# Patient Record
Sex: FEMALE | Race: BLACK OR AFRICAN AMERICAN | NOT HISPANIC OR LATINO | Employment: OTHER | URBAN - METROPOLITAN AREA
[De-identification: names, ages, dates, MRNs, and addresses within clinical notes are randomized per-mention and may not be internally consistent; named-entity substitution may affect disease eponyms.]

---

## 2017-05-27 ENCOUNTER — HOSPITAL ENCOUNTER (OUTPATIENT)
Facility: HOSPITAL | Age: 62
Discharge: HOME | DRG: 305 | End: 2017-05-29
Attending: EMERGENCY MEDICINE | Admitting: INTERNAL MEDICINE
Payer: COMMERCIAL

## 2017-05-27 DIAGNOSIS — E78.5 HYPERLIPIDEMIA, UNSPECIFIED HYPERLIPIDEMIA TYPE: Chronic | ICD-10-CM

## 2017-05-27 DIAGNOSIS — I16.9 HYPERTENSIVE CRISIS: Primary | ICD-10-CM

## 2017-05-27 DIAGNOSIS — R51.9 HEADACHE: ICD-10-CM

## 2017-05-27 DIAGNOSIS — R51.9 HEADACHE, UNSPECIFIED HEADACHE TYPE: ICD-10-CM

## 2017-05-27 PROBLEM — R71.8 MICROCYTOSIS: Status: ACTIVE | Noted: 2017-05-27

## 2017-05-27 LAB
ALBUMIN SERPL BCP-MCNC: 4.2 G/DL
ALP SERPL-CCNC: 61 U/L
ALT SERPL W/O P-5'-P-CCNC: 12 U/L
ANION GAP SERPL CALC-SCNC: 11 MMOL/L
APTT BLDCRRT: 25.8 SEC
AST SERPL-CCNC: 17 U/L
BACTERIA #/AREA URNS HPF: NORMAL /HPF
BASOPHILS # BLD AUTO: 0.01 K/UL
BASOPHILS NFR BLD: 0.2 %
BILIRUB SERPL-MCNC: 0.7 MG/DL
BILIRUB UR QL STRIP: NEGATIVE
BUN SERPL-MCNC: 10 MG/DL
CALCIUM SERPL-MCNC: 9.6 MG/DL
CHLORIDE SERPL-SCNC: 105 MMOL/L
CLARITY UR: CLEAR
CO2 SERPL-SCNC: 28 MMOL/L
COLOR UR: YELLOW
CREAT SERPL-MCNC: 0.9 MG/DL
DIFFERENTIAL METHOD: ABNORMAL
EOSINOPHIL # BLD AUTO: 0.1 K/UL
EOSINOPHIL NFR BLD: 1.1 %
ERYTHROCYTE [DISTWIDTH] IN BLOOD BY AUTOMATED COUNT: 14.6 %
EST. GFR  (AFRICAN AMERICAN): >60 ML/MIN/1.73 M^2
EST. GFR  (NON AFRICAN AMERICAN): >60 ML/MIN/1.73 M^2
FERRITIN SERPL-MCNC: 143 NG/ML
GLUCOSE SERPL-MCNC: 127 MG/DL
GLUCOSE UR QL STRIP: NEGATIVE
HCT VFR BLD AUTO: 40 %
HGB BLD-MCNC: 12.9 G/DL
HGB UR QL STRIP: NEGATIVE
HYALINE CASTS #/AREA URNS LPF: 0 /LPF
INR PPP: 1
KETONES UR QL STRIP: NEGATIVE
LEUKOCYTE ESTERASE UR QL STRIP: NEGATIVE
LYMPHOCYTES # BLD AUTO: 1.3 K/UL
LYMPHOCYTES NFR BLD: 22.5 %
MAGNESIUM SERPL-MCNC: 2.2 MG/DL
MCH RBC QN AUTO: 26.2 PG
MCHC RBC AUTO-ENTMCNC: 32.3 %
MCV RBC AUTO: 81 FL
MICROSCOPIC COMMENT: NORMAL
MONOCYTES # BLD AUTO: 0.4 K/UL
MONOCYTES NFR BLD: 6.2 %
NEUTROPHILS # BLD AUTO: 4 K/UL
NEUTROPHILS NFR BLD: 70 %
NITRITE UR QL STRIP: NEGATIVE
PH UR STRIP: 8 [PH] (ref 5–8)
PHOSPHATE SERPL-MCNC: 3.8 MG/DL
PLATELET # BLD AUTO: 222 K/UL
PMV BLD AUTO: 10.1 FL
POCT GLUCOSE: 145 MG/DL (ref 70–110)
POTASSIUM SERPL-SCNC: 3.5 MMOL/L
PROT SERPL-MCNC: 7.3 G/DL
PROT UR QL STRIP: ABNORMAL
PROTHROMBIN TIME: 10.4 SEC
RBC # BLD AUTO: 4.92 M/UL
RBC #/AREA URNS HPF: 1 /HPF (ref 0–4)
SODIUM SERPL-SCNC: 144 MMOL/L
SP GR UR STRIP: 1.01 (ref 1–1.03)
SQUAMOUS #/AREA URNS HPF: 4 /HPF
TROPONIN I SERPL DL<=0.01 NG/ML-MCNC: <0.006 NG/ML
TSH SERPL DL<=0.005 MIU/L-ACNC: 0.94 UIU/ML
URN SPEC COLLECT METH UR: ABNORMAL
UROBILINOGEN UR STRIP-ACNC: 1 EU/DL
WBC # BLD AUTO: 5.64 K/UL
WBC #/AREA URNS HPF: 1 /HPF (ref 0–5)

## 2017-05-27 PROCEDURE — 84443 ASSAY THYROID STIM HORMONE: CPT

## 2017-05-27 PROCEDURE — 82607 VITAMIN B-12: CPT

## 2017-05-27 PROCEDURE — 93005 ELECTROCARDIOGRAM TRACING: CPT

## 2017-05-27 PROCEDURE — 84145 PROCALCITONIN (PCT): CPT

## 2017-05-27 PROCEDURE — 82962 GLUCOSE BLOOD TEST: CPT

## 2017-05-27 PROCEDURE — 85025 COMPLETE CBC W/AUTO DIFF WBC: CPT

## 2017-05-27 PROCEDURE — 84484 ASSAY OF TROPONIN QUANT: CPT

## 2017-05-27 PROCEDURE — 81000 URINALYSIS NONAUTO W/SCOPE: CPT

## 2017-05-27 PROCEDURE — 83540 ASSAY OF IRON: CPT

## 2017-05-27 PROCEDURE — 96375 TX/PRO/DX INJ NEW DRUG ADDON: CPT

## 2017-05-27 PROCEDURE — 80053 COMPREHEN METABOLIC PANEL: CPT

## 2017-05-27 PROCEDURE — 25000003 PHARM REV CODE 250: Performed by: STUDENT IN AN ORGANIZED HEALTH CARE EDUCATION/TRAINING PROGRAM

## 2017-05-27 PROCEDURE — 11000001 HC ACUTE MED/SURG PRIVATE ROOM

## 2017-05-27 PROCEDURE — 99291 CRITICAL CARE FIRST HOUR: CPT | Mod: 25

## 2017-05-27 PROCEDURE — 85730 THROMBOPLASTIN TIME PARTIAL: CPT

## 2017-05-27 PROCEDURE — 82728 ASSAY OF FERRITIN: CPT

## 2017-05-27 PROCEDURE — 83036 HEMOGLOBIN GLYCOSYLATED A1C: CPT

## 2017-05-27 PROCEDURE — 96374 THER/PROPH/DIAG INJ IV PUSH: CPT

## 2017-05-27 PROCEDURE — G0378 HOSPITAL OBSERVATION PER HR: HCPCS

## 2017-05-27 PROCEDURE — 84100 ASSAY OF PHOSPHORUS: CPT

## 2017-05-27 PROCEDURE — 25000003 PHARM REV CODE 250: Performed by: EMERGENCY MEDICINE

## 2017-05-27 PROCEDURE — 85610 PROTHROMBIN TIME: CPT

## 2017-05-27 PROCEDURE — 63600175 PHARM REV CODE 636 W HCPCS: Performed by: EMERGENCY MEDICINE

## 2017-05-27 PROCEDURE — 83735 ASSAY OF MAGNESIUM: CPT

## 2017-05-27 PROCEDURE — 82746 ASSAY OF FOLIC ACID SERUM: CPT

## 2017-05-27 RX ORDER — LABETALOL HYDROCHLORIDE 5 MG/ML
10 INJECTION, SOLUTION INTRAVENOUS EVERY 4 HOURS PRN
Status: DISCONTINUED | OUTPATIENT
Start: 2017-05-27 | End: 2017-05-29 | Stop reason: HOSPADM

## 2017-05-27 RX ORDER — IBUPROFEN 200 MG
24 TABLET ORAL
Status: DISCONTINUED | OUTPATIENT
Start: 2017-05-27 | End: 2017-05-29 | Stop reason: HOSPADM

## 2017-05-27 RX ORDER — ONDANSETRON 4 MG/1
4 TABLET, ORALLY DISINTEGRATING ORAL
Status: COMPLETED | OUTPATIENT
Start: 2017-05-27 | End: 2017-05-27

## 2017-05-27 RX ORDER — TRAZODONE HYDROCHLORIDE 100 MG/1
100 TABLET ORAL NIGHTLY
Status: DISCONTINUED | OUTPATIENT
Start: 2017-05-28 | End: 2017-05-29 | Stop reason: HOSPADM

## 2017-05-27 RX ORDER — FAMOTIDINE 20 MG/1
20 TABLET, FILM COATED ORAL 2 TIMES DAILY
Status: DISCONTINUED | OUTPATIENT
Start: 2017-05-27 | End: 2017-05-29 | Stop reason: HOSPADM

## 2017-05-27 RX ORDER — LISINOPRIL 20 MG/1
20 TABLET ORAL DAILY
Status: DISCONTINUED | OUTPATIENT
Start: 2017-05-27 | End: 2017-05-29

## 2017-05-27 RX ORDER — HEPARIN SODIUM 5000 [USP'U]/ML
5000 INJECTION, SOLUTION INTRAVENOUS; SUBCUTANEOUS EVERY 12 HOURS
Status: DISCONTINUED | OUTPATIENT
Start: 2017-05-28 | End: 2017-05-29 | Stop reason: HOSPADM

## 2017-05-27 RX ORDER — ATORVASTATIN CALCIUM 40 MG/1
40 TABLET, FILM COATED ORAL DAILY
Status: DISCONTINUED | OUTPATIENT
Start: 2017-05-28 | End: 2017-05-29 | Stop reason: HOSPADM

## 2017-05-27 RX ORDER — GLUCAGON 1 MG
1 KIT INJECTION
Status: DISCONTINUED | OUTPATIENT
Start: 2017-05-27 | End: 2017-05-29 | Stop reason: HOSPADM

## 2017-05-27 RX ORDER — MORPHINE SULFATE 2 MG/ML
4 INJECTION, SOLUTION INTRAMUSCULAR; INTRAVENOUS
Status: COMPLETED | OUTPATIENT
Start: 2017-05-27 | End: 2017-05-27

## 2017-05-27 RX ORDER — NAPROXEN SODIUM 220 MG/1
81 TABLET, FILM COATED ORAL DAILY
Status: ON HOLD | COMMUNITY
End: 2017-05-29

## 2017-05-27 RX ORDER — ONDANSETRON 8 MG/1
8 TABLET, ORALLY DISINTEGRATING ORAL EVERY 8 HOURS PRN
Status: DISCONTINUED | OUTPATIENT
Start: 2017-05-27 | End: 2017-05-29 | Stop reason: HOSPADM

## 2017-05-27 RX ORDER — CITALOPRAM 20 MG/1
40 TABLET, FILM COATED ORAL DAILY
Status: DISCONTINUED | OUTPATIENT
Start: 2017-05-28 | End: 2017-05-29 | Stop reason: HOSPADM

## 2017-05-27 RX ORDER — METHOCARBAMOL 500 MG/1
500 TABLET, FILM COATED ORAL 4 TIMES DAILY PRN
Status: DISCONTINUED | OUTPATIENT
Start: 2017-05-27 | End: 2017-05-29 | Stop reason: HOSPADM

## 2017-05-27 RX ORDER — INSULIN ASPART 100 [IU]/ML
0-5 INJECTION, SOLUTION INTRAVENOUS; SUBCUTANEOUS
Status: DISCONTINUED | OUTPATIENT
Start: 2017-05-27 | End: 2017-05-29 | Stop reason: HOSPADM

## 2017-05-27 RX ORDER — IBUPROFEN 200 MG
16 TABLET ORAL
Status: DISCONTINUED | OUTPATIENT
Start: 2017-05-27 | End: 2017-05-29 | Stop reason: HOSPADM

## 2017-05-27 RX ORDER — NAPROXEN SODIUM 220 MG/1
81 TABLET, FILM COATED ORAL DAILY
Status: DISCONTINUED | OUTPATIENT
Start: 2017-05-28 | End: 2017-05-29 | Stop reason: HOSPADM

## 2017-05-27 RX ORDER — HYDRALAZINE HYDROCHLORIDE 20 MG/ML
10 INJECTION INTRAMUSCULAR; INTRAVENOUS
Status: COMPLETED | OUTPATIENT
Start: 2017-05-27 | End: 2017-05-27

## 2017-05-27 RX ORDER — HYDROCODONE BITARTRATE AND ACETAMINOPHEN 10; 325 MG/1; MG/1
1 TABLET ORAL EVERY 6 HOURS PRN
Status: DISCONTINUED | OUTPATIENT
Start: 2017-05-27 | End: 2017-05-29 | Stop reason: HOSPADM

## 2017-05-27 RX ORDER — CARVEDILOL 6.25 MG/1
6.25 TABLET ORAL 2 TIMES DAILY WITH MEALS
Status: DISCONTINUED | OUTPATIENT
Start: 2017-05-28 | End: 2017-05-29 | Stop reason: HOSPADM

## 2017-05-27 RX ORDER — HYDROCHLOROTHIAZIDE 25 MG/1
25 TABLET ORAL DAILY
Status: DISCONTINUED | OUTPATIENT
Start: 2017-05-28 | End: 2017-05-29

## 2017-05-27 RX ORDER — ACETAMINOPHEN 325 MG/1
650 TABLET ORAL EVERY 6 HOURS PRN
Status: DISCONTINUED | OUTPATIENT
Start: 2017-05-27 | End: 2017-05-29 | Stop reason: HOSPADM

## 2017-05-27 RX ADMIN — HYDRALAZINE HYDROCHLORIDE 10 MG: 20 INJECTION INTRAMUSCULAR; INTRAVENOUS at 08:05

## 2017-05-27 RX ADMIN — FAMOTIDINE 20 MG: 20 TABLET, FILM COATED ORAL at 11:05

## 2017-05-27 RX ADMIN — MORPHINE SULFATE 4 MG: 2 INJECTION, SOLUTION INTRAMUSCULAR; INTRAVENOUS at 08:05

## 2017-05-27 RX ADMIN — ACETAMINOPHEN 650 MG: 325 TABLET ORAL at 10:05

## 2017-05-27 RX ADMIN — ONDANSETRON 4 MG: 4 TABLET, ORALLY DISINTEGRATING ORAL at 08:05

## 2017-05-27 RX ADMIN — LISINOPRIL 20 MG: 20 TABLET ORAL at 11:05

## 2017-05-27 RX ADMIN — LABETALOL HYDROCHLORIDE 10 MG: 5 INJECTION, SOLUTION INTRAVENOUS at 11:05

## 2017-05-28 LAB
ALBUMIN SERPL BCP-MCNC: 3.6 G/DL
ALP SERPL-CCNC: 51 U/L
ALT SERPL W/O P-5'-P-CCNC: 9 U/L
ANION GAP SERPL CALC-SCNC: 10 MMOL/L
AST SERPL-CCNC: 13 U/L
BASOPHILS # BLD AUTO: 0.01 K/UL
BASOPHILS NFR BLD: 0.2 %
BILIRUB SERPL-MCNC: 0.8 MG/DL
BUN SERPL-MCNC: 10 MG/DL
CALCIUM SERPL-MCNC: 8.8 MG/DL
CHLORIDE SERPL-SCNC: 103 MMOL/L
CHOLEST/HDLC SERPL: 2.8 {RATIO}
CO2 SERPL-SCNC: 26 MMOL/L
CREAT SERPL-MCNC: 0.8 MG/DL
CRP SERPL-MCNC: 17.7 MG/L
DIFFERENTIAL METHOD: ABNORMAL
EOSINOPHIL # BLD AUTO: 0 K/UL
EOSINOPHIL NFR BLD: 0.2 %
ERYTHROCYTE [DISTWIDTH] IN BLOOD BY AUTOMATED COUNT: 14.7 %
ERYTHROCYTE [SEDIMENTATION RATE] IN BLOOD BY WESTERGREN METHOD: 18 MM/HR
EST. GFR  (AFRICAN AMERICAN): >60 ML/MIN/1.73 M^2
EST. GFR  (NON AFRICAN AMERICAN): >60 ML/MIN/1.73 M^2
ESTIMATED AVG GLUCOSE: 180 MG/DL
FOLATE SERPL-MCNC: 12.6 NG/ML
GLUCOSE SERPL-MCNC: 140 MG/DL
HBA1C MFR BLD HPLC: 7.9 %
HCT VFR BLD AUTO: 35.3 %
HDL/CHOLESTEROL RATIO: 36.2 %
HDLC SERPL-MCNC: 127 MG/DL
HDLC SERPL-MCNC: 46 MG/DL
HGB BLD-MCNC: 11.4 G/DL
IRON SERPL-MCNC: 37 UG/DL
LDLC SERPL CALC-MCNC: 63 MG/DL
LYMPHOCYTES # BLD AUTO: 1 K/UL
LYMPHOCYTES NFR BLD: 17.3 %
MCH RBC QN AUTO: 26 PG
MCHC RBC AUTO-ENTMCNC: 32.3 %
MCV RBC AUTO: 80 FL
MONOCYTES # BLD AUTO: 0.3 K/UL
MONOCYTES NFR BLD: 4.2 %
NEUTROPHILS # BLD AUTO: 4.6 K/UL
NEUTROPHILS NFR BLD: 77.8 %
NONHDLC SERPL-MCNC: 81 MG/DL
PLATELET # BLD AUTO: 193 K/UL
PMV BLD AUTO: 10.2 FL
POCT GLUCOSE: 232 MG/DL (ref 70–110)
POCT GLUCOSE: 276 MG/DL (ref 70–110)
POCT GLUCOSE: 290 MG/DL (ref 70–110)
POTASSIUM SERPL-SCNC: 3.1 MMOL/L
PROCALCITONIN SERPL IA-MCNC: <0.09 NG/ML
PROT SERPL-MCNC: 6.1 G/DL
RBC # BLD AUTO: 4.39 M/UL
SATURATED IRON: 10 %
SODIUM SERPL-SCNC: 139 MMOL/L
TOTAL IRON BINDING CAPACITY: 358 UG/DL
TRANSFERRIN SERPL-MCNC: 242 MG/DL
TRIGL SERPL-MCNC: 90 MG/DL
VIT B12 SERPL-MCNC: 1941 PG/ML
WBC # BLD AUTO: 5.89 K/UL

## 2017-05-28 PROCEDURE — 94761 N-INVAS EAR/PLS OXIMETRY MLT: CPT

## 2017-05-28 PROCEDURE — 63600175 PHARM REV CODE 636 W HCPCS: Performed by: STUDENT IN AN ORGANIZED HEALTH CARE EDUCATION/TRAINING PROGRAM

## 2017-05-28 PROCEDURE — 86140 C-REACTIVE PROTEIN: CPT

## 2017-05-28 PROCEDURE — 36415 COLL VENOUS BLD VENIPUNCTURE: CPT

## 2017-05-28 PROCEDURE — 85652 RBC SED RATE AUTOMATED: CPT

## 2017-05-28 PROCEDURE — 85025 COMPLETE CBC W/AUTO DIFF WBC: CPT

## 2017-05-28 PROCEDURE — 25000003 PHARM REV CODE 250: Performed by: STUDENT IN AN ORGANIZED HEALTH CARE EDUCATION/TRAINING PROGRAM

## 2017-05-28 PROCEDURE — 80053 COMPREHEN METABOLIC PANEL: CPT

## 2017-05-28 PROCEDURE — 80061 LIPID PANEL: CPT

## 2017-05-28 PROCEDURE — G0378 HOSPITAL OBSERVATION PER HR: HCPCS

## 2017-05-28 PROCEDURE — 11000001 HC ACUTE MED/SURG PRIVATE ROOM

## 2017-05-28 RX ORDER — POTASSIUM CHLORIDE 20 MEQ/1
40 TABLET, EXTENDED RELEASE ORAL
Status: COMPLETED | OUTPATIENT
Start: 2017-05-28 | End: 2017-05-28

## 2017-05-28 RX ORDER — FLUTICASONE PROPIONATE 50 MCG
2 SPRAY, SUSPENSION (ML) NASAL DAILY
Status: DISCONTINUED | OUTPATIENT
Start: 2017-05-28 | End: 2017-05-29 | Stop reason: HOSPADM

## 2017-05-28 RX ORDER — PREDNISONE 20 MG/1
60 TABLET ORAL DAILY
Status: DISCONTINUED | OUTPATIENT
Start: 2017-05-28 | End: 2017-05-29

## 2017-05-28 RX ADMIN — HYDROCODONE BITARTRATE AND ACETAMINOPHEN 1 TABLET: 10; 325 TABLET ORAL at 12:05

## 2017-05-28 RX ADMIN — LISINOPRIL 20 MG: 20 TABLET ORAL at 08:05

## 2017-05-28 RX ADMIN — CITALOPRAM HYDROBROMIDE 40 MG: 20 TABLET ORAL at 08:05

## 2017-05-28 RX ADMIN — HYDROCODONE BITARTRATE AND ACETAMINOPHEN 1 TABLET: 10; 325 TABLET ORAL at 06:05

## 2017-05-28 RX ADMIN — ATORVASTATIN CALCIUM 40 MG: 40 TABLET, FILM COATED ORAL at 09:05

## 2017-05-28 RX ADMIN — HEPARIN SODIUM 5000 UNITS: 5000 INJECTION, SOLUTION INTRAVENOUS; SUBCUTANEOUS at 09:05

## 2017-05-28 RX ADMIN — POTASSIUM CHLORIDE 40 MEQ: 20 TABLET, EXTENDED RELEASE ORAL at 06:05

## 2017-05-28 RX ADMIN — FAMOTIDINE 20 MG: 20 TABLET, FILM COATED ORAL at 09:05

## 2017-05-28 RX ADMIN — CARVEDILOL 6.25 MG: 6.25 TABLET, FILM COATED ORAL at 04:05

## 2017-05-28 RX ADMIN — HEPARIN SODIUM 5000 UNITS: 5000 INJECTION, SOLUTION INTRAVENOUS; SUBCUTANEOUS at 12:05

## 2017-05-28 RX ADMIN — HEPARIN SODIUM 5000 UNITS: 5000 INJECTION, SOLUTION INTRAVENOUS; SUBCUTANEOUS at 08:05

## 2017-05-28 RX ADMIN — TRAZODONE HYDROCHLORIDE 100 MG: 100 TABLET ORAL at 09:05

## 2017-05-28 RX ADMIN — HYDROCHLOROTHIAZIDE 25 MG: 25 TABLET ORAL at 08:05

## 2017-05-28 RX ADMIN — INSULIN ASPART 2 UNITS: 100 INJECTION, SOLUTION INTRAVENOUS; SUBCUTANEOUS at 12:05

## 2017-05-28 RX ADMIN — ACETAMINOPHEN 650 MG: 325 TABLET ORAL at 02:05

## 2017-05-28 RX ADMIN — PREDNISONE 60 MG: 20 TABLET ORAL at 09:05

## 2017-05-28 RX ADMIN — FAMOTIDINE 20 MG: 20 TABLET, FILM COATED ORAL at 08:05

## 2017-05-28 RX ADMIN — POTASSIUM CHLORIDE 40 MEQ: 20 TABLET, EXTENDED RELEASE ORAL at 08:05

## 2017-05-28 RX ADMIN — FLUTICASONE PROPIONATE 2 SPRAY: 50 SPRAY, METERED NASAL at 12:05

## 2017-05-28 RX ADMIN — ASPIRIN 81 MG CHEWABLE TABLET 81 MG: 81 TABLET CHEWABLE at 08:05

## 2017-05-28 RX ADMIN — INSULIN ASPART 3 UNITS: 100 INJECTION, SOLUTION INTRAVENOUS; SUBCUTANEOUS at 04:05

## 2017-05-28 RX ADMIN — CARVEDILOL 6.25 MG: 6.25 TABLET, FILM COATED ORAL at 08:05

## 2017-05-28 NOTE — ED NOTES
Pt reports that she has been having cough, nasal congestion, post nasal drip, and R ear pain for 2 days. Started taking Nyquil yesterday with no relief in symptoms. Very HTN in triage, c/o headache to R temple with fatigue/generalized weakness. Denies N/V, dizziness, vision changes, CP, and SOB. Hx CVA last year. Pt appears uncomfortable. AAO. Dr. Buchanan at bedside.    APPEARANCE: Alert, oriented.  CARDIAC: Normal rate and rhythm.   PERIPHERAL VASCULAR: peripheral pulses present. Normal cap refill. No edema. Warm to touch.    RESPIRATORY:Normal rate and effort, breath sounds clear bilaterally throughout chest. Respirations are equal and unlabored no obvious signs of distress. +cough, nasal congestion  GASTRO: soft, no tenderness, no abdominal distention.  MUSC: Full ROM. No bony tenderness or soft tissue tenderness. No obvious deformity. Fatigue/generalized weakness  SKIN: Skin is warm and dry, normal skin turgor.  NEURO: 5/5 strength major flexors/extensors bilaterally. Joe coma scale: eyes open spontaneously-4, oriented & converses-5, obeys commands-6. +temporal headache  MENTAL STATUS: awake, alert and aware of environment.

## 2017-05-28 NOTE — PROGRESS NOTES
"[facility] Resident HO-I Progress Note    Subjective:      Autumn Smart is a 61 y.o.  female who is being followed by the [facility] service at [facility] for HTN urgency.     Pt with continued right temporal headache, although less severe than prior. Denies blurry vision or weakness.      Objective:   Last 24 Hour Vital Signs:  BP  Min: 137/65  Max: 252/116  Temp  Av.8 °F (38.2 °C)  Min: 98.9 °F (37.2 °C)  Max: 102.4 °F (39.1 °C)  Pulse  Av.2  Min: 80  Max: 97  Resp  Av.7  Min: 18  Max: 25  SpO2  Av.5 %  Min: 94 %  Max: 100 %  Height  Av' 7" (170.2 cm)  Min: 5' 7" (170.2 cm)  Max: 5' 7" (170.2 cm)  Weight  Av.1 kg (203 lb 0.4 oz)  Min: 92.1 kg (203 lb)  Max: 92.1 kg (203 lb 0.7 oz)  I/O last 3 completed shifts:  In: -   Out: 400 [Urine:400]    Physical Examination:  General appearance: alert, appears stated age and cooperative  Head: Normocephalic, without obvious abnormality, atraumatic  Eyes: EOMI, PERRL, watery eyes  Throat: MMM, poor dentition   Lungs: clear to auscultation bilaterally  Heart: regular rate and rhythm, S1, S2 normal, no murmur, click, rub or gallop  Abdomen: soft, non-tender; bowel sounds normal; no masses,  no organomegaly  Extremities: extremities normal, atraumatic, no cyanosis or edema  Pulses: 2+ and symmetric  Neurologic: Alert and oriented X 3, normal strength and tone. Normal symmetric reflexes. Normal coordination and gait        Laboratory:  Laboratory Data Reviewed: yes  Pertinent Findings:  CBC:   Lab Results   Component Value Date    WBC 5.89 2017    HGB 11.4 (L) 2017    HCT 35.3 (L) 2017     2017    MCV 80 (L) 2017    RDW 14.7 (H) 2017     BMP:   Lab Results   Component Value Date     2017    K 3.1 (L) 2017     2017    CO2 26 2017    BUN 10 2017     (H) 2017    CALCIUM 8.8 2017    MG 2.2 2017    PHOS 3.8 2017     LFTs:   Lab " Results   Component Value Date    PROT 6.1 05/28/2017    ALBUMIN 3.6 05/28/2017    BILITOT 0.8 05/28/2017    AST 13 05/28/2017    ALKPHOS 51 (L) 05/28/2017    ALT 9 (L) 05/28/2017     Coags:   Lab Results   Component Value Date    INR 1.0 05/27/2017     Lipids:   Lab Results   Component Value Date    CHOL 127 05/28/2017    HDL 46 05/28/2017    LDLCALC 63.0 05/28/2017    TRIG 90 05/28/2017     DM:   Lab Results   Component Value Date    HGBA1C 6.8 (H) 05/19/2016    LDLCALC 63.0 05/28/2017    CREATININE 0.8 05/28/2017     Thyroid:   Lab Results   Component Value Date    TSH 0.943 05/27/2017     Anemia:   Lab Results   Component Value Date    FERRITIN 143 05/27/2017    [id number] 1916 (H) 05/20/2016       Trending Labs:    Recent Labs  Lab 05/27/17 2022 05/27/17 2027 05/28/17  0455   WBC 5.64  --  5.89   HGB 12.9  --  11.4*   HCT 40.0  --  35.3*     --  193     --  139   K 3.5  --  3.1*     --  103   CO2 28  --  26   BUN 10  --  10   PROT 7.3  --  6.1   ALBUMIN 4.2  --  3.6   BILITOT 0.7  --  0.8   AST 17  --  13   ALKPHOS 61  --  51*   ALT 12  --  9*   INR  --  1.0  --          Microbiology Data Reviewed: yes  Pertinent Findings:  none    Other Results:  EKG (my interpretation): T wave inversions in inferolateral leads, unchanged from previous.    Radiology Data Reviewed: yes  Pertinent Findings:  Imaging Results          X-Ray Chest 1 View (Final result)  Result time 05/27/17 22:56:30    Final result by Eleazar Verdin MD (05/27/17 22:56:30)                 Impression:        No acute findings in the chest. No significant change from prior.        Electronically signed by: ELEAZAR VERDIN MD  Date:     05/27/17  Time:    22:56              Narrative:    Chest AP portable    Indication:fever.    Comparison:August 1, 2016.    Findings:         Heart and lungs unchanged when allowing for differences in technique and positioning.                             CT Head Without Contrast (Final result)   Result time 05/27/17 20:54:34    Final result by Jaspreet Mendez MD (05/27/17 20:54:34)                 Impression:         No intracranial hemorrhage or mass effect.      No interval detrimental change from 5/21/16.        Electronically signed by: Dr. Jaspreet Mendez MD  Date:     05/27/17  Time:    20:54              Narrative:    CT OF THE HEAD WITHOUT CONTRAST:     Technique: 5 mm axial images were obtained from the vertex to the skullbase, without administration of contrast.    Comparison: 5/21/16    Findings:    No intracranial hemorrhage, extra-axial fluid collection, midline shift, or mass effect.  No evidence of an acute cortical infarct or of an infarct in a major vascular territory.  The ventricles are normal in size and configuration, and the basilar cisterns are patent. Findings consistent with chronic infarction LEFT occipital lobe. If there is additional clinical concern for acute ischemia, MRI with diffusion-weighted imaging could be obtained.      The calvarium is unremarkable. Visualized portions of the paranasal sinuses are clear. Visualized mastoid air cells are clear. Visualized orbits are unremarkable.                                Current Medications:     Infusions:        Scheduled:   aspirin  81 mg Oral Daily    atorvastatin  40 mg Oral Daily    carvedilol  6.25 mg Oral BID WM    citalopram  40 mg Oral Daily    famotidine  20 mg Oral BID    heparin (porcine)  5,000 Units Subcutaneous Q12H    hydrochlorothiazide  25 mg Oral Daily    lisinopril  20 mg Oral Daily    potassium chloride  40 mEq Oral Q2H    trazodone  100 mg Oral QHS        PRN:  acetaminophen, dextrose 50%, dextrose 50%, glucagon (human recombinant), glucose, glucose, hydrocodone-acetaminophen 10-325mg, insulin aspart, labetalol, methocarbamol, ondansetron    Antibiotics and Day Number of Therapy:  -    Lines and Day Number of Therapy:  -    Assessment:     Autumn Smart is a 61 y.o.female with  Patient Active  Problem List    Diagnosis Date Noted    Hypertensive crisis 05/27/2017    Microcytosis 05/27/2017    HLD (hyperlipidemia) 05/21/2016    Hypovolemia 05/20/2016    Right sided weakness 05/19/2016    Arterial ischemic stroke, PCA, left, acute 05/19/2016    Visual disturbance 05/19/2016    Cytotoxic brain edema 05/19/2016        Plan:     Hypertensive urgency, resolved  - presented to ED with BP of 252/116   - complaining of 2 day history of HA, otherwise asymptomatic with normal lab values   - home med list with amlodipine-norvasc 5-320 mg, coreg 6.25 mg BID, lisinopril 20-12.5 mg but patient unsure which medications she takes at home   - troponin negative, EKG unchanged from previous   - restarted lisinopril 20 mg ,HCTZ 25 mg, coreg 6.25 BID  - /65 this AM      Fever unknown etiology, resolved  - Pt febrile to 102.9 on admission  WBC normal, bland UA  - procal negative procal  CXR unremarkable   - Normothermic on exam this AM   - continue to monitor      DM2  - most recent A1C of 6.8  repeat pending   - SSI as needed for glucose control      HLD  - continue home statin      H/O CVA  - continue Asa, statin, and blood pressure control      Normocytic Anemia   - Hgb 12.9 -> 11.4  - has not had colonoscopy   - iron studies pending      HCM  - pt not up to date on vaccinations or cancer screening  order prior to discharge      F -   E - replete as needed  N - diabetic      PPX - SQH     Kun Escobedo  [facility]-I  [facility] IM Service Team B    [facility] Hospitalist Pager numbers:   [facility] Hospitalist Medicine Team A (Lucio/Jaja): [phone]  [facility] Hospitalist Medicine Team B (Chad/Angelo):  [phone]

## 2017-05-28 NOTE — PLAN OF CARE
Problem: Hypertensive Disease/Crisis (Arterial) (Adult)  Goal: Signs and Symptoms of Listed Potential Problems Will be Absent, Minimized or Managed (Hypertensive Disease/Crisis)  Signs and symptoms of listed potential problems will be absent, minimized or managed by discharge/transition of care (reference Hypertensive Disease/Crisis (Arterial) (Adult) CPG).   Outcome: Ongoing (interventions implemented as appropriate)  Pt stable. NO distress noted. POC reviewed with pt. Pt verbalized understanding. Pt remains SR on the monitor. NO true red alarms noted. Pt c/o right temple headache. Po pain meds administered. Pt received PO tylenol for fever and PO potassium for hypokalemia. Education given before administration. Blood pressure monitored throughout the night. Antihypertensives given in ED to maintained BP WDL. Fall precautions maintained. Bed in lowest position, call light in reach and bed alarm on.

## 2017-05-28 NOTE — PLAN OF CARE
Problem: Patient Care Overview  Goal: Plan of Care Review  Outcome: Ongoing (interventions implemented as appropriate)  Plan of care reviewed with the patient, verbalized understanding, c/o headache beginning of the shift, moderate relief with PRN pain medication, has remained afebrile throughout shift, no true red alarms or ectopy noted, SR on tele, will continue to monitor.

## 2017-05-28 NOTE — ED PROVIDER NOTES
Encounter Date: 5/27/2017       History     Chief Complaint   Patient presents with    Fatigue     reports weakness and fatigue with subjective fever, headache, chills cough and nasal congetion for 2 days    Fever    Headache    Chills    Cough    Nasal Congestion     Review of patient's allergies indicates:  No Known Allergies  This is a 61-year-old lady with a history of hypertension and a recent CVA.   Her chief complaint to me is a headache.  The headache is in the left frontal area and is moderate in intensity.  She also says her sinuses feel full and she has the sensation of a post-nasal drip with intermittent coughing.   Her HA is not exacerbated or relieved by anything and was of gradual onset today.    Her BP is markedly elevated at presentation.           Past Medical History:   Diagnosis Date    Back pain     Diabetes mellitus     Hypertension      Past Surgical History:   Procedure Laterality Date    BACK SURGERY      FRACTURE SURGERY       History reviewed. No pertinent family history.  Social History   Substance Use Topics    Smoking status: Never Smoker    Smokeless tobacco: Not on file    Alcohol use 0.6 oz/week     1 Cans of beer per week     Review of Systems   Constitutional: Negative for chills and fever.   HENT: Positive for postnasal drip, sinus pressure and tinnitus. Negative for nosebleeds, sore throat and voice change.    Eyes: Negative for photophobia, pain and visual disturbance.   Respiratory: Positive for cough. Negative for chest tightness, shortness of breath, wheezing and stridor.    Cardiovascular: Negative for chest pain, palpitations and leg swelling.        Denies palpitations   Gastrointestinal: Positive for nausea. Negative for abdominal distention and vomiting.   Neurological: Positive for dizziness, light-headedness and headaches. Negative for tremors, seizures, syncope, facial asymmetry, speech difficulty, weakness and numbness.   All other systems reviewed and  are negative.      Physical Exam     Initial Vitals [05/27/17 1958]   BP Pulse Resp Temp SpO2   (!) 252/116 91 (!) 21 99.8 °F (37.7 °C) 97 %     Physical Exam    Nursing note and vitals reviewed.  Constitutional: She appears well-developed and well-nourished. She appears distressed.   HENT:   Head: Normocephalic and atraumatic.   Right Ear: External ear normal.   Mouth/Throat: Oropharynx is clear and moist.   Eyes: Conjunctivae and EOM are normal. Pupils are equal, round, and reactive to light.   Neck: Normal range of motion. Neck supple. No JVD present.   Cardiovascular: Normal rate, regular rhythm, normal heart sounds and intact distal pulses. Exam reveals no gallop and no friction rub.    No murmur heard.  Pulmonary/Chest: Breath sounds normal. No respiratory distress. She has no wheezes. She has no rhonchi. She exhibits no tenderness.   Abdominal: Soft. She exhibits no distension, no pulsatile midline mass and no mass. There is no tenderness. There is no rebound and no guarding.   Musculoskeletal: Normal range of motion. She exhibits no edema.   Lymphadenopathy:     She has no cervical adenopathy.   Neurological: She is alert and oriented to person, place, and time. She has normal strength. No cranial nerve deficit or sensory deficit. Coordination and gait normal.   Skin: Skin is warm and dry. Capillary refill takes less than 2 seconds. No rash noted.   Psychiatric: She has a normal mood and affect. Her behavior is normal. Judgment and thought content normal.         ED Course   Critical Care  Date/Time: 5/27/2017 9:22 PM  Performed by: GILBERT DE JESUS.  Authorized by: GILBERT DE JESUS.   Direct patient critical care time: 20 minutes  Additional history critical care time: 5 minutes  Ordering / reviewing critical care time: 5 minutes  Documentation critical care time: 15 minutes  Consulting other physicians critical care time: 5 minutes  Total critical care time (exclusive of procedural time) : 50  minutes  Critical care was necessary to treat or prevent imminent or life-threatening deterioration of the following conditions: CNS failure or compromise and cardiac failure.  Critical care was time spent personally by me on the following activities: discussions with consultants, evaluation of patient's response to treatment, examination of patient, obtaining history from patient or surrogate, ordering and performing treatments and interventions, ordering and review of laboratory studies, ordering and review of radiographic studies, pulse oximetry, re-evaluation of patient's condition, review of old charts and development of treatment plan with patient or surrogate.        Labs Reviewed   POCT GLUCOSE - Abnormal; Notable for the following:        Result Value    POCT Glucose 145 (*)     All other components within normal limits   CBC W/ AUTO DIFFERENTIAL   COMPREHENSIVE METABOLIC PANEL   TROPONIN I   URINALYSIS     EKG Readings: (Independently Interpreted)   ST Segments: Normal ST Segments. T Waves Flipped: II, III, AVF, V1, V2 and V3.   NSR, T wave inversions in the inferior leads and lateral leads, rate 91 .  Inferolateral T-wave findings mentioned in EKG reports from last year.          Medical Decision Making:   Initial Assessment:   Although the lady presented with what seemed to be infectious symptoms (post-nasal drip, fever, fatigue according to the triage note), her exam and my history do not suggest this.  I believe her symptoms to be the sequela of her markedly elevated blood pressure.   These symptoms have improved with blood pressure control.   Differential Diagnosis:   Considered hemorrhagic CVA; onset was gradual and less than 12 hours ago.  CT was negative for acute findings.  No focal deficits to suggest ischemic CVA.  The lady was evaluated for this a year ago here.      BP improved at admission.   Clinical Tests:   Lab Tests: Ordered and Reviewed  The following lab test(s) were unremarkable: CBC,  CMP and Troponin  Radiological Study: Ordered and Reviewed  I discussed the patient with the [facility] resident who will see the patient in the ER for admission.                    ED Course     Clinical Impression:   The primary encounter diagnosis was Hypertensive crisis. A diagnosis of Headache was also pertinent to this visit.          Mario Buchanan MD  05/27/17 [id number]

## 2017-05-28 NOTE — H&P
"\[facility] [place] [facility]\"" Internal Medicine History and Physical - Resident Note    Admitting Team: U Team B  Attending Physician: Chad  Resident: Canelo   Interns: Guccione and Green    Date of Admit: 5/27/2017    Chief Complaint     Headache x 2 days     Subjective:      History of Present Illness:  Pt is a 61 year old female with past medical history of CVA in 2016, hypertension and DM2 presenting to the ED with headache x 2 days.     The patient was in their usual state of health until a day prior to admission when she developed a splitting headache in the right temple. Patient took multiple doses of Nyquil, but did not attain relief. Pt presented to the ED concerned after her headache did not go away over the past two days. Also endorsing nasal congestion, watery eyes, and generalized weakness, but denies blurry vision, nausea, vomiting, chest pain, shortness of breath, abdominal pain, constipation or diarrhea. Patient states that she is compliant with her medications but can't recall what she takes because "there are too many". Pt denies difficulty speaking or loss of sensation.     Past Medical History:  Past Medical History:   Diagnosis Date    Back pain     Diabetes mellitus     Hypertension        Past Surgical History:  Past Surgical History:   Procedure Laterality Date    BACK SURGERY      FRACTURE SURGERY         Allergies:  Review of patient's allergies indicates:  No Known Allergies    Home Medications:  Prior to Admission medications    Medication Sig Start Date End Date Taking? Authorizing Provider   amlodipine-valsartan (EXFORGE) 5-320 mg per tablet Take 1 tablet by mouth once daily.   Yes Historical Provider, MD   aspirin 81 MG Chew Take 81 mg by mouth once daily.   Yes Historical Provider, MD   carvedilol (COREG) 6.25 MG tablet Take 6.25 mg by mouth 2 (two) times daily with meals.   Yes Historical Provider, MD   citalopram (CELEXA) 40 MG tablet Take 40 mg by mouth once daily.   Yes Historical Provider, MD "   clonazepam (KLONOPIN) 1 MG tablet Take 1 mg by mouth 2 (two) times daily as needed.   Yes Historical Provider, MD   hydrocodone-acetaminophen  (LORTAB)  mg per tablet Take 1 tablet by mouth every 6 (six) hours as needed.   Yes Historical Provider, MD   hydrOXYzine (VISTARIL) 25 MG Cap Take 25 mg by mouth 4 (four) times daily.   Yes Historical Provider, MD   isosorbide dinitrate (ISORDIL) 20 MG tablet Take 20 mg by mouth 3 (three) times daily.   Yes Historical Provider, MD   lisinopril-hydrochlorothiazide (PRINZIDE,ZESTORETIC) 20-12.5 mg per tablet Take 1 tablet by mouth once daily.   Yes Historical Provider, MD   metformin (GLUCOPHAGE) 1000 MG tablet Take 1,000 mg by mouth 2 (two) times daily with meals.   Yes Historical Provider, MD   methocarbamol (ROBAXIN) 500 MG Tab Take 500 mg by mouth 4 (four) times daily.   Yes Historical Provider, MD   naproxen (NAPROSYN) 500 MG tablet Take 500 mg by mouth 2 (two) times daily.   Yes Historical Provider, MD   trazodone (DESYREL) 100 MG tablet Take 100 mg by mouth every evening.   Yes Historical Provider, MD   atorvastatin (LIPITOR) 40 MG tablet Take 1 tablet (40 mg total) by mouth once daily. 5/21/16 5/21/17  Tahira Jackson NP   diclofenac (VOLTAREN) 75 MG EC tablet Take 1 tablet (75 mg total) by mouth 2 (two) times daily. 6/10/13   Cheo Montoya NP   famotidine (PEPCID) 20 MG tablet Take 1 tablet (20 mg total) by mouth 2 (two) times daily. 8/1/16 8/6/16  Emily Beth MD   nitroGLYCERIN (NITROSTAT) 0.4 MG SL tablet Place 0.4 mg under the tongue every 5 (five) minutes as needed.    Historical Provider, MD       Family History:  History reviewed. No pertinent family history.    Social History:  Social History   Substance Use Topics    Smoking status: Never Smoker    Smokeless tobacco: Not on file    Alcohol use 0.6 oz/week     1 Cans of beer per week       Review of Systems:  Pertinent items are noted in HPI. All other systems are reviewed and are  "negative.    Health Maintaince :   Primary Care Physician: Lisbeth  Immunizations:   TDap is not up to date.  Influenza is not up to date.  Pneumovax is not up to date.  Cancer Screening:  PAP: is not up to date.  Mammogram: is not up to date.   Colonoscopy: is not up to date.      Objective:   Last 24 Hour Vital Signs:  BP  Min: 174/100  Max: 252/116  Temp  Av.1 °F (38.4 °C)  Min: 99.8 °F (37.7 °C)  Max: 102.4 °F (39.1 °C)  Pulse  Av.4  Min: 88  Max: 97  Resp  Av.8  Min: 19  Max: 25  SpO2  Av.4 %  Min: 94 %  Max: 100 %  Height  Av' 7" (170.2 cm)  Min: 5' 7" (170.2 cm)  Max: 5' 7" (170.2 cm)  Weight  Av.1 kg (203 lb)  Min: 92.1 kg (203 lb)  Max: 92.1 kg (203 lb)  Body mass index is 31.79 kg/m².  No intake/output data recorded.    Physical Examination:  BP (!) 174/100   Pulse 91   Temp (!) 102.4 °F (39.1 °C) (Oral)   Resp 19   Ht 5' 7" (1.702 m)   Wt 92.1 kg (203 lb)   SpO2 (!) 94%   BMI 31.79 kg/m²   General appearance: alert, appears stated age and cooperative  Head: Normocephalic, without obvious abnormality, atraumatic  Eyes: EOMI, PERRL, watery eyes  Throat: MMM, poor dentition   Lungs: clear to auscultation bilaterally  Heart: regular rate and rhythm, S1, S2 normal, no murmur, click, rub or gallop  Abdomen: soft, non-tender; bowel sounds normal; no masses,  no organomegaly  Extremities: extremities normal, atraumatic, no cyanosis or edema  Pulses: 2+ and symmetric  Skin: Skin color, texture, turgor normal. No rashes or lesions  Neurologic: Alert and oriented X 3, normal strength and tone. Normal symmetric reflexes. Normal coordination and gait      Laboratory:  Most Recent Data:  CBC: Lab Results   Component Value Date    WBC 5.64 2017    HGB 12.9 2017    HCT 40.0 2017     2017    MCV 81 (L) 2017    RDW 14.6 (H) 2017     BMP: Lab Results   Component Value Date     2017    K 3.5 2017     2017    CO2 28 " 05/27/2017    BUN 10 05/27/2017    CREATININE 0.9 05/27/2017     (H) 05/27/2017    CALCIUM 9.6 05/27/2017    MG 2.0 05/21/2016    PHOS 2.2 (L) 05/21/2016     LFTs: Lab Results   Component Value Date    PROT 7.3 05/27/2017    ALBUMIN 4.2 05/27/2017    BILITOT 0.7 05/27/2017    AST 17 05/27/2017    ALKPHOS 61 05/27/2017    ALT 12 05/27/2017     Coags:   Lab Results   Component Value Date    INR 1.0 08/01/2016     FLP: Lab Results   Component Value Date    CHOL 221 (H) 05/19/2016    HDL 68 05/19/2016    LDLCALC 135.6 05/19/2016    TRIG 87 05/19/2016    CHOLHDL 30.8 05/19/2016     DM: Lab Results   Component Value Date    HGBA1C 6.8 (H) 05/19/2016    LDLCALC 135.6 05/19/2016    CREATININE 0.9 05/27/2017     Thyroid: Lab Results   Component Value Date    TSH 1.489 05/19/2016     Anemia: Lab Results   Component Value Date    KRTYJVAU28 1916 (H) 05/20/2016     Cardiac: Lab Results   Component Value Date    TROPONINI <0.006 05/27/2017    BNP 48 08/01/2016     Urinalysis: Lab Results   Component Value Date    COLORU Yellow 05/19/2016    SPECGRAV 1.005 05/19/2016    NITRITE Negative 05/19/2016    KETONESU 1+ (A) 05/19/2016    UROBILINOGEN Negative 05/19/2016       Trended Lab Data:    Recent Labs  Lab 05/27/17 2022   WBC 5.64   HGB 12.9   HCT 40.0      MCV 81*   RDW 14.6*      K 3.5      CO2 28   BUN 10   CREATININE 0.9   *   PROT 7.3   ALBUMIN 4.2   BILITOT 0.7   AST 17   ALKPHOS 61   ALT 12       Trended Cardiac Data:    Recent Labs  Lab 05/27/17 2022   TROPONINI <0.006       Microbiology Data:  -    Other Laboratory Data:  -    Other Results:  EKG (my interpretation): t wave inversions in inferolateral leads, unchanged from previous.    Radiology:  Imaging Results          CT Head Without Contrast (Final result)  Result time 05/27/17 20:54:34    Final result by Jaspreet Mendez MD (05/27/17 20:54:34)                 Impression:         No intracranial hemorrhage or mass effect.      No  interval detrimental change from 5/21/16.        Electronically signed by: Dr. Jaspreet Mendez MD  Date:     05/27/17  Time:    20:54              Narrative:    CT OF THE HEAD WITHOUT CONTRAST:     Technique: 5 mm axial images were obtained from the vertex to the skullbase, without administration of contrast.    Comparison: 5/21/16    Findings:    No intracranial hemorrhage, extra-axial fluid collection, midline shift, or mass effect.  No evidence of an acute cortical infarct or of an infarct in a major vascular territory.  The ventricles are normal in size and configuration, and the basilar cisterns are patent. Findings consistent with chronic infarction LEFT occipital lobe. If there is additional clinical concern for acute ischemia, MRI with diffusion-weighted imaging could be obtained.      The calvarium is unremarkable. Visualized portions of the paranasal sinuses are clear. Visualized mastoid air cells are clear. Visualized orbits are unremarkable.                                   Assessment:     Autumn Smart is a 61 y.o. female with:  Patient Active Problem List    Diagnosis Date Noted    Hypertensive crisis 05/27/2017    HLD (hyperlipidemia) 05/21/2016    Hypovolemia 05/20/2016    Right sided weakness 05/19/2016    Hypertensive urgency 05/19/2016    Arterial ischemic stroke, PCA, left, acute 05/19/2016    Visual disturbance 05/19/2016    Cytotoxic brain edema 05/19/2016        Plan:     Hypertensive urgency   - presented to ED with BP of 252/116   - complaining of 2 day history of HA, otherwise asymptomatic with normal lab values   - home med list with amlodipine-norvasc 5-320 mg, coreg 6.25 mg BID, lisinopril 20-12.5 mg but patient unsure which medications she takes at home   - troponin negative, EKG unchanged from previous   - will attempt to bring down slowly overnight to MAP of 120  - restart ACE and HCTZ, labetalol PRN    Fever unknown etiology   - Pt febrile to 102.9  WBC normal, bland UA  -  will get procal and chest xray   - continue to monitor     DM2  - most recent A1C of 6.8  will reorder   - SSI as needed for glucose control     HLD  - continue home statin     H/O CVA  - continue Asa, statin, and blood pressure control     Normocytic Anemia   - Hgb 12.9  MCV 81   - has not had colonoscopy   - will get iron studies     HCM  - pt not up to date on vaccinations or cancer screening  order prior to discharge     F -   E - replete as needed  N - diabetic     PPX - SQH     Dispo - admission for htn urgency       Code Status:     Full     Knu Escobedo  U Internal Medicine HO-I   [facility] IM Service    [facility] Hospitalist Pager numbers:   [facility] Hospitalist Medicine Team A (Lucio/Jaja): [phone]  [facility] Hospitalist Medicine Team B (Chad/Angelo):  [phone]

## 2017-05-28 NOTE — CONSULTS
[facility]  Rheumatology  Consult Note    Patient Name: Autumn Smart  MRN: [id number]  Admission Date: 5/27/2017  Hospital Length of Stay: 0 days  Attending Physician: Dr Van  Primary Care Provider: Maria Esther Bob MD             Consults      Subjective:     Principal Problem:Hypertensive crisis    Chief Complaint:   Chief Complaint   Patient presents with    Fatigue     reports weakness and fatigue with subjective fever, headache, chills cough and nasal congetion for 2 days    Fever    Headache    Chills    Cough    Nasal Congestion         HPI:   Pt is 60yo AAF who presents c/o headache x 2 days. She also had subjective fever, watery eyes, sinus congestion x 2 days. She went to the gas station to get some sinus headache medicine, and subsequently came to ED. She is unsure whether she was taking her BP medications, she presented to ED with hypertensive emergency. No complaints of jaw claudication, blurry vision, arm numbness/tingling, or muscle weakness.    14pt ros negative except as otherwise states above    Pmhx:    Back pain s/p spinal surgery  DM  HTN    Psurghx:  Back surgery       Past Medical History:   Diagnosis Date    Back pain     Diabetes mellitus     Hypertension        Past Surgical History:   Procedure Laterality Date    BACK SURGERY      FRACTURE SURGERY         Review of patient's allergies indicates:  No Known Allergies    No current facility-administered medications on file prior to encounter.      Current Outpatient Prescriptions on File Prior to Encounter   Medication Sig    amlodipine-valsartan (EXFORGE) 5-320 mg per tablet Take 1 tablet by mouth once daily.    carvedilol (COREG) 6.25 MG tablet Take 6.25 mg by mouth 2 (two) times daily with meals.    citalopram (CELEXA) 40 MG tablet Take 40 mg by mouth once daily.    clonazepam (KLONOPIN) 1 MG tablet Take 1 mg by mouth 2 (two) times daily as needed.    hydrocodone-acetaminophen  (LORTAB)  mg  per tablet Take 1 tablet by mouth every 6 (six) hours as needed.    hydrOXYzine (VISTARIL) 25 MG Cap Take 25 mg by mouth 4 (four) times daily.    isosorbide dinitrate (ISORDIL) 20 MG tablet Take 20 mg by mouth 3 (three) times daily.    lisinopril-hydrochlorothiazide (PRINZIDE,ZESTORETIC) 20-12.5 mg per tablet Take 1 tablet by mouth once daily.    metformin (GLUCOPHAGE) 1000 MG tablet Take 1,000 mg by mouth 2 (two) times daily with meals.    methocarbamol (ROBAXIN) 500 MG Tab Take 500 mg by mouth 4 (four) times daily.    naproxen (NAPROSYN) 500 MG tablet Take 500 mg by mouth 2 (two) times daily.    trazodone (DESYREL) 100 MG tablet Take 100 mg by mouth every evening.    atorvastatin (LIPITOR) 40 MG tablet Take 1 tablet (40 mg total) by mouth once daily.    diclofenac (VOLTAREN) 75 MG EC tablet Take 1 tablet (75 mg total) by mouth 2 (two) times daily.    famotidine (PEPCID) 20 MG tablet Take 1 tablet (20 mg total) by mouth 2 (two) times daily.    nitroGLYCERIN (NITROSTAT) 0.4 MG SL tablet Place 0.4 mg under the tongue every 5 (five) minutes as needed.     Family History     None        Social History Main Topics    Smoking status: Never Smoker    Smokeless tobacco: Not on file    Alcohol use 0.6 oz/week     1 Cans of beer per week    Drug use: No    Sexual activity: Not on file     Review of Systems  Objective:     Vital Signs (Most Recent):  Temp: 98.3 °F (36.8 °C) (05/28/17 1300)  Pulse: 75 (05/28/17 1300)  Resp: 18 (05/28/17 1300)  BP: (!) 161/76 (05/28/17 1300)  SpO2: 95 % (05/28/17 1203) Vital Signs (24h Range):  Temp:  [98.3 °F (36.8 °C)-102.4 °F (39.1 °C)] 98.3 °F (36.8 °C)  Pulse:  [75-97] 75  Resp:  [18-25] 18  SpO2:  [94 %-100 %] 95 %  BP: (117-252)/() 161/76     Weight: 92.1 kg (203 lb 0.7 oz)  Body mass index is 31.8 kg/m².    Physical Exam     General: laying in bed, nad  HEENT: nubia, no tenderness along temporal artery  Chest: symmetrical expansion w respiration  CVS: RRR no  murmurs  Resp: CTABL, no crackles  Abd: nontender, +BSx4, no guarding no rebound  Msk: 4/5 prox and distal, upper and lower   Skin: no lesions, intact  Lymph: no lymphadenopathy      Significant Labs:   ESR 18  CRP 17  H/H 11.4/35  WBC 5.8  Plts 193k  K 3.1  Cr .8      Significant Imaging    CT head 5/17  No intracranial hemorrhage, extra-axial fluid collection, midline shift, or mass effect.  No evidence of an acute cortical infarct or of an infarct in a major vascular territory.  The ventricles are normal in size and configuration, and the basilar cisterns are patent. Findings consistent with chronic infarction LEFT occipital lobe. If there is additional clinical concern for acute ischemia, MRI with diffusion-weighted imaging could be obtained.  Assessment/Plan:     60yo AAF presents for acute onset headache, admitted for hypertensive emergency, rheumatology consulted for evaluation giant cell arteritis    1. Headache  -no tenderness along temporal artery, no elevation ESR, CT head imaging unremarkable for acute event  -low suspicion GCA , no other symptoms suggestive of GCA, suspect more likely 2.2 uncontrolled HTN  -Received 1 dose prednisone 60mg, no need to continue at this time  -Continue BP control    Pt seen and discussed with Dr Rehan Peter MD  [facility] Rheumatology  [facility]

## 2017-05-29 ENCOUNTER — HOSPITAL ENCOUNTER (OUTPATIENT)
Facility: HOSPITAL | Age: 62
Discharge: HOME | End: 2017-05-30
Attending: EMERGENCY MEDICINE | Admitting: INTERNAL MEDICINE
Payer: COMMERCIAL

## 2017-05-29 VITALS
SYSTOLIC BLOOD PRESSURE: 145 MMHG | HEIGHT: 67 IN | DIASTOLIC BLOOD PRESSURE: 72 MMHG | HEART RATE: 67 BPM | TEMPERATURE: 98 F | OXYGEN SATURATION: 96 % | WEIGHT: 205.94 LBS | RESPIRATION RATE: 18 BRPM | BODY MASS INDEX: 32.32 KG/M2

## 2017-05-29 DIAGNOSIS — G45.0 VERTEBROBASILAR ARTERY SYNDROME: ICD-10-CM

## 2017-05-29 DIAGNOSIS — R29.898 LEFT ARM WEAKNESS: ICD-10-CM

## 2017-05-29 DIAGNOSIS — I63.532: ICD-10-CM

## 2017-05-29 DIAGNOSIS — H53.2 DIPLOPIA: Primary | ICD-10-CM

## 2017-05-29 LAB
ALBUMIN SERPL BCP-MCNC: 3.4 G/DL
ALBUMIN SERPL BCP-MCNC: 3.4 G/DL
ALP SERPL-CCNC: 45 U/L
ALP SERPL-CCNC: 46 U/L
ALT SERPL W/O P-5'-P-CCNC: 10 U/L
ALT SERPL W/O P-5'-P-CCNC: 9 U/L
ANION GAP SERPL CALC-SCNC: 10 MMOL/L
ANION GAP SERPL CALC-SCNC: 10 MMOL/L
AST SERPL-CCNC: 11 U/L
AST SERPL-CCNC: 12 U/L
BASOPHILS # BLD AUTO: 0 K/UL
BASOPHILS # BLD AUTO: 0.01 K/UL
BASOPHILS NFR BLD: 0 %
BASOPHILS NFR BLD: 0.3 %
BILIRUB SERPL-MCNC: 0.6 MG/DL
BILIRUB SERPL-MCNC: 0.7 MG/DL
BUN SERPL-MCNC: 14 MG/DL
BUN SERPL-MCNC: 16 MG/DL
CALCIUM SERPL-MCNC: 9 MG/DL
CALCIUM SERPL-MCNC: 9.2 MG/DL
CHLORIDE SERPL-SCNC: 102 MMOL/L
CHLORIDE SERPL-SCNC: 102 MMOL/L
CHOLEST/HDLC SERPL: 2.6 {RATIO}
CO2 SERPL-SCNC: 26 MMOL/L
CO2 SERPL-SCNC: 27 MMOL/L
CREAT SERPL-MCNC: 0.8 MG/DL
CREAT SERPL-MCNC: 1.1 MG/DL
DIFFERENTIAL METHOD: ABNORMAL
DIFFERENTIAL METHOD: ABNORMAL
EOSINOPHIL # BLD AUTO: 0 K/UL
EOSINOPHIL # BLD AUTO: 0 K/UL
EOSINOPHIL NFR BLD: 0 %
EOSINOPHIL NFR BLD: 0.3 %
ERYTHROCYTE [DISTWIDTH] IN BLOOD BY AUTOMATED COUNT: 14.3 %
ERYTHROCYTE [DISTWIDTH] IN BLOOD BY AUTOMATED COUNT: 14.3 %
EST. GFR  (AFRICAN AMERICAN): >60 ML/MIN/1.73 M^2
EST. GFR  (AFRICAN AMERICAN): >60 ML/MIN/1.73 M^2
EST. GFR  (NON AFRICAN AMERICAN): 55 ML/MIN/1.73 M^2
EST. GFR  (NON AFRICAN AMERICAN): >60 ML/MIN/1.73 M^2
GLUCOSE SERPL-MCNC: 136 MG/DL
GLUCOSE SERPL-MCNC: 172 MG/DL
HCT VFR BLD AUTO: 33.9 %
HCT VFR BLD AUTO: 35.5 %
HDL/CHOLESTEROL RATIO: 38.8 %
HDLC SERPL-MCNC: 134 MG/DL
HDLC SERPL-MCNC: 52 MG/DL
HGB BLD-MCNC: 10.9 G/DL
HGB BLD-MCNC: 11.3 G/DL
INR PPP: 1
LDLC SERPL CALC-MCNC: 63 MG/DL
LYMPHOCYTES # BLD AUTO: 1.4 K/UL
LYMPHOCYTES # BLD AUTO: 1.5 K/UL
LYMPHOCYTES NFR BLD: 33.6 %
LYMPHOCYTES NFR BLD: 45.8 %
MCH RBC QN AUTO: 25.6 PG
MCH RBC QN AUTO: 25.7 PG
MCHC RBC AUTO-ENTMCNC: 31.8 %
MCHC RBC AUTO-ENTMCNC: 32.2 %
MCV RBC AUTO: 80 FL
MCV RBC AUTO: 81 FL
MONOCYTES # BLD AUTO: 0.2 K/UL
MONOCYTES # BLD AUTO: 0.4 K/UL
MONOCYTES NFR BLD: 7 %
MONOCYTES NFR BLD: 9.3 %
NEUTROPHILS # BLD AUTO: 1.5 K/UL
NEUTROPHILS # BLD AUTO: 2.3 K/UL
NEUTROPHILS NFR BLD: 46.6 %
NEUTROPHILS NFR BLD: 56.9 %
NONHDLC SERPL-MCNC: 82 MG/DL
PLATELET # BLD AUTO: 189 K/UL
PLATELET # BLD AUTO: 205 K/UL
PMV BLD AUTO: 10.3 FL
PMV BLD AUTO: 9.9 FL
POCT GLUCOSE: 124 MG/DL (ref 70–110)
POCT GLUCOSE: 138 MG/DL (ref 70–110)
POCT GLUCOSE: 165 MG/DL (ref 70–110)
POCT GLUCOSE: 222 MG/DL (ref 70–110)
POTASSIUM SERPL-SCNC: 3.2 MMOL/L
POTASSIUM SERPL-SCNC: 3.3 MMOL/L
PROT SERPL-MCNC: 6.6 G/DL
PROT SERPL-MCNC: 6.6 G/DL
PROTHROMBIN TIME: 11 SEC
RBC # BLD AUTO: 4.24 M/UL
RBC # BLD AUTO: 4.41 M/UL
SODIUM SERPL-SCNC: 138 MMOL/L
SODIUM SERPL-SCNC: 139 MMOL/L
TRIGL SERPL-MCNC: 95 MG/DL
TSH SERPL DL<=0.005 MIU/L-ACNC: 0.92 UIU/ML
WBC # BLD AUTO: 3.3 K/UL
WBC # BLD AUTO: 4.08 K/UL

## 2017-05-29 PROCEDURE — 80053 COMPREHEN METABOLIC PANEL: CPT | Mod: 91

## 2017-05-29 PROCEDURE — 92610 EVALUATE SWALLOWING FUNCTION: CPT

## 2017-05-29 PROCEDURE — G0378 HOSPITAL OBSERVATION PER HR: HCPCS

## 2017-05-29 PROCEDURE — 85025 COMPLETE CBC W/AUTO DIFF WBC: CPT | Mod: 91

## 2017-05-29 PROCEDURE — 93005 ELECTROCARDIOGRAM TRACING: CPT

## 2017-05-29 PROCEDURE — 82962 GLUCOSE BLOOD TEST: CPT

## 2017-05-29 PROCEDURE — G8997 SWALLOW GOAL STATUS: HCPCS | Mod: CH

## 2017-05-29 PROCEDURE — 36415 COLL VENOUS BLD VENIPUNCTURE: CPT

## 2017-05-29 PROCEDURE — A9585 GADOBUTROL INJECTION: HCPCS | Performed by: INTERNAL MEDICINE

## 2017-05-29 PROCEDURE — 94761 N-INVAS EAR/PLS OXIMETRY MLT: CPT

## 2017-05-29 PROCEDURE — 25000003 PHARM REV CODE 250: Performed by: HOSPITALIST

## 2017-05-29 PROCEDURE — 25000003 PHARM REV CODE 250: Performed by: STUDENT IN AN ORGANIZED HEALTH CARE EDUCATION/TRAINING PROGRAM

## 2017-05-29 PROCEDURE — 84443 ASSAY THYROID STIM HORMONE: CPT

## 2017-05-29 PROCEDURE — 99291 CRITICAL CARE FIRST HOUR: CPT | Mod: 25

## 2017-05-29 PROCEDURE — 25000003 PHARM REV CODE 250: Performed by: INTERNAL MEDICINE

## 2017-05-29 PROCEDURE — 80061 LIPID PANEL: CPT

## 2017-05-29 PROCEDURE — 25500020 PHARM REV CODE 255: Performed by: INTERNAL MEDICINE

## 2017-05-29 PROCEDURE — 85610 PROTHROMBIN TIME: CPT

## 2017-05-29 PROCEDURE — G0508 CRIT CARE TELEHEA CONSULT 60: HCPCS | Mod: GT,,, | Performed by: PSYCHIATRY & NEUROLOGY

## 2017-05-29 PROCEDURE — 80053 COMPREHEN METABOLIC PANEL: CPT

## 2017-05-29 PROCEDURE — G8998 SWALLOW D/C STATUS: HCPCS | Mod: CH

## 2017-05-29 PROCEDURE — 85025 COMPLETE CBC W/AUTO DIFF WBC: CPT

## 2017-05-29 PROCEDURE — G8996 SWALLOW CURRENT STATUS: HCPCS | Mod: CH

## 2017-05-29 RX ORDER — CARVEDILOL 6.25 MG/1
6.25 TABLET ORAL 2 TIMES DAILY WITH MEALS
Qty: 60 TABLET | Refills: 3 | Status: ON HOLD | OUTPATIENT
Start: 2017-05-29 | End: 2017-05-30

## 2017-05-29 RX ORDER — FLUTICASONE PROPIONATE 50 MCG
1 SPRAY, SUSPENSION (ML) NASAL DAILY
Refills: 0 | COMMUNITY
Start: 2017-05-29 | End: 2018-01-23

## 2017-05-29 RX ORDER — FAMOTIDINE 20 MG/1
20 TABLET, FILM COATED ORAL 2 TIMES DAILY
Status: DISCONTINUED | OUTPATIENT
Start: 2017-05-30 | End: 2017-05-30 | Stop reason: HOSPADM

## 2017-05-29 RX ORDER — GADOBUTROL 604.72 MG/ML
10 INJECTION INTRAVENOUS
Status: COMPLETED | OUTPATIENT
Start: 2017-05-29 | End: 2017-05-29

## 2017-05-29 RX ORDER — CITALOPRAM 20 MG/1
40 TABLET, FILM COATED ORAL DAILY
Status: DISCONTINUED | OUTPATIENT
Start: 2017-05-30 | End: 2017-05-30 | Stop reason: HOSPADM

## 2017-05-29 RX ORDER — QUETIAPINE FUMARATE 100 MG/1
200 TABLET, FILM COATED ORAL NIGHTLY
Status: DISCONTINUED | OUTPATIENT
Start: 2017-05-29 | End: 2017-05-30 | Stop reason: HOSPADM

## 2017-05-29 RX ORDER — ATORVASTATIN CALCIUM 40 MG/1
40 TABLET, FILM COATED ORAL DAILY
Status: DISCONTINUED | OUTPATIENT
Start: 2017-05-30 | End: 2017-05-30 | Stop reason: HOSPADM

## 2017-05-29 RX ORDER — NAPROXEN SODIUM 220 MG/1
324 TABLET, FILM COATED ORAL DAILY
Status: DISCONTINUED | OUTPATIENT
Start: 2017-05-29 | End: 2017-05-29

## 2017-05-29 RX ORDER — MOXIFLOXACIN HYDROCHLORIDE 400 MG/1
400 TABLET ORAL DAILY
Qty: 4 TABLET | Refills: 0 | Status: SHIPPED | OUTPATIENT
Start: 2017-05-29 | End: 2017-05-31 | Stop reason: CLARIF

## 2017-05-29 RX ORDER — LABETALOL HYDROCHLORIDE 5 MG/ML
10 INJECTION, SOLUTION INTRAVENOUS EVERY 4 HOURS PRN
Status: DISCONTINUED | OUTPATIENT
Start: 2017-05-29 | End: 2017-05-30 | Stop reason: HOSPADM

## 2017-05-29 RX ORDER — LISINOPRIL 40 MG/1
40 TABLET ORAL DAILY
Qty: 90 TABLET | Refills: 3 | Status: SHIPPED | OUTPATIENT
Start: 2017-05-29 | End: 2017-05-31 | Stop reason: SDUPTHER

## 2017-05-29 RX ORDER — CITALOPRAM 20 MG/1
40 TABLET, FILM COATED ORAL DAILY
Status: DISCONTINUED | OUTPATIENT
Start: 2017-05-29 | End: 2017-05-29

## 2017-05-29 RX ORDER — NAPROXEN SODIUM 220 MG/1
324 TABLET, FILM COATED ORAL ONCE
Status: DISCONTINUED | OUTPATIENT
Start: 2017-05-29 | End: 2017-05-29

## 2017-05-29 RX ORDER — TRAZODONE HYDROCHLORIDE 100 MG/1
100 TABLET ORAL NIGHTLY
Status: DISCONTINUED | OUTPATIENT
Start: 2017-05-29 | End: 2017-05-30 | Stop reason: HOSPADM

## 2017-05-29 RX ORDER — MOXIFLOXACIN HYDROCHLORIDE 400 MG/1
400 TABLET ORAL DAILY
Status: DISCONTINUED | OUTPATIENT
Start: 2017-05-29 | End: 2017-05-29 | Stop reason: HOSPADM

## 2017-05-29 RX ORDER — SODIUM CHLORIDE 0.9 % (FLUSH) 0.9 %
3 SYRINGE (ML) INJECTION EVERY 8 HOURS
Status: DISCONTINUED | OUTPATIENT
Start: 2017-05-29 | End: 2017-05-30 | Stop reason: HOSPADM

## 2017-05-29 RX ORDER — LISINOPRIL 20 MG/1
40 TABLET ORAL DAILY
Status: DISCONTINUED | OUTPATIENT
Start: 2017-05-29 | End: 2017-05-29 | Stop reason: HOSPADM

## 2017-05-29 RX ORDER — NAPROXEN SODIUM 220 MG/1
81 TABLET, FILM COATED ORAL DAILY
Refills: 0 | COMMUNITY
Start: 2017-05-29 | End: 2017-05-31 | Stop reason: SDUPTHER

## 2017-05-29 RX ORDER — POTASSIUM CHLORIDE 20 MEQ/1
40 TABLET, EXTENDED RELEASE ORAL ONCE
Status: DISCONTINUED | OUTPATIENT
Start: 2017-05-29 | End: 2017-05-29 | Stop reason: HOSPADM

## 2017-05-29 RX ORDER — QUETIAPINE FUMARATE 100 MG/1
100 TABLET, FILM COATED ORAL NIGHTLY
Status: DISCONTINUED | OUTPATIENT
Start: 2017-05-29 | End: 2017-05-29

## 2017-05-29 RX ORDER — ATORVASTATIN CALCIUM 40 MG/1
40 TABLET, FILM COATED ORAL DAILY
Status: DISCONTINUED | OUTPATIENT
Start: 2017-05-29 | End: 2017-05-29

## 2017-05-29 RX ORDER — FAMOTIDINE 20 MG/1
20 TABLET, FILM COATED ORAL 2 TIMES DAILY
Status: DISCONTINUED | OUTPATIENT
Start: 2017-05-29 | End: 2017-05-29

## 2017-05-29 RX ORDER — HYDROXYZINE PAMOATE 25 MG/1
25 CAPSULE ORAL NIGHTLY PRN
Status: DISCONTINUED | OUTPATIENT
Start: 2017-05-29 | End: 2017-05-30 | Stop reason: HOSPADM

## 2017-05-29 RX ORDER — ATORVASTATIN CALCIUM 40 MG/1
40 TABLET, FILM COATED ORAL DAILY
Qty: 30 TABLET | Refills: 1 | Status: SHIPPED | OUTPATIENT
Start: 2017-05-29 | End: 2017-05-31 | Stop reason: SDUPTHER

## 2017-05-29 RX ORDER — DIPHENHYDRAMINE HCL 25 MG
25 CAPSULE ORAL ONCE AS NEEDED
Status: COMPLETED | OUTPATIENT
Start: 2017-05-29 | End: 2017-05-29

## 2017-05-29 RX ORDER — NAPROXEN SODIUM 220 MG/1
81 TABLET, FILM COATED ORAL DAILY
Status: DISCONTINUED | OUTPATIENT
Start: 2017-05-29 | End: 2017-05-29

## 2017-05-29 RX ADMIN — ATORVASTATIN CALCIUM 40 MG: 40 TABLET, FILM COATED ORAL at 09:05

## 2017-05-29 RX ADMIN — SODIUM CHLORIDE, PRESERVATIVE FREE 3 ML: 5 INJECTION INTRAVENOUS at 09:05

## 2017-05-29 RX ADMIN — HEPARIN SODIUM 5000 UNITS: 5000 INJECTION, SOLUTION INTRAVENOUS; SUBCUTANEOUS at 09:05

## 2017-05-29 RX ADMIN — MOXIFLOXACIN HYDROCHLORIDE 400 MG: 400 TABLET, FILM COATED ORAL at 09:05

## 2017-05-29 RX ADMIN — CARVEDILOL 6.25 MG: 6.25 TABLET, FILM COATED ORAL at 09:05

## 2017-05-29 RX ADMIN — LISINOPRIL 40 MG: 20 TABLET ORAL at 09:05

## 2017-05-29 RX ADMIN — DIPHENHYDRAMINE HYDROCHLORIDE 25 MG: 25 CAPSULE ORAL at 01:05

## 2017-05-29 RX ADMIN — FLUTICASONE PROPIONATE 2 SPRAY: 50 SPRAY, METERED NASAL at 09:05

## 2017-05-29 RX ADMIN — CITALOPRAM HYDROBROMIDE 40 MG: 20 TABLET ORAL at 09:05

## 2017-05-29 RX ADMIN — GADOBUTROL 10 ML: 604.72 INJECTION INTRAVENOUS at 05:05

## 2017-05-29 RX ADMIN — TRAZODONE HYDROCHLORIDE 100 MG: 100 TABLET ORAL at 09:05

## 2017-05-29 RX ADMIN — ASPIRIN 81 MG CHEWABLE TABLET 81 MG: 81 TABLET CHEWABLE at 09:05

## 2017-05-29 RX ADMIN — FAMOTIDINE 20 MG: 20 TABLET, FILM COATED ORAL at 09:05

## 2017-05-29 RX ADMIN — QUETIAPINE FUMARATE 200 MG: 100 TABLET, FILM COATED ORAL at 09:05

## 2017-05-29 NOTE — PT/OT/SLP EVAL
"Speech Language Pathology  Swallow Evaluation    Autumn Smart   MRN: [id number]   Admitting Diagnosis: <principal problem not specified>    Diet recommendations: Solid Diet Level: Regular  Liquid Diet Level: Thin   Universal swallow precautions, upright for meals, whole meds    SLP Treatment Date: 05/29/17  Speech Start Time: 1600     Speech Stop Time: 1610     Speech Total (min): 10 min       TREATMENT BILLABLE MINUTES:  Eval Swallow and Oral Function 10    Diagnosis: <principal problem not specified>  Pt is a 61 year old female with past medical history of CVA in 2016, hypertension and DM2 presenting to the ED with headache x 2 days.      The patient was in their usual state of health until a day prior to admission when she developed a splitting headache in the right temple. Patient took multiple doses of Nyquil, but did not attain relief. Pt presented to the ED concerned after her headache did not go away over the past two days. Also endorsing nasal congestion, watery eyes, and generalized weakness, but denies blurry vision, nausea, vomiting, chest pain, shortness of breath, abdominal pain, constipation or diarrhea. Patient states that she is compliant with her medications but can't recall what she takes because "there are too many". Pt denies difficulty speaking or loss of sensation.        Past Medical History:   Diagnosis Date    Back pain     Diabetes mellitus     Hypertension      Past Surgical History:   Procedure Laterality Date    BACK SURGERY      FRACTURE SURGERY         Has the patient been evaluated by SLP for swallowing? : Yes  Keep patient NPO?: No   General Precautions: Standard, fall    Current Respiratory Status:  (room air)     Social Hx: Patient lives at home, independent with ADLS    Prior diet: regular diet and thin liquids    Occupational/hobbies/homemaking: works full time    Subjective:  Consult received for swallow study. SLP did communicate with RN re: swallow eval  Patient goals: "I " "can eat fine, I just got dizzy."    Pain/Comfort  Pain Rating 1: 0/10    Objective:   Patient found with: telemetry  Pt seen at bedside for swallow study, alert and awake. Pt oriented x4.   Pt reported no difficulty with eating and swallowing "pork chop" yesterday. Pt was being discharged this afternoon when she was re-admitted.     Oral Musculature Evaluation  Oral Musculature: WFL  Dentition: edentulous  Mucosal Quality: good, adequate  Mandibular Strength and Mobility: WFL  Oral Labial Strength and Mobility: WFL  Lingual Strength and Mobility: WFL  Buccal Strength and Mobility: WFL  Volitional Cough: elicited  Volitional Swallow: timely  Voice Prior to PO Intake: clear voice  Oral Musculature Comments: no facial droop present     Bedside Swallow Eval:  Consistencies Assessed: Thin liquids water by straw, Puree pudding by tsp and Solids cracker self fed  Oral Phase: WFL  Pharyngeal Phase: no overt clinical  signs/symptoms of aspiration and no overt clinical signs/symptoms of pharyngeal dysphagia    Additional Treatment:      FIM:  Social Interaction: 7 Complete [place]--The patient interacts appropriately with staff, other patients, and family members (e.g., controls temper, accepts criticism, is aware that words and actions have an impact on others), and does not require medication for         Comprehension: 7 Complete [place]--The patient understand complex or abstract directions and conversation, and understand either spoken or written language (not necessarily English).                Assessment:  Autumn Smart is a 61 y.o. female with a medical diagnosis of <principal problem not specified> and presents with no clinical s/s of oral or pharyngeal dysphagia.    Do you have any cultural, spiritual, Scientology conflicts, given your current situation?: none     Discharge recommendations: Discharge Facility/Level Of Care Needs:  (pending neuro w/u)     Goals:    SLP Goals        Problem: SLP Goal    Goal " Priority Disciplines Outcome   SLP Goal     SLP Ongoing (interventions implemented as appropriate)   Description:  Short Term Goals:  1. Pt will participate in ongoing swallow assessment to determine least restrictive diet.   2. Patient will successfully participate in [id number] evaluation to further assess for any communication impairments s/p stroke                       Plan:   Patient to be seen Therapy Frequency: 2 x/week   Plan of Care expires: 06/18/17  Plan of Care reviewed with: patient (RN, MD)  SLP Follow-up?: Yes  SLP - Next Visit Date: 05/30/17           FABRICIO Fowler, CCC-SLP  05/29/2017

## 2017-05-29 NOTE — PLAN OF CARE
Pt  presenting to the ED with headache x 2 days. Hx of CVA in 2016, hypertension and DM2       05/28/17 1816   Discharge Assessment   Assessment Type Discharge Planning Assessment   Confirmed/corrected address and phone number on facesheet? Yes   Assessment information obtained from? Patient   Expected Length of Stay (days) 2   Communicated expected length of stay with patient/caregiver yes   Prior to hospitilization cognitive status: Alert/Oriented   Prior to hospitalization functional status: Independent   Current cognitive status: Alert/Oriented   Current Functional Status: Independent   Arrived From home or self-care   Lives With alone   Able to Return to Prior Arrangements yes   Is patient able to care for self after discharge? Yes   How many people do you have in your home that can help with your care after discharge? 0   Who are your caregiver(s) and their phone number(s)? Friend:  Nahomy Audra  [phone]   Patient's perception of discharge disposition home or selfcare   Readmission Within The Last 30 Days no previous admission in last 30 days   Patient currently being followed by outpatient case management? No   Patient currently receives home health services? No   Does the patient currently use HME? Yes   Patient currently receives private duty nursing? No   Equipment Currently Used at Home glucometer   Do you have any problems affording any of your prescribed medications? No   Is the patient taking medications as prescribed? yes   Do you have any financial concerns preventing you from receiving the healthcare you need? No   Does the patient have transportation to healthcare appointments? Yes   Transportation Available family or friend will provide   On Dialysis? No   Does the patient receive services at the Coumadin Clinic? No   Are there any open cases? No   Discharge Plan A Home   Discharge Plan B Home with family   Patient/Family In Agreement With Plan yes     Liz Hernández RN Transitional  Navigator  [phone]

## 2017-05-29 NOTE — PLAN OF CARE
Patient discharged to home, no needs noted upon discharge.  PCP office closed, patient to call for follow-up.    [place] faxed discharge information to PCP, left message to call patient with follow.    Follow-up With  Details  Why  Contact Info   Maria Esther Bob MD  In 2 weeks  office closed today, please call for follow-up.  [address] [facility]  [place] [address]  [phone]   Valeria Lowry MD  On 6/8/2017  9:30 am  [address]  [address]  [facility] [address]  [phone]           05/29/17 1140   Final Note   Assessment Type Final Discharge Note   Discharge Disposition Home   Discharge planning education complete? Yes   What phone number can be called within the next 1-3 days to see how you are doing after discharge? [phone]   Hospital Follow Up  Appt(s) scheduled? Yes   Discharge plans and expectations educations in teach back method with documentation complete? Yes   Offered [facility] Bedside Delivery? Yes   Discharge/Hospital Encounter Summary to (non[facility]) PCP Yes   Referral to Outpatient Case Management complete? n/a   Referral to / orders for Home Health Complete? n/a   30 day supply of medicines given at discharge, if documented non-compliance / non-adherence? n/a   Any social issues identified prior to discharge? n/a   Did you assess the readiness or willingness of the family or caregiver to support self management of care? Yes   Right Care Referral Info   Post Acute Recommendation No Care     Radha Vickers RN  Transition Navigator  [phone]

## 2017-05-29 NOTE — CONSULTS
[facility]  Tele-Consult    Consultation started: 5/29/2017 at 2:06 PM   Consulting Provider:  Dr. Atwood  The patient location is [facility]  Spoke hospital nurse at bedside with patient assisting consultant.     Subjective:   Subjective   History of Present Illness:  Autumn Smart is a 61 y.o. female who presents with double vision and L sided weakness.  She was discharged earlier today for an admission for hypertensive crisis.  Shortly after discharge, she developed sudden neurologic complaints.  At 1:15p she developed diplopia and L sided weakness.  Diplopia resolved first and then L sided weakness. No abnl motor activity.      Wake up Stroke?: no  Last known normal:    Recent bleeding noted: no  Does the patient take any Blood Thinners? yes ASA           H&P was obtained from patient and Dr. Atwood    Past Medical History:   Diagnosis Date    Back pain     Diabetes mellitus     Hypertension        Medications: No current facility-administered medications for this encounter.     Current Outpatient Prescriptions:     aspirin 81 MG Chew, Take 1 tablet (81 mg total) by mouth once daily., Disp: , Rfl: 0    atorvastatin (LIPITOR) 40 MG tablet, Take 1 tablet (40 mg total) by mouth once daily., Disp: 30 tablet, Rfl: 1    carvedilol (COREG) 6.25 MG tablet, Take 1 tablet (6.25 mg total) by mouth 2 (two) times daily with meals., Disp: 60 tablet, Rfl: 3    citalopram (CELEXA) 40 MG tablet, Take 40 mg by mouth once daily., Disp: , Rfl:     diclofenac (VOLTAREN) 75 MG EC tablet, Take 1 tablet (75 mg total) by mouth 2 (two) times daily., Disp: 20 tablet, Rfl: 0    famotidine (PEPCID) 20 MG tablet, Take 1 tablet (20 mg total) by mouth 2 (two) times daily., Disp: 10 tablet, Rfl: 0    fluticasone (FLONASE) 50 mcg/actuation nasal spray, 1 spray by Each Nare route once daily., Disp: , Rfl: 0    hydrOXYzine (VISTARIL) 25 MG Cap, Take 25 mg by mouth 4 (four) times daily., Disp: ,  Rfl:     lisinopril (PRINIVIL,ZESTRIL) 40 MG tablet, Take 1 tablet (40 mg total) by mouth once daily., Disp: 90 tablet, Rfl: 3    metformin (GLUCOPHAGE) 1000 MG tablet, Take 1,000 mg by mouth 2 (two) times daily with meals., Disp: , Rfl:     methocarbamol (ROBAXIN) 500 MG Tab, Take 500 mg by mouth 4 (four) times daily., Disp: , Rfl:     moxifloxacin (AVELOX) 400 mg tablet, Take 1 tablet (400 mg total) by mouth once daily., Disp: 4 tablet, Rfl: 0    nitroGLYCERIN (NITROSTAT) 0.4 MG SL tablet, Place 0.4 mg under the tongue every 5 (five) minutes as needed., Disp: , Rfl:     trazodone (DESYREL) 100 MG tablet, Take 100 mg by mouth every evening., Disp: , Rfl:     Allergies: Review of patient's allergies indicates:  No Known Allergies    Objective:     Vitals: There were no vitals filed for this visit.  /90    Objective   Physical Exam:  General: well developed, well nourished   HENT: Head:normocephalic and atraumatic   HENT: Ears: right ear normal and left ear normal  Nose: normal nares and no discharge  Eyes:conjunctivae/corneas clear. PERRL.  Neck: normal, no obvious masses and trachea to midline  Lungs: normal respiratory effort  Cardiovascular: Heart: regular rate and rhythm   Cardiovascular: Extremities: no cyanosis, no edema and no clubbing  Abdomen: appears normal and not distended  Skin:  skin color and texture normal, no rash and no bruises  Musculoskeletal: normal range of motion in all extremities  Psych/Behavioral: appropriate affect       Imaging Notes: Abnormal CT L pontine, L BG, L occipital infarcts (subacute to chronic). Impression performed at: 2:05p    NIH Stroke Scale:  Interval: baseline (upon arrival/admit)  Level of Consciousness: 0 - alert  LOC Questions: 0 - answers both correctly  LOC Commands: 0 - performs both correctly  Best Gaze: 0 - normal  Visual: 0 - no visual loss  Facial Palsy: 0 - normal  Motor Left Arm: 0 - no drift  Motor Right Arm: 0 - no drift  Motor Left Le - no  drift  Motor Right Le - no drift  Limb Ataxia: 0 - absent  Sensory: 0 - normal  Best Language: 0 - no aphasia  Dysarthria: 0 - normal articulation  Extinction and Inattention: 0 - no neglect  NIH Stroke Scale Total: 0      Lab Results   Component Value Date    LDLCALC 63.0 2017       Assessment - Diagnosis - Goals:   Posterior circulation TIA    Plan     Diagnosis/Impression: Vertebrobasilar artery syndrome TIA (G45.0)    Altaplase Recommendation: Altaplase not recommended due to TIA    Recommendation: NPO until after pass dysphagia screen. Diagnostic studies: MRA head to assess vasculature, MRA neck/arch to assess vasculature, MRI head without contrast to assess brain parenchyma, Trans-thoracic cardiac echo to assess cardiac function/status  Consults: Other: n/a  Antithrombotics: Aspirin: 325 mg oral daily  Statins: Atorvastatin- 40 mg oral daily  ASA 325mg po if passes swallow screen    Disposition Recommendation:  Obs       Possible Interventional Revascularization Candidate? No; No significant neurological deficit    Recommended the emergency room physician to have a brief discussion with the patient and/or family if available regarding the risks and benefits of treatment, and to briefly document the occurrence of that discussion in his clinical encounter note.     The attending portion of this evaluation, treatment, and documentation was performed per Jaspreet Teixeira via audiovisual.      Billing code:  (time dependent stroke, complex case, unstable patient, hemorrhages, any intervention, some mimics)    · This patient has a very critical neurological condition/illness, with very high morbidity and mortality.  · There is a very high probability for acute neurological change leading to clinical and possibly life-threatening deterioration requiring highest level of physician preparedness for urgent intervention.  · There is possibility that this condition will require treatment with high risk  medications as quickly as possible.  · There is also a possibility that the patient may benefit from further, more advance complex therapies (e.g. endovascular therapy) that will require prompt diagnosis and care.  · Care was coordinated with other physicians involved in the patient's care.  · Radiologic studies and laboratory data were reviewed and interpreted, and plan of care was re-assessed based on the results.  · Diagnosis, treatment options and prognosis may have been discussed with the patient and/or family members or caregiver.  Further advanced medical management and further evaluation is warranted for his care.      Consultation ended: 5/29/2017 at 2:28p    Jaspreet Teixeira MD

## 2017-05-29 NOTE — ED NOTES
Patient states she take psych drugs that was not given in the hospital. States her medicine is in her car

## 2017-05-29 NOTE — H&P
[facility] History and Physical - Resident Note    Admitting Team: [facility] Team B  Attending Physician: Chad  Resident: Canelo   Interns: Guccione and Green    Date of Admit: 5/29/2017    Chief Complaint     Headache x 2 days     Subjective:      History of Present Illness:  Pt is a 61 year old female with past medical history of CVA in 2016, hypertension and DM2 presenting with diplopia x 5 minutes.    This is a patient who was recently admitted for hypertensive urgency and discharged on 5/29.   The patient was on her way out to the car when she began to develop diplopia.  She denies difficulty speaking, shortness of breath, chest pain, weakness, or any other symptom.  The event was witnessed by a patient care technician who took her back to the ED.    At time of interview the patient denies any remaining deficits.    Past Medical History:  Past Medical History:   Diagnosis Date    Back pain     Diabetes mellitus     Hypertension        Past Surgical History:  Past Surgical History:   Procedure Laterality Date    BACK SURGERY      FRACTURE SURGERY         Allergies:  Review of patient's allergies indicates:  No Known Allergies    Home Medications:  Prior to Admission medications    Medication Sig Start Date End Date Taking? Authorizing Provider   aspirin 81 MG Chew Take 1 tablet (81 mg total) by mouth once daily. 5/29/17   Mynor Sauceda MD   atorvastatin (LIPITOR) 40 MG tablet Take 1 tablet (40 mg total) by mouth once daily. 5/29/17   Mynor Sauceda MD   carvedilol (COREG) 6.25 MG tablet Take 1 tablet (6.25 mg total) by mouth 2 (two) times daily with meals. 5/29/17   Mynor Sauceda MD   citalopram (CELEXA) 40 MG tablet Take 40 mg by mouth once daily.    Historical Provider, MD   diclofenac (VOLTAREN) 75 MG EC tablet Take 1 tablet (75 mg total) by mouth 2 (two) times daily. 6/10/13   Cheo Montoya, NP   famotidine (PEPCID) 20 MG tablet Take 1 tablet (20 mg total)  by mouth 2 (two) times daily. 8/1/16 8/6/16  Emily Beth MD   fluticasone (FLONASE) 50 mcg/actuation nasal spray 1 spray by Each Nare route once daily. 5/29/17   Mynor Sauceda MD   hydrOXYzine (VISTARIL) 25 MG Cap Take 25 mg by mouth 4 (four) times daily.    Historical Provider, MD   lisinopril (PRINIVIL,ZESTRIL) 40 MG tablet Take 1 tablet (40 mg total) by mouth once daily. 5/29/17 5/29/18  Mynor Sauceda MD   metformin (GLUCOPHAGE) 1000 MG tablet Take 1,000 mg by mouth 2 (two) times daily with meals.    Historical Provider, MD   methocarbamol (ROBAXIN) 500 MG Tab Take 500 mg by mouth 4 (four) times daily.    Historical Provider, MD   moxifloxacin (AVELOX) 400 mg tablet Take 1 tablet (400 mg total) by mouth once daily. 5/29/17 6/2/17  Mynor Sauceda MD   nitroGLYCERIN (NITROSTAT) 0.4 MG SL tablet Place 0.4 mg under the tongue every 5 (five) minutes as needed.    Historical Provider, MD   trazodone (DESYREL) 100 MG tablet Take 100 mg by mouth every evening.    Historical Provider, MD   amlodipine-valsartan (EXFORGE) 5-320 mg per tablet Take 1 tablet by mouth once daily.  5/29/17  Historical Provider, MD   aspirin 325 MG tablet Take 1 tablet (325 mg total) by mouth once daily. 5/21/16 5/29/17  Tahira Jackson NP   aspirin 81 MG Chew Take 81 mg by mouth once daily.  5/29/17  Historical Provider, MD   atorvastatin (LIPITOR) 40 MG tablet Take 1 tablet (40 mg total) by mouth once daily. 5/21/16 5/29/17  Tahira Jackson NP   carvedilol (COREG) 6.25 MG tablet Take 6.25 mg by mouth 2 (two) times daily with meals.  5/29/17  Historical Provider, MD   clonazepam (KLONOPIN) 1 MG tablet Take 1 mg by mouth 2 (two) times daily as needed.  5/29/17  Historical Provider, MD   hydrocodone-acetaminophen  (LORTAB)  mg per tablet Take 1 tablet by mouth every 6 (six) hours as needed.  5/29/17  Historical Provider, MD   isosorbide dinitrate (ISORDIL) 20 MG tablet Take 20 mg by mouth 3  "(three) times daily.  17  Historical Provider, MD   lisinopril-hydrochlorothiazide (PRINZIDE,ZESTORETIC) 20-12.5 mg per tablet Take 1 tablet by mouth once daily.  17  Historical Provider, MD   naproxen (NAPROSYN) 500 MG tablet Take 500 mg by mouth 2 (two) times daily.  17  Historical Provider, MD       Family History:  History reviewed. No pertinent family history.    Social History:  Social History   Substance Use Topics    Smoking status: Never Smoker    Smokeless tobacco: Not on file    Alcohol use 0.6 oz/week     1 Cans of beer per week       Review of Systems:  Pertinent items are noted in HPI. All other systems are reviewed and are negative.    Health Maintaince :   Primary Care Physician: Lisbeth  Immunizations:   TDap is not up to date.  Influenza is not up to date.  Pneumovax is not up to date.  Cancer Screening:  PAP: is not up to date.  Mammogram: is not up to date.   Colonoscopy: is not up to date.      Objective:   Last 24 Hour Vital Signs:  BP  Min: 132/71  Max: 186/81  Temp  Av.3 °F (36.8 °C)  Min: 98 °F (36.7 °C)  Max: 98.9 °F (37.2 °C)  Pulse  Av.6  Min: 56  Max: 74  Resp  Av.3  Min: 18  Max: 20  SpO2  Av.6 %  Min: 94 %  Max: 99 %  Height  Av' 7" (170.2 cm)  Min: 5' 7" (170.2 cm)  Max: 5' 7" (170.2 cm)  Weight  Av.2 kg (205 lb 7.3 oz)  Min: 93 kg (205 lb)  Max: 93.4 kg (205 lb 14.6 oz)  Body mass index is 32.11 kg/m².  No intake/output data recorded.    Physical Examination:  BP (!) 151/78 (BP Location: Left arm, Patient Position: Lying, BP Method: Automatic)   Pulse (!) 56   Temp 98.2 °F (36.8 °C)   Resp 20   Ht 5' 7" (1.702 m)   Wt 93 kg (205 lb)   SpO2 97%   Breastfeeding? No   BMI 32.11 kg/m²   Gen: NAD, laying in bed comfortably  Head: atraumatic, normocephalic  Eyes: extraocular movements intact, no nystagmus, pupils equally round and reactive to light ~4mm.  No scleral icterus  Mouth: tongue protrusion midline. Poor dentition.  Neck: no " lymphadenopathy, JVP 6cm  CV: RRR, no murmurs  Pulm: CTAB, no wheezes, occasional cough  Abd: normoactive BS, nontender, nondistended  Extremities: no c/c/e, 2+ pulses in all extremities  Skin: no rash or lesion  Neuro:  Bilateral lower leg weakness, chronic. 5/5 strength upper extremities, no other focal finding  Psych: AAOx4, calm, cooperative, conversational      Laboratory:  Most Recent Data:  CBC:   Lab Results   Component Value Date    WBC 3.30 (L) 05/29/2017    HGB 11.3 (L) 05/29/2017    HCT 35.5 (L) 05/29/2017     05/29/2017    MCV 81 (L) 05/29/2017    RDW 14.3 05/29/2017     BMP:   Lab Results   Component Value Date     05/29/2017    K 3.2 (L) 05/29/2017     05/29/2017    CO2 27 05/29/2017    BUN 16 05/29/2017    CREATININE 1.1 05/29/2017     (H) 05/29/2017    CALCIUM 9.2 05/29/2017    MG 2.2 05/27/2017    PHOS 3.8 05/27/2017     LFTs:   Lab Results   Component Value Date    PROT 6.6 05/29/2017    ALBUMIN 3.4 (L) 05/29/2017    BILITOT 0.7 05/29/2017    AST 12 05/29/2017    ALKPHOS 45 (L) 05/29/2017    ALT 9 (L) 05/29/2017     Coags:   Lab Results   Component Value Date    INR 1.0 05/29/2017     FLP:   Lab Results   Component Value Date    CHOL 134 05/29/2017    HDL 52 05/29/2017    LDLCALC 63.0 05/29/2017    TRIG 95 05/29/2017    CHOLHDL 38.8 05/29/2017     DM:   Lab Results   Component Value Date    HGBA1C 7.9 (H) 05/27/2017    HGBA1C 6.8 (H) 05/19/2016    LDLCALC 63.0 05/29/2017    CREATININE 1.1 05/29/2017     Thyroid:   Lab Results   Component Value Date    TSH 0.923 05/29/2017     Anemia:   Lab Results   Component Value Date    IRON 37 05/27/2017    TIBC 358 05/27/2017    FERRITIN 143 05/27/2017    [id number] 1941 (H) 05/27/2017    FOLATE 12.6 05/27/2017     Cardiac:   Lab Results   Component Value Date    TROPONINI <0.006 05/27/2017    BNP 48 08/01/2016     Urinalysis:   Lab Results   Component Value Date    COLORU Yellow 05/27/2017    SPECGRAV 1.015 05/27/2017    NITRITE  Negative 05/27/2017    KETONESU Negative 05/27/2017    UROBILINOGEN 1.0 05/27/2017    WBCUA 1 05/27/2017       Trended Lab Data:    Recent Labs  Lab 05/28/17  0455 05/29/17  0515 05/29/17  1359   WBC 5.89 4.08 3.30*   HGB 11.4* 10.9* 11.3*   HCT 35.3* 33.9* 35.5*    205 189   MCV 80* 80* 81*   RDW 14.7* 14.3 14.3    138 139   K 3.1* 3.3* 3.2*    102 102   CO2 26 26 27   BUN 10 14 16   CREATININE 0.8 0.8 1.1   * 136* 172*   PROT 6.1 6.6 6.6   ALBUMIN 3.6 3.4* 3.4*   BILITOT 0.8 0.6 0.7   AST 13 11 12   ALKPHOS 51* 46* 45*   ALT 9* 10 9*       Trended Cardiac Data:    Recent Labs  Lab 05/27/17 2022   TROPONINI <0.006       Microbiology Data:  -    Other Laboratory Data:  -    Other Results:  EKG (my interpretation): t wave inversions in inferolateral leads, unchanged from previous.    Radiology:  Imaging Results          X-Ray Chest AP Portable (Final result)  Result time 05/29/17 14:31:01    Final result by Silvio Chan MD (05/29/17 14:31:01)                 Impression:        No acute radiographic findings in the chest.      Electronically signed by: SILVIO CHAN MD  Date:     05/29/17  Time:    14:31              Narrative:    PORTABLE AP CHEST:      Comparison: 5/27/17    Findings:     The lungs are clear.  The cardiac silhouette and the pulmonary vasculature are normal in size. There is no pleural effusion or pneumothorax. The hilar and mediastinal contours are unremarkable. There are no acute bony abnormalities.                             CT Head Without Contrast (Final result)  Result time 05/29/17 14:28:09    Final result by Silvio Chan MD (05/29/17 14:28:09)                 Impression:        Stable infarcts involving the left shantanu and left posterior cerebral artery territory.  No evidence of acute intracranial hemorrhage or new major vascular territory infarct.    Paranasal sinus disease involving the maxillary and ethmoid sinuses.      Electronically signed by: SILVIO  ERIC SCOTT  Date:     05/29/17  Time:    14:28              Narrative:    Comparison: 5/27/17    Technique: 5 mm axial images of the head obtained without contrast with sagittal and coronal reformats obtained from the data.    Findings: Patchy areas of decreased attenuation within the left shantanu and left PCA territory consistent with small areas of infarction which appear stable when compared to prior examinations.  No evidence of new major vascular territory infarct.  Hypodensity in the left basal ganglia is stable and may represent a lacunar infarct or prominent perivascular space.  No evidence of acute intracranial hemorrhage.  No abnormal extra-axial fluid collections.  There is atherosclerosis of the vertebral and carotid arteries.  There are fluid levels within the maxillary sinuses which could represent acute sinusitis.  There is mucosal membrane thickening in the ethmoid sinuses.  The mastoid air cells are clear.  The calvarium is intact.                                   Assessment:     Autumn Smart is a 61 y.o. female with:  Patient Active Problem List    Diagnosis Date Noted    Diplopia 05/29/2017    Vertebrobasilar artery syndrome     Hypertensive crisis 05/27/2017    Microcytosis 05/27/2017    HLD (hyperlipidemia) 05/21/2016    Hypovolemia 05/20/2016    Right sided weakness 05/19/2016    Arterial ischemic stroke, PCA, left, acute 05/19/2016    Visual disturbance 05/19/2016    Cytotoxic brain edema 05/19/2016        Plan:     Diplopia  - admit to obs for TIA workup  - CT head negative  - MRI/MRA head pending  - continue aspirin, statin, ace, BB  - cleared by speech for regular diet  - pending PT/OT recs  - possibly orthostatic vs low BP related    HTN  - recently admitted for HTN urgency  - currently 151/78  - allowing for permissive hypertension while ruling out stroke.  - will slowly restart home lisinopril, coreg    DM2  - most recent A1C of 7.9  - SSI as needed for glucose control   - will  restart home metformin on discharge    HLD  - continue home statin     H/O CVA  - continue Asa, statin, and blood pressure control     Normocytic Anemia   - Hgb 12.9  MCV 81   - has not had colonoscopy   - iron studies showing mixed picture, defer to outpatient    HCM  - pt not up to date on vaccinations or cancer screening  order prior to discharge     Diet: dental soft cardiac  Prophylaxis: lovenox  Code: Full    Dispo - admission for TIA rule-out and workup    Bradford Kirk MD  [facility] Team B    [facility] Medicine Hospitalist Pager numbers:   [facility] Hospitalist Medicine Team A (Lucio/Jaja): [phone]  [facility] Hospital[facility] Medicine Team B (Chad/Angelo):  [phone]

## 2017-05-29 NOTE — PLAN OF CARE
Problem: Patient Care Overview  Goal: Plan of Care Review  Outcome: Ongoing (interventions implemented as appropriate)  Pt on RA with sats of 94% .  Will continue to monitor.

## 2017-05-29 NOTE — PROGRESS NOTES
[facility] IM Resident CHIQUITA Progress Note    Subjective:     Patient feeling much better today and states her headache has resolved. She is still having lots of nasal congestion and cough w/ sore throat. Her BPs have been well controlled and she doesn't have any other complaints.     Objective:   Last 24 Hour Vital Signs:  BP  Min: 117/64  Max: 186/81  Temp  Av.4 °F (36.9 °C)  Min: 98 °F (36.7 °C)  Max: 98.9 °F (37.2 °C)  Pulse  Av.6  Min: 63  Max: 75  Resp  Av  Min: 18  Max: 18  SpO2  Av.3 %  Min: 94 %  Max: 99 %  Weight  Av.4 kg (205 lb 14.6 oz)  Min: 93.4 kg (205 lb 14.6 oz)  Max: 93.4 kg (205 lb 14.6 oz)  I/O last 3 completed shifts:  In: 800 [P.O.:800]  Out: 1420 [Urine:1420]    Physical Examination:  General appearance: alert, appears stated age and cooperative  Head: Normocephalic, without obvious abnormality, atraumatic  Eyes: EOMI, PERRL, watery eyes  Throat: MMM, poor dentition   Lungs: clear to auscultation bilaterally  Heart: regular rate and rhythm, S1, S2 normal, no murmur, click, rub or gallop  Abdomen: soft, non-tender; bowel sounds normal; no masses,  no organomegaly  Extremities: extremities normal, atraumatic, no cyanosis or edema  Pulses: 2+ and symmetric  Neurologic: Alert and oriented X 3, normal strength and tone. Normal symmetric reflexes. Normal coordination and gait        Laboratory:  Laboratory Data Reviewed: yes  Pertinent Findings:  CBC:   Lab Results   Component Value Date    WBC 4.08 2017    HGB 10.9 (L) 2017    HCT 33.9 (L) 2017     2017    MCV 80 (L) 2017    RDW 14.3 2017     BMP:   Lab Results   Component Value Date     2017    K 3.3 (L) 2017     2017    CO2 26 2017    BUN 14 2017     (H) 2017    CALCIUM 9.0 2017    MG 2.2 2017    PHOS 3.8 2017     LFTs:   Lab Results   Component Value Date    PROT 6.6 2017    ALBUMIN 3.4 (L) 2017     BILITOT 0.6 05/29/2017    AST 11 05/29/2017    ALKPHOS 46 (L) 05/29/2017    ALT 10 05/29/2017     Coags:   Lab Results   Component Value Date    INR 1.0 05/27/2017     Lipids:   Lab Results   Component Value Date    CHOL 127 05/28/2017    HDL 46 05/28/2017    LDLCALC 63.0 05/28/2017    TRIG 90 05/28/2017     DM:   Lab Results   Component Value Date    HGBA1C 7.9 (H) 05/27/2017    HGBA1C 6.8 (H) 05/19/2016    LDLCALC 63.0 05/28/2017    CREATININE 0.8 05/29/2017     Thyroid:   Lab Results   Component Value Date    TSH 0.943 05/27/2017     Anemia:   Lab Results   Component Value Date    IRON 37 05/27/2017    TIBC 358 05/27/2017    FERRITIN 143 05/27/2017    XBTFCNQO46 1941 (H) 05/27/2017    FOLATE 12.6 05/27/2017       Microbiology Data Reviewed: yes  Pertinent Findings:  none    Other Results:  EKG (my interpretation): T wave inversions in inferolateral leads, unchanged from previous.    Radiology Data Reviewed: yes  Pertinent Findings:  Imaging Results          X-Ray Chest 1 View (Final result)  Result time 05/27/17 22:56:30    Final result by Eleazar Verdin MD (05/27/17 22:56:30)                 Impression:        No acute findings in the chest. No significant change from prior.        Electronically signed by: ELEAZAR VERDIN MD  Date:     05/27/17  Time:    22:56              Narrative:    Chest AP portable    Indication:fever.    Comparison:August 1, 2016.    Findings:         Heart and lungs unchanged when allowing for differences in technique and positioning.                             CT Head Without Contrast (Final result)  Result time 05/27/17 20:54:34    Final result by Jaspreet Mendez MD (05/27/17 20:54:34)                 Impression:         No intracranial hemorrhage or mass effect.      No interval detrimental change from 5/21/16.        Electronically signed by: Dr. Jaspreet Mendez MD  Date:     05/27/17  Time:    20:54              Narrative:    CT OF THE HEAD WITHOUT CONTRAST:     Technique: 5  mm axial images were obtained from the vertex to the skullbase, without administration of contrast.    Comparison: 5/21/16    Findings:    No intracranial hemorrhage, extra-axial fluid collection, midline shift, or mass effect.  No evidence of an acute cortical infarct or of an infarct in a major vascular territory.  The ventricles are normal in size and configuration, and the basilar cisterns are patent. Findings consistent with chronic infarction LEFT occipital lobe. If there is additional clinical concern for acute ischemia, MRI with diffusion-weighted imaging could be obtained.      The calvarium is unremarkable. Visualized portions of the paranasal sinuses are clear. Visualized mastoid air cells are clear. Visualized orbits are unremarkable.                                Current Medications:     Infusions:        Scheduled:   aspirin  81 mg Oral Daily    atorvastatin  40 mg Oral Daily    carvedilol  6.25 mg Oral BID WM    citalopram  40 mg Oral Daily    famotidine  20 mg Oral BID    fluticasone  2 spray Each Nare Daily    heparin (porcine)  5,000 Units Subcutaneous Q12H    lisinopril  40 mg Oral Daily    moxifloxacin  400 mg Oral Daily    trazodone  100 mg Oral QHS        PRN:  acetaminophen, dextrose 50%, dextrose 50%, glucagon (human recombinant), glucose, glucose, hydrocodone-acetaminophen 10-325mg, insulin aspart, labetalol, methocarbamol, ondansetron      Assessment:     Autumn Smart is a 61 y.o.female with  Patient Active Problem List    Diagnosis Date Noted    Hypertensive crisis 05/27/2017    Microcytosis 05/27/2017    HLD (hyperlipidemia) 05/21/2016    Hypovolemia 05/20/2016    Right sided weakness 05/19/2016    Arterial ischemic stroke, PCA, left, acute 05/19/2016    Visual disturbance 05/19/2016    Cytotoxic brain edema 05/19/2016        Plan:     Hypertensive urgency, resolved  - presented to ED with BP of 252/116   - complaining of 2 day history of HA, otherwise asymptomatic  with normal lab values   - home med list with amlodipine-norvasc 5-320 mg, coreg 6.25 mg BID, lisinopril 20-12.5 mg but patient unsure which medications she takes at home   - troponin negative, EKG unchanged from previous   - restarted lisinopril 20 mg ,HCTZ 25 mg, coreg 6.25 BID  - /68 this AM  - Will increase lisinopril to 40 mg and stop HCTZ    Headache  - Severe bi-temporal headache during admission  - CT head wnl on admit  - CRP elevated; ESR wnl  - Concern for GCA; s/p prednisone  - Rheum consulted; felt no concern for GCA  - Treat symptomatically     Fever unknown etiology, resolved  - Pt febrile to 102.9 on admission  WBC normal, bland UA  - procal negative procal  CXR unremarkable   - Normothermic on exam this AM   - continue to monitor      DM2  - most recent A1C of 6.8  repeat 7.9  - SSI as needed for glucose control  - Likely not compliant with metformin at home; will encourage compliance before adding additional medications/insulin     HLD  - continue home statin      H/O CVA  - continue Asa, statin, and blood pressure control      Normocytic Anemia   - Hgb 12.9 -> 11.4  - has not had colonoscopy; will need done outpatient  - iron studies likely MELY but will need repeat outpatient      HCM  - pt not up to date on vaccinations or cancer screening  order prior to discharge      F -   E - replete as needed  N - diabetic      PPX - SQH     Dispo: Home today    Mynor Sauceda  [facility] Internal Medicine HO-I  U IM Service Team B    [facility] Medicine Hospitalist Pager numbers:   [facility] Hospitalist Medicine Team A (Lucio/Jaja): [phone]  [facility] Hospitalist Medicine Team B (Chad/Angelo):  [phone]

## 2017-05-29 NOTE — PLAN OF CARE
Patient d/c home d/c instructions given to the patient, verbalized understanding. Education given, refer to clinical reference for education. IV removed tip intact. Telemetry d/c. Waiting for transport

## 2017-05-29 NOTE — PROGRESS NOTES
.Pharmacy New Medication Education    Patient accepted medication education.    Pharmacy educated patient on the following medications, using the teach-back method.   Apap  Asa  Lipitor  Coreg  Celexa  D50%  Flonase  Glucagon  Glucose  Heparin  Norco  Novolog  Labetalol  Lisinopril  Methocarbamol  Avelox  Zofran  Trazodone    Learners of pharmacy medication education included:  patient    Patient +/- learner response:  verbalize understanding

## 2017-05-29 NOTE — DISCHARGE SUMMARY
[facility] IM Discharge Summary    Primary Team: [facility] IM Team B  Attending Physician: Dr. Bonilla  Resident: Dr. Lemos  Intern: Dr. Sauceda    Date of Admit: 5/27/2017  Date of Discharge: 5/29/2017    Discharge to: Home  Condition: Stable at discharge    Discharge Diagnoses     Patient Active Problem List   Diagnosis    Right sided weakness    Arterial ischemic stroke, PCA, left, acute    Visual disturbance    Cytotoxic brain edema    Hypovolemia    HLD (hyperlipidemia)    Hypertensive crisis    Microcytosis    Diplopia    Vertebrobasilar artery syndrome       Consultants and Procedures     Consultants:  Rheumatology - [facility]    Procedures:   CT Head 5/27/17:  No intracranial hemorrhage, extra-axial fluid collection, midline shift, or mass effect.  No evidence of an acute cortical infarct or of an infarct in a major vascular territory.  The ventricles are normal in size and configuration, and the basilar cisterns are patent. Findings consistent with chronic infarction LEFT occipital lobe. If there is additional clinical concern for acute ischemia, MRI with diffusion-weighted imaging could be obtained.      The calvarium is unremarkable. Visualized portions of the paranasal sinuses are clear. Visualized mastoid air cells are clear. Visualized orbits are unremarkable.    Brief History of Present Illness      Autumn Smart is a 61 y.o. AA female who  has a past medical history of Back pain; Diabetes mellitus; and Hypertension.  The patient presented to [facility] on 5/27/2017 with a primary complaint of Fatigue and headache.  .     The patient was in their usual state of health until a day prior to admission when she developed a splitting headache in the right temple. Patient took multiple doses of Nyquil, but did not attain relief. Pt presented to the ED concerned after her headache did not go away over the past two days PTA. Also endorsed nasal congestion, watery eyes, and generalized weakness,  "but denied blurry vision, nausea, vomiting, chest pain, shortness of breath, abdominal pain, constipation or diarrhea. Patient stated that she is compliant with her medications but couldn't recall what she takes because "there are too many". Pt denied difficulty speaking or loss of sensation.     For the full HPI please refer to the History & Physical from this admission.    Hospital Course By Problem with Pertinent Findings     Hypertensive urgency, resolved  - presented to ED with BP of 252/116   - complaining of 2 day history of HA, otherwise asymptomatic with normal lab values   - home med list with amlodipine-norvasc 5-320 mg, coreg 6.25 mg BID, lisinopril 20-12.5 mg but patient unsure which medications she takes at home   - troponin negative, EKG unchanged from previous   - restarted lisinopril 20 mg ,HCTZ 25 mg, coreg 6.25 BID  - /68 this AM  - Increased lisinopril to 40 mg and stopped HCTZ  - Patient only on Lisinopril 40mg and Coreg 6.25 on day of discharge and BP stable at 145/72 at discharge; will avoid stricter BP goals for the short term to avoid dropping patient too low  - Patient was counseled on strict compliance with home medication     Headache  - Severe bi-temporal headache during admission  - CT head wnl on admit  - CRP elevated; ESR wnl  - Concern for GCA; s/p prednisone  - Rheum consulted; felt no concern for GCA  - Treated symptomatically; resolved prior to discharge     Fever unknown etiology, resolved  - Pt febrile to 102.9 on admission  WBC normal, bland UA  - procal negative procal  CXR unremarkable   - Normothermic on exam day of discharge  - Treat w/ moxifloxacin for possible respiratory source     DM2  - most recent A1C of 6.8  repeat 7.9  - SSI as needed for glucose control  - Likely not compliant with metformin at home; encourage compliance before adding additional medications/insulin     HLD  - continued home statin      H/O CVA  - continued Asa, statin, and blood pressure " control      Normocytic Anemia   - Hgb 12.9 -> 11.4  - has not had colonoscopy; will need done outpatient  - iron studies likely MELY but will need repeat outpatient    Discharge Medications        Medication List      START taking these medications    fluticasone 50 mcg/actuation nasal spray  Commonly known as:  FLONASE  1 spray by Each Nare route once daily.     lisinopril 40 MG tablet  Commonly known as:  PRINIVIL,ZESTRIL  Take 1 tablet (40 mg total) by mouth once daily.     moxifloxacin 400 mg tablet  Commonly known as:  AVELOX  Take 1 tablet (400 mg total) by mouth once daily.        CHANGE how you take these medications    aspirin 81 MG Chew  Take 1 tablet (81 mg total) by mouth once daily.  What changed:  Another medication with the same name was removed. Continue taking this medication, and follow the directions you see here.        CONTINUE taking these medications    atorvastatin 40 MG tablet  Commonly known as:  LIPITOR  Take 1 tablet (40 mg total) by mouth once daily.     carvedilol 6.25 MG tablet  Commonly known as:  COREG  Take 1 tablet (6.25 mg total) by mouth 2 (two) times daily with meals.     citalopram 40 MG tablet  Commonly known as:  CELEXA     diclofenac 75 MG EC tablet  Commonly known as:  VOLTAREN  Take 1 tablet (75 mg total) by mouth 2 (two) times daily.     famotidine 20 MG tablet  Commonly known as:  PEPCID  Take 1 tablet (20 mg total) by mouth 2 (two) times daily.     hydrOXYzine pamoate 25 MG Cap  Commonly known as:  VISTARIL     metformin 1000 MG tablet  Commonly known as:  GLUCOPHAGE     methocarbamol 500 MG Tab  Commonly known as:  ROBAXIN     nitroGLYCERIN 0.4 MG SL tablet  Commonly known as:  NITROSTAT     trazodone 100 MG tablet  Commonly known as:  DESYREL        STOP taking these medications    amlodipine-valsartan 5-320 mg per tablet  Commonly known as:  EXFORGE     clonazePAM 1 MG tablet  Commonly known as:  KLONOPIN     hydrocodone-acetaminophen   mg per  tablet  Commonly known as:  LORTAB     isosorbide dinitrate 20 MG tablet  Commonly known as:  ISORDIL     lisinopril-hydrochlorothiazide 20-12.5 mg per tablet  Commonly known as:  PRINZIDE,ZESTORETIC     naproxen 500 MG tablet  Commonly known as:  NAPROSYN           Where to Get Your Medications      These medications were sent to [facility]pharm[facility] #[id number] - [place], [place] - [address] [place]  [address] [place] [address]    Phone:  [phone]    atorvastatin 40 MG tablet     You can get these medications from any pharmacy    Bring a paper prescription for each of these medications   carvedilol 6.25 MG tablet   lisinopril 40 MG tablet   moxifloxacin 400 mg tablet  You don't need a prescription for these medications   aspirin 81 MG Chew   fluticasone 50 mcg/actuation nasal spray         Discharge Information:   Diet:  Cardiac, low sodium    Physical Activity:  As tolerated    Instructions:  1. Take all medications as prescribed  2. Keep all follow-up appointments  3. Return to the hospital or call your primary care physicians if any worsening symptoms such as headache, vision changes occur.      Follow-Up Appointments:  Follow-up Information     Maria Esther Bob MD In 2 weeks.    Specialty:  Internal Medicine  Why:  office closed today, please call for follow-up.  Contact information:  [address] [place] [address] [phone]             Valeria Pulido MD On 6/8/2017.    Specialty:  Allergy and Immunology  Why:  9:30 am  Contact information:  [address]  [address]  [place] [address] [phone]                     Mynor Sauceda  [facility] Internal Medicine, -1

## 2017-05-29 NOTE — ED NOTES
Patient was brought back inside from being discharged for previous stroke. Patient got to car when she started seeing double vision in both eyes. Double vision is now resolved. Patient denies pain. Bilateral leg weakness noticed when getting a CT scan. Was unable to stand up by herself.

## 2017-05-29 NOTE — ED NOTES
Pt states she sees a mental health provider at [facility]( [facility] office_ phone number [phone]

## 2017-05-29 NOTE — PLAN OF CARE
Patient returned to her room from MRI.  Patient denies any pain.  Patient does not have any new weakness, drifting, facial droop or slurred speech.  Patient is ready to go home.  I will continue to monitor the patient.

## 2017-05-29 NOTE — PLAN OF CARE
"At the time of D/C patient got up and dressed herself.  I went over the D/C plan with the patient and patient verbalized understanding.  Patient denied any complaints at the time.  Patient rolled down per PCT.  Received call from charge nurse at 1330 stating patient c/o double vision and feeling dizzy. Immediatly to access the patient.  Patient stating " I see two of you". I told the patient her nose was running. She stated right into my mouth" She did not wipe her nose or do anything about it. Patient's behavior was not at her baseline.  Patient brought to the ED via tech and nurse .  The admission  in the ER asked for the patient's name and date of birth. Patient told her " I see two of you" instead of giving her her information.      "

## 2017-05-29 NOTE — PLAN OF CARE
Problem: SLP Goal  Goal: SLP Goal  Short Term Goals:  1. Pt will participate in ongoing swallow assessment to determine least restrictive diet.   2. Patient will successfully participate in [id number] evaluation to further assess for any communication impairments s/p stroke    Outcome: Ongoing (interventions implemented as appropriate)  5/29/17: Swallow eval completed, no clinical s/s of oral or pharyngeal dysphagia present. Will f/u with speech eval next session as indicated pending neuro w/u.   FABRICIO Fowler, [facility]SLP  Speech Pathologist 5/29/2017

## 2017-05-29 NOTE — PLAN OF CARE
Problem: Patient Care Overview  Goal: Plan of Care Review  Outcome: Ongoing (interventions implemented as appropriate)  Reviewed plan of care with pt. Pt unable to sleep overnight. Trazodone and benadryl was administered. Pt denies any pain. Pt sinus magalie on tele. No true red alarms noted. Safety measures are in place, bed low and in lock position, call light in reach, and bed alarm is on. Pt verbalizes full understanding of their plan of care.

## 2017-05-29 NOTE — PLAN OF CARE
The radiologist called. Patient had a small infarct on the right side of her brain.  Dr Aj advised.

## 2017-05-29 NOTE — ED PROVIDER NOTES
"Encounter Date: 5/29/2017       History     Chief Complaint   Patient presents with    Eye Problem     c/o blurred vision, pt had stroke, just discharged few minutes ago  from hospital,. went outside to leave and developed blurred vision.     Review of patient's allergies indicates:  No Known Allergies  HPI   Autumn Smart is a 61 y.o. female who has a past medical history of Back pain; Diabetes mellitus; and Hypertension. who presents to the Emergency Department with double vision. Symptoms began just prior to arrival as patient was leaving the hospital 2/2 discharge. She was recently admitted for hypertensive emergency, headache, and states "I had a stroke in the back of my brain". Symptoms are associated with nothing, and are not associated with headache, eye pain, numbness or weakness. Pt has a past surgical history that includes Fracture surgery and Back surgery.      Past Medical History:   Diagnosis Date    Back pain     Diabetes mellitus     Hypertension      Past Surgical History:   Procedure Laterality Date    BACK SURGERY      FRACTURE SURGERY       History reviewed. No pertinent family history.  Social History   Substance Use Topics    Smoking status: Never Smoker    Smokeless tobacco: Not on file    Alcohol use 0.6 oz/week     1 Cans of beer per week     Review of Systems   Constitutional: Positive for activity change. Negative for fatigue and fever.   HENT: Negative for sore throat.    Eyes: Positive for visual disturbance. Negative for photophobia, pain and redness.   Respiratory: Negative for shortness of breath.    Cardiovascular: Negative for chest pain.   Gastrointestinal: Negative for nausea.   Genitourinary: Negative for dysuria.   Musculoskeletal: Positive for arthralgias (right hip). Negative for back pain.   Skin: Negative for rash.   Neurological: Negative for dizziness, seizures, facial asymmetry, speech difficulty, weakness, light-headedness, numbness and headaches. "   Hematological: Does not bruise/bleed easily.   Psychiatric/Behavioral: The patient is not nervous/anxious.        Physical Exam     Initial Vitals   BP Pulse Resp Temp SpO2   05/29/17 1520 05/29/17 1520 05/29/17 1520 05/29/17 1520 05/29/17 1558   (!) 151/78 (!) 56 20 98.2 °F (36.8 °C) 97 %     Physical Exam    Nursing note and vitals reviewed.  Constitutional: She appears well-developed and well-nourished. She appears distressed.   HENT:   Head: Normocephalic and atraumatic.   Right Ear: External ear normal.   Left Ear: External ear normal.   Mouth/Throat: Oropharynx is clear and moist.   Eyes: Conjunctivae and EOM are normal. Pupils are equal, round, and reactive to light.   No gaze preference   Neck: Normal range of motion. Neck supple.   Cardiovascular: Regular rhythm and intact distal pulses.   No murmur heard.  Bradycardia   Pulmonary/Chest: Breath sounds normal. No respiratory distress. She has no wheezes.   Abdominal: Soft. Bowel sounds are normal. She exhibits no distension. There is no tenderness. There is no guarding.   Musculoskeletal: Normal range of motion. She exhibits no edema or tenderness.   Lymphadenopathy:     She has no cervical adenopathy.   Neurological: She is alert and oriented to person, place, and time. She has normal reflexes. No cranial nerve deficit or sensory deficit.   Left upper extremity weakness: 4/5  Right upper extremity 5/5  Left lower extremity 5/5  Right lower extremity 3/5 (secondary to pain)  Finger-nose negative bilaterally  Heel to Shin negative bilaterally  No dysarthria or aphasia  No visual field deficits     Skin: Skin is warm and dry.   Psychiatric: She has a normal mood and affect. Her behavior is normal. Judgment and thought content normal.         ED Course   Critical Care  Date/Time: 5/29/2017 4:44 PM  Performed by: DOT FULTON  Authorized by: BRENDAN FARRIS   Direct patient critical care time: 20 minutes  Ordering / reviewing critical care time: 5  minutes  Documentation critical care time: 5 minutes  Consulting other physicians critical care time: 5 minutes  Total critical care time (exclusive of procedural time) : 35 minutes  Comments: Critical Care time: 35 minutes inclusive of direct patient care, review of previous records, interpretation of labs, imaging and ekg, as well as discussion of my impression and plan of care with the patient, family and other clinicians/consultants. This time is exclusive of any separate billable procedures and of treating other patients. Critical care was required for this patient who presented with acute neurological deficit, possible TIA vs CVA requiring my emergent evaluation and management in the emergency department.          Labs Reviewed   CBC W/ AUTO DIFFERENTIAL - Abnormal; Notable for the following:        Result Value    WBC 3.30 (*)     Hemoglobin 11.3 (*)     Hematocrit 35.5 (*)     MCV 81 (*)     MCH 25.6 (*)     MCHC 31.8 (*)     Gran # 1.5 (*)     Mono # 0.2 (*)     All other components within normal limits   COMPREHENSIVE METABOLIC PANEL - Abnormal; Notable for the following:     Potassium 3.2 (*)     Glucose 172 (*)     Albumin 3.4 (*)     Alkaline Phosphatase 45 (*)     ALT 9 (*)     eGFR if non  55 (*)     All other components within normal limits   POCT GLUCOSE - Abnormal; Notable for the following:     POCT Glucose 165 (*)     All other components within normal limits   PROTIME-INR   TSH   LIPID PANEL   POCT GLUCOSE     EKG Readings: (Independently Interpreted)   EKG: Sinus bradycardia at 53 bpm with occasional PVC, nl axis, nl intervals, no hypertrophy, nonspecific ST-T changes as read by me.  Heart rate has decreased significantly and ST depressions with T-wave inversions are less obvious on this EKG when compared to 5/27/17          Medical Decision Making:   Initial Assessment:   This is an emergent evaluation of a 61-year-old female presents with acute onset of diplopia.  Patient had  "recent history of hypertensive emergency and was just discharged from the hospital.  Stroke code was activated by me.  Patient was taken emergently to CT scan.  Head CT was read as negative for acute bleed per radiology.  I discussed the case with Dr. Rosado, vascular neurologist, who evaluated the patient via teleneurology. At that time, symptoms resolved.  Our overall impression is TIA.  Patient not a candidate for TPA given recent possible "stroke" and resolution of symptoms.  Per vascular neurology, patient should be admitted for further evaluation including MRI.                   ED Course     Clinical Impression:   The primary encounter diagnosis was Diplopia. Diagnoses of Left arm weakness and Vertebrobasilar artery syndrome were also pertinent to this visit.    Disposition:   Disposition: Placed in Observation  Condition: Cyril Atwood MD  05/29/17 1740    "

## 2017-05-30 VITALS
RESPIRATION RATE: 18 BRPM | HEART RATE: 64 BPM | HEIGHT: 67 IN | SYSTOLIC BLOOD PRESSURE: 174 MMHG | TEMPERATURE: 98 F | BODY MASS INDEX: 32.18 KG/M2 | DIASTOLIC BLOOD PRESSURE: 91 MMHG | OXYGEN SATURATION: 93 % | WEIGHT: 205 LBS

## 2017-05-30 PROBLEM — I63.9 CVA (CEREBRAL VASCULAR ACCIDENT): Status: ACTIVE | Noted: 2017-05-29

## 2017-05-30 LAB
ALBUMIN SERPL BCP-MCNC: 3.3 G/DL
ALP SERPL-CCNC: 44 U/L
ALT SERPL W/O P-5'-P-CCNC: 9 U/L
ANION GAP SERPL CALC-SCNC: 9 MMOL/L
AST SERPL-CCNC: 13 U/L
BASOPHILS # BLD AUTO: 0.01 K/UL
BASOPHILS NFR BLD: 0.3 %
BILIRUB SERPL-MCNC: 0.5 MG/DL
BUN SERPL-MCNC: 14 MG/DL
CALCIUM SERPL-MCNC: 8.9 MG/DL
CHLORIDE SERPL-SCNC: 104 MMOL/L
CO2 SERPL-SCNC: 27 MMOL/L
CREAT SERPL-MCNC: 0.8 MG/DL
DIASTOLIC DYSFUNCTION: YES
DIFFERENTIAL METHOD: ABNORMAL
EOSINOPHIL # BLD AUTO: 0 K/UL
EOSINOPHIL NFR BLD: 0.3 %
ERYTHROCYTE [DISTWIDTH] IN BLOOD BY AUTOMATED COUNT: 14.5 %
EST. GFR  (AFRICAN AMERICAN): >60 ML/MIN/1.73 M^2
EST. GFR  (NON AFRICAN AMERICAN): >60 ML/MIN/1.73 M^2
ESTIMATED PA SYSTOLIC PRESSURE: 13.37
GLUCOSE SERPL-MCNC: 136 MG/DL
HCT VFR BLD AUTO: 34.9 %
HGB BLD-MCNC: 11.1 G/DL
LYMPHOCYTES # BLD AUTO: 1.2 K/UL
LYMPHOCYTES NFR BLD: 31.3 %
MCH RBC QN AUTO: 25.8 PG
MCHC RBC AUTO-ENTMCNC: 31.8 %
MCV RBC AUTO: 81 FL
MITRAL VALVE MOBILITY: NORMAL
MONOCYTES # BLD AUTO: 0.3 K/UL
MONOCYTES NFR BLD: 8.4 %
NEUTROPHILS # BLD AUTO: 2.3 K/UL
NEUTROPHILS NFR BLD: 59.4 %
PLATELET # BLD AUTO: 199 K/UL
PMV BLD AUTO: 10.6 FL
POCT GLUCOSE: 117 MG/DL (ref 70–110)
POCT GLUCOSE: 151 MG/DL (ref 70–110)
POTASSIUM SERPL-SCNC: 3.4 MMOL/L
PROT SERPL-MCNC: 6.4 G/DL
RBC # BLD AUTO: 4.31 M/UL
RETIRED EF AND QEF - SEE NOTES: 65 (ref 55–65)
SODIUM SERPL-SCNC: 140 MMOL/L
TRICUSPID VALVE REGURGITATION: ABNORMAL
WBC # BLD AUTO: 3.93 K/UL

## 2017-05-30 PROCEDURE — 25000003 PHARM REV CODE 250: Performed by: STUDENT IN AN ORGANIZED HEALTH CARE EDUCATION/TRAINING PROGRAM

## 2017-05-30 PROCEDURE — G0378 HOSPITAL OBSERVATION PER HR: HCPCS

## 2017-05-30 PROCEDURE — 97161 PT EVAL LOW COMPLEX 20 MIN: CPT

## 2017-05-30 PROCEDURE — 85025 COMPLETE CBC W/AUTO DIFF WBC: CPT

## 2017-05-30 PROCEDURE — 93306 TTE W/DOPPLER COMPLETE: CPT | Mod: 26,,, | Performed by: INTERNAL MEDICINE

## 2017-05-30 PROCEDURE — 97802 MEDICAL NUTRITION INDIV IN: CPT

## 2017-05-30 PROCEDURE — 97165 OT EVAL LOW COMPLEX 30 MIN: CPT

## 2017-05-30 PROCEDURE — 97530 THERAPEUTIC ACTIVITIES: CPT

## 2017-05-30 PROCEDURE — 36415 COLL VENOUS BLD VENIPUNCTURE: CPT

## 2017-05-30 PROCEDURE — 94761 N-INVAS EAR/PLS OXIMETRY MLT: CPT

## 2017-05-30 PROCEDURE — 96374 THER/PROPH/DIAG INJ IV PUSH: CPT

## 2017-05-30 PROCEDURE — 80053 COMPREHEN METABOLIC PANEL: CPT

## 2017-05-30 PROCEDURE — 25000003 PHARM REV CODE 250: Performed by: HOSPITALIST

## 2017-05-30 RX ORDER — CARVEDILOL 3.12 MG/1
3.12 TABLET ORAL 2 TIMES DAILY WITH MEALS
Qty: 60 TABLET | Refills: 4 | Status: SHIPPED | OUTPATIENT
Start: 2017-05-30 | End: 2017-05-31 | Stop reason: SDUPTHER

## 2017-05-30 RX ORDER — POTASSIUM CHLORIDE 20 MEQ/1
40 TABLET, EXTENDED RELEASE ORAL ONCE
Status: COMPLETED | OUTPATIENT
Start: 2017-05-30 | End: 2017-05-30

## 2017-05-30 RX ORDER — ASPIRIN 81 MG/1
81 TABLET ORAL DAILY
Status: DISCONTINUED | OUTPATIENT
Start: 2017-05-30 | End: 2017-05-30

## 2017-05-30 RX ORDER — ASPIRIN 325 MG
325 TABLET, DELAYED RELEASE (ENTERIC COATED) ORAL DAILY
Status: DISCONTINUED | OUTPATIENT
Start: 2017-05-30 | End: 2017-05-30 | Stop reason: HOSPADM

## 2017-05-30 RX ORDER — MOXIFLOXACIN HYDROCHLORIDE 400 MG/1
400 TABLET ORAL DAILY
Status: DISCONTINUED | OUTPATIENT
Start: 2017-05-30 | End: 2017-05-30 | Stop reason: HOSPADM

## 2017-05-30 RX ADMIN — ASPIRIN 325 MG: 325 TABLET, DELAYED RELEASE ORAL at 11:05

## 2017-05-30 RX ADMIN — MOXIFLOXACIN HYDROCHLORIDE 400 MG: 400 TABLET, FILM COATED ORAL at 10:05

## 2017-05-30 RX ADMIN — POTASSIUM CHLORIDE 40 MEQ: 20 TABLET, EXTENDED RELEASE ORAL at 10:05

## 2017-05-30 RX ADMIN — FAMOTIDINE 20 MG: 20 TABLET, FILM COATED ORAL at 10:05

## 2017-05-30 RX ADMIN — SODIUM CHLORIDE, PRESERVATIVE FREE 3 ML: 5 INJECTION INTRAVENOUS at 06:05

## 2017-05-30 RX ADMIN — ATORVASTATIN CALCIUM 40 MG: 40 TABLET, FILM COATED ORAL at 10:05

## 2017-05-30 RX ADMIN — CITALOPRAM HYDROBROMIDE 40 MG: 20 TABLET ORAL at 10:05

## 2017-05-30 NOTE — PT/OT/SLP EVAL
Occupational Therapy  Evaluation/Treatment/Dc Summary     Autumn Smart   MRN: [id number]   Admitting Diagnosis: <principal problem not specified>    OT Date of Treatment: 05/30/17   OT Start Time: 1000  OT Stop Time: 1047  OT Total Time (min): 47 min    Billable Minutes:  Evaluation 10  Therapeutic Activity 37 co treatment with PT     Diagnosis: <principal problem not specified>   The primary encounter diagnosis was Diplopia. Diagnoses of Left arm weakness, Vertebrobasilar artery syndrome, and Arterial ischemic stroke, PCA, left, acute were also pertinent to this visit.      Past Medical History:   Diagnosis Date    Back pain     Diabetes mellitus     Hypertension       Past Surgical History:   Procedure Laterality Date    BACK SURGERY      FRACTURE SURGERY           General Precautions: Standard, fall  Orthopedic Precautions: N/A  Braces:            Patient History:  Living Environment  Lives With: alone  Living Arrangements: house  Home Accessibility: stairs to enter home  Number of Stairs to Enter Home: 3  Stair Railings at Home: outside, present at both sides  Living Environment Comment: Pt reports living alone in [facility], 3 steps to enter, B rails, tub/shower combo. Pt reports (I) as PLOF, drives, does not work, does not own DME, performs IADLs  Equipment Currently Used at Home: none    Prior level of function:   Bed Mobility/Transfers: independent  Grooming: independent  Bathing: independent  Upper Body Dressing: independent  Lower Body Dressing: independent  Toileting: independent  Home Management Skills: independent  Driving License: Yes  Mode of Transportation: Car     Dominant hand: right    Subjective:  Communicated with nsg prior to session.  Pt reporting she has concerns over her hospital stay- states MDs d/c her too quickly and that is why she had to be re-admitted so quickly. Frustrated over situation and medication management   Chief Complaint: none stated   Patient/Family stated goals: return home;  figure out medications     Pain/Comfort  Pain Rating 1: 0/10    Objective:       Cognitive Exam:  Oriented to: Person, Place, Time and Situation  Follows Commands/attention: Follows multistep  commands  Communication: clear/fluent  Memory:  No Deficits noted  Safety awareness/insight to disability: impaired  Coping skills/emotional control: Labile    Visual/perceptual:  Intact    Physical Exam:  Postural examination/scapula alignment: No postural abnormalities identified  Skin integrity: Visible skin intact  Edema: mild BLEs     Sensation:   Intact    Upper Extremity Range of Motion:  Right Upper Extremity: WFL  Left Upper Extremity: WFL    Upper Extremity Strength:  Right Upper Extremity: WFL  Left Upper Extremity: WFL   Strength: good     Fine motor coordination:   Intact    Gross motor coordination: WFL    Functional Mobility:  Bed Mobility:  Supine to Sit: Modified Independent  Sit to Supine: Modified Independent    Transfers:  Sit <> Stand Assistance: Modified Independent  Sit <> Stand Assistive Device: No Assistive Device  Toilet Transfer Technique: Stand Pivot  Toilet Transfer Assistance: Modified Independent    Functional Ambulation: mod I; no AD; safety awareness     Activities of Daily Living:    UE Dressing Level of Assistance: Modified independent    LE Dressing Level of Assistance: Modified independent      Balance:   Static Sit: NORMAL: No deviations seen in posture held statically  Dynamic Sit: NORMAL: No deviations seen in posture held dynamically  Static Stand: GOOD+: Takes MAXIMAL challenges from all directions  Dynamic stand: GOOD+: Independent gait (with or without assistive device)    Therapeutic Activities and Exercises:  Pt educated on safety throughout session; education on stroke precautions as well as signs symptoms  Functional mobility in room and hallway while participating in balance challenges     AM-PAC 6 CLICK ADL  How much help from another person does this patient currently  "need?  1 = Unable, Total/Dependent Assistance  2 = A lot, Maximum/Moderate Assistance  3 = A little, Minimum/Contact Guard/Supervision  4 = None, Modified [place]/Independent    Putting on and taking off regular lower body clothing? : 4  Bathing (including washing, rinsing, drying)?: 3  Toileting, which includes using toilet, bedpan, or urinal? : 4  Putting on and taking off regular upper body clothing?: 4  Taking care of personal grooming such as brushing teeth?: 4  Eating meals?: 4  Total Score: 23    AM-PAC Raw Score CMS "G-Code Modifier Level of Impairment Assistance   6 % Total / Unable   7 - 9 CM 80 - 100% Maximal Assist   10-14 CL 60 - 80% Moderate Assist   15 - 19 CK 40 - 60% Moderate Assist   20 - 22 CJ 20 - 40% Minimal Assist   23 CI 1-20% SBA / CGA   24 CH 0% Independent/ Mod I       Patient left supine with all lines intact, call button in reach and nsg notified    Assessment:  Autumn Smart is a 61 y.o. female with a medical diagnosis of <principal problem not specified>. Pt performing Mod I ADLs and functional mobility at this time. Pt with no further OT and/or DME needs at this time. Educated on home safety. D/c OT     Rehab identified problem list/impairments: Rehab identified problem list/impairments: decreased safety awareness    Rehab potential is good.    Activity tolerance: Good    Discharge recommendations: Discharge Facility/Level Of Care Needs:  (home when medically stable )     Barriers to discharge: Barriers to Discharge: None    Equipment recommendations: none     GOALS:    Occupational Therapy Goals     Not on file          Multidisciplinary Problems (Resolved)        Problem: Occupational Therapy Goal    Goal Priority Disciplines Outcome Interventions   Occupational Therapy Goal   (Resolved)     OT, PT/OT Outcome(s) achieved                    PLAN:  Patient to be seen  (D/c OT ) to address the above listed problems via    Plan of Care expires: 05/30/17  Plan of Care " reviewed with: patient         Kaylen NOVAK Marah, OT  05/30/2017

## 2017-05-30 NOTE — PLAN OF CARE
Problem: Occupational Therapy Goal  Goal: Occupational Therapy Goal  Outcome: Outcome(s) achieved Date Met: 05/30/17  Pt performing Mod I ADLs and functional mobility at this time. Pt with no further OT and/or DME needs at this time. Educated on home safety. D/c OT

## 2017-05-30 NOTE — PT/OT/SLP EVAL
Physical Therapy  Evaluation    Autumn Smart   MRN: [id number]   Admitting Diagnosis: CVA (cerebral vascular accident)    PT Received On: 05/30/17  PT Start Time: 1000     PT Stop Time: 1047    PT Total Time (min): 47 min with OT      Billable Minutes:  Evaluation 47 minutes    Diagnosis: CVA (cerebral vascular accident)  The primary encounter diagnosis was Diplopia. Diagnoses of Left arm weakness, Vertebrobasilar artery syndrome, and Arterial ischemic stroke, PCA, left, acute were also pertinent to this visit.      Past Medical History:   Diagnosis Date    Back pain     Diabetes mellitus     Hypertension       Past Surgical History:   Procedure Laterality Date    BACK SURGERY      FRACTURE SURGERY       General Precautions: Standard, fall  Orthopedic Precautions: N/A   Braces: N/A       Do you have any cultural, spiritual, Faith conflicts, given your current situation?: none    Patient History:  Lives With: alone  Living Arrangements: house  Home Accessibility: stairs to enter home  Number of Stairs to Enter Home: 3  Stair Railings at Home: outside, present at both sides  Transportation Available: family or friend will provide  Living Environment Comment: Pt reports living alone in [facility], 3 [address], B rails, tub/shower combo; pt reports (I) as PLOF, drives, does not work, does not own DME, performs IADLs  Equipment Currently Used at Home: none      Previous Level of Function:  Ambulation Skills: independent  Transfer Skills: independent  ADL Skills: independent  Work/Leisure Activity: independent    Subjective:  Communicated with nurse prior to session.  Pt reporting she has concerns over her hospital stay- states MDs d/c her too quickly and that is why she had to be re-admitted so quickly. Frustrated over situation and medication management; reported concerns to charge nurse who will consult the pt advocate.  Chief Complaint: none stated  Patient goals: to return home; figure out  medications    Pain/Comfort  Pain Rating 1: 0/10  Pain Rating Post-Intervention 1: 0/10      Objective:   Patient found with: telemetry     Cognitive Exam:  Oriented to: Person, Place, Time and Situation    Follows Commands/attention: Follows multistep  commands  Communication: clear/fluent  Safety awareness/insight to disability: impaired    Physical Exam:  Postural examination/scapula alignment: No postural abnormalities identified    Skin integrity: Visible skin intact  Edema: None noted     Sensation:   Intact    BUEs~ see OT notes  Lower Extremity Range of Motion:  Right Lower Extremity: WFL  Left Lower Extremity: WFL    Lower Extremity Strength:  Right Lower Extremity: WFL  Left Lower Extremity: WFL     Fine motor coordination:  Intact    Gross motor coordination: WFL    Functional Mobility:  Bed Mobility:  Supine to Sit: Modified Independent  Sit to Supine: Modified Independent    Transfers:  Sit <> Stand Assistance: Modified Independent  Sit <> Stand Assistive Device: No Assistive Device    Gait:   Gait Distance: 250ft  Assistance 1: Modified Independent  Gait Assistive Device: No device  Gait Pattern: reciprocal  Gait Deviation(s): decreased nicolas, decreased velocity of limb motion (2/2 RLE surgery with placement of dionna and screws)    Stairs:  Pt ascended/descend 10 stair(s) with No Assistive Device with right with Modified Independent.     Balance:   Static Sit: NORMAL: No deviations seen in posture held statically  Dynamic Sit: NORMAL: No deviations seen in posture held dynamically  Static Stand: GOOD+: Takes MAXIMAL challenges from all directions  Dynamic stand: GOOD+: Independent gait (with or without assistive device)     Therapeutic activities and Exercises:  Patient educated on safety with change in positions - waiting a few moments to allow for accomodation in BP from sup to sit and sit to stand; and with self awareness following taking BP medications- sitting up to 15-20 minutes after taking  medication to ensure her blood is accommodating.    AM-PAC 6 CLICK MOBILITY  How much help from another person does this patient currently need?   1 = Unable, Total/Dependent Assistance  2 = A lot, Maximum/Moderate Assistance  3 = A little, Minimum/Contact Guard/Supervision  4 = None, Modified [place]/Independent    Turning over in bed (including adjusting bedclothes, sheets and blankets)?: 4  Sitting down on and standing up from a chair with arms (e.g., wheelchair, bedside commode, etc.): 4  Moving from lying on back to sitting on the side of the bed?: 4  Moving to and from a bed to a chair (including a wheelchair)?: 4  Need to walk in hospital room?: 4  Climbing 3-5 steps with a railing?: 4  Total Score: 24     AM-PAC Raw Score CMS G-Code Modifier Level of Impairment Assistance   6 % Total / Unable   7 - 9 CM 80 - 100% Maximal Assist   10 - 14 CL 60 - 80% Moderate Assist   15 - 19 CK 40 - 60% Moderate Assist   20 - 22 CJ 20 - 40% Minimal Assist   23 CI 1-20% SBA / CGA   24 CH 0% Independent/ Mod I     Patient left supine with all lines intact, call button in reach and nurse notified.    Assessment:   Autumn mSart is a 61 y.o. female with a medical diagnosis of CVA (cerebral vascular accident). Patient seen for evaluation of functional mob/amb status; pt is [place] with all transfers and amb without AD; educated on safety at home with self awareness when changing position and after taking BP; /71 HR 92 O2 sats 94% during amb; no further need for skilled PT services; no recs for equipment or therapy; will d/c PT.    Rehab identified problem list/impairments: Rehab identified problem list/impairments: decreased safety awareness (pt educated on safety at home with changes in position and self awareness following taking BP medications)    Rehab potential is good.    Activity tolerance: Good    Discharge recommendations: Discharge Facility/Level Of Care Needs:  (home when medically stable)     Barriers  to discharge: Barriers to Discharge: None    Equipment recommendations: Equipment Needed After Discharge: none     GOALS:    Physical Therapy Goals     Not on file          Multidisciplinary Problems (Resolved)        Problem: Physical Therapy Goal    Goal Priority Disciplines Outcome Goal Variances Interventions   Physical Therapy Goal   (Resolved)     PT/OT, PT Outcome(s) achieved                     PLAN:      (d/c acute PT services)     Plan of Care reviewed with: patient          Lily CORONA Adelia, PT  05/30/2017

## 2017-05-30 NOTE — PLAN OF CARE
Patient d/c home d/c instructions given to the patient ,patient  verbalized understanding. Stroke education hand out and stroke booklet given to the patient, refer to clinical reference for education. IV removed tip intact. Telemetry d/c. The patient denies any pain, light headness, chest pain, visual disturbance, slurred speech and no arm or leg weakness.  The patient stated she feels fine and is ready to go. Charge nurse was also in the room and evaluated the patient with me.  The patient will be driving herself.  For the patient's safety the charge nurse asked the patient if she had anyone who could come and pick her up. Mr Corea, a friend of the patient  will come and pick the her  up.  When Mr Corea arrives he will call the patient and the nurse will wheel chair her down to the front entrance to meet him.

## 2017-05-30 NOTE — PLAN OF CARE
05/30/17 1152   Discharge Assessment   Assessment Type Discharge Planning Assessment   Confirmed/corrected address and phone number on facesheet? Yes   Assessment information obtained from? Patient   Communicated expected length of stay with patient/caregiver yes   Prior to hospitilization cognitive status: Alert/Oriented   Prior to hospitalization functional status: Independent   Current cognitive status: Alert/Oriented   Current Functional Status: Independent   Arrived From home or self-care   Lives With alone   Able to Return to Prior Arrangements yes   Is patient able to care for self after discharge? Yes   Patient's perception of discharge disposition home or selfcare   Patient currently receives home health services? No   Does the patient currently use HME? No   Patient currently receives private duty nursing? No   Patient currently receives any other outside agency services? No   Equipment Currently Used at Home none   Do you have any problems affording any of your prescribed medications? Yes   Is the patient taking medications as prescribed? yes   Do you have any financial concerns preventing you from receiving the healthcare you need? No   Does the patient have transportation to healthcare appointments? Yes   Transportation Available family or friend will provide   On Dialysis? No   Does the patient receive services at the [facility]mad[facility] Clinic? No   Are there any open cases? No   Discharge Plan A Home;Home with family   Discharge Plan B Home;Home with family   Patient/Family In Agreement With Plan yes     Valeria Alegre, RN, CCM, CMSRN  RN Transition Navigator  [phone]

## 2017-05-30 NOTE — PLAN OF CARE
Problem: Patient Care Overview  Goal: Plan of Care Review  Outcome: Ongoing (interventions implemented as appropriate)  Pt on RA with sats of 93%.  Will continue to monitor.

## 2017-05-30 NOTE — CONSULTS
[facility]  Adult Nutrition  Consult Note    SUMMARY     Recommendations    Recommendation/Intervention:   1. Rec add 1800 ADA diet restriction   2. Provided handouts on low sodium diet- patient not receptive   3. RD to monitor    Goals: Meet 85% EEN  Nutrition Goal Status: new  Communication of RD Recs: reviewed with RN    Continuum of Care Plan    Referral to Outpatient Services:  (D/C planning: Cardiac/ADA)    Reason for Assessment    Reason for Assessment: physician consult  Diagnosis:  (TIA)  Relevent Medical History: DM   Interdisciplinary Rounds: did not attend     General Information Comments: Provided patient with stroke nutrition therapy handout. Patient states she has been educated before on this topic as well as DM. [place] she was not in need for further information. Reinforced rec for good BG control and low sodium diet. Patient denies issues with n/v/appetite/weight loss.    Nutrition Prescription Ordered    Current Diet Order: Cardiac Dental Soft      Evaluation of Received Nutrients/Fluid Intake        % Intake of Estimated Needs: %   % Intake of Meals: %    I/O: not recorded at this time    Nutrition Risk Screen     Nutrition Risk Screen: no indicators present    Nutrition/Diet History     Food Preferences: Denies cultural, Jew, ethnic food preferences.        Labs/Tests/Procedures/Meds     Pertinent Labs Reviewed: reviewed   Pertinent Medications Reviewed: reviewed     Physical Findings    Overall Physical Appearance: overweight   Oral/Mouth Cavity: WDL  Skin: intact    Anthropometrics     Height (inches): 66.93 in  Weight Method: Stated        Ideal Body Weight (IBW), Female: 134.65 lb     % Ideal Body Weight, Female (lb): 152.27 lb     BMI Grade: 30 - 34.9- obesity - grade I     Estimated/Assessed Needs    Weight Used For Calorie Calculations: 93 kg (205 lb 0.4 oz)         Energy Need Method: [place]-St Jeor (5651-7232 kcal)     RMR ([facility]  Equation): 1536.5      Weight Used For Protein Calculations: 93 kg (205 lb 0.4 oz)  Protein Requirements: 75 g    Fluid Need Method: RDA Method       CHO Requirement: 225 g     Assessment and Plan    Nutrition Diagnosis    No nutrition related issues at this time.    Monitor and Evaluation    Food and Nutrient Intake: energy intake, food and beverage intake  Food and Nutrient Adminstration: diet order   Anthropometric Measurements: weight, weight change  Biochemical Data, Medical Tests and Procedures: electrolyte and renal panel, glucose/endocrine profile  Nutrition-Focused Physical Findings: overall appearance    Nutrition Risk    Level of Risk:  (1 x week)    Nutrition Follow-Up    RD Follow-up?: Yes

## 2017-05-30 NOTE — PLAN OF CARE
Problem: Patient Care Overview  Goal: Plan of Care Review  Outcome: Ongoing (interventions implemented as appropriate)  Reviewed plan of care with pt. Pt concerned about medications prescribed prior to being discharged. Pt able to sleep well tonight. Pt denies any pain. Safety measures are in place, bed low and in lock position, call light in reach, and  bed alarm is on. Pt verbalizes full understanding of their plan of care.

## 2017-05-30 NOTE — PLAN OF CARE
Problem: Patient Care Overview  Goal: Plan of Care Review  Room air SpO2  95 %. Pt with no apparent distress noted. Will continue to monitor.

## 2017-05-30 NOTE — CONSULTS
Case discussed over the phone with attending physician.    Small area of infarction noted on the right frontal lobe in patient with apparent right SHIV stenosis may be related to hypoperfusion when correcting for hypertensive emergency as an unavoidable situation.    That location should not present clinical changes.    Plan to do single antiplatelet agent with risk factor modification for stroke prevention.    Follow up in 4 weeks with vascular neurology in clinic.    Jean Parks MD  Vascular and Interventional Neurology Staff  Director of [facility]  [facility]  [phone]

## 2017-05-30 NOTE — PT/OT/SLP PROGRESS
Speech Language Pathology      Autumn Smart  MRN: [id number]    Patient not seen this pm, pt working therapy. Will re-attempt speech and lang eval next session.     FABRICIO Fowler, CCC-SLP  Speech Pathologist  5/30/2017

## 2017-05-30 NOTE — PLAN OF CARE
Problem: Physical Therapy Goal  Goal: Physical Therapy Goal  Outcome: Outcome(s) achieved Date Met: 05/30/17  Patient seen for evaluation of functional mob/amb status; pt is [place] with all transfers and amb without AD; educated on safety at home with self awareness when changing position and after taking BP; /71 HR 92 O2 sats 94% during amb; no further need for skilled PT services; no recs for equipment or therapy; will d/c PT.

## 2017-05-30 NOTE — PROGRESS NOTES
"[facility] IM Resident HO-1 Progress Note    Subjective:      Patient states she is having no more vision changes and no weakness. She slept well after she got a dose of her seroquel. Afebrile overnight.     Objective:   Last 24 Hour Vital Signs:  BP  Min: 132/71  Max: 169/87  Temp  Av.6 °F (37 °C)  Min: 98.1 °F (36.7 °C)  Max: 99.1 °F (37.3 °C)  Pulse  Av.1  Min: 56  Max: 74  Resp  Av.3  Min: 18  Max: 20  SpO2  Av.2 %  Min: 94 %  Max: 98 %  Height  Av' 7" (170.2 cm)  Min: 5' 7" (170.2 cm)  Max: 5' 7" (170.2 cm)  Weight  Av kg (205 lb)  Min: 93 kg (205 lb)  Max: 93 kg (205 lb)  No intake/output data recorded.    Physical Examination:  Gen: NAD, laying in bed comfortably  Head: atraumatic, normocephalic  Eyes: extraocular movements intact, no nystagmus, pupils equally round and reactive to light ~4mm.  No scleral icterus   Mouth: tongue protrusion midline. Poor dentition.  Neck: no lymphadenopathy, JVP 6cm  CV: RRR, no murmurs  Pulm: CTAB, no wheezes, occasional cough  Abd: normoactive BS, nontender, nondistended  Extremities: no c/c/e, 2+ pulses in all extremities  Skin: no rash or lesion  Neuro:  Bilateral lower leg weakness, chronic. 5/5 strength upper extremities, no other focal finding  Psych: AAOx4, calm, cooperative, conversational     Laboratory:  Laboratory Data Reviewed: yes  Pertinent Findings:  K 3.4    Microbiology Data Reviewed: yes  Pertinent Findings:  None    Other Results:  EKG (my interpretation): NSR, Rate 90    Radiology Data Reviewed: yes  Pertinent Findings:  MRI Brain 17:     1. Punctate acute infarct in the right frontal lobe adjacent to the genu of the corpus callosum.    2.  Old infarcts in the left aspect of the shantanu and in the left occipital lobe.    3.  Punctate foci of microhemorrhage in the left occipital lobe corresponding to an area of prior infarction.    MRA Head/Neck 17:  No evidence of hemodynamically significant stenosis of the neck " vessels.    ---    1. No evidence of a large vessel occlusion involving the intracranial vessels.    2.  Stable appearance of the mid caliber of the left distal PCA and the right anterior cerebral arteries.    Current Medications:     Infusions:   sodium chloride 0.9%          Scheduled:   atorvastatin  40 mg Oral Daily    citalopram  40 mg Oral Daily    famotidine  20 mg Oral BID    moxifloxacin  400 mg Oral Daily    potassium chloride  40 mEq Oral Once    quetiapine  200 mg Oral QHS    sodium chloride 0.9%  3 mL Intravenous Q8H    trazodone  100 mg Oral QHS        PRN:  hydrOXYzine pamoate, labetalol, sodium chloride 0.9%    Antibiotics and Day Number of Therapy:  Moxifloxacin End date 6/2      Assessment:     Autumn Smart is a 61 y.o.female with  Patient Active Problem List    Diagnosis Date Noted    Diplopia 05/29/2017    Vertebrobasilar artery syndrome     Hypertensive crisis 05/27/2017    Microcytosis 05/27/2017    HLD (hyperlipidemia) 05/21/2016    Hypovolemia 05/20/2016    Right sided weakness 05/19/2016    Arterial ischemic stroke, PCA, left, acute 05/19/2016    Visual disturbance 05/19/2016    Cytotoxic brain edema 05/19/2016        Plan:     Diplopia Weakness likely 2/2 Acute CVA  - NIHSS 0 on admit  - ABCD2 3  - s/p full-dose ASA in ER  - CXR wnl; CT Head Non-Con Stable infarcts of L shantanu and L p. Cerebral a.; Troponin in ER wnl   - Tele Neuro Vasc consulted by ED  - Holding HTN medications to allow for permissive HTN to 220/120 for first 24 hours  - PT/OT/SLP Consulted  - MRI/MRA Head and Neck with new acute infarct in right frontal lobe in corpus collosum  - 2D Echo ordered     HTN  - recently admitted for HTN urgency  - currently 140/76  - allowing for permissive hypertension while ruling out stroke for first 24 hours  - will slowly restart home lisinopril, coreg     DM2  - most recent A1C of 7.9  - SSI as needed for glucose control   - will restart home metformin on  discharge     HLD  - continue home statin      H/O CVA  - continue Asa, statin, and blood pressure control      Normocytic Anemia   - Hgb 12.9  MCV 81   - has not had colonoscopy   - iron studies showing mixed picture, defer to outpatient    Fever unknown etiology, resolved  - Pt febrile to 102.9 on previous admit  WBC normal  - procal negative  CXR unremarkable  - Continue to treat w/ moxifloxacin for possible respiratory source     HCM  - pt not up to date on vaccinations or cancer screening  order prior to discharge      Diet: dental soft cardiac  Prophylaxis: lovenox  Code: Full     Dispo - Likely home today after workup complete    Mynor Sauceda  [facility] Internal Medicine HO-1  U IM Service Team B    [facility] Medicine Hospitalist Pager numbers:   U Hospitalist Medicine Team A (Lucio/Jaja): [phone]  [facility] Hospitalist Medicine Team B (Chad/Angelo):  [phone]

## 2017-05-30 NOTE — PLAN OF CARE
Future Appointments  Date Time Provider Department Center   6/29/2017 2:20 PM Elia Martinez MD [facility] NEURO Blaise Troncoso     No dme or home health or outpatient needs.       05/30/17 1516   Final Note   Assessment Type Final Discharge Note   Discharge Disposition Home   Discharge planning education complete? Yes   Hospital Follow Up  Appt(s) scheduled? Yes   Discharge plans and expectations educations in teach back method with documentation complete? Yes   Offered [facility] -- Bedside Delivery? Yes  (patient declined)   Referral to / orders for Home Health Complete? No   30 day supply of medicines given at discharge, if documented non-compliance / non-adherence? No   Any social issues identified prior to discharge? No   Did you assess the readiness or willingness of the family or caregiver to support self management of care? No   Right Care Referral Info   Post Acute Recommendation No Care     Valeria Alegre, RN, CCM, CMSRN  RN Transition Navigator  [phone]

## 2017-05-30 NOTE — PLAN OF CARE
Recommendations     Recommendation/Intervention:   1. Rec add 1800 ADA diet restriction   2. Provided handouts on low sodium diet- patient not receptive   3. RD to monitor     Goals: Meet 85% EEN  Nutrition Goal Status: new  Communication of RD Recs: reviewed with RN     Continuum of Care Plan     Referral to Outpatient Services:  (D/C planning: Cardiac/ADA)

## 2017-05-31 DIAGNOSIS — Z91.199 NON-COMPLIANCE: Primary | ICD-10-CM

## 2017-05-31 RX ORDER — LISINOPRIL 20 MG/1
20 TABLET ORAL DAILY
Qty: 90 TABLET | Refills: 3 | Status: SHIPPED | OUTPATIENT
Start: 2017-05-31 | End: 2017-07-31 | Stop reason: SDUPTHER

## 2017-05-31 RX ORDER — ATORVASTATIN CALCIUM 40 MG/1
40 TABLET, FILM COATED ORAL DAILY
Qty: 30 TABLET | Refills: 1 | Status: SHIPPED | OUTPATIENT
Start: 2017-05-31 | End: 2019-06-15 | Stop reason: SDUPTHER

## 2017-05-31 RX ORDER — CARVEDILOL 3.12 MG/1
3.12 TABLET ORAL 2 TIMES DAILY WITH MEALS
Qty: 60 TABLET | Refills: 4 | Status: ON HOLD | OUTPATIENT
Start: 2017-05-31 | End: 2017-11-24

## 2017-05-31 RX ORDER — METFORMIN HYDROCHLORIDE 1000 MG/1
1000 TABLET ORAL 2 TIMES DAILY WITH MEALS
Qty: 60 TABLET | Refills: 3 | Status: SHIPPED | OUTPATIENT
Start: 2017-05-31 | End: 2022-09-14

## 2017-05-31 RX ORDER — CITALOPRAM 40 MG/1
40 TABLET, FILM COATED ORAL DAILY
Qty: 30 TABLET | Refills: 2 | Status: SHIPPED | OUTPATIENT
Start: 2017-05-31 | End: 2018-01-23

## 2017-05-31 RX ORDER — CARVEDILOL 3.12 MG/1
3.12 TABLET ORAL 2 TIMES DAILY WITH MEALS
Qty: 60 TABLET | Refills: 4 | Status: SHIPPED | OUTPATIENT
Start: 2017-05-31 | End: 2017-05-31 | Stop reason: SDUPTHER

## 2017-05-31 RX ORDER — NAPROXEN SODIUM 220 MG/1
81 TABLET, FILM COATED ORAL DAILY
Refills: 0 | COMMUNITY
Start: 2017-05-31 | End: 2018-01-23

## 2017-05-31 NOTE — DISCHARGE SUMMARY
[facility] IM Discharge Summary    Primary Team: [facility] IM Team B  Attending Physician: Dr. Calhoun  Resident: Dr. Lemos  Intern: Dr. Sauceda    Date of Admit: 5/29/2017  Date of Discharge: 5/30/2017    Discharge to: Home  Condition: Good    Discharge Diagnoses     Patient Active Problem List   Diagnosis    Right sided weakness    Arterial ischemic stroke, PCA, left, acute    Visual disturbance    Cytotoxic brain edema    Hypovolemia    HLD (hyperlipidemia)    Hypertensive crisis    Microcytosis    CVA (cerebral vascular accident)    Vertebrobasilar artery syndrome    Left arm weakness       Consultants and Procedures     Consultants:  Neurovascular  PT/OT/SLP    Procedures:   MRI/MRA Head and Neck 5/29/17:  1. Punctate acute infarct in the right frontal lobe adjacent to the genu of the corpus callosum.    2.  Old infarcts in the left aspect of the shantanu and in the left occipital lobe.    3.  Punctate foci of microhemorrhage in the left occipital lobe corresponding to an area of prior infarction.    No evidence of hemodynamically significant stenosis of the neck vessels.     1. No evidence of a large vessel occlusion involving the intracranial vessels.    2.  Stable appearance of the mid caliber of the left distal PCA and the right anterior cerebral arteries.    2D Echo/TTE 5/30/17:      1 - Normal left ventricular systolic function (EF 60-65%).     2 - Impaired LV relaxation, normal LAP (grade 1 diastolic dysfunction).     3 - Normal right ventricular systolic function .     4 - The estimated PA systolic pressure is 13 mmHg.   Negative Bubble study    Brief History of Present Illness      Autumn Smart is a 61 y.o. AA female who  has a past medical history of Back pain; Diabetes mellitus; and Hypertension.  The patient presented to [facility] on 5/29/2017 with a primary complaint of Eye Problem (c/o blurred vision, pt had stroke, just discharged few minutes ago  from hospital,. went outside  to leave and developed blurred vision.)  .     This is a patient who was recently admitted for hypertensive urgency and discharged on 5/29. The patient was on her way out to the car when she began to develop diplopia. She denied difficulty speaking, shortness of breath, chest pain, weakness, or any other symptom. The event was witnessed by a patient care technician who took her back to the ED.     At time of interview the patient denied any remaining deficits.    For the full HPI please refer to the History & Physical from this admission.    Hospital Course By Problem with Pertinent Findings     Diplopia Weakness likely 2/2 Acute CVA  - NIHSS 0 on admit  - ABCD2 3  - s/p full-dose ASA in ER  - CXR wnl; CT Head Non-Con Stable infarcts of L shantanu and L p. Cerebral a.; Troponin in ER wnl   - Tele Neuro Vasc consulted by ED  - Held HTN medications to allow for permissive HTN to 220/120 for first 24 hours  - PT/OT/SLP rec home w/o needs  - MRI/MRA Head and Neck with new acute infarct in right frontal lobe in corpus collosum  - 2D Echo With EF 60-65%, DD     HTN  - recently admitted for HTN urgency  - currently 140/76  - allowed for permissive hypertension while ruling out stroke for first 24 hours  - Discharge with lisinopril 20mg and Coreg 3.125 BID; PCP to titrate up as needed     DM2  - most recent A1C of 7.9  - SSI as needed for glucose control   - restart home metformin on discharge; patient counseled on compliance     HLD  - continued home statin     H/O CVA  - continued Asa, statin, and blood pressure control      Normocytic Anemia   - Hgb 12.9  MCV 81   - has not had colonoscopy   - iron studies showing mixed picture, defer to outpatient     Fever unknown etiology, resolved  - Pt febrile to 102.9 on previous admit  WBC normal  - procal negative  CXR unremarkable  - Continue to treat w/ moxifloxacin for possible respiratory source    Discharge Medications     No current facility-administered medications for this  encounter.      Current Outpatient Prescriptions   Medication Sig    aspirin 81 MG Chew Take 1 tablet (81 mg total) by mouth once daily.    atorvastatin (LIPITOR) 40 MG tablet Take 1 tablet (40 mg total) by mouth once daily.    carvedilol (COREG) 3.125 MG tablet Take 1 tablet (3.125 mg total) by mouth 2 (two) times daily with meals.    citalopram (CELEXA) 40 MG tablet Take 1 tablet (40 mg total) by mouth once daily.    fluticasone (FLONASE) 50 mcg/actuation nasal spray 1 spray by Each Nare route once daily.    lisinopril (PRINIVIL,ZESTRIL) 20 MG tablet Take 1 tablet (20 mg total) by mouth once daily.    metformin (GLUCOPHAGE) 1000 MG tablet Take 1 tablet (1,000 mg total) by mouth 2 (two) times daily with meals.         Discharge Information:   Diet:  Diabetic, Cardiac    Physical Activity:  As tolerated    Instructions:  1. Take all medications as prescribed  2. Keep all follow-up appointments  3. Return to the hospital or call your primary care physicians if any worsening symptoms such as weakness, confusion occur.    Follow-Up Appointments:  Follow-up Information     Schedule an appointment as soon as possible for a visit with Maria Esther Bob MD.    Specialty:  Internal Medicine  Contact information:  [address] [address]  [phone]             Elia Martinez MD On 6/29/2017.    Specialty:  Neurology  Why:  2:20pm- 7th floor clinic tower  Contact information:  [address]  [facility] [address]  [phone]                     Mynor Sauceda  [facility] Internal Medicine, -

## 2017-06-02 PROBLEM — R29.898 LEFT ARM WEAKNESS: Status: ACTIVE | Noted: 2017-06-02

## 2017-07-31 ENCOUNTER — OFFICE VISIT (OUTPATIENT)
Dept: URGENT CARE | Facility: CLINIC | Age: 62
End: 2017-07-31
Payer: COMMERCIAL

## 2017-07-31 VITALS
WEIGHT: 197 LBS | TEMPERATURE: 98 F | OXYGEN SATURATION: 98 % | DIASTOLIC BLOOD PRESSURE: 120 MMHG | HEART RATE: 71 BPM | SYSTOLIC BLOOD PRESSURE: 180 MMHG | BODY MASS INDEX: 30.92 KG/M2 | RESPIRATION RATE: 18 BRPM | HEIGHT: 67 IN

## 2017-07-31 DIAGNOSIS — N76.0 ACUTE VAGINITIS: Primary | ICD-10-CM

## 2017-07-31 DIAGNOSIS — I10 ESSENTIAL HYPERTENSION: ICD-10-CM

## 2017-07-31 PROCEDURE — 99203 OFFICE O/P NEW LOW 30 MIN: CPT | Mod: S$GLB,,, | Performed by: FAMILY MEDICINE

## 2017-07-31 RX ORDER — FLUCONAZOLE 150 MG/1
150 TABLET ORAL DAILY
Qty: 1 TABLET | Refills: 0 | Status: SHIPPED | OUTPATIENT
Start: 2017-07-31 | End: 2017-08-01

## 2017-07-31 RX ORDER — LISINOPRIL 20 MG/1
20 TABLET ORAL DAILY
Qty: 30 TABLET | Refills: 0 | Status: SHIPPED | OUTPATIENT
Start: 2017-07-31 | End: 2018-07-02

## 2017-07-31 RX ORDER — CLOTRIMAZOLE AND BETAMETHASONE DIPROPIONATE 10; .64 MG/G; MG/G
CREAM TOPICAL 2 TIMES DAILY
Qty: 45 G | Refills: 0 | Status: SHIPPED | OUTPATIENT
Start: 2017-07-31 | End: 2018-01-23

## 2017-07-31 NOTE — PATIENT INSTRUCTIONS
"  Contact Dermatitis  Contact dermatitis is a skin rash caused by something that touches the skin and makes it irritated and inflamed. Your skin may be red, swollen, dry, and may be cracked. Blisters may form and ooze. The rash will itch.  Contact dermatitis can form on the face and neck, backs of hands, forearms, genitals, and lower legs.  People can get contact dermatitis from lots of sources. These include:  · Plants such as poison ivy, oak, or sumac  · Chemicals in hair dyes and rinses, soaps, solvents, waxes, fingernail polish, and deodorants   · Jewelry or watchbands made of nickel  Contact dermatitis is not passed from person to person.  Talk with your healthcare provider about what may have caused the rash. A type of allergy testing called "patch testing" may be used to discover what you are allergic to. You will need to avoid the source of your rash in the future to prevent it from coming back.  Treatment is done to relieve itching and prevent the rash from coming back. The rash should go away in a few days to a few weeks.  Home care  Your healthcare provider may prescribe medicine to relieve swelling and itching. Follow all instructions when using these medicines.  General care:  · Avoid anything that heats up your skin, such as hot showers or baths, or direct sunlight. This can make itching worse.  · Apply cold compresses to soothe your sores to help relieve your symptoms. Do this for 30 minutes 3 to 4 times a day. You can make a cold compress by soaking a cloth in cold water. Squeeze out excess water. You can add colloidal oatmeal to the water to help reduce itching. For severe itching in a small area, apply an ice pack wrapped in a thin towel. Do this for 20 minutes 3 to 4 times a day.  · You can also try wet dressings. One way to do this is to wear a wet piece of clothing under a dry one. Wear a damp shirt under a dry shirt if your upper body is affected. This can relieve itching and prevent you from " scratching the affected area.  · You can also help relieve large areas of itching by taking a lukewarm bath with colloidal oatmeal added to the water.  · Use hydrocortisone cream for redness and irritation, unless another medicine was prescribed. You can also use benzocaine anesthetic cream or spray. Calamine lotion can also relieve mild symptoms.  · Use oral diphenhydramine to help reduce itching. You can buy this antihistamine at drug and grocery stores. It can make you sleepy, so use lower doses during the daytime. Or you can use loratadine. This is an antihistamine that will not make you sleepy. Do not use diphenhydramine if you have glaucoma or have trouble urinating due to an enlarged prostate.  · If a plant causes your rash, make sure to wash your skin and the clothes you were wearing when you came into contact with the plant. This is to wash away the plant oils that gave you the rash and prevent more or worse symptoms.  · Stay away from the substance or object that causes your symptoms. If you cant avoid it, wear gloves or some other type of protection.  Follow-up care  Follow up with your healthcare provider, or as advised.  When to seek medical advice  Call your healthcare provider right away if any of these occur:  · Spreading of the rash to other parts of your body  · Severe swelling of your face, eyelids, mouth, throat or tongue  · Trouble urinating due to swelling in the genital area  · Fever of 100.4°F (38°C) or higher  · Redness or swelling that gets worse  · Pain that gets worse  · Foul-smelling fluid leaking from the skin  · Yellow-brown crusts on the open blisters  Date Last Reviewed: 9/1/2016  © 0394-4451 [employer]. [address] [place] [address], [place] [address]. All rights reserved. This information is not intended as a substitute for professional medical care. Always follow your healthcare professional's instructions.        Discharge Instructions for High Blood Pressure  "(Hypertension)  You have been diagnosed with high blood pressure (also called hypertension). This means the force of blood against your artery walls is too strong. It also means your heart is working hard to move blood. High blood pressure usually has no symptoms, but over time, it can damage your heart, blood vessels, eyes, kidneys, and other organs. With help from your doctor, you can manage your blood pressure and protect your health.  Taking medicine  · Learn to take your own blood pressure. Keep a record of your results. Ask your doctor which readings mean that you need medical attention.  · Take your blood pressure medicine exactly as directed. Dont skip doses. Missing doses can cause your blood pressure to get out of control.  · If you do miss a dose (or doses) check with your healthcare provider about what to do.  · Avoid medicine that contain heart stimulants, including over-the-counter drugs. Check for warnings about high blood pressure on the label. Ask the pharmacist before purchasing something you haven't used before  · Check with your doctor or pharmacist before taking a decongestant. Some decongestants can worsen high blood pressure.  Lifestyle changes  · Maintain a healthy weight. Get help to lose any extra pounds.  · Cut back on salt.  ¨ Limit canned, dried, packaged, and fast foods.  ¨ Dont add salt to your food at the table.  ¨ Season foods with herbs instead of salt when you cook.  ¨ Request no added salt when you go to a restaurant.  ¨ The American Heart Associations (AHA) "ideal" sodium intake recommendation is 1,500 milligrams per day.  However, since American's eat so much salt, the AHA says a positive change can occur by cutting back to even 2,400 milligrams of sodium a day.   · Follow the DASH (Dietary Approaches to Stop Hypertension) eating plan. This plan recommends vegetables, fruits, whole gains, and other heart healthy foods.  · Begin an exercise program. Ask your doctor how to get " started. The American Heart Association recommends aerobic exercise 3 to 4 times a week for an average of 40 minutes at a time, with your doctor's approval. Simple activities like walking or gardening can help.  · Break the smoking habit. Enroll in a stop-smoking program to improve your chances of success. Ask your healthcare provider about programs and medicines to help you stop smoking.  · Limit drinks that contain caffeine (coffee, black or green tea, cola) to 2 per day.  · Never take stimulants such as amphetamines or cocaine; these drugs can be deadly for someone with high blood pressure.  · Control your stress. Learn stress-management techniques.  · Limit alcohol to no more than 1 drink a day for women and 2 drinks a day for men.  Follow-up care  Make a follow-up appointment as directed by our staff.     When to seek medical care  Call your doctor immediately or seek emergency care if you have any of the following:  · Chest pain or shortness of breath (call 911)  · Moderate to severe headache  · Weakness in the muscles of your face, arms, or legs  · Trouble speaking  · Extreme drowsiness  · Confusion  · Fainting or dizziness  · Pulsating or rushing sound in your ears  · Unexplained nosebleed  · Weakness, tingling, or numbness of your face, arms, or legs  · Change in vision  · Blood pressure measured at home that is greater than 180/110   Date Last Reviewed: 4/27/2016  © 2751-0087 [employer]. [address] [place] [address]. All rights reserved. This information is not intended as a substitute for professional medical care. Always follow your healthcare professional's instructions.      Autumn was seen today for vaginal itching and vaginal pain.    Diagnoses and all orders for this visit:    Acute vaginitis  -     C. trachomatis/N. gonorrhoeae by AMP DNA Urine  -     clotrimazole-betamethasone 1-0.05% (LOTRISONE) cream; Apply topically 2 (two) times daily.  -     fluconazole (DIFLUCAN)  150 MG Tab; Take 1 tablet (150 mg total) by mouth once daily.    Essential hypertension  -     lisinopril (PRINIVIL,ZESTRIL) 20 MG tablet; Take 1 tablet (20 mg total) by mouth once daily.            Follow Up Comments   Make sure that you follow up with your primary care doctor in the next 2-5 days if needed .  Return to the Urgent Care if signs or symptoms change and certainly if you have worsening symptoms go to the nearest emergency department for further evaluation.     Juju Crabtree MD

## 2017-07-31 NOTE — PROGRESS NOTES
"Subjective:       Patient ID: Autumn Smart is a 61 y.o. female.    Vitals:  height is 5' 7" (1.702 m) and weight is 89.4 kg (197 lb). Her temperature is 98 °F (36.7 °C). Her blood pressure is 180/120 (abnormal) and her pulse is 71. Her respiration is 18 and oxygen saturation is 98%.     Chief Complaint: Vaginal Itching and Vaginal Pain    Vaginal Itching   The patient's primary symptoms include genital itching. The patient's pertinent negatives include no missed menses. This is a new problem. The current episode started in the past 7 days. The problem occurs intermittently. The problem has been gradually worsening. The pain is moderate. The problem affects both sides. She is not pregnant. Pertinent negatives include no abdominal pain, back pain, chills, dysuria, fever, hematuria, nausea, urgency or vomiting. Nothing aggravates the symptoms. She has tried nothing for the symptoms. She is sexually active. No, her partner does not have an STD. She uses nothing for contraception.   Vaginal Pain   The patient's primary symptoms include genital itching. The patient's pertinent negatives include no missed menses. Pertinent negatives include no abdominal pain, back pain, chills, dysuria, fever, hematuria, nausea, urgency or vomiting.     Review of Systems   Constitution: Negative for chills and fever.   Musculoskeletal: Negative for back pain.   Gastrointestinal: Negative for abdominal pain, nausea and vomiting.   Genitourinary: Positive for vaginal pain. Negative for dysuria, genital sores, hematuria, missed menses, non-menstrual bleeding and urgency.       Objective:      Physical Exam   Constitutional: She is oriented to person, place, and time. She appears well-developed and well-nourished.   HENT:   Head: Normocephalic and atraumatic.   Nose: Nose normal. No nasal deformity. No epistaxis.   Mouth/Throat: Mucous membranes are normal.   Eyes: Conjunctivae and lids are normal.   Neck: Trachea normal, normal range of " motion and phonation normal. Neck supple.   Cardiovascular: Normal rate, regular rhythm, normal heart sounds and normal pulses.    Pulmonary/Chest: Effort normal and breath sounds normal.   Abdominal: Soft. Normal appearance and bowel sounds are normal. She exhibits no distension and no mass. There is no tenderness. There is no CVA tenderness.   Genitourinary: There is rash and tenderness on the right labia. There is no lesion or injury on the right labia. There is rash and tenderness on the left labia. There is no lesion or injury on the left labia.   Genitourinary Comments: External labia and perineal area with erythema,  Some areas of friability with some oozing of blood.      Internal vaginal area is normal in appearance.  No real discharge   Neurological: She is alert and oriented to person, place, and time.   Skin: Skin is warm, dry and intact.   Psychiatric: She has a normal mood and affect. Her speech is normal and behavior is normal. Cognition and memory are normal.   Nursing note and vitals reviewed.      Assessment:       1. Acute vaginitis    2. Essential hypertension        Plan:         Acute vaginitis  -     C. trachomatis/N. gonorrhoeae by AMP DNA Urine  -     clotrimazole-betamethasone 1-0.05% (LOTRISONE) cream; Apply topically 2 (two) times daily.  Dispense: 45 g; Refill: 0  -     fluconazole (DIFLUCAN) 150 MG Tab; Take 1 tablet (150 mg total) by mouth once daily.  Dispense: 1 tablet; Refill: 0    Essential hypertension  -     lisinopril (PRINIVIL,ZESTRIL) 20 MG tablet; Take 1 tablet (20 mg total) by mouth once daily.  Dispense: 30 tablet; Refill: 0      Autumn was seen today for vaginal itching and vaginal pain.    Diagnoses and all orders for this visit:    Acute vaginitis  -     C. trachomatis/N. gonorrhoeae by AMP DNA Urine  -     clotrimazole-betamethasone 1-0.05% (LOTRISONE) cream; Apply topically 2 (two) times daily.  -     fluconazole (DIFLUCAN) 150 MG Tab; Take 1 tablet (150 mg total) by  mouth once daily.    Essential hypertension  -     lisinopril (PRINIVIL,ZESTRIL) 20 MG tablet; Take 1 tablet (20 mg total) by mouth once daily.            Follow Up Comments   Make sure that you follow up with your primary care doctor in the next 2-5 days if needed .  Return to the Urgent Care if signs or symptoms change and certainly if you have worsening symptoms go to the nearest emergency department for further evaluation.     Juju Crabtree MD

## 2017-08-01 LAB
C TRACH RRNA SPEC QL NAA+PROBE: NEGATIVE
N GONORRHOEA RRNA SPEC QL NAA+PROBE: NEGATIVE

## 2017-08-03 ENCOUNTER — TELEPHONE (OUTPATIENT)
Dept: URGENT CARE | Facility: CLINIC | Age: 62
End: 2017-08-03

## 2017-08-28 DIAGNOSIS — I10 ESSENTIAL HYPERTENSION: ICD-10-CM

## 2017-08-30 RX ORDER — LISINOPRIL 20 MG/1
20 TABLET ORAL DAILY
Qty: 30 TABLET | Refills: 0 | OUTPATIENT
Start: 2017-08-30 | End: 2017-09-29

## 2017-11-23 ENCOUNTER — HOSPITAL ENCOUNTER (INPATIENT)
Facility: HOSPITAL | Age: 62
LOS: 1 days | Discharge: HOME | DRG: 040 | End: 2017-11-24
Attending: EMERGENCY MEDICINE | Admitting: PSYCHIATRY & NEUROLOGY
Payer: COMMERCIAL

## 2017-11-23 DIAGNOSIS — I63.412 EMBOLIC STROKE INVOLVING LEFT MIDDLE CEREBRAL ARTERY: ICD-10-CM

## 2017-11-23 DIAGNOSIS — R29.90 EPISODE OF TRANSIENT NEUROLOGIC SYMPTOMS: ICD-10-CM

## 2017-11-23 DIAGNOSIS — I63.532 ACUTE ISCHEMIC LEFT PCA STROKE: ICD-10-CM

## 2017-11-23 DIAGNOSIS — I63.9 STROKE: ICD-10-CM

## 2017-11-23 DIAGNOSIS — R53.1 WEAKNESS: ICD-10-CM

## 2017-11-23 DIAGNOSIS — E11.9 TYPE 2 DIABETES MELLITUS WITHOUT COMPLICATION, WITHOUT LONG-TERM CURRENT USE OF INSULIN: Primary | ICD-10-CM

## 2017-11-23 PROBLEM — I10 ESSENTIAL HYPERTENSION: Status: ACTIVE | Noted: 2017-11-23

## 2017-11-23 PROBLEM — G93.6 CYTOTOXIC CEREBRAL EDEMA: Status: ACTIVE | Noted: 2017-11-23

## 2017-11-23 LAB
ALBUMIN SERPL BCP-MCNC: 3.5 G/DL
ALP SERPL-CCNC: 62 U/L
ALT SERPL W/O P-5'-P-CCNC: 11 U/L
ANION GAP SERPL CALC-SCNC: 8 MMOL/L
AST SERPL-CCNC: 18 U/L
BASOPHILS # BLD AUTO: 0.02 K/UL
BASOPHILS NFR BLD: 0.3 %
BILIRUB SERPL-MCNC: 0.3 MG/DL
BUN SERPL-MCNC: 9 MG/DL
CALCIUM SERPL-MCNC: 9.6 MG/DL
CHLORIDE SERPL-SCNC: 105 MMOL/L
CHOLEST SERPL-MCNC: 132 MG/DL
CHOLEST/HDLC SERPL: 3 {RATIO}
CO2 SERPL-SCNC: 27 MMOL/L
CREAT SERPL-MCNC: 0.6 MG/DL (ref 0.5–1.4)
CREAT SERPL-MCNC: 0.8 MG/DL
DIFFERENTIAL METHOD: ABNORMAL
EOSINOPHIL # BLD AUTO: 0 K/UL
EOSINOPHIL NFR BLD: 0.1 %
ERYTHROCYTE [DISTWIDTH] IN BLOOD BY AUTOMATED COUNT: 13.8 %
EST. GFR  (AFRICAN AMERICAN): >60 ML/MIN/1.73 M^2
EST. GFR  (NON AFRICAN AMERICAN): >60 ML/MIN/1.73 M^2
GLUCOSE SERPL-MCNC: 248 MG/DL
HCT VFR BLD AUTO: 35.4 %
HDLC SERPL-MCNC: 44 MG/DL
HDLC SERPL: 33.3 %
HGB BLD-MCNC: 11.3 G/DL
IMM GRANULOCYTES # BLD AUTO: 0.02 K/UL
IMM GRANULOCYTES NFR BLD AUTO: 0.3 %
INR PPP: 1
LDLC SERPL CALC-MCNC: 65.2 MG/DL
LYMPHOCYTES # BLD AUTO: 1.5 K/UL
LYMPHOCYTES NFR BLD: 20.1 %
MCH RBC QN AUTO: 26 PG
MCHC RBC AUTO-ENTMCNC: 31.9 G/DL
MCV RBC AUTO: 82 FL
MONOCYTES # BLD AUTO: 0.2 K/UL
MONOCYTES NFR BLD: 2.7 %
NEUTROPHILS # BLD AUTO: 5.7 K/UL
NEUTROPHILS NFR BLD: 76.5 %
NONHDLC SERPL-MCNC: 88 MG/DL
NRBC BLD-RTO: 0 /100 WBC
PLATELET # BLD AUTO: 247 K/UL
PMV BLD AUTO: 10.2 FL
POC PTINR: 1 (ref 0.9–1.2)
POC PTWBT: 11.8 SEC (ref 9.7–14.3)
POCT GLUCOSE: 252 MG/DL (ref 70–110)
POTASSIUM SERPL-SCNC: 3.5 MMOL/L
PROT SERPL-MCNC: 7 G/DL
PROTHROMBIN TIME: 11.1 SEC
RBC # BLD AUTO: 4.34 M/UL
SAMPLE: NORMAL
SAMPLE: NORMAL
SODIUM SERPL-SCNC: 140 MMOL/L
T4 FREE SERPL-MCNC: 0.91 NG/DL
TRIGL SERPL-MCNC: 114 MG/DL
TSH SERPL DL<=0.005 MIU/L-ACNC: 0.33 UIU/ML
WBC # BLD AUTO: 7.5 K/UL

## 2017-11-23 PROCEDURE — 82803 BLOOD GASES ANY COMBINATION: CPT

## 2017-11-23 PROCEDURE — 99900035 HC TECH TIME PER 15 MIN (STAT)

## 2017-11-23 PROCEDURE — 85610 PROTHROMBIN TIME: CPT

## 2017-11-23 PROCEDURE — 99291 CRITICAL CARE FIRST HOUR: CPT | Mod: ,,, | Performed by: EMERGENCY MEDICINE

## 2017-11-23 PROCEDURE — 99223 1ST HOSP IP/OBS HIGH 75: CPT | Mod: AI,,, | Performed by: PSYCHIATRY & NEUROLOGY

## 2017-11-23 PROCEDURE — 93010 ELECTROCARDIOGRAM REPORT: CPT | Mod: ,,, | Performed by: INTERNAL MEDICINE

## 2017-11-23 PROCEDURE — 80053 COMPREHEN METABOLIC PANEL: CPT

## 2017-11-23 PROCEDURE — 20600001 HC STEP DOWN PRIVATE ROOM

## 2017-11-23 PROCEDURE — G0378 HOSPITAL OBSERVATION PER HR: HCPCS

## 2017-11-23 PROCEDURE — 82962 GLUCOSE BLOOD TEST: CPT

## 2017-11-23 PROCEDURE — A4216 STERILE WATER/SALINE, 10 ML: HCPCS | Performed by: NURSE PRACTITIONER

## 2017-11-23 PROCEDURE — 99285 EMERGENCY DEPT VISIT HI MDM: CPT | Mod: 25

## 2017-11-23 PROCEDURE — 80061 LIPID PANEL: CPT

## 2017-11-23 PROCEDURE — 82565 ASSAY OF CREATININE: CPT

## 2017-11-23 PROCEDURE — 12000002 HC ACUTE/MED SURGE SEMI-PRIVATE ROOM

## 2017-11-23 PROCEDURE — 84439 ASSAY OF FREE THYROXINE: CPT

## 2017-11-23 PROCEDURE — 25500020 PHARM REV CODE 255: Performed by: EMERGENCY MEDICINE

## 2017-11-23 PROCEDURE — 85025 COMPLETE CBC W/AUTO DIFF WBC: CPT

## 2017-11-23 PROCEDURE — 84443 ASSAY THYROID STIM HORMONE: CPT

## 2017-11-23 PROCEDURE — 25000003 PHARM REV CODE 250: Performed by: NURSE PRACTITIONER

## 2017-11-23 PROCEDURE — 93005 ELECTROCARDIOGRAM TRACING: CPT

## 2017-11-23 RX ORDER — GLUCAGON 1 MG
1 KIT INJECTION
Status: DISCONTINUED | OUTPATIENT
Start: 2017-11-23 | End: 2017-11-24 | Stop reason: HOSPADM

## 2017-11-23 RX ORDER — LABETALOL HYDROCHLORIDE 5 MG/ML
10 INJECTION, SOLUTION INTRAVENOUS EVERY 6 HOURS PRN
Status: DISCONTINUED | OUTPATIENT
Start: 2017-11-23 | End: 2017-11-24 | Stop reason: HOSPADM

## 2017-11-23 RX ORDER — MIDAZOLAM HYDROCHLORIDE 1 MG/ML
1 INJECTION INTRAMUSCULAR; INTRAVENOUS ONCE AS NEEDED
Status: ACTIVE | OUTPATIENT
Start: 2017-11-23 | End: 2017-11-23

## 2017-11-23 RX ORDER — ONDANSETRON 2 MG/ML
4 INJECTION INTRAMUSCULAR; INTRAVENOUS EVERY 12 HOURS PRN
Status: DISCONTINUED | OUTPATIENT
Start: 2017-11-23 | End: 2017-11-24 | Stop reason: HOSPADM

## 2017-11-23 RX ORDER — IBUPROFEN 200 MG
24 TABLET ORAL
Status: DISCONTINUED | OUTPATIENT
Start: 2017-11-23 | End: 2017-11-24 | Stop reason: HOSPADM

## 2017-11-23 RX ORDER — SODIUM CHLORIDE 0.9 % (FLUSH) 0.9 %
3 SYRINGE (ML) INJECTION EVERY 8 HOURS
Status: DISCONTINUED | OUTPATIENT
Start: 2017-11-23 | End: 2017-11-24 | Stop reason: HOSPADM

## 2017-11-23 RX ORDER — ATORVASTATIN CALCIUM 20 MG/1
40 TABLET, FILM COATED ORAL DAILY
Status: DISCONTINUED | OUTPATIENT
Start: 2017-11-24 | End: 2017-11-24 | Stop reason: HOSPADM

## 2017-11-23 RX ORDER — NAPROXEN SODIUM 220 MG/1
81 TABLET, FILM COATED ORAL DAILY
Status: DISCONTINUED | OUTPATIENT
Start: 2017-11-24 | End: 2017-11-24 | Stop reason: HOSPADM

## 2017-11-23 RX ORDER — CITALOPRAM 10 MG/1
40 TABLET ORAL DAILY
Status: DISCONTINUED | OUTPATIENT
Start: 2017-11-24 | End: 2017-11-24 | Stop reason: HOSPADM

## 2017-11-23 RX ORDER — ONDANSETRON 8 MG/1
8 TABLET, ORALLY DISINTEGRATING ORAL EVERY 8 HOURS PRN
Status: DISCONTINUED | OUTPATIENT
Start: 2017-11-23 | End: 2017-11-24 | Stop reason: HOSPADM

## 2017-11-23 RX ORDER — IBUPROFEN 200 MG
16 TABLET ORAL
Status: DISCONTINUED | OUTPATIENT
Start: 2017-11-23 | End: 2017-11-24 | Stop reason: HOSPADM

## 2017-11-23 RX ORDER — HEPARIN SODIUM 5000 [USP'U]/ML
5000 INJECTION, SOLUTION INTRAVENOUS; SUBCUTANEOUS EVERY 8 HOURS
Status: DISCONTINUED | OUTPATIENT
Start: 2017-11-23 | End: 2017-11-24

## 2017-11-23 RX ORDER — INSULIN ASPART 100 [IU]/ML
1-10 INJECTION, SOLUTION INTRAVENOUS; SUBCUTANEOUS
Status: DISCONTINUED | OUTPATIENT
Start: 2017-11-23 | End: 2017-11-24 | Stop reason: HOSPADM

## 2017-11-23 RX ADMIN — IOHEXOL 100 ML: 350 INJECTION, SOLUTION INTRAVENOUS at 08:11

## 2017-11-23 RX ADMIN — Medication 3 ML: at 10:11

## 2017-11-24 ENCOUNTER — DOCUMENTATION ONLY (OUTPATIENT)
Dept: CARDIOLOGY | Facility: CLINIC | Age: 62
End: 2017-11-24

## 2017-11-24 VITALS
BODY MASS INDEX: 30.01 KG/M2 | SYSTOLIC BLOOD PRESSURE: 157 MMHG | HEIGHT: 68 IN | TEMPERATURE: 99 F | RESPIRATION RATE: 16 BRPM | HEART RATE: 68 BPM | OXYGEN SATURATION: 98 % | WEIGHT: 198 LBS | DIASTOLIC BLOOD PRESSURE: 88 MMHG

## 2017-11-24 PROBLEM — I63.412 EMBOLIC STROKE INVOLVING LEFT MIDDLE CEREBRAL ARTERY: Status: ACTIVE | Noted: 2017-11-23

## 2017-11-24 LAB
ALBUMIN SERPL BCP-MCNC: 3.2 G/DL
ALP SERPL-CCNC: 54 U/L
ALT SERPL W/O P-5'-P-CCNC: 11 U/L
ANION GAP SERPL CALC-SCNC: 8 MMOL/L
APTT BLDCRRT: 22.1 SEC
AST SERPL-CCNC: 14 U/L
BASOPHILS # BLD AUTO: 0.03 K/UL
BASOPHILS NFR BLD: 0.6 %
BILIRUB SERPL-MCNC: 0.3 MG/DL
BUN SERPL-MCNC: 8 MG/DL
CALCIUM SERPL-MCNC: 9.2 MG/DL
CHLORIDE SERPL-SCNC: 107 MMOL/L
CK MB SERPL-MCNC: 0.8 NG/ML
CK MB SERPL-RTO: 0.9 %
CK SERPL-CCNC: 94 U/L
CO2 SERPL-SCNC: 28 MMOL/L
CREAT SERPL-MCNC: 0.8 MG/DL
DIASTOLIC DYSFUNCTION: YES
DIFFERENTIAL METHOD: ABNORMAL
EOSINOPHIL # BLD AUTO: 0.1 K/UL
EOSINOPHIL NFR BLD: 1.2 %
ERYTHROCYTE [DISTWIDTH] IN BLOOD BY AUTOMATED COUNT: 13.8 %
EST. GFR  (AFRICAN AMERICAN): >60 ML/MIN/1.73 M^2
EST. GFR  (NON AFRICAN AMERICAN): >60 ML/MIN/1.73 M^2
ESTIMATED AVG GLUCOSE: 177 MG/DL
ESTIMATED PA SYSTOLIC PRESSURE: 35.72
GLUCOSE SERPL-MCNC: 145 MG/DL
HBA1C MFR BLD HPLC: 7.8 %
HCT VFR BLD AUTO: 32.3 %
HGB BLD-MCNC: 10.3 G/DL
IMM GRANULOCYTES # BLD AUTO: 0.01 K/UL
IMM GRANULOCYTES NFR BLD AUTO: 0.2 %
INR PPP: 1
LYMPHOCYTES # BLD AUTO: 2.4 K/UL
LYMPHOCYTES NFR BLD: 46.1 %
MAGNESIUM SERPL-MCNC: 2.1 MG/DL
MCH RBC QN AUTO: 26 PG
MCHC RBC AUTO-ENTMCNC: 31.9 G/DL
MCV RBC AUTO: 82 FL
MITRAL VALVE MOBILITY: NORMAL
MONOCYTES # BLD AUTO: 0.3 K/UL
MONOCYTES NFR BLD: 5.2 %
NEUTROPHILS # BLD AUTO: 2.4 K/UL
NEUTROPHILS NFR BLD: 46.7 %
NRBC BLD-RTO: 0 /100 WBC
PHOSPHATE SERPL-MCNC: 3 MG/DL
PLATELET # BLD AUTO: 242 K/UL
PMV BLD AUTO: 10.7 FL
POCT GLUCOSE: 122 MG/DL (ref 70–110)
POTASSIUM SERPL-SCNC: 3.2 MMOL/L
PROT SERPL-MCNC: 6.3 G/DL
PROTHROMBIN TIME: 10.7 SEC
RBC # BLD AUTO: 3.96 M/UL
RETIRED EF AND QEF - SEE NOTES: 60 (ref 55–65)
SODIUM SERPL-SCNC: 143 MMOL/L
TRICUSPID VALVE REGURGITATION: ABNORMAL
TROPONIN I SERPL DL<=0.01 NG/ML-MCNC: <0.006 NG/ML
WBC # BLD AUTO: 5.21 K/UL

## 2017-11-24 PROCEDURE — 97161 PT EVAL LOW COMPLEX 20 MIN: CPT

## 2017-11-24 PROCEDURE — 85730 THROMBOPLASTIN TIME PARTIAL: CPT

## 2017-11-24 PROCEDURE — G8996 SWALLOW CURRENT STATUS: HCPCS | Mod: CH

## 2017-11-24 PROCEDURE — 99233 SBSQ HOSP IP/OBS HIGH 50: CPT | Mod: ,,, | Performed by: PSYCHIATRY & NEUROLOGY

## 2017-11-24 PROCEDURE — 80053 COMPREHEN METABOLIC PANEL: CPT

## 2017-11-24 PROCEDURE — A4216 STERILE WATER/SALINE, 10 ML: HCPCS | Performed by: NURSE PRACTITIONER

## 2017-11-24 PROCEDURE — G8987 SELF CARE CURRENT STATUS: HCPCS | Mod: CH

## 2017-11-24 PROCEDURE — 92610 EVALUATE SWALLOWING FUNCTION: CPT

## 2017-11-24 PROCEDURE — 97165 OT EVAL LOW COMPLEX 30 MIN: CPT

## 2017-11-24 PROCEDURE — 96374 THER/PROPH/DIAG INJ IV PUSH: CPT

## 2017-11-24 PROCEDURE — 92523 SPEECH SOUND LANG COMPREHEN: CPT

## 2017-11-24 PROCEDURE — G8989 SELF CARE D/C STATUS: HCPCS | Mod: CH

## 2017-11-24 PROCEDURE — 0JH632Z INSERTION OF MONITORING DEVICE INTO CHEST SUBCUTANEOUS TISSUE AND FASCIA, PERCUTANEOUS APPROACH: ICD-10-PCS | Performed by: INTERNAL MEDICINE

## 2017-11-24 PROCEDURE — 82553 CREATINE MB FRACTION: CPT

## 2017-11-24 PROCEDURE — 83735 ASSAY OF MAGNESIUM: CPT

## 2017-11-24 PROCEDURE — 63600175 PHARM REV CODE 636 W HCPCS

## 2017-11-24 PROCEDURE — 85610 PROTHROMBIN TIME: CPT

## 2017-11-24 PROCEDURE — 33282 EP LAB PROCEDURE: CPT | Mod: ,,, | Performed by: INTERNAL MEDICINE

## 2017-11-24 PROCEDURE — 63600175 PHARM REV CODE 636 W HCPCS: Performed by: NURSE PRACTITIONER

## 2017-11-24 PROCEDURE — 25000003 PHARM REV CODE 250: Performed by: PHYSICIAN ASSISTANT

## 2017-11-24 PROCEDURE — 83036 HEMOGLOBIN GLYCOSYLATED A1C: CPT

## 2017-11-24 PROCEDURE — G8988 SELF CARE GOAL STATUS: HCPCS | Mod: CH

## 2017-11-24 PROCEDURE — 97802 MEDICAL NUTRITION INDIV IN: CPT

## 2017-11-24 PROCEDURE — C1764 EVENT RECORDER, CARDIAC: HCPCS

## 2017-11-24 PROCEDURE — 84484 ASSAY OF TROPONIN QUANT: CPT

## 2017-11-24 PROCEDURE — 85025 COMPLETE CBC W/AUTO DIFF WBC: CPT

## 2017-11-24 PROCEDURE — 25000003 PHARM REV CODE 250: Performed by: NURSE PRACTITIONER

## 2017-11-24 PROCEDURE — G8997 SWALLOW GOAL STATUS: HCPCS | Mod: CH

## 2017-11-24 PROCEDURE — 99252 IP/OBS CONSLTJ NEW/EST SF 35: CPT | Mod: ,,, | Performed by: NURSE PRACTITIONER

## 2017-11-24 PROCEDURE — 93306 TTE W/DOPPLER COMPLETE: CPT | Mod: 26,,, | Performed by: INTERNAL MEDICINE

## 2017-11-24 PROCEDURE — 36415 COLL VENOUS BLD VENIPUNCTURE: CPT

## 2017-11-24 PROCEDURE — 84100 ASSAY OF PHOSPHORUS: CPT

## 2017-11-24 RX ORDER — CLOPIDOGREL BISULFATE 75 MG/1
75 TABLET ORAL DAILY
Qty: 30 TABLET | Refills: 1 | Status: SHIPPED | OUTPATIENT
Start: 2017-11-25 | End: 2019-12-03

## 2017-11-24 RX ORDER — HYDROCHLOROTHIAZIDE 25 MG/1
25 TABLET ORAL DAILY
COMMUNITY
End: 2018-07-02

## 2017-11-24 RX ORDER — QUETIAPINE 400 MG/1
TABLET, FILM COATED, EXTENDED RELEASE ORAL NIGHTLY
COMMUNITY
End: 2020-03-03 | Stop reason: SDUPTHER

## 2017-11-24 RX ORDER — OXYCODONE HYDROCHLORIDE 30 MG/1
5 TABLET ORAL EVERY 8 HOURS
COMMUNITY
End: 2018-07-02

## 2017-11-24 RX ORDER — CLOPIDOGREL BISULFATE 75 MG/1
75 TABLET ORAL DAILY
Status: DISCONTINUED | OUTPATIENT
Start: 2017-11-24 | End: 2017-11-24 | Stop reason: HOSPADM

## 2017-11-24 RX ORDER — CEFAZOLIN SODIUM 2 G/50ML
2 SOLUTION INTRAVENOUS
Status: DISCONTINUED | OUTPATIENT
Start: 2017-11-24 | End: 2017-11-24 | Stop reason: HOSPADM

## 2017-11-24 RX ADMIN — ATORVASTATIN CALCIUM 40 MG: 20 TABLET, FILM COATED ORAL at 09:11

## 2017-11-24 RX ADMIN — HEPARIN SODIUM 5000 UNITS: 5000 INJECTION, SOLUTION INTRAVENOUS; SUBCUTANEOUS at 05:11

## 2017-11-24 RX ADMIN — CLOPIDOGREL 75 MG: 75 TABLET, FILM COATED ORAL at 09:11

## 2017-11-24 RX ADMIN — HEPARIN SODIUM 5000 UNITS: 5000 INJECTION, SOLUTION INTRAVENOUS; SUBCUTANEOUS at 12:11

## 2017-11-24 RX ADMIN — CITALOPRAM HYDROBROMIDE 40 MG: 10 TABLET ORAL at 09:11

## 2017-11-24 RX ADMIN — Medication 3 ML: at 05:11

## 2017-11-24 RX ADMIN — ASPIRIN 81 MG CHEWABLE TABLET 81 MG: 81 TABLET CHEWABLE at 09:11

## 2017-11-24 NOTE — SUBJECTIVE & OBJECTIVE
Past Medical History:   Diagnosis Date    Back pain     CVA (cerebral vascular accident)     Diabetes mellitus     Embolic stroke involving left middle cerebral artery 11/23/2017    Hypertension        Past Surgical History:   Procedure Laterality Date    BACK SURGERY      FRACTURE SURGERY         Review of patient's allergies indicates:  No Known Allergies    No current facility-administered medications on file prior to encounter.      Current Outpatient Prescriptions on File Prior to Encounter   Medication Sig    aspirin 81 MG Chew Take 1 tablet (81 mg total) by mouth once daily.    atorvastatin (LIPITOR) 40 MG tablet Take 1 tablet (40 mg total) by mouth once daily.    carvedilol (COREG) 3.125 MG tablet Take 1 tablet (3.125 mg total) by mouth 2 (two) times daily with meals.    citalopram (CELEXA) 40 MG tablet Take 1 tablet (40 mg total) by mouth once daily.    metformin (GLUCOPHAGE) 1000 MG tablet Take 1 tablet (1,000 mg total) by mouth 2 (two) times daily with meals.    clotrimazole-betamethasone 1-0.05% (LOTRISONE) cream Apply topically 2 (two) times daily.    fluticasone (FLONASE) 50 mcg/actuation nasal spray 1 spray by Each Nare route once daily.    lisinopril (PRINIVIL,ZESTRIL) 20 MG tablet Take 1 tablet (20 mg total) by mouth once daily.     Family History     Family history is unknown by patient.        Social History Main Topics    Smoking status: Never Smoker    Smokeless tobacco: Never Used    Alcohol use 0.6 oz/week     1 Cans of beer per week    Drug use:      Types: Marijuana    Sexual activity: Yes     Review of Systems   Neurological: Negative for brief paralysis, difficulty with concentration, focal weakness, headaches, light-headedness and loss of balance.   All other systems reviewed and are negative.    Objective:     Vital Signs (Most Recent):  Temp: 98.4 °F (36.9 °C) (11/24/17 0745)  Pulse: 69 (11/24/17 0745)  Resp: 18 (11/24/17 0745)  BP: (!) 148/93 (11/24/17 0745)  SpO2:  99 % (11/24/17 0745) Vital Signs (24h Range):  Temp:  [97.8 °F (36.6 °C)-98.8 °F (37.1 °C)] 98.4 °F (36.9 °C)  Pulse:  [64-80] 69  Resp:  [18] 18  SpO2:  [98 %-100 %] 99 %  BP: (122-180)/(67-96) 148/93     Weight: 89.8 kg (198 lb)  Body mass index is 30.11 kg/m².    SpO2: 99 %  O2 Device (Oxygen Therapy): room air    Physical Exam   Constitutional: She is oriented to person, place, and time. She appears well-developed and well-nourished.   HENT:   Head: Normocephalic and atraumatic.   Neck: No JVD present.   Cardiovascular: Normal rate and regular rhythm.    Murmur heard.  Pulmonary/Chest: Effort normal and breath sounds normal. No respiratory distress. She has no wheezes. She has no rales.   Neurological: She is alert and oriented to person, place, and time. No cranial nerve deficit. Coordination normal.   All Extremities anti-gravity   Skin: Skin is warm. She is not diaphoretic.       Significant Labs: All pertinent lab results from the last 24 hours have been reviewed.    Significant Imaging: EKG: Normal sinus rhythm

## 2017-11-24 NOTE — PLAN OF CARE
Problem: SLP Goal  Goal: SLP Goal  Outcome: Ongoing (interventions implemented as appropriate)  Regular diet with thin liquids recommended.    Mary Gallardo MA/DOMI-SLP  Speech Language Pathologist  Pager [phone]  11/24/2017

## 2017-11-24 NOTE — PT/OT/SLP EVAL
"Physical Therapy  Evaluation and Discharge    Autumn Smart   MRN: [id number]   Admitting Diagnosis: Embolic stroke involving left middle cerebral artery    PT Received On: 11/24/17  PT Start Time: 0857     PT Stop Time: 0915    PT Total Time (min): 18 min       Billable Minutes:  Evaluation 18    Diagnosis: Embolic stroke involving left middle cerebral artery    Comorbidities and personal factors that affect the PT plan of care or the patient's ability to participate or progress with therapy:  1. Hx of stroke  2. R knee arthritis s/p surgical repair 2017  3. Back pain     Clinical Presentation: stable and/or uncomplicated  Level of Complexity:   Low Complexity  · No personal factors or comorbidities that impact the plan of care  · Examination addressing 1-2 body structures and functions, activity limitations, and/or participation restrictions  · Clinical presentation with stable or uncomplicated characteristics    Past Medical History:   Diagnosis Date    Back pain     CVA (cerebral vascular accident)     Diabetes mellitus     Embolic stroke involving left middle cerebral artery 11/23/2017    Hypertension       Past Surgical History:   Procedure Laterality Date    BACK SURGERY      FRACTURE SURGERY         Referring physician: Radha Galaviz NP  Date referred to PT: 11/23/2017    General Precautions: Standard, aspiration, fall    Patient History:  Patient lives alone in [place], [place] in al 1 story home with 3 steps and bilateral rails to enter.  She was completely independent prior to admit and did not use DME.     Subjective:  Communicated with RN prior to session.  "I just can't believe this happened to me again.  Why am I having strokes?  I want to know what is causing this.  Do I need to lay off the [employer]ips?"  Chief Complaint: concern about diagnosis  Patient goals: return home    Pain/Comfort  Pain Rating 1: 0/10  Pain Rating Post-Intervention 1: 0/10      Objective:   Patient found with: " telemetry  Patient found supine in bed with covers over head.  She was agreeable and pleasant during therapy eval.      EXAMINATION    Cognitive Function:  Oriented to: Person, Place, Time and Situation  Level of Alertness: awake and alert  Follows Commands/attention: Follows multistep  commands  Communication: clear/fluent  Safety awareness/insight to disability: intact    Musculoskeletal System  Lower Extremities:  Range of Motion:  Right Lower Extremity: WFL  Left Lower Extremity: WFL    Strength:  Right Lower Extremity: grossly 5/5 in all major muscle groups   Left Lower Extremity: grossly 5/5 in all major muscle groups    Integumentary System:  Skin integrity: Visible skin intact    Cardiopulmonary System:  Edema: None noted     Neuromuscular System:  Sensation:   Light Touch (LT): intact   Coordination: intact    Finger to thumb opposition intact bilaterally    Finger to nose intact bilaterally     Balance:   Static Sit: NORMAL: No deviations seen in posture held statically;   Dynamic Sit: NORMAL: No deviations seen in posture held dynamically;   Static Stand: NORMAL: No deviations seen in posture held statically;   Dynamic stand: NORMAL: No deviations seen in posture held dynamically;     Posture and gross symmetry: No postural abnormalities identified     FUNCTIONAL MOBILITY ASSESSMENT:  Bed Mobility:  Rolling/Turning R: independent   Rolling/Turning L: independent  Supine to sit: independent  Sit to supine: independent   Scooting EOB: independent     Transfers:  Sit to stand transfer: independent   Bed to chair transfer: independent    Gait:   Gait 200 feet independent with no assistive device, no significant gait deviations, no LOB, and no safety concerns.      Stairs:  Pt ascended/descended 6 steps with L hand rail and modified independence using step over step gait pattern.      THERAPEUTIC ACTIVITIES AND EXERCISES:  Therapist educated patient on the following:  · Role of PT, POC  · No therapy needs at  this time.      AM-PAC 6 CLICK MOBILITY  How much help from another person does this patient currently need?   1 = Unable, Total/Dependent Assistance  2 = A lot, Maximum/Moderate Assistance  3 = A little, Minimum/Contact Guard/Supervision  4 = None, Modified [place]/Independent    Turning over in bed (including adjusting bedclothes, sheets and blankets)?: 4  Sitting down on and standing up from a chair with arms (e.g., wheelchair, bedside commode, etc.): 4  Moving from lying on back to sitting on the side of the bed?: 4  Moving to and from a bed to a chair (including a wheelchair)?: 4  Need to walk in hospital room?: 4  Climbing 3-5 steps with a railing?: 4  Total Score: 24     AM-PAC Raw Score CMS G-Code Modifier Level of Impairment Assistance   6 % Total / Unable   7 - 9 CM 80 - 100% Maximal Assist   10 - 14 CL 60 - 80% Moderate Assist   15 - 19 CK 40 - 60% Moderate Assist   20 - 22 CJ 20 - 40% Minimal Assist   23 CI 1-20% SBA / CGA   24 CH 0% Independent/ Mod I     Patient left up in chair with all lines intact and call button in reach.    Assessment:   Autumn Smart is a 62 y.o. female with a medical diagnosis of Embolic stroke involving left middle cerebral artery and presents with no significant functional mobility deficits at this time. She performed bed mobility, transfers, gait and stair climbing independently with no difficulty.  She is not in need of skilled PT services at this time.  Pt is safe to d/c home when medically appropriate.     Rehab identified problem list/impairments:      Rehab potential is excellent.    Activity tolerance: Excellent    Discharge recommendations: Discharge Facility/Level Of Care Needs: home     Barriers to discharge: Barriers to Discharge: None    Equipment recommendations:   none    PLAN:    D/c from skilled PT services.   Plan of Care reviewed with: patient          Ivet ZAIRA Yandel, PT  11/24/2017

## 2017-11-24 NOTE — ED NOTES
Pt ambulatory to bathroom. Gave pt urine cup to get sample. Pt returned and states that she was unaware that she had to obtain sample. She is still c/o feeling off.

## 2017-11-24 NOTE — CONSULTS
[facility]  Physical Medicine & Rehab  Consult Note    Patient Name: Autumn Smart  MRN: [id number]  Admission Date: 11/23/2017  Hospital Length of Stay: 1 days  Attending Physician: Jean Parks MD     Inpatient consult to Physical Medicine & Rehabilitation  Consult performed by: Mellisa Ma NP  Consult requested by:  Jean Parks MD    Reason for Consult:  assess rehabilitation needs    Consults  Subjective:     Principal Problem: Embolic stroke involving left middle cerebral artery    HPI: Autumn Smart is a 62-year-old female with PMHx of HTN, DM, CVA, and back pain.  Patient presented to [facility] on 11/23 with R arm weakness and numbness that resolved prior to arrival .  CTH revealed no acute pathology, but did show stable, remote infarcts of the left shantanu and left occipital lobe.  CTA revealed suspect new occlusion or high grade stenosis involving the A2 segment of the right anterior cerebral artery with stable occlusion or high-grade stenosis involving the P2 segment of the left posterior cerebral artery.  MRI brain revealed acute infarct in the left posterior MCA and superior PCA territory. VN following.     Functional History: Patient lives in [place] alone in a single story home with 3 steps to enter.  Prior to admission,  (I) with ADLs and mobility and driving.  DME: [facility] but does not use it.      Hospital Course: 11/24/17: Evaluated by therapy.  Bed mobility (I).  Sit to stand (I) and transfers (I).  UBD and LBD (I). Ambulated 200 ft (I) and ascend/descend 6 steps (I).     Past Medical History:   Diagnosis Date    Back pain     CVA (cerebral vascular accident)     Diabetes mellitus     Embolic stroke involving left middle cerebral artery 11/23/2017    Hypertension      Past Surgical History:   Procedure Laterality Date    BACK SURGERY      FRACTURE SURGERY       Review of patient's allergies indicates:  No Known Allergies    Scheduled Medications:    aspirin  81 mg Oral  Daily    atorvastatin  40 mg Oral Daily    citalopram  40 mg Oral Daily    clopidogrel  75 mg Oral Daily    heparin (porcine)  5,000 Units Subcutaneous Q8H    sodium chloride 0.9%  3 mL Intravenous Q8H       PRN Medications: dextrose 50%, dextrose 50%, glucagon (human recombinant), glucose, glucose, insulin aspart, labetalol, ondansetron, ondansetron, sodium chloride 0.9%    Family History     Family history is unknown by patient.        Social History Main Topics    Smoking status: Never Smoker    Smokeless tobacco: Never Used    Alcohol use 0.6 oz/week     1 Cans of beer per week    Drug use:      Types: Marijuana    Sexual activity: Yes     Review of Systems   Constitutional: Negative for chills, fatigue and fever.   HENT: Negative for trouble swallowing and voice change.    Eyes: Negative for photophobia and visual disturbance.   Respiratory: Negative for cough, shortness of breath and wheezing.    Cardiovascular: Negative for chest pain and palpitations.   Gastrointestinal: Negative for abdominal distention, nausea and vomiting.   Genitourinary: Negative for difficulty urinating and flank pain.   Musculoskeletal: Negative for arthralgias and gait problem.   Skin: Negative for color change and rash.   Neurological: Negative for facial asymmetry, speech difficulty, weakness, numbness and headaches.   Psychiatric/Behavioral: Negative for agitation and confusion.     Objective:     Vital Signs (Most Recent):  Temp: 98.4 °F (36.9 °C) (11/24/17 0745)  Pulse: 69 (11/24/17 0745)  Resp: 18 (11/24/17 0745)  BP: (!) 148/93 (11/24/17 0745)  SpO2: 99 % (11/24/17 0745)    Vital Signs (24h Range):  Temp:  [97.8 °F (36.6 °C)-98.8 °F (37.1 °C)] 98.4 °F (36.9 °C)  Pulse:  [64-80] 69  Resp:  [18] 18  SpO2:  [98 %-100 %] 99 %  BP: (122-180)/(67-96) 148/93     Body mass index is 30.11 kg/m².    Physical Exam   Constitutional: She appears well-developed and well-nourished.   HENT:   Head: Normocephalic and atraumatic.    Eyes: EOM are normal. Pupils are equal, round, and reactive to light.   Neck: Normal range of motion.   Cardiovascular: Normal rate and regular rhythm.    Pulmonary/Chest: No respiratory distress.   Abdominal: Soft. There is no tenderness.   Musculoskeletal: Normal range of motion. She exhibits no deformity.   Neurological:   -  Mental Status:  AAOx3.  Follows commands.  Answers correct age and .  Recent and remote memory intact.  No neglect.    -  Speech and language:  - aphasia - dysarthria.    -  Vision:  - hemianopsia.  - ptosis.    -  Facial movement (CN VII): symmetric  -  Motor:  RUE: 5/5, 5/5 .  LUE: 5/5, 5/5 .  RLE: 5/5, DF 5/5, PF 5/5.  LLE: 5/5, DF 5/5, PF 5/5.   -  Tone:  normal  -  Sensory:  Intact to light touch and pin prick.  Psychiatric: She has a normal mood and affect. Her behavior is normal. Cognition and memory are not impaired.   Vitals reviewed.          Diagnostic Results:   Labs: Reviewed  ECG: Reviewed  X-Ray: Reviewed  CT: Reviewed  MRI: Reviewed    Assessment/Plan:     * Embolic stroke involving left middle cerebral artery    Functional status: pending  Cognitive/Speech/Language status:  pending  Nutrition/Swallow Status:  Pending bedside swallow evaluation.     Recommendations  -  Encourage mobility, OOB in chair at least 3 hours per day, and early ambulation as appropriate   -  PT/OT evaluate and treat  -  SLP speech and cognitive evaluate and treat  -  Monitor sleep disturbances and establish consistent sleep-wake cycle  -  Monitor for bowel and bladder dysfunction  -  Monitor for shoulder pain and subluxation  -  Monitor for spasticity  -  Monitor for and prevent skin breakdown and pressure ulcers  · Early mobility, repositioning/weight shifting every 20-30 minutes when sitting, turn patient every 2 hours, proper mattress/overlay and chair cushioning, pressure relief/heel protector boots  -  DVT prophylaxis:  [facility]  -  Reviewed discharge options (IP rehab, SNF, HH therapy,  and OP therapy)          Patient is high level with therapy.  She is (I) with bed mobility, transfers, ambulated 200 ft (I), ascend/descend 6 steps, and UBD/LBD.  SLP has deemed Speech, language, and cognitive skills WFL.  All therapies have signed off.   Agree with therapy recommendations for home.  Will sign off.  Please call with questions/concerns or re-consult if situation changes.      Thank you for your consult.     Mellisa Ma NP  Department of Physical Medicine & Rehab  [facility]

## 2017-11-24 NOTE — PLAN OF CARE
Problem: Occupational Therapy Goal  Goal: Occupational Therapy Goal  OT evaluation completed.  NIKOS Avelar  11/24/2017

## 2017-11-24 NOTE — PLAN OF CARE
11/24/2017      Autumn Smart  [address] [place] [address] [place] [address]          Vascular Neurology Dept.  [facility] [address] [place] [address]  [phone] after hours   Diagnosis:  Embolic stroke involving left middle cerebral artery                              Please evaluate and treat patient for her physical therapy, occupational therapy, and speech therapy needs.            __________________________  MIAH Dorsey  11/24/2017

## 2017-11-24 NOTE — NURSING
Patient received discharge orders. Discharge instructions reviewed with patient. Patient demonstrated understanding of discharge instructions. IV and tele removed. Waiting for transport to be discharged home. Will continue to monitor.

## 2017-11-24 NOTE — SUBJECTIVE & OBJECTIVE
Past Medical History:   Diagnosis Date    Back pain     CVA (cerebral vascular accident)     Diabetes mellitus     Embolic stroke involving left middle cerebral artery 11/23/2017    Hypertension      Past Surgical History:   Procedure Laterality Date    BACK SURGERY      FRACTURE SURGERY       Review of patient's allergies indicates:  No Known Allergies    Scheduled Medications:    aspirin  81 mg Oral Daily    atorvastatin  40 mg Oral Daily    citalopram  40 mg Oral Daily    clopidogrel  75 mg Oral Daily    heparin (porcine)  5,000 Units Subcutaneous Q8H    sodium chloride 0.9%  3 mL Intravenous Q8H       PRN Medications: dextrose 50%, dextrose 50%, glucagon (human recombinant), glucose, glucose, insulin aspart, labetalol, ondansetron, ondansetron, sodium chloride 0.9%    Family History     Family history is unknown by patient.        Social History Main Topics    Smoking status: Never Smoker    Smokeless tobacco: Never Used    Alcohol use 0.6 oz/week     1 Cans of beer per week    Drug use:      Types: Marijuana    Sexual activity: Yes     Review of Systems   Constitutional: Negative for chills, fatigue and fever.   HENT: Negative for trouble swallowing and voice change.    Eyes: Negative for photophobia and visual disturbance.   Respiratory: Negative for cough, shortness of breath and wheezing.    Cardiovascular: Negative for chest pain and palpitations.   Gastrointestinal: Negative for abdominal distention, nausea and vomiting.   Genitourinary: Negative for difficulty urinating and flank pain.   Musculoskeletal: Negative for arthralgias and gait problem.   Skin: Negative for color change and rash.   Neurological: Negative for facial asymmetry, speech difficulty, weakness, numbness and headaches.   Psychiatric/Behavioral: Negative for agitation and confusion.     Objective:     Vital Signs (Most Recent):  Temp: 98.4 °F (36.9 °C) (11/24/17 0745)  Pulse: 69 (11/24/17 0745)  Resp: 18 (11/24/17  0745)  BP: (!) 148/93 (17 0745)  SpO2: 99 % (17 0745)    Vital Signs (24h Range):  Temp:  [97.8 °F (36.6 °C)-98.8 °F (37.1 °C)] 98.4 °F (36.9 °C)  Pulse:  [64-80] 69  Resp:  [18] 18  SpO2:  [98 %-100 %] 99 %  BP: (122-180)/(67-96) 148/93     Body mass index is 30.11 kg/m².    Physical Exam   Constitutional: She appears well-developed and well-nourished.   HENT:   Head: Normocephalic and atraumatic.   Eyes: EOM are normal. Pupils are equal, round, and reactive to light.   Neck: Normal range of motion.   Cardiovascular: Normal rate and regular rhythm.    Pulmonary/Chest: No respiratory distress.   Abdominal: Soft. There is no tenderness.   Musculoskeletal: Normal range of motion. She exhibits no deformity.   Neurological:   -  Mental Status:  AAOx3.  Follows commands.  Answers correct age and .  Recent and remote memory intact.  No neglect.    -  Speech and language:  - aphasia - dysarthria.    -  Vision:  - hemianopsia.  - ptosis.    -  Facial movement (CN VII): symmetric  -  Motor:  RUE: 5/5, 5/5 .  LUE: 5/5, 5/5 .  RLE: 5/5, DF 5/5, PF 5/5.  LLE: 5/5, DF 5/5, PF 5/5.   -  Tone:  normal  -  Sensory:  Intact to light touch and pin prick.       Psychiatric: She has a normal mood and affect. Her behavior is normal. Cognition and memory are not impaired.   Vitals reviewed.    NEUROLOGICAL EXAMINATION:     CRANIAL NERVES     CN III, IV, VI   Pupils are equal, round, and reactive to light.  Extraocular motions are normal.       Diagnostic Results:   Labs: Reviewed  ECG: Reviewed  X-Ray: Reviewed  CT: Reviewed  MRI: Reviewed

## 2017-11-24 NOTE — HPI
Autumn Smart is a 62-year-old female with PMHx of HTN, DM, CVA, and back pain.  [place] [facility] [place] with R arm weakness and numbness that resolved prior to arrival .  CTH revealed no acute pathology, but did show stable, remote infarcts of the left shantanu and left occipital lobe.  CTA revealed suspect new occlusion or high grade stenosis involving the A2 segment of the right anterior cerebral artery with stable occlusion or high-grade stenosis involving the P2 segment of the left posterior cerebral artery.  MRI brain revealed acute infarct in the left posterior MCA and superior PCA territory. VN following.     Functional History: Patient lives in [place] alone in a single story home with 3 steps to enter.  Prior to admission,  (I) with ADLs and mobility and driving.  DME: [facility] but does not use it.

## 2017-11-24 NOTE — ASSESSMENT & PLAN NOTE
Functional status: pending  Cognitive/Speech/Language status:  pending  Nutrition/Swallow Status:  Pending bedside swallow evaluation.     Recommendations  -  Encourage mobility, OOB in chair at least 3 hours per day, and early ambulation as appropriate   -  PT/OT evaluate and treat  -  SLP speech and cognitive evaluate and treat  -  Monitor sleep disturbances and establish consistent sleep-wake cycle  -  Monitor for bowel and bladder dysfunction  -  Monitor for shoulder pain and subluxation  -  Monitor for spasticity  -  Monitor for and prevent skin breakdown and pressure ulcers  · Early mobility, repositioning/weight shifting every 20-30 minutes when sitting, turn patient every 2 hours, proper mattress/overlay and chair cushioning, pressure relief/heel protector boots  -  DVT prophylaxis:  [facility]  -  Reviewed discharge options (IP rehab, SNF, HH therapy, and OP therapy)

## 2017-11-24 NOTE — PLAN OF CARE
Problem: Patient Care Overview  Goal: Plan of Care Review  Outcome: Ongoing (interventions implemented as appropriate)  Pt arrived to floor at 2335. POC reviewed with pt, verbalized understanding. Pt verbalizes her disagreement with being admited but states she will stay until the morning. RAVEN passed. VSS. No complaints or needs. Pt rested well through the night.

## 2017-11-24 NOTE — ASSESSMENT & PLAN NOTE
Autumn Smart is a 62 y.o. female who presents with episode of R arm weakness and numbness that resolved prior to arrival. MRI confirmed L MCA infarct, etiology likely ESUS. Echo with LAE and diastolic dysfunction. Etiology likely ESUS.    Antithrombotics: DAPT  Statins: continue home atorvastatin 40, LDL <70  Risk factor modifications: HTN, DM, HLD  Therapy: PT/OT, SLP, and PM&R  Diagnostics: none  VTE prophylaxis: heparin sq

## 2017-11-24 NOTE — CONSULTS
[facility]  Vascular Neurology  Comprehensive Stroke Center  Consult Note    Inpatient consult to Vascular (Stroke) Neurology  Consult performed by: BLAISE PATE  Consult ordered by: GUERDA QUINTANILLA        Assessment/Plan:     Patient is a 62 y.o. year old female with:    Episode of transient neurologic symptoms    Autumn Smart is a 62 y.o. female who presents with episode of R arm weakness and numbness that resolved prior to arrival.    Antithrombotics: DAPT  Statins: continue home atorvastatin 40, LDL <70  Risk factor modifications: HTN, DM, HLD  Therapy: PT/OT, SLP, and PM&R  Diagnostics: MRI, echo, A1C  VTE prophylaxis: heparin sq          Essential hypertension    Stroke risk factor  SBP <220 until MRI completed  Labetalol PRN        Type 2 diabetes mellitus without complication    Stroke risk factor  A1C pending          HLD (hyperlipidemia)    Stroke risk factor  LDL <70  Continue atorvastatin 40            STROKE DOCUMENTATION          NIH Scale:  1a. Level Of Consciousness: 0-->Alert: keenly responsive  1b. LOC Questions: 1-->Answers one question correctly  1c. LOC Commands: 0-->Performs both tasks correctly  2. Best Gaze: 0-->Normal  3. Visual: 0-->No visual loss  4. Facial Palsy: 0-->Normal symmetrical movements  5a. Motor Arm, Left: 0-->No drift: limb holds 90 (or 45) degrees for full 10 secs  5b. Motor Arm, Right: 0-->No drift: limb holds 90 (or 45) degrees for full 10 secs  6a. Motor Leg, Left: 0-->No drift: leg holds 30 degree position for full 5 secs  6b. Motor Leg, Right: 0-->No drift: leg holds 30 degree position for full 5 secs  7. Limb Ataxia: 0-->Absent  8. Sensory: 0-->Normal: no sensory loss  9. Best Language: 0-->No aphasia: normal  10. Dysarthria: 0-->Normal  11. Extinction and Inattention (formerly Neglect): 0-->No abnormality  Total (NIH Stroke Scale): 1    Modified [place] Score: 0    Thrombolysis Candidate? No, presenting symptoms resolved      Interventional  "Revascularization Candidate?   Is the patient eligible for mechanical endovascular reperfusion (RAPHAEL)?  No; No large vessel occlusion      Hemorrhagic change of an Ischemic Stroke: Does this patient have an ischemic stroke with hemorrhagic changes? No     Subjective:     History of Present Illness:  Autumn Smart is a 62 y.o. female with PMHx of HTN, HLD, prior stroke (no deficits) and DM who presented to ED with complaint of "not feeling right." Patient states since 3:30 pm she was having BL UE weakness. Her RUE felt more weak than her LUE. Patient called her friend and told him that she couldn't feel or move her R arm. She drove to him with 1 arm. Patient denies leg weakness.   Upon arrival, patient's symptoms had resolved. Patient and her friend state that she came to ED because she thought she may be having another stroke. She denies numbness, vision changes, and speech difficulty.       Of note, according to friend, patient smokes marijuana and drinks beer occasionally. Friend states patient is under a lot of stress. Patient has lost a child in the past and her other child is paralyzed.        Past Medical History:   Diagnosis Date    Back pain     CVA (cerebral vascular accident)     Diabetes mellitus     Hypertension      Past Surgical History:   Procedure Laterality Date    BACK SURGERY      FRACTURE SURGERY       No family history on file.  Social History   Substance Use Topics    Smoking status: Never Smoker    Smokeless tobacco: Never Used    Alcohol use 0.6 oz/week     1 Cans of beer per week     Review of patient's allergies indicates:  No Known Allergies    Medications: I have reviewed the current medication administration record.      (Not in a hospital admission)    Review of Systems   Constitutional: Negative for chills and fever.   Eyes: Negative for visual disturbance.   Respiratory: Negative for cough.    Cardiovascular: Negative for chest pain.   Gastrointestinal: Negative for nausea " and vomiting.   Skin: Negative for rash.   Neurological: Positive for weakness and numbness. Negative for facial asymmetry, speech difficulty and headaches.   Psychiatric/Behavioral: Negative for agitation. The patient is nervous/anxious.      Objective:     Vital Signs (Most Recent):  Temp: 97.8 °F (36.6 °C) (11/23/17 2000)  Pulse: 72 (11/23/17 2030)  Resp: 18 (11/23/17 2000)  BP: (!) 144/90 (11/23/17 2000)  SpO2: 100 % (11/23/17 2030)    Vital Signs Range (Last 24H):  Temp:  [97.8 °F (36.6 °C)]   Pulse:  [72-80]   Resp:  [18]   BP: (144)/(90)   SpO2:  [98 %-100 %]     Physical Exam   Constitutional: She appears well-developed and well-nourished. No distress.   HENT:   Head: Normocephalic and atraumatic.   Eyes: EOM are normal. Pupils are equal, round, and reactive to light.   Pulmonary/Chest: Effort normal.   Musculoskeletal: Normal range of motion.   Neurological: She is alert.   Skin: Skin is warm and dry.   Psychiatric: Her mood appears anxious. Her speech is rapid and/or pressured.   Vitals reviewed.      Neurological Exam:   LOC: alert  Attention Span: Good   Language: No aphasia  Articulation: No dysarthria  Orientation: Person, Place, Time   Visual Fields: Full  EOM (CN III, IV, VI): Full/intact  Pupils (CN II, III): PERRL  Facial Sensation (CN V): Normal  Facial Movement (CN VII): Symmetric facial expression    Motor: Arm left Normal (5/5)  Leg left Normal (5/5)  Arm right Normal (5/5)  Leg right Normal (5/5)  Cebellar: No evidence of appendicular or axial ataxia  Sensation: Intact to light touch, temperature and vibration  Tone: Normal tone throughout      Laboratory:  CMP: No results for input(s): GLUCOSE, CALCIUM, ALBUMIN, PROT, NA, K, CO2, CL, BUN, CREATININE, ALKPHOS, ALT, AST, BILITOT in the last 24 hours.  CBC:   Recent Labs  Lab 11/23/17 2025   WBC 7.50   RBC 4.34   HGB 11.3*   HCT 35.4*      MCV 82   MCH 26.0*   MCHC 31.9*     Lipid Panel: No results for input(s): CHOL, LDLCALC, HDL, TRIG  in the last 168 hours.  Coagulation:   Recent Labs  Lab 11/23/17 2025   INR 1.0     Hgb A1C: No results for input(s): HGBA1C in the last 168 hours.  TSH: No results for input(s): TSH in the last 168 hours.    Diagnostic Results:      Brain imaging:  CT Head. Date: 11/23/17  No evidence of acute major vascular distribution infarct or intracranial hemorrhage.     Stable, remote infarcts of the left shantanu and left occipital lobe.    Vessel Imaging:  CTA Multiphase. Date: 11/23/17  No large vessel occlusion    Cardiac Evaluation:   NSR at 72 BPM            Parisa Villela PA-C  Comprehensive Stroke Center  Department of Vascular Neurology   [facility]

## 2017-11-24 NOTE — PROGRESS NOTES
Called to the ED due to patient stating she didn't want the MRI and wanted to leave when she was being prepared to go to MRI.  On my arrival the patient was oriented x 3 and states she agrees to the MRI and admission.  Will order Versed if needed for anxiety related to MRI.  Of note, the patient has expressed a desire to leave on multiple occasions with the ED and stroke providers and has reversed her decision in as many times.    Radha Galaviz NP  Vascular Neurology

## 2017-11-24 NOTE — SUBJECTIVE & OBJECTIVE
Neurologic Chief Complaint: L MCA    Subjective:     Interval History: Patient is seen for follow-up neurological assessment and treatment recommendations: NAEON, patient intermittently wanting to leave [place]    HPI, Past Medical, Family, and Social History remains the same as documented in the initial encounter.     Review of Systems   Constitutional: Negative for chills and fever.   Respiratory: Negative for cough.    Cardiovascular: Negative for chest pain.   Gastrointestinal: Negative for vomiting.   Neurological: Negative for speech difficulty and weakness.   Psychiatric/Behavioral: The patient is nervous/anxious.      Scheduled Meds:   aspirin  81 mg Oral Daily    atorvastatin  40 mg Oral Daily    citalopram  40 mg Oral Daily    clopidogrel  75 mg Oral Daily    heparin (porcine)  5,000 Units Subcutaneous Q8H    sodium chloride 0.9%  3 mL Intravenous Q8H     Continuous Infusions:   sodium chloride 0.9%       PRN Meds:dextrose 50%, dextrose 50%, glucagon (human recombinant), glucose, glucose, insulin aspart, labetalol, ondansetron, ondansetron, sodium chloride 0.9%    Objective:     Vital Signs (Most Recent):  Temp: 98.4 °F (36.9 °C) (11/24/17 0745)  Pulse: 69 (11/24/17 0745)  Resp: 18 (11/24/17 0745)  BP: (!) 148/93 (11/24/17 0745)  SpO2: 99 % (11/24/17 0745)  BP Location: Left arm    Vital Signs Range (Last 24H):  Temp:  [97.8 °F (36.6 °C)-98.8 °F (37.1 °C)]   Pulse:  [64-80]   Resp:  [18]   BP: (122-180)/(67-96)   SpO2:  [98 %-100 %]   BP Location: Left arm    Physical Exam   Constitutional: She appears well-developed and well-nourished. No distress.   HENT:   Head: Normocephalic and atraumatic.   Eyes: EOM are normal. Pupils are equal, round, and reactive to light.   Cardiovascular: Normal rate.    Pulmonary/Chest: Effort normal. No respiratory distress.   Neurological: She is alert.   Skin: Skin is warm and dry.       Neurological Exam:   LOC: alert  Attention Span: Good   Language: No  aphasia  Articulation: No dysarthria  Orientation: Person, Place, Time   Visual Fields: Superior quadrantanopsia: right  EOM (CN III, IV, VI): Full/intact  Pupils (CN II, III): PERRL  Facial Sensation (CN V): Normal  Facial Movement (CN VII): Symmetric facial expression    Motor: Arm left Normal (5/5)  Leg left Normal (5/5)  Arm right Paretic (1-4/5)  Leg right Normal (5/5)  Cebellar: No evidence of appendicular or axial ataxia  Sensation: Intact to light touch, temperature and vibration  Tone: Normal tone throughout    Laboratory:  CMP:   Recent Labs  Lab 11/24/17 0440   CALCIUM 9.2   ALBUMIN 3.2*   PROT 6.3      K 3.2*   CO2 28      BUN 8   CREATININE 0.8   ALKPHOS 54*   ALT 11   AST 14   BILITOT 0.3     CBC:   Recent Labs  Lab 11/24/17 0440   WBC 5.21   RBC 3.96*   HGB 10.3*   HCT 32.3*      MCV 82   MCH 26.0*   MCHC 31.9*     Lipid Panel:   Recent Labs  Lab 11/23/17 2025   CHOL 132   LDLCALC 65.2   HDL 44   TRIG 114     Coagulation:   Recent Labs  Lab 11/24/17 0440   INR 1.0   APTT 22.1     Platelet Aggregation Study: No results for input(s): PLTAGG, PLTAGINTERP, PLTAGREGLACO, ADPPLTAGGREG in the last 168 hours.  Hgb A1C: No results for input(s): HGBA1C in the last 168 hours.  TSH:   Recent Labs  Lab 11/23/17 2025   TSH 0.333*       Diagnostic Results     Brain Imaging   CT Head. Date: 11/23/12  No evidence of acute major vascular distribution infarct or intracranial hemorrhage.     Stable, remote infarcts of the left shantanu and left occipital lobe.      MRI. Date: 11/23/17  Acute infarct in the left posterior MCA and superior PCA territory. No evidence of hemorrhagic conversion.    Remote infarcts in the left shantanu and left occipital lobe.    Vessel Imaging   CTA Multiphase. Date: 11/23/17  Suspect new occlusion or high grade stenosis involving the A2 segment of the right anterior cerebral artery.    Focal narrowing of the M2 segment of the left MCA, more conspicuous but grossly unchanged  when compared with the prior CTA in 2016.    Stable occlusion or high-grade stenosis involving the P2 segment of the left posterior cerebral artery.    No evidence of aneurysm or vascular malformation.    Cardiac Imaging   NSR at 72 BPM    Echo pending

## 2017-11-24 NOTE — ASSESSMENT & PLAN NOTE
Autumn Smart is a 62 y.o. female who presents with episode of R arm weakness and numbness that resolved prior to arrival. MRI confirmed L MCA infarct, etiology likely ESUS. Plans for discharge home today after ILR implantation.    Antithrombotics: DAPT  Statins: continue home atorvastatin 40, LDL <70  Risk factor modifications: HTN, DM, HLD  Therapy: PT/OT, SLP, and PM&R  Diagnostics: echo, A1C  VTE prophylaxis: heparin sq

## 2017-11-24 NOTE — ED NOTES
Pt is having problems following directions for neuro assessments; she states she is not thinking clearly.

## 2017-11-24 NOTE — PLAN OF CARE
Problem: Physical Therapy Goal  Goal: Physical Therapy Goal  Outcome: Outcome(s) achieved Date Met: 11/24/17  Initial eval completed. Patient independent with mobility at this time and not in need of skilled PT services.     She is safe to d/c home with no additional PT needs.     Ivet Humphries, PT  11/24/2017  [phone] (pager)

## 2017-11-24 NOTE — ASSESSMENT & PLAN NOTE
Autumn Smart is a 62 y.o. female who presents with episode of R arm weakness and numbness that resolved prior to arrival.    Antithrombotics: DAPT  Statins: continue home atorvastatin 40, LDL <70  Risk factor modifications: HTN, DM, HLD  Therapy: PT/OT, SLP, and PM&R  Diagnostics: MRI, echo, A1C  VTE prophylaxis: heparin sq

## 2017-11-24 NOTE — ED PROVIDER NOTES
"Encounter Date: 11/23/2017    SCRIBE #1 NOTE: I, Joel Malone, am scribing for, and in the presence of,  Dr. Kemp . I have scribed the following portions of the note - the Resident attestation and the EKG reading. Other sections scribed: CThead.       History     Chief Complaint   Patient presents with    Extremity Weakness     right arm numbness and weakness began at 4pm today - hx of cva with no residual deficits - charge rn notifed.      68 year old F with sig Pmhx of HTN, DM and several prior CVAs 5/17 - Punctate Right frontal lobe with foci in left occipital and 5/16 Left occipital and temporal infarcts with limited residual deficits presents with feeling of right arm weakness and loss of control that began at approximately 330pm or 4pm per the patient. At that time she report "just feeling bad". Denied CP, SOB. Since that time she reports mild improvement in symptoms. She denies any other weakness, vision loss or sensory changes.            Review of patient's allergies indicates:  No Known Allergies  Past Medical History:   Diagnosis Date    Back pain     CVA (cerebral vascular accident)     Diabetes mellitus     Hypertension      Past Surgical History:   Procedure Laterality Date    BACK SURGERY      FRACTURE SURGERY       Family History   Problem Relation Age of Onset    Family history unknown: Yes     Social History   Substance Use Topics    Smoking status: Never Smoker    Smokeless tobacco: Never Used    Alcohol use 0.6 oz/week     1 Cans of beer per week     Review of Systems   Constitutional: Positive for activity change. Negative for appetite change, chills and fever.   HENT: Negative for congestion and rhinorrhea.    Eyes: Negative for discharge, itching and visual disturbance.   Respiratory: Negative for cough, chest tightness and shortness of breath.    Cardiovascular: Negative for chest pain and palpitations.   Gastrointestinal: Negative for abdominal pain, nausea and vomiting. "   Endocrine: Negative.    Genitourinary: Negative.    Musculoskeletal: Negative.    Neurological: Positive for weakness and numbness. Negative for dizziness, seizures, facial asymmetry, light-headedness and headaches.   Psychiatric/Behavioral: Negative for agitation and behavioral problems. The patient is nervous/anxious.        Physical Exam     Initial Vitals [11/23/17 2000]   BP Pulse Resp Temp SpO2   (!) 144/90 75 18 97.8 °F (36.6 °C) 98 %      MAP       108         Physical Exam    Nursing note and vitals reviewed.  Constitutional: She appears well-developed and well-nourished. She is not diaphoretic. No distress.   HENT:   Head: Normocephalic and atraumatic.   Mouth/Throat: No oropharyngeal exudate.   Eyes: EOM are normal. Pupils are equal, round, and reactive to light. No scleral icterus.   Neck: Normal range of motion. Neck supple. No JVD present.   Cardiovascular: Normal rate and regular rhythm. Exam reveals no gallop.    No murmur heard.  Pulmonary/Chest: Breath sounds normal. No respiratory distress. She has no wheezes. She has no rales.   Abdominal: Soft. Bowel sounds are normal. She exhibits no distension. There is no tenderness.   Musculoskeletal: Normal range of motion. She exhibits no edema or tenderness.   Lymphadenopathy:     She has no cervical adenopathy.   Neurological: She is alert and oriented to person, place, and time. She has normal strength. No cranial nerve deficit or sensory deficit.   MS - no confusion, mild inattention to commands  Motor 5/5 in all extremities. Intact arm and leg raise b/l  Significant dysmetria Left Finger to nose  CN II-XII intact on general exam  No gross sensorry deficits   Skin: Skin is warm and dry. Capillary refill takes less than 2 seconds.   Psychiatric:   Patient is anxious         ED Course   Procedures  Labs Reviewed   CBC W/ AUTO DIFFERENTIAL - Abnormal; Notable for the following:        Result Value    Hemoglobin 11.3 (*)     Hematocrit 35.4 (*)     MCH  26.0 (*)     MCHC 31.9 (*)     Mono # 0.2 (*)     Gran% 76.5 (*)     Mono% 2.7 (*)     All other components within normal limits   COMPREHENSIVE METABOLIC PANEL - Abnormal; Notable for the following:     Glucose 248 (*)     All other components within normal limits   TSH - Abnormal; Notable for the following:     TSH 0.333 (*)     All other components within normal limits   POCT GLUCOSE - Abnormal; Notable for the following:     POCT Glucose 252 (*)     All other components within normal limits   PROTIME-INR   LIPID PANEL   T4, FREE   HEMOGLOBIN A1C   HEMOGLOBIN A1C   TOXICOLOGY SCREEN, URINE, RANDOM (COMPLIANCE)   URINALYSIS   POCT GLUCOSE   ISTAT PROCEDURE   ISTAT CREATININE   POCT GLUCOSE MONITORING CONTINUOUS     EKG Readings: (Independently Interpreted)   NSR at rate of 72 bpm. No ischemic changes.        X-Rays:   Independently Interpreted Readings:   Head CT: No acute stroke.      Medical Decision Making:   History:   Old Medical Records: I decided to obtain old medical records.  Independently Interpreted Test(s):   I have ordered and independently interpreted X-rays - see prior notes.  I have ordered and independently interpreted EKG Reading(s) - see prior notes  Clinical Tests:   Lab Tests: Ordered and Reviewed  Radiological Study: Ordered and Reviewed  Medical Tests: Ordered and Reviewed  Other:   I have discussed this case with another health care provider.       APC / Resident Notes:   HOIV MDM:  62 year old F with sig Pmhx of CVA as documented above, HTN, DM who presents with new Left arm weakness and significant dysmetria concerning for CVA. Stroke activated from triage.  Concern: CVA/TIA, ACS equivalent, arrhythmia, peripheral neuropathy, central lesion, Hemorrhagic stroke, HTN urgency vs emergency.    Plan: Stroke activation at this time. Stat CT of the head. We have discussed the case with stroke coordinator and will initiate CT-A multiphase. Initiated stroke labs as well.   Paxton Zamora MD  U  "EM PGY-IV  8:29 PM       Scribe Attestation:   Scribe #1: I performed the above scribed service and the documentation accurately describes the services I performed. I attest to the accuracy of the note.    Attending Attestation:   Physician Attestation Statement for Resident:  As the supervising MD   Physician Attestation Statement: I have personally seen and examined this patient.   I agree with the above history. -: Pt presents with 4.5 hours of her right arm being "in limbo."   As the supervising MD I agree with the above PE.   -: On exam, pt has good strength but has difficulty with coordination of her right arm. Her mental status is clear.    As the supervising MD I agree with the above treatment, course, plan, and disposition.   -: Head CT shows no acute stroke. Emergent consult to Neurology was made.     8:44 PM  Discussed with Neurology.   I have reviewed and agree with the residents interpretation of the following: lab data, x-rays, CT scans and EKG.  I have reviewed the following: old records at this facility.        Attending Critical Care:   Critical Care Times:   Direct Patient Care (initial evaluation, reassessments, and time considering the case)................................................................18 minutes.   Additional History from reviewing old medical records or taking additional history from the family, EMS, PCP, etc.......................4 minutes.   Ordering, Reviewing, and Interpreting Diagnostic Studies...............................................................................................................4 minutes.   Documentation..................................................................................................................................................................................4 minutes.   Consultation with other Physicians. " .................................................................................................................................................5 minutes.   ==============================================================  · Total Critical Care Time - exclusive of procedural time: 35 minutes.  ==============================================================              ED Course      Clinical Impression:   Diagnoses of Episode of transient neurologic symptoms and Acute ischemic left PCA stroke were pertinent to this visit.            I, Dr. Matthew Kemp, personally performed the services described in this documentation. All medical record entries made by the scribe were at my direction and in my presence.  I have reviewed the chart and agree that the record reflects my personal performance and is accurate and complete. Matthew Kemp MD.  9:04 AM 11/24/2017               Paxton Zamora MD  Resident  11/24/17 0655       Matthew Kemp MD  11/24/17 0904

## 2017-11-24 NOTE — PROGRESS NOTES
[facility]  Vascular Neurology  Comprehensive Stroke Center  Progress Note    Assessment/Plan:     * Embolic stroke involving left middle cerebral artery    Autumn Smart is a 62 y.o. female who presents with episode of R arm weakness and numbness that resolved prior to arrival. MRI confirmed L MCA infarct, etiology likely ESUS. Plans for discharge home today after ILR implantation.    Antithrombotics: DAPT  Statins: continue home atorvastatin 40, LDL <70  Risk factor modifications: HTN, DM, HLD  Therapy: PT/OT, SLP, and PM&R  Diagnostics: echo, A1C  VTE prophylaxis: heparin sq          Cytotoxic cerebral edema    Evident on imaging        Essential hypertension    Stroke risk factor  SBP <180  Labetalol PRN        Type 2 diabetes mellitus without complication    Stroke risk factor  A1C pending          HLD (hyperlipidemia)    Stroke risk factor  LDL <70  Continue atorvastatin 40             11/24 MRI confirmed L MCA infarct, echo pending, likely discharge today with no needs, plans for ILR    STROKE DOCUMENTATION        NIH Scale:  1a. Level Of Consciousness: 0-->Alert: keenly responsive  1b. LOC Questions: 1-->Answers one question correctly  1c. LOC Commands: 0-->Performs both tasks correctly  2. Best Gaze: 0-->Normal  3. Visual: 1-->Partial hemianopia  4. Facial Palsy: 0-->Normal symmetrical movements  5a. Motor Arm, Left: 0-->No drift: limb holds 90 (or 45) degrees for full 10 secs  5b. Motor Arm, Right: 1-->Drift: limb holds 90 (or 45) degrees, but drifts down before full 10 secs: does not hit bed or other support  6a. Motor Leg, Left: 0-->No drift: leg holds 30 degree position for full 5 secs  6b. Motor Leg, Right: 0-->No drift: leg holds 30 degree position for full 5 secs  7. Limb Ataxia: 0-->Absent  8. Sensory: 0-->Normal: no sensory loss  9. Best Language: 0-->No aphasia: normal  10. Dysarthria: 0-->Normal  11. Extinction and Inattention (formerly Neglect): 0-->No abnormality  Total  (NIH Stroke Scale): 3       Hemorrhagic change of an Ischemic Stroke: Does this patient have an ischemic stroke with hemorrhagic changes? No     Neurologic Chief Complaint: L MCA    Subjective:     Interval History: Patient is seen for follow-up neurological assessment and treatment recommendations: LEON, patient intermittently wanting to leave [place]    HPI, Past Medical, Family, and Social History remains the same as documented in the initial encounter.     Review of Systems   Constitutional: Negative for chills and fever.   Respiratory: Negative for cough.    Cardiovascular: Negative for chest pain.   Gastrointestinal: Negative for vomiting.   Neurological: Negative for speech difficulty and weakness.   Psychiatric/Behavioral: The patient is nervous/anxious.      Scheduled Meds:   aspirin  81 mg Oral Daily    atorvastatin  40 mg Oral Daily    citalopram  40 mg Oral Daily    clopidogrel  75 mg Oral Daily    heparin (porcine)  5,000 Units Subcutaneous Q8H    sodium chloride 0.9%  3 mL Intravenous Q8H     Continuous Infusions:   sodium chloride 0.9%       PRN Meds:dextrose 50%, dextrose 50%, glucagon (human recombinant), glucose, glucose, insulin aspart, labetalol, ondansetron, ondansetron, sodium chloride 0.9%    Objective:     Vital Signs (Most Recent):  Temp: 98.4 °F (36.9 °C) (11/24/17 0745)  Pulse: 69 (11/24/17 0745)  Resp: 18 (11/24/17 0745)  BP: (!) 148/93 (11/24/17 0745)  SpO2: 99 % (11/24/17 0745)  BP Location: Left arm    Vital Signs Range (Last 24H):  Temp:  [97.8 °F (36.6 °C)-98.8 °F (37.1 °C)]   Pulse:  [64-80]   Resp:  [18]   BP: (122-180)/(67-96)   SpO2:  [98 %-100 %]   BP Location: Left arm    Physical Exam   Constitutional: She appears well-developed and well-nourished. No distress.   HENT:   Head: Normocephalic and atraumatic.   Eyes: EOM are normal. Pupils are equal, round, and reactive to light.   Cardiovascular: Normal rate.    Pulmonary/Chest: Effort normal. No respiratory distress.    Neurological: She is alert.   Skin: Skin is warm and dry.       Neurological Exam:   LOC: alert  Attention Span: Good   Language: No aphasia  Articulation: No dysarthria  Orientation: Person, Place, Time   Visual Fields: Superior quadrantanopsia: right  EOM (CN III, IV, VI): Full/intact  Pupils (CN II, III): PERRL  Facial Sensation (CN V): Normal  Facial Movement (CN VII): Symmetric facial expression    Motor: Arm left Normal (5/5)  Leg left Normal (5/5)  Arm right Paretic (1-4/5)  Leg right Normal (5/5)  Cebellar: No evidence of appendicular or axial ataxia  Sensation: Intact to light touch, temperature and vibration  Tone: Normal tone throughout    Laboratory:  CMP:   Recent Labs  Lab 11/24/17 0440   CALCIUM 9.2   ALBUMIN 3.2*   PROT 6.3      K 3.2*   CO2 28      BUN 8   CREATININE 0.8   ALKPHOS 54*   ALT 11   AST 14   BILITOT 0.3     CBC:   Recent Labs  Lab 11/24/17 0440   WBC 5.21   RBC 3.96*   HGB 10.3*   HCT 32.3*      MCV 82   MCH 26.0*   MCHC 31.9*     Lipid Panel:   Recent Labs  Lab 11/23/17 2025   CHOL 132   LDLCALC 65.2   HDL 44   TRIG 114     Coagulation:   Recent Labs  Lab 11/24/17 0440   INR 1.0   APTT 22.1     Platelet Aggregation Study: No results for input(s): PLTAGG, PLTAGINTERP, PLTAGREGLACO, ADPPLTAGGREG in the last 168 hours.  Hgb A1C: No results for input(s): HGBA1C in the last 168 hours.  TSH:   Recent Labs  Lab 11/23/17 2025   TSH 0.333*       Diagnostic Results     Brain Imaging   CT Head. Date: 11/23/12  No evidence of acute major vascular distribution infarct or intracranial hemorrhage.     Stable, remote infarcts of the left shantanu and left occipital lobe.      MRI. Date: 11/23/17  Acute infarct in the left posterior MCA and superior PCA territory. No evidence of hemorrhagic conversion.    Remote infarcts in the left shantanu and left occipital lobe.    Vessel Imaging   CTA Multiphase. Date: 11/23/17  Suspect new occlusion or high grade stenosis involving the A2  segment of the right anterior cerebral artery.    Focal narrowing of the M2 segment of the left MCA, more conspicuous but grossly unchanged when compared with the prior CTA in 2016.    Stable occlusion or high-grade stenosis involving the P2 segment of the left posterior cerebral artery.    No evidence of aneurysm or vascular malformation.    Cardiac Imaging   NSR at 72 BPM    Echo pending            Parisa Villela PA-C  [facility]  Department of Vascular Neurology   [facility]

## 2017-11-24 NOTE — ED TRIAGE NOTES
"Autumn Smart, a 62 y.o. female presents to the ED bed 3      Chief Complaint   Patient presents with    Extremity Weakness     right arm numbness and weakness began at 4pm today - hx of cva with no residual deficits - charge rn notifed.      Pt states she was sitting in her car and started feeling "bad" c/o lightheadedness, right arm weakness, and numbness. Pt states she is feeling better than before and numbness is getting better and she "can feel it" now.    Review of patient's allergies indicates:  No Known Allergies  Past Medical History:   Diagnosis Date    Back pain     CVA (cerebral vascular accident)     Diabetes mellitus     Hypertension      "

## 2017-11-24 NOTE — HPI
"Autumn Smart is a 62 y.o. female with PMHx of HTN, HLD, prior stroke (no deficits) and DM who presented to ED with complaint of "not feeling right." Patient states since 3:30 pm she was having BL UE weakness. Her RUE felt more weak than her LUE. Patient called her friend and told him that she couldn't feel or move her R arm. She drove to him with 1 arm. Patient denies leg weakness.   Upon arrival, patient's symptoms had resolved. Patient and her friend state that she came to ED because she thought she may be having another stroke. She denies numbness, vision changes, and speech difficulty.       Of note, according to friend, patient smokes marijuana and drinks beer occasionally. Friend states patient is under a lot of stress. Patient has lost a child in the past and her other child is paralyzed.  "

## 2017-11-24 NOTE — CONSULTS
Inpatient consult to Physical Medicine Rehab  Consult performed by: ABDIRIZAK GORDON  Consult ordered by: OSCAR JAMESON  Reason for consult: assess rehab needs        Reviewed patient history and current admission.  Rehab team following.  Full consult to follow.    CARLOS Guajardo, FNP-C  Physical Medicine & Rehabilitation   11/24/2017  Spectralink: [id number]

## 2017-11-24 NOTE — PLAN OF CARE
Maria Esther Bob MD     Extended Emergency Contact Information  Primary Emergency Contact: [place] [address] [place]  Home Phone: [phone]  Mobile Phone: [phone]  Relation: Friend     Payor: BLUE CROSS BLUE SHIELD / Plan: BCBS ALL OUT OF STATE / Product Type: PPO /         11/24/17 1139   Discharge Assessment   Assessment Type Discharge Planning Assessment   Confirmed/corrected address and phone number on facesheet? Yes   Assessment information obtained from? Patient   Expected Length of Stay (days) 3   Communicated expected length of stay with patient/caregiver yes   Prior to hospitilization cognitive status: Alert/Oriented   Prior to hospitalization functional status: Independent   Current cognitive status: Alert/Oriented   Current Functional Status: Independent   Lives With alone   Able to Return to Prior Arrangements yes   Is patient able to care for self after discharge? Yes   Patient's perception of discharge disposition home or selfcare   Readmission Within The Last 30 Days no previous admission in last 30 days   Patient currently being followed by outpatient case management? No   Patient currently receives any other outside agency services? No   Equipment Currently Used at Home none   Do you have any problems affording any of your prescribed medications? No   Is the patient taking medications as prescribed? yes   Does the patient have transportation home? Yes   Transportation Available family or friend will provide   Does the patient receive services at the [facility]? No   Discharge Plan A Home   Discharge Plan B Home;Home Health   Patient/Family In Agreement With Plan yes   Readmission Questionnaire   Have you felt down, depressed, or hopeless? 0   Have you felt little interest or pleasure in doing things? 0

## 2017-11-24 NOTE — SUBJECTIVE & OBJECTIVE
Past Medical History:   Diagnosis Date    Back pain     CVA (cerebral vascular accident)     Diabetes mellitus     Hypertension      Past Surgical History:   Procedure Laterality Date    BACK SURGERY      FRACTURE SURGERY       No family history on file.  Social History   Substance Use Topics    Smoking status: Never Smoker    Smokeless tobacco: Never Used    Alcohol use 0.6 oz/week     1 Cans of beer per week     Review of patient's allergies indicates:  No Known Allergies    Medications: I have reviewed the current medication administration record.      (Not in a hospital admission)    Review of Systems   Constitutional: Negative for chills and fever.   Eyes: Negative for visual disturbance.   Respiratory: Negative for cough.    Cardiovascular: Negative for chest pain.   Gastrointestinal: Negative for nausea and vomiting.   Skin: Negative for rash.   Neurological: Positive for weakness and numbness. Negative for facial asymmetry, speech difficulty and headaches.   Psychiatric/Behavioral: Negative for agitation. The patient is nervous/anxious.      Objective:     Vital Signs (Most Recent):  Temp: 97.8 °F (36.6 °C) (11/23/17 2000)  Pulse: 72 (11/23/17 2030)  Resp: 18 (11/23/17 2000)  BP: (!) 144/90 (11/23/17 2000)  SpO2: 100 % (11/23/17 2030)    Vital Signs Range (Last 24H):  Temp:  [97.8 °F (36.6 °C)]   Pulse:  [72-80]   Resp:  [18]   BP: (144)/(90)   SpO2:  [98 %-100 %]     Physical Exam   Constitutional: She appears well-developed and well-nourished. No distress.   HENT:   Head: Normocephalic and atraumatic.   Eyes: EOM are normal. Pupils are equal, round, and reactive to light.   Pulmonary/Chest: Effort normal.   Musculoskeletal: Normal range of motion.   Neurological: She is alert.   Skin: Skin is warm and dry.   Psychiatric: Her mood appears anxious. Her speech is rapid and/or pressured.   Vitals reviewed.      Neurological Exam:   LOC: alert  Attention Span: Good   Language: No  aphasia  Articulation: No dysarthria  Orientation: Person, Place, Time   Visual Fields: Full  EOM (CN III, IV, VI): Full/intact  Pupils (CN II, III): PERRL  Facial Sensation (CN V): Normal  Facial Movement (CN VII): Symmetric facial expression    Motor: Arm left Normal (5/5)  Leg left Normal (5/5)  Arm right Normal (5/5)  Leg right Normal (5/5)  Cebellar: No evidence of appendicular or axial ataxia  Sensation: Intact to light touch, temperature and vibration  Tone: Normal tone throughout      Laboratory:  CMP: No results for input(s): GLUCOSE, CALCIUM, ALBUMIN, PROT, NA, K, CO2, CL, BUN, CREATININE, ALKPHOS, ALT, AST, BILITOT in the last 24 hours.  CBC:   Recent Labs  Lab 11/23/17 2025   WBC 7.50   RBC 4.34   HGB 11.3*   HCT 35.4*      MCV 82   MCH 26.0*   MCHC 31.9*     Lipid Panel: No results for input(s): CHOL, LDLCALC, HDL, TRIG in the last 168 hours.  Coagulation:   Recent Labs  Lab 11/23/17 2025   INR 1.0     Hgb A1C: No results for input(s): HGBA1C in the last 168 hours.  TSH: No results for input(s): TSH in the last 168 hours.    Diagnostic Results:      Brain imaging:  CT Head. Date: 11/23/17  No evidence of acute major vascular distribution infarct or intracranial hemorrhage.     Stable, remote infarcts of the left shantanu and left occipital lobe.    Vessel Imaging:  CTA Multiphase. Date: 11/23/17    Cardiac Evaluation:   NSR at 72 BPM

## 2017-11-24 NOTE — HPI
"   62 y.o. female with PMHx of HTN, HLD, prior stroke (no deficits) and DM who presented to ED with complaint of "not feeling right" while at the [place]. Patient states she had RUE weakness. Her RUE felt more weak than her LUE.     MRI Brain 11/23/2017  Acute infarct in the left posterior MCA and superior PCA territory. No evidence of hemorrhagic conversion.    Remote infarcts in the left shantanu and left occipital lobe.    CTA 11/23/2017  Suspect new occlusion or high grade stenosis involving the A2 segment of the right anterior cerebral artery.    Focal narrowing of the M2 segment of the left MCA, more conspicuous but grossly unchanged when compared with the prior CTA in 2016.    Stable occlusion or high-grade stenosis involving the P2 segment of the left posterior cerebral artery.    No evidence of aneurysm or vascular malformation.    05/2017 TTE:   1 - Normal left ventricular systolic function (EF 60-65%).     2 - Impaired LV relaxation, normal LAP (grade 1 diastolic dysfunction).     3 - Normal right ventricular systolic function .     4 - The estimated PA systolic pressure is 13 mmHg.   Negative Bubble study    EKG: NSR   "

## 2017-11-24 NOTE — HOSPITAL COURSE
"Autumn Smart is a 62 y.o. female with PMHx of HTN, HLD, prior stroke (no deficits) and DM who presented to ED with complaint of "not feeling right." and unable to move or feel her RUE. Her symptoms resolved prior to arrival. She was admitted to stroke service for TIA workup. CTA revealed new occlusion or high grade stenosis of R A2, L M2 narrowing, and occlusion vs high grade stenosis of L P2. There was not evidence of large vessel occluison. MRI revealed L MCA distribution stroke. Echo revealed EF of 60-65% with diastolic dysfunction and mild LA enlargement. Etiology currently ESUS. Patient had loop recorder place prior to discharge.     At discharge, patient had RUE drift and R superior quadranopsia.  She was evaluated by PT/OT and speech who recommended discharge home with no therapy needs. For secondary stroke prevention, patient will be discharged on DAPT and atorvastatin 40. She will continue all other home medications. Her Hemoglobin A1C was 7.8 on metformin 1000 mg daily. Patient was instructed to follow up with her PCP for further management of her diabetes. Patient admits to smoking marijuana and was counseled on cessation. She noticed that she was having difficulty writing with her R hand. She was discharged home with outpatient therapy orders for difficulty writing with R hand. She will follow up in stroke clinic in 4-6 week and with her PCP in 1 week.        "

## 2017-11-24 NOTE — HOSPITAL COURSE
11/24/17: Evaluated by therapy.  Bed mobility (I).  Sit to stand (I) and transfers (I).  UBD and LBD (I). Ambulated 200 ft (I) and ascend/descend 6 steps (I).

## 2017-11-24 NOTE — PROGRESS NOTES
Patient identified by 2 identifiers.   Allergies reviewed.  18 g IV in place to Lt AC.  Bubble study explained to patient, patient verbalized understanding.  Bubble performed X 2, with & without Valsalva.  Pt tolerated procedure well.  IV flushed post bubble..

## 2017-11-24 NOTE — ASSESSMENT & PLAN NOTE
Stroke risk factor  A1C 7.8  Recommend follow up with PCP for further management  Continue home metformin

## 2017-11-24 NOTE — PROCEDURES
Procedures     S/p ILR without complications. Follow up in device clinic in 2 weeks, EP clinic in 3 months.

## 2017-11-24 NOTE — CONSULTS
Food & Nutrition  Education    Consult: Stroke Pathway  Diet Education: Low Sodium   Time Spent: 15 min   Learners: Patient       Nutrition Education provided with handouts: Yes      Comments: Educated patient on low sodium diet including, reading food labels and being mindful when eating away from home. Receptive to information and accepted handout. All questions and concerns answered. Dietitian's contact information provided.       Follow-Up: Please re-consult as needed.         Thanks!

## 2017-11-24 NOTE — PT/OT/SLP EVAL
Speech Language Pathology Evaluation/discharge    Autumn Samrt   MRN: [id number]   Admitting Diagnosis: Embolic stroke involving left middle cerebral artery    Diet recommendations: Solid Diet Level: Regular  Liquid Diet Level: Thin     SLP Treatment Date: 11/24/17  Speech Start Time: 0810     Speech Stop Time: 0827     Speech Total (min): 17 min       TREATMENT BILLABLE MINUTES:  Eval 9  and Eval Swallow and Oral Function 8    Diagnosis: Embolic stroke involving left middle cerebral artery      Past Medical History:   Diagnosis Date    Back pain     CVA (cerebral vascular accident)     Diabetes mellitus     Embolic stroke involving left middle cerebral artery 11/23/2017    Hypertension      Past Surgical History:   Procedure Laterality Date    BACK SURGERY      FRACTURE SURGERY         Has the patient been evaluated by SLP for swallowing? : Yes  Keep patient NPO?: No   General Precautions: Standard,            Social Hx: Patient lives with self    Prior diet: regular    Subjective:  Patient goals: go home    Pain/Comfort  Pain Rating 1: 0/10  Pain Rating Post-Intervention 1: 0/10    Objective:        Oral Musculature Evaluation  Oral Musculature: WNL  Dentition: upper dentures  Mucosal Quality: good  Mandibular Strength and Mobility: WFL  Oral Labial Strength and Mobility: WFL  Lingual Strength and Mobility: WFL  Velar Elevation: WFL  Buccal Strength and Mobility: WFL  Volitional Cough: strong  Volitional Swallow: no delay  Voice Prior to PO Intake: wfl     Cognitive Status:  Behavioral Observations: alert and appropriate-  Memory and Orientation: Pt. was oriented x3 with good recall of recent and remote temporal and general information.  Immediate/delayed verbal recall was wfl with pt. Repeating 7 digits and 5 words without difficulty.      Attention: wfl  Problem Solving: Responses to hypothetical verbal problem solving tasks were accurate and complete.  Pt. Compared and contrasted objects and generated  multiple solutions to problems.  Thought organization and categorization skills were wfl with pt. Naming 15 animals given one minute when 15-20 are wnl.    Pragmatics: wfl  Executive Function: wfl        Auditory Comprehension: Pt. Responded to complex yes/no questions and multi step commands with 100% accuracy and no delays in responding.        Verbal Expression: Verbal language skills were wfl with no evidence of aphasia.  Pt. Expressed their thoughts coherently in conversation with no evidence of confusion or word finding deficits      Motor Speech: wnl    Voice: wnl    Reading: wfl    Writing: wfl    Bedside Swallow Eval:  Consistencies Assessed: one cup of water and one cracker self feed  Oral Phase: WNL  Pharyngeal Phase: no overt clinical  signs/symptoms of aspiration and no overt clinical signs/symptoms of pharyngeal dysphagia        Assessment:  Autumn Smart is a 62 y.o. female with speech language and cognitive skills wfl    Do you have any cultural, spiritual, Shinto conflicts, given your current situation?: no    Discharge recommendations: Discharge Facility/Level Of Care Needs: home     Goals:    SLP Goals        Problem: SLP Goal    Goal Priority Disciplines Outcome   SLP Goal     SLP Ongoing (interventions implemented as appropriate)                    Plan:   Patient to be seen     Plan of Care expires:    Plan of Care reviewed with: patient  SLP Follow-up?: No              Mary Gallardo MA, CCC-SLP  11/24/2017

## 2017-11-24 NOTE — DISCHARGE SUMMARY
"[facility]  Vascular Neurology  Comprehensive Stroke Center  Discharge Summary     Summary:     Admit Date: 11/23/2017  8:03 PM    Discharge Date and Time:  11/24/2017 5:55 PM    Attending Physician: Jean Parks MD     Discharge Provider: Parisa Villela PA-C    History of Present Illness: Autumn Smart is a 62 y.o. female with PMHx of HTN, HLD, prior stroke (no deficits) and DM who presented to ED with complaint of "not feeling right." Patient states since 3:30 pm she was having BL UE weakness. Her RUE felt more weak than her LUE. Patient called her friend and told him that she couldn't feel or move her R arm. She drove to him with 1 arm. Patient denies leg weakness.   Upon arrival, patient's symptoms had resolved. Patient and her friend state that she came to ED because she thought she may be having another stroke. She denies numbness, vision changes, and speech difficulty.       Of note, according to friend, patient smokes marijuana and drinks beer occasionally. Friend states patient is under a lot of stress. Patient has lost a child in the past and her other child is paralyzed.    Hospital Course (synopsis of major diagnoses, care, treatment, and services provided during the course of the hospital stay): Autumn Smart is a 62 y.o. female with PMHx of HTN, HLD, prior stroke (no deficits) and DM who presented to ED with complaint of "not feeling right." and unable to move or feel her RUE. Her symptoms resolved prior to arrival. She was admitted to stroke service for TIA workup. CTA revealed new occlusion or high grade stenosis of R A2, L M2 narrowing, and occlusion vs high grade stenosis of L P2. There was not evidence of large vessel occluison. MRI revealed L MCA distribution stroke. Echo revealed EF of 60-65% with diastolic dysfunction and mild LA enlargement. Etiology currently ESUS. Patient had loop recorder place prior to discharge.     At discharge, patient had RUE drift and R " superior quadranopsia.  She was evaluated by PT/OT and speech who recommended discharge home with no therapy needs. For secondary stroke prevention, patient will be discharged on DAPT and atorvastatin 40. She will continue all other home medications. Her Hemoglobin A1C was 7.8 on metformin 1000 mg daily. Patient was instructed to follow up with her PCP for further management of her diabetes. Patient admits to smoking marijuana and was counseled on cessation. She noticed that she was having difficulty writing with her R hand. She was discharged home with outpatient therapy orders for difficulty writing with R hand. She will follow up in stroke clinic in 4-6 week and with her PCP in 1 week.          STROKE DOCUMENTATION         NIH Scale:  Interval: 7 days or at discharge (whichever comes first)  1a. Level Of Consciousness: 0-->Alert: keenly responsive  1b. LOC Questions: 0-->Answers both questions correctly  1c. LOC Commands: 0-->Performs both tasks correctly  2. Best Gaze: 0-->Normal  3. Visual: 1-->Partial hemianopia  4. Facial Palsy: 0-->Normal symmetrical movements  5a. Motor Arm, Left: 0-->No drift: limb holds 90 (or 45) degrees for full 10 secs  5b. Motor Arm, Right: 1-->Drift: limb holds 90 (or 45) degrees, but drifts down before full 10 secs: does not hit bed or other support  6a. Motor Leg, Left: 0-->No drift: leg holds 30 degree position for full 5 secs  6b. Motor Leg, Right: 0-->No drift: leg holds 30 degree position for full 5 secs  7. Limb Ataxia: 0-->Absent  8. Sensory: 0-->Normal: no sensory loss  9. Best Language: 0-->No aphasia: normal  10. Dysarthria: 0-->Normal  11. Extinction and Inattention (formerly Neglect): 0-->No abnormality  Total (NIH Stroke Scale): 2        Modified Tom Green Score: 1  Joe Coma Scale:    ABCD2 Score:    [id number] Score:   HAS -BLED Score:   ICH Score:   Hunt & Resendiz Classification:       Assessment/Plan:     Diagnostic Results:    Brain Imaging   CT Head. Date:  11/23/12  No evidence of acute major vascular distribution infarct or intracranial hemorrhage.     Stable, remote infarcts of the left shantanu and left occipital lobe.        MRI. Date: 11/23/17  Acute infarct in the left posterior MCA and superior PCA territory. No evidence of hemorrhagic conversion.    Remote infarcts in the left shantanu and left occipital lobe.     Vessel Imaging   CTA Multiphase. Date: 11/23/17  Suspect new occlusion or high grade stenosis involving the A2 segment of the right anterior cerebral artery.    Focal narrowing of the M2 segment of the left MCA, more conspicuous but grossly unchanged when compared with the prior CTA in 2016.    Stable occlusion or high-grade stenosis involving the P2 segment of the left posterior cerebral artery.    No evidence of aneurysm or vascular malformation.     Cardiac Imaging   NSR at 72 BPM     Echo with bubble. Date: 11/24/17    1 - Normal left ventricular systolic function (EF 60-65%).     2 - No wall motion abnormalities.     3 - Mild left atrial enlargement.     4 - Impaired LV relaxation, elevated LAP (grade 2 diastolic dysfunction).     5 - Normal right ventricular systolic function .     6 - The estimated PA systolic pressure is 36 mmHg.     7 - Mild tricuspid regurgitation.       Interventions: None    Complications: None    Disposition: Home or Self Care    Final Active Diagnoses:    Diagnosis Date Noted POA    PRINCIPAL PROBLEM:  Embolic stroke involving left middle cerebral artery [I63.412] 11/23/2017 Yes    Type 2 diabetes mellitus without complication [E11.9] 11/23/2017 Unknown    Essential hypertension [I10] 11/23/2017 Unknown    Cytotoxic cerebral edema [G93.6] 11/23/2017 Yes    HLD (hyperlipidemia) [E78.5] 05/21/2016 Yes     Chronic      Problems Resolved During this Admission:    Diagnosis Date Noted Date Resolved POA     * Embolic stroke involving left middle cerebral artery    Autumn Smart is a 62 y.o. female who presents with episode of R  arm weakness and numbness that resolved prior to arrival. MRI confirmed L MCA infarct, etiology likely ESUS. Echo with LAE and diastolic dysfunction. Etiology likely ESUS.    Antithrombotics: DAPT  Statins: continue home atorvastatin 40, LDL <70  Risk factor modifications: HTN, DM, HLD  Therapy: PT/OT, SLP, and PM&R  Diagnostics: none  VTE prophylaxis: heparin sq          Type 2 diabetes mellitus without complication    Stroke risk factor  A1C 7.8  Recommend follow up with PCP for further management  Continue home metformin            Recommendations:     Post-discharge complication risks: None    Stroke Education given to: patient    Follow-up in Stroke Clinic in 4-6 weeks.     Discharge Plan:  Antithrombotics: Aspirin 81mg, Clopidogrel 75mg  Statin: Atorvastatin 40mg  Aggresive risk factor modification:  Hypertension  Diabetes  High Cholesterol  Drug abuse (marijuana)    Follow Up:  Follow-up Information     [facility] VASCULAR NEUROLOGY In 4 weeks.    Specialty:  Vascular Neurology  Why:  stroke follow up appointment, someone will contact you to schedule your appointment  Contact information:  [address]  [facility] [id number]  [phone]                 Patient Instructions:     Diet Diabetic 1800 Calories     Call 911 for any of the following:   Order Comments: Call 911  right away if any of the following warning signs come on suddenly, even if the symptoms only last for a few minutes. With stroke, timing is very important.   - Warning Signs of Stroke:  - Weakness: You may feel a sudden weakness, tingling or loss of feeling on one side of your face or body.  - Vision Problems: You may have sudden double vision or trouble seeing in one or both eyes.  - Speech Problems: You may have sudden trouble talking, slured speech, or problems understanding others.  - Headache: You may have sudden, severe headache.  - Movement Problems: You may experience dizziness, a feeling of spinning, a loss of balance, a  feeling of falling or blackouts.     Activity as tolerated         Medications:  Reconciled Home Medications:   Current Discharge Medication List      START taking these medications    Details   clopidogrel (PLAVIX) 75 mg tablet Take 1 tablet (75 mg total) by mouth once daily.  Qty: 30 tablet, Refills: 1         CONTINUE these medications which have NOT CHANGED    Details   aspirin 81 MG Chew Take 1 tablet (81 mg total) by mouth once daily.  Refills: 0      atorvastatin (LIPITOR) 40 MG tablet Take 1 tablet (40 mg total) by mouth once daily.  Qty: 30 tablet, Refills: 1      citalopram (CELEXA) 40 MG tablet Take 1 tablet (40 mg total) by mouth once daily.  Qty: 30 tablet, Refills: 2      hydroCHLOROthiazide (HYDRODIURIL) 25 MG tablet Take 25 mg by mouth once daily.      metformin (GLUCOPHAGE) 1000 MG tablet Take 1 tablet (1,000 mg total) by mouth 2 (two) times daily with meals.  Qty: 60 tablet, Refills: 3      oxyCODONE (ROXICODONE) 30 MG Tab Take 5 mg by mouth every 8 (eight) hours.      QUEtiapine (SEROQUEL XR) 400 MG Tb24 Take by mouth every evening.      clotrimazole-betamethasone 1-0.05% (LOTRISONE) cream Apply topically 2 (two) times daily.  Qty: 45 g, Refills: 0    Associated Diagnoses: Acute vaginitis      fluticasone (FLONASE) 50 mcg/actuation nasal spray 1 spray by Each Nare route once daily.  Refills: 0      lisinopril (PRINIVIL,ZESTRIL) 20 MG tablet Take 1 tablet (20 mg total) by mouth once daily.  Qty: 30 tablet, Refills: 0    Associated Diagnoses: Essential hypertension                     Parisa Villela PA-C  Comprehensive Stroke Center  Department of Vascular Neurology   [facility]

## 2017-11-24 NOTE — PT/OT/SLP EVAL
"Occupational Therapy  Evaluation/Discharge Summary    Autumn Smart   MRN: [id number]   Admitting Diagnosis: Acute ischemic left PCA stroke    OT Date of Treatment: 11/24/17   OT Start Time: 0548  OT Stop Time: 0608  OT Total Time (min): 20 min    Billable Minutes:  Evaluation 20    Diagnosis: Acute ischemic left PCA stroke     Past Medical History:   Diagnosis Date    Back pain     CVA (cerebral vascular accident)     Diabetes mellitus     Hypertension       Past Surgical History:   Procedure Laterality Date    BACK SURGERY      FRACTURE SURGERY         Referring physician: Anastacia  Date referred to OT: 11/23  General Precautions: Standard, aspiration, fall  Orthopedic Precautions: N/A  Braces: N/A    Do you have any cultural, spiritual, Nondenominational conflicts, given your current situation?: Rastafari     Patient History:  Prior level of function:   Per patient:  Patient resides alone in [place] in one story home with 3 steps to enter, bilateral rail.  PTA patient independent with ADLs including driving.  Retired: .  Hobbies: TV.  Roles/Responsibilities:  mother, grandmother, cooking. grocery shopping.  Patient owns a cane but does not use it.     Subjective:  Communicated with nurse prior to session.  Patient:  "I am ready to leave."  "My right arm got weak and I felt bad; that's why I came here."  Pain/Comfort  Pain Rating 1: 0/10  Pain Rating Post-Intervention 1: 0/10    Objective:  Patient found with: peripheral IV  Encouragement required for participation    Cognitive Exam:  Oriented to: Person, Place, Time and Situation  Follows Commands/attention: Follows one-step commands  Communication: clear/fluent  Memory:  No Deficits noted  Safety awareness/insight to disability: intact  Coping skills/emotional control: Appropriate to situation    Visual/perceptual:  Intact    Physical Exam:  Postural examination/scapula alignment: Rounded shoulder  Skin integrity: Visible skin intact  Edema: None noted "     Sensation:   Intact    Upper Extremity Range of Motion:  Right Upper Extremity: WNL  Left Upper Extremity: WNL    Upper Extremity Strength:  Right Upper Extremity: WNL  Left Upper Extremity: WNL    Fine motor coordination:   Intact    Gross motor coordination: WFL    Functional Mobility:  Bed Mobility:  Rolling/Turning to Left: Independent  Rolling/Turning Right: Independent  Scooting/Bridging: Independent  Supine to Sit: Independent  Sit to Supine: Independent    Transfers:  Sit <> Stand Assistance: Independent  Sit <> Stand Assistive Device: No Assistive Device  Bed <> Chair Technique: Stand Pivot  Bed <> Chair Transfer Assistance: Independent    Activities of Daily Living:  UE Dressing Level of Assistance: Independent  LE Dressing Level of Assistance: Independent  Grooming Position: Standing  Grooming Level of Assistance: Independent  Toileting Where Assessed: Toilet  Toileting Level of Assistance: Independent     Additional Treatment:   Patient education provided for stroke warning signs, prevention guidelines and personal risk factors; however patient not participatory in education process.  Patient education provided on role of OT and need for no further OT upon discharge.  Patient alert and oriented x 3; able to follow 4/4 one step commands.  Patient attentive and interactive throughout the session.  Patient able to identify 3/3 body parts.  Able to name 5/5 objects.  Patient's functional status and disposition recommendation discussed with stroke team in daily rounds.  White board updated in patient's room.  OT asked if there were any other questions; patient/ family had no further questions.       AM-PAC 6 CLICK ADL  How much help from another person does this patient currently need?  1 = Unable, Total/Dependent Assistance  2 = A lot, Maximum/Moderate Assistance  3 = A little, Minimum/Contact Guard/Supervision  4 = None, Modified [place]/Independent    Putting on and taking off regular lower body  "clothing? : 4  Bathing (including washing, rinsing, drying)?: 4  Toileting, which includes using toilet, bedpan, or urinal? : 4  Putting on and taking off regular upper body clothing?: 4  Taking care of personal grooming such as brushing teeth?: 4  Eating meals?: 4  Total Score: 24    AM-PAC Raw Score CMS "G-Code Modifier Level of Impairment Assistance   6 % Total / Unable   7 - 9 CM 80 - 100% Maximal Assist   10-14 CL 60 - 80% Moderate Assist   15 - 19 CK 40 - 60% Moderate Assist   20 - 22 CJ 20 - 40% Minimal Assist   23 CI 1-20% SBA / CGA   24 CH 0% Independent/ Mod I       Patient left supine with all lines intact and call button in reach    Assessment:  Autumn Smart is a 62 y.o. female with a medical diagnosis of Acute ischemic left PCA stroke and presents without performance deficits of physical skills; cognitive skills or psychosocial skills.  Able to organize occupational performance in a timely and safe manner; demonstrating skills necessary to successfully and appropriately participate in everyday tasks and social situations.  No activity limitations noted. No further OT needed.     Pt evaluation falls under low complexity for evaluation coding due to performance deficits noted in 1-3 areas as stated above and 0 co-morbities affecting current functional status. Data obtained from problem focused assessments. No modifications or assistance was required for completion of evaluation. Only brief occupational profile and history review completed.    Rehab identified problem list/impairments: Rehab identified problem list/impairments:  (none)    Rehab potential is good.    Activity tolerance: Good    Discharge recommendations:   Home    Barriers to discharge: Barriers to Discharge: None    Equipment recommendations: none     GOALS:    Occupational Therapy Goals        Problem: Occupational Therapy Goal    Goal Priority Disciplines Outcome Interventions   Occupational Therapy Goal     OT, PT/OT   "   Description:  Goals not set 2* no further OT needed.                    PLAN: Discharge from OT services on acute.  Plan of Care reviewed with: patient    OT G-codes  Functional Assessment Tool Used: FIM  Score: 7  Functional Limitation: Self care  Self Care Current Status ():   Self Care Goal Status ():   Self Care Discharge Status (): NIKOS Underwood  11/24/2017

## 2017-11-27 ENCOUNTER — TELEPHONE (OUTPATIENT)
Dept: NEUROSURGERY | Facility: HOSPITAL | Age: 62
End: 2017-11-27

## 2017-11-27 NOTE — PLAN OF CARE
11/27/17 1008   Final Note   Assessment Type Final Discharge Note   Discharge Disposition Home   Hospital Follow Up  Appt(s) scheduled? (patient to schedule follow up)   Discharge plans and expectations educations in teach back method with documentation complete? Yes   Right Care Referral Info   Post Acute Recommendation No Care

## 2017-11-27 NOTE — TELEPHONE ENCOUNTER
"Attempted to contact patient via number on file.  No answer.  The following message was left for patient to return call "Hello.  This is a message for Autumn Smart.  My name is Greg and I am a nurse at [facility].  If you could give me call back at [phone] between the hours of 08:00 AM and 04:00 PM, Monday through Friday.  Thanks so much and have a great day."  Will try again later.   "

## 2017-11-28 ENCOUNTER — TELEPHONE (OUTPATIENT)
Dept: NEUROSURGERY | Facility: HOSPITAL | Age: 62
End: 2017-11-28

## 2017-11-28 NOTE — TELEPHONE ENCOUNTER
"Attempted to contact patient via number on file.  No answer.  Unable to leave message as "Mailbox is full."  Will try again later.     "

## 2017-11-29 ENCOUNTER — TELEPHONE (OUTPATIENT)
Dept: NEUROSURGERY | Facility: HOSPITAL | Age: 62
End: 2017-11-29

## 2017-11-29 DIAGNOSIS — Z95.818 STATUS POST PLACEMENT OF IMPLANTABLE LOOP RECORDER: ICD-10-CM

## 2017-11-29 DIAGNOSIS — I63.9 CRYPTOGENIC STROKE: Primary | ICD-10-CM

## 2017-11-29 NOTE — TELEPHONE ENCOUNTER
"Attempted to contact patient via number on file.  No answer.  Unable to leave message as "Mailbox is full".  Third unsuccessful attempt.   "

## 2017-12-05 ENCOUNTER — TELEPHONE (OUTPATIENT)
Dept: ELECTROPHYSIOLOGY | Facility: CLINIC | Age: 62
End: 2017-12-05

## 2018-01-08 ENCOUNTER — CLINICAL SUPPORT (OUTPATIENT)
Dept: ELECTROPHYSIOLOGY | Facility: CLINIC | Age: 63
End: 2018-01-08
Attending: INTERNAL MEDICINE
Payer: COMMERCIAL

## 2018-01-08 ENCOUNTER — TELEPHONE (OUTPATIENT)
Dept: ELECTROPHYSIOLOGY | Facility: CLINIC | Age: 63
End: 2018-01-08

## 2018-01-08 DIAGNOSIS — I63.9 CRYPTOGENIC STROKE: ICD-10-CM

## 2018-01-08 DIAGNOSIS — Z95.818 STATUS POST PLACEMENT OF IMPLANTABLE LOOP RECORDER: ICD-10-CM

## 2018-01-08 PROCEDURE — 93298 REM INTERROG DEV EVAL SCRMS: CPT | Mod: S$GLB,,, | Performed by: INTERNAL MEDICINE

## 2018-01-08 PROCEDURE — 93299 LOOP RECORDER REMOTE: CPT | Mod: S$GLB,,, | Performed by: INTERNAL MEDICINE

## 2018-01-08 NOTE — TELEPHONE ENCOUNTER
Patient contacted the [facility] on this morning with questions in related to her ILR and the remote monitor.  Patient was not sure what she was supposed to do.  Informed the patient she is to leave the remote monitor plugged in at all times in the same room where she sleeps.  Also went over how the monitor works as well as she is to carry the patient activator with her at all times and to use it when she may be feeling something that is not familiar to her.  Patient also spoke to the Technician who reiterated this to her as well.  Patient was informed should report be received with abnormal value someone would reach out to her.  Patient verbalized understanding to all of the above.

## 2018-01-10 ENCOUNTER — TELEPHONE (OUTPATIENT)
Dept: NEUROSURGERY | Facility: HOSPITAL | Age: 63
End: 2018-01-10

## 2018-01-10 NOTE — TELEPHONE ENCOUNTER
"Patient called [facility].  Spoke with patient.  Patient stated "I was discharged over Thanksgiving from [facility] and I need to see the doctor that I saw once I was there.  I was supposed to follow up in [facility] but it has been terrible and a bad experience.  My doctor wanted some tests done but I want to get all my care at [facility] since it was better there."  Informed patient that I would send message to clinic on her behalf to schedule a follow up appointment and at that point the Vascular Neurologist would order any new tests or procedures that they see fit.  Patient verbalized understanding.  Patient relayed no new questions or comments at this time.  Instructed to call [facility] with any further questions.  "

## 2018-01-11 ENCOUNTER — TELEPHONE (OUTPATIENT)
Dept: NEUROLOGY | Facility: CLINIC | Age: 63
End: 2018-01-11

## 2018-01-11 NOTE — TELEPHONE ENCOUNTER
----- Message from Khurram Billings RN sent at 1/10/2018  9:49 AM CST -----  Hey everyone,     I was wondering if you could schedule Mrs. Smart a follow up appointment with a stroke provider ASAP. She was discharged before Thanksgiving and she has had no follow up.  She called and stated that she needs to follow up with a stroke provider because there was some tests that needed to be followed up on? I didn't see any record of this in the chart but it has been past the time that she should have followed up.  If we could give her a call at the number on file once it's scheduled, it would be much appreciated.     Thanks so much for all you do for myself and our stroke patients,  SUSI ConnorN-RN

## 2018-01-21 ENCOUNTER — HOSPITAL ENCOUNTER (EMERGENCY)
Facility: HOSPITAL | Age: 63
Discharge: HOME | End: 2018-01-21
Attending: EMERGENCY MEDICINE
Payer: COMMERCIAL

## 2018-01-21 VITALS
OXYGEN SATURATION: 98 % | SYSTOLIC BLOOD PRESSURE: 181 MMHG | HEART RATE: 69 BPM | DIASTOLIC BLOOD PRESSURE: 68 MMHG | RESPIRATION RATE: 20 BRPM | TEMPERATURE: 99 F

## 2018-01-21 DIAGNOSIS — L73.9 ACUTE FOLLICULITIS: Primary | ICD-10-CM

## 2018-01-21 DIAGNOSIS — N76.4 ABSCESS OF LABIA MAJORA: ICD-10-CM

## 2018-01-21 LAB
ALBUMIN SERPL BCP-MCNC: 3.9 G/DL
ALP SERPL-CCNC: 66 U/L
ALT SERPL W/O P-5'-P-CCNC: 11 U/L
ANION GAP SERPL CALC-SCNC: 11 MMOL/L
AST SERPL-CCNC: 16 U/L
BACTERIA #/AREA URNS AUTO: ABNORMAL /HPF
BASOPHILS # BLD AUTO: 0.02 K/UL
BASOPHILS NFR BLD: 0.3 %
BILIRUB SERPL-MCNC: 0.6 MG/DL
BILIRUB UR QL STRIP: NEGATIVE
BUN SERPL-MCNC: 10 MG/DL
CALCIUM SERPL-MCNC: 9.9 MG/DL
CHLORIDE SERPL-SCNC: 98 MMOL/L
CLARITY UR REFRACT.AUTO: CLEAR
CO2 SERPL-SCNC: 31 MMOL/L
COLOR UR AUTO: YELLOW
CREAT SERPL-MCNC: 1 MG/DL
DIFFERENTIAL METHOD: ABNORMAL
EOSINOPHIL # BLD AUTO: 0 K/UL
EOSINOPHIL NFR BLD: 0.3 %
ERYTHROCYTE [DISTWIDTH] IN BLOOD BY AUTOMATED COUNT: 13.7 %
EST. GFR  (AFRICAN AMERICAN): >60 ML/MIN/1.73 M^2
EST. GFR  (NON AFRICAN AMERICAN): >60 ML/MIN/1.73 M^2
GLUCOSE SERPL-MCNC: 289 MG/DL
GLUCOSE UR QL STRIP: ABNORMAL
HCT VFR BLD AUTO: 35.8 %
HGB BLD-MCNC: 11.6 G/DL
HGB UR QL STRIP: NEGATIVE
IMM GRANULOCYTES # BLD AUTO: 0.01 K/UL
IMM GRANULOCYTES NFR BLD AUTO: 0.2 %
KETONES UR QL STRIP: ABNORMAL
LEUKOCYTE ESTERASE UR QL STRIP: ABNORMAL
LIPASE SERPL-CCNC: 22 U/L
LYMPHOCYTES # BLD AUTO: 2.3 K/UL
LYMPHOCYTES NFR BLD: 36.9 %
MCH RBC QN AUTO: 26.2 PG
MCHC RBC AUTO-ENTMCNC: 32.4 G/DL
MCV RBC AUTO: 81 FL
MICROSCOPIC COMMENT: ABNORMAL
MONOCYTES # BLD AUTO: 0.3 K/UL
MONOCYTES NFR BLD: 5.2 %
NEUTROPHILS # BLD AUTO: 3.5 K/UL
NEUTROPHILS NFR BLD: 57.1 %
NITRITE UR QL STRIP: NEGATIVE
NRBC BLD-RTO: 0 /100 WBC
PH UR STRIP: 6 [PH] (ref 5–8)
PLATELET # BLD AUTO: 261 K/UL
PMV BLD AUTO: 10.6 FL
POTASSIUM SERPL-SCNC: 3.5 MMOL/L
PROT SERPL-MCNC: 7.7 G/DL
PROT UR QL STRIP: NEGATIVE
RBC # BLD AUTO: 4.42 M/UL
RBC #/AREA URNS AUTO: 0 /HPF (ref 0–4)
SODIUM SERPL-SCNC: 140 MMOL/L
SP GR UR STRIP: 1.01 (ref 1–1.03)
SQUAMOUS #/AREA URNS AUTO: 1 /HPF
URN SPEC COLLECT METH UR: ABNORMAL
UROBILINOGEN UR STRIP-ACNC: NEGATIVE EU/DL
WBC # BLD AUTO: 6.15 K/UL
WBC #/AREA URNS AUTO: 7 /HPF (ref 0–5)
YEAST UR QL AUTO: ABNORMAL

## 2018-01-21 PROCEDURE — 99283 EMERGENCY DEPT VISIT LOW MDM: CPT | Mod: ,,, | Performed by: EMERGENCY MEDICINE

## 2018-01-21 PROCEDURE — 80053 COMPREHEN METABOLIC PANEL: CPT

## 2018-01-21 PROCEDURE — 81001 URINALYSIS AUTO W/SCOPE: CPT

## 2018-01-21 PROCEDURE — 25000003 PHARM REV CODE 250: Performed by: EMERGENCY MEDICINE

## 2018-01-21 PROCEDURE — 83690 ASSAY OF LIPASE: CPT

## 2018-01-21 PROCEDURE — 85025 COMPLETE CBC W/AUTO DIFF WBC: CPT

## 2018-01-21 PROCEDURE — 99283 EMERGENCY DEPT VISIT LOW MDM: CPT

## 2018-01-21 RX ORDER — SULFAMETHOXAZOLE AND TRIMETHOPRIM 800; 160 MG/1; MG/1
1 TABLET ORAL 2 TIMES DAILY
Qty: 20 TABLET | Refills: 0 | Status: SHIPPED | OUTPATIENT
Start: 2018-01-21 | End: 2018-01-31

## 2018-01-21 RX ORDER — SULFAMETHOXAZOLE AND TRIMETHOPRIM 800; 160 MG/1; MG/1
1 TABLET ORAL
Status: COMPLETED | OUTPATIENT
Start: 2018-01-21 | End: 2018-01-21

## 2018-01-21 RX ADMIN — SULFAMETHOXAZOLE AND TRIMETHOPRIM 1 TABLET: 800; 160 TABLET ORAL at 09:01

## 2018-01-22 NOTE — ED PROVIDER NOTES
"Encounter Date: 1/21/2018       History     Chief Complaint   Patient presents with    Hematuria     blood in urine and abdominal pain since Thursday; also abscess to left groin     HPI   Pt presents with a "boil" on her groin since Thursday.  She tried to pop it but it is still there.  She also endorses possible hematuria - she is unsure whether or not the blood is coming from the boil or in her urine.  She denies dysuria, fever, chills, N/V/D, flank pain, HA, CP, SOB.    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   Skin:        Groin "boil"   All other systems reviewed and are negative.      Physical Exam     Initial Vitals [01/21/18 1732]   BP Pulse Resp Temp SpO2   (!) 189/96 60 17 98.3 °F (36.8 °C) 98 %      MAP       127         Physical Exam  Gen/Constitutional: Interactive. No acute distress  Head: Normocephalic, Atraumatic  Neck: supple, no masses or LAD  Eyes: PERRLA, conjunctiva clear  Ears, Nose and Throat: No rhinorrhea or stridor.  Cardiac: Reg Rhythm, No murmur  Pulmonary: CTA Bilat, no wheezes, rhonchi, rales.  GI: Abdomen soft, non-tender, non-distended; no rebound or guarding  : No CVA tenderness.  Musculoskeletal: Extremities warm, well perfused, no erythema, no edema  Skin: No rashes, she has a 0.5cmx0.5cm area of induration on the right labia majoris, no head, no swelling in " bartholin gland area.  No surrounding erythema or induration.  Neuro: Alert and Oriented x 3; No focal motor or sensory deficits.    Psych: Normal affect    ED Course   Procedures  Labs Reviewed   URINALYSIS, REFLEX TO URINE CULTURE - Abnormal; Notable for the following:        Result Value    Glucose, UA 3+ (*)     Ketones, UA Trace (*)     Leukocytes, UA Trace (*)     All other components within normal limits    Narrative:     Preferred Collection Type->Urine, Clean Catch   CBC W/ AUTO DIFFERENTIAL - Abnormal; Notable for the following:     Hemoglobin 11.6 (*)     Hematocrit 35.8 (*)     MCV 81 (*)     MCH 26.2 (*)     All other components within normal limits   COMPREHENSIVE METABOLIC PANEL - Abnormal; Notable for the following:     CO2 31 (*)     Glucose 289 (*)     All other components within normal limits   URINALYSIS MICROSCOPIC - Abnormal; Notable for the following:     WBC, UA 7 (*)     All other components within normal limits    Narrative:     Preferred Collection Type->Urine, Clean Catch   LIPASE                   APC / Resident Notes:   HO4 MDM  Pt is 62 y.o. female that presents with abscess. DDx includes but is not limited to abscess, cyst, ingrown hair, cellulitis, Bartholin's cyst.  Exam with small abscess to the left labia not in the position concerning for Bartholin's cyst.  Too small for emergent I&D.  Patient will be discharged with antibiotics.  Patient has pain control at home for prior injuries and recommended to continue taking that as needed.  Patient given strict return precautions and follow-up instructions.  Patient verbalized understanding and agreement prior to discharge.  Screening labs for other causes of possible abdominal pain and hematuria were unremarkable.  Alejandro Lynn  [facility] Resident  9:44 PM 01/21/2018           Attending Attestation:   Physician Attestation Statement for Resident:  As the supervising MD   Physician Attestation Statement: I have personally  seen and examined this patient.   I agree with the above history. -:   As the supervising MD I agree with the above PE.    As the supervising MD I agree with the above treatment, course, plan, and disposition.  I have reviewed and agree with the residents interpretation of the following: lab data.  I have reviewed the following: old records at this facility.                    ED Course      Clinical Impression:   The primary encounter diagnosis was Acute folliculitis. A diagnosis of Abscess of labia majora was also pertinent to this visit.                           Xu Hyman MD  01/21/18 9586

## 2018-01-22 NOTE — ED TRIAGE NOTES
"Pt presents to the ED c/o hematuria x 3 days. +burning during urination. Denies clots. Denies hx kidney stones. Endorses suprapubic pain. Pt also c/o "a boil that I popped in between my legs." Pt states she wants "a pain shot." Pt states, "I'm on the oxycondom for my back pain."  "

## 2018-01-23 ENCOUNTER — OFFICE VISIT (OUTPATIENT)
Dept: NEUROLOGY | Facility: CLINIC | Age: 63
End: 2018-01-23
Payer: COMMERCIAL

## 2018-01-23 VITALS
HEIGHT: 68 IN | SYSTOLIC BLOOD PRESSURE: 141 MMHG | WEIGHT: 198.19 LBS | BODY MASS INDEX: 30.04 KG/M2 | HEART RATE: 55 BPM | DIASTOLIC BLOOD PRESSURE: 84 MMHG

## 2018-01-23 DIAGNOSIS — E11.9 TYPE 2 DIABETES MELLITUS WITHOUT COMPLICATION, WITHOUT LONG-TERM CURRENT USE OF INSULIN: ICD-10-CM

## 2018-01-23 DIAGNOSIS — E78.2 MIXED HYPERLIPIDEMIA: Chronic | ICD-10-CM

## 2018-01-23 DIAGNOSIS — I10 ESSENTIAL HYPERTENSION: ICD-10-CM

## 2018-01-23 DIAGNOSIS — I67.2 INTRACRANIAL ATHEROSCLEROSIS: ICD-10-CM

## 2018-01-23 DIAGNOSIS — Z86.73 HISTORY OF STROKE: Primary | ICD-10-CM

## 2018-01-23 PROBLEM — I16.9 HYPERTENSIVE CRISIS: Status: RESOLVED | Noted: 2017-05-27 | Resolved: 2018-01-23

## 2018-01-23 PROBLEM — G93.6 CYTOTOXIC CEREBRAL EDEMA: Status: RESOLVED | Noted: 2017-11-23 | Resolved: 2018-01-23

## 2018-01-23 PROCEDURE — 99213 OFFICE O/P EST LOW 20 MIN: CPT | Mod: S$GLB,,, | Performed by: PSYCHIATRY & NEUROLOGY

## 2018-01-23 PROCEDURE — 99999 PR PBB SHADOW E&M-EST. PATIENT-LVL III: CPT | Mod: PBBFAC,,, | Performed by: PSYCHIATRY & NEUROLOGY

## 2018-01-23 NOTE — PROGRESS NOTES
Vascular Neurology  Clinic Note    Reason For Visit (Chief Complaint): admit 11/23 -  11/24 to stroke service    HPI: 62 y.o. right handed female with transient bilateral upper extremity weakness, R > L, w/ symptoms improving prior to arrival.  The aforementioned symptoms have never happened prior to the event. There are no identified triggers or modifying factors. There have been no recurrent events. There are no other associated symptoms.  MRI revealed left MCA posterior division infarct. 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    No further events from stroke. Daily marijuana smoker. She says she can't survive without it.    Brain Imaging:  MRI 11/23 - moderate sized L MCA inferior division infarct  Vessel Imaging:  CTA -  occlusion or high grade stenosis of R A2, L M2 high grade stenosis, and occlusion vs high grade stenosis of L P2  Cardiac Evaluation:  TTE - 60-65% with diastolic dysfunction and mild LA enlargement  Other:   None  Relevant Labwork:    Recent Labs  Lab 11/23/17 2025 11/24/17  0440   HEMOGLOBIN A1C  --  7.8 H   LDL CHOLESTEROL 65.2  --    HDL 44  --    TRIGLYCERIDES 114  --    CHOLESTEROL 132  --        I independently viewed the above imaging studies in addition to reviewing the report.  I reviewed the above labwork.    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    CLOPIDOGREL (PLAVIX) 75 MG TABLET    Take 1 tablet (75 mg total) by mouth once daily.    HYDROCHLOROTHIAZIDE (HYDRODIURIL) 25 MG TABLET    Take 25 mg by mouth once daily.    LISINOPRIL (PRINIVIL,ZESTRIL) 20 MG TABLET    Take 1 tablet (20 mg total) by mouth once daily.    METFORMIN (GLUCOPHAGE) 1000 MG TABLET    Take 1 tablet (1,000 mg total) by mouth 2 (two) times daily with meals.    OXYCODONE (ROXICODONE) 30 MG TAB    Take 5 mg by mouth every 8 (eight) hours.    QUETIAPINE (SEROQUEL XR) 400 MG TB24    Take by mouth every evening.    SULFAMETHOXAZOLE-TRIMETHOPRIM 800-160MG (BACTRIM DS) 800-160 MG TAB    Take 1 tablet by mouth 2 (two) times daily.   Discontinued Medications    ASPIRIN 81 MG CHEW    Take 1 tablet (81 mg total) by mouth once daily.    CITALOPRAM (CELEXA) 40 MG TABLET    Take 1 tablet (40 mg total) by mouth once daily.    CLOTRIMAZOLE-BETAMETHASONE 1-0.05% (LOTRISONE) CREAM    Apply topically 2 (two) times daily.    FLUTICASONE (FLONASE) 50 MCG/ACTUATION NASAL SPRAY    1 spray by Each Nare route once daily.       EXAM  Vital Signs:Blood pressure (!) 141/84, pulse (!) 55, height 5' 8" (1.727 m), weight 89.9 kg (198 lb 3.1 oz).  General: well appearing without discomfort   Mental Status:alert, oriented to person - place - age - month   Language: able to name, repeat, comprehend commands   Cranial Nerves: EOMI, PERRL, no facial asymmetry, tongue to midline, palate midline  Motor: 5/5 power in all extremities, normal tone  Sensory: intact light touch bilaterally, intact proprioception bilaterally  Coordination: no ataxia on finger-to-nose or heel-to-shin testing; no truncal ataxia  Gait & Stance: normal    NIH Stroke Scale:    Level of Consciousness: 0 - alert  LOC Questions: 0 - answers both correctly  LOC Commands: 0 - performs both correctly  Best Gaze: 0 - normal  Visual: 0 - no visual loss  Facial Palsy: 0 - normal  Motor Left Arm: 0 - no drift  Motor Right Arm: 0 - no " drift  Motor Left Le - no drift  Motor Right Le - no drift  Limb Ataxia: 0 - absent  Sensory: 0 - normal  Best Language: 0 - no aphasia  Dysarthria: 0 - normal articulation  Extinction and Inattention: 0 - no neglect  NIH Stroke Scale Total: 0        ___________________  ASSESSMENT & PLAN    Problem List Items Addressed This Visit        1 - High    History of stroke    Overview     L MCA posterior division infarct 17         Current Assessment & Plan     Etiology: Large Artery Atherosclerosis Evident  OBDULIO Major: multiple intracranial stenoses, notably in left posterior division MCA where infarct is related -- CE Absent --  Absent -- Other Absent   · Diagnostic Orders: none ordered, ILR implanted and will continue to follow-up  · Secondary stroke prevention: Continue plavix  · Continue current statin therapy lipitor 40  · Blood pressure goal <140/90 mmHg   · Stroke Risk Factors Addressed: HTN, HLD, DM, intracranial atherosclerotic disease  · Stroke education administered            Unprioritized    HLD (hyperlipidemia) (Chronic)    Current Assessment & Plan     Goal LDL < 70 w/ intracranial atherosclerotic disease  Continue current statin therapy         Type 2 diabetes mellitus without complication    Current Assessment & Plan     Goal A1c < 7.0  Continue current oral regimen         Essential hypertension - Primary    Current Assessment & Plan     Goal BP < 140/90  Continue current antihypertensive regimen         Intracranial atherosclerosis    Current Assessment & Plan     Continue to aggressively control vascular risk factors such as HTN, HLD, DM as detailed               MD Beba  Vascular Neurology  Office [phone]  Fax [phone]

## 2018-01-23 NOTE — ASSESSMENT & PLAN NOTE
Etiology: Large Artery Atherosclerosis Evident  OBDULIO Major: multiple intracranial stenoses, notably in left posterior division MCA where infarct is related -- CE Absent --  Absent -- Other Absent   · Diagnostic Orders: none ordered, ILR implanted and will continue to follow-up  · Secondary stroke prevention: Continue plavix  · Continue current statin therapy lipitor 40  · Blood pressure goal <140/90 mmHg   · Stroke Risk Factors Addressed: HTN, HLD, DM, intracranial atherosclerotic disease  · Stroke education administered

## 2018-01-23 NOTE — LETTER
January 23, 2018      Frederic Ruiz, FNP-C  [address] [facility] [address]           [facility]  [address] [place] [address]  Phone: [phone]          Patient: Autumn Smart   MR Number: [id number]   YOB: 1955   Date of Visit: 1/23/2018       Dear Frederic Ruiz:    Thank you for referring Autumn Smart to me for evaluation. Attached you will find relevant portions of my assessment and plan of care.    If you have questions, please do not hesitate to call me. I look forward to following Autumn Smart along with you.    Sincerely,    Facundo Lorenzo MD    Enclosure  CC:  No Recipients    If you would like to receive this communication electronically, please contact [email or web address] or [phone] to request more information on "[employer] Link access.    For providers and/or their staff who would like to refer a patient to [facility], please contact us through our one-stop-shop provider referral line, Donte Hernandez, at [phone].    If you feel you have received this communication in error or would no longer like to receive these types of communications, please e-mail [email or web address]

## 2018-01-25 ENCOUNTER — TELEPHONE (OUTPATIENT)
Dept: ELECTROPHYSIOLOGY | Facility: CLINIC | Age: 63
End: 2018-01-25

## 2018-01-25 NOTE — TELEPHONE ENCOUNTER
Called Ms Leyva because she is sending a full Report from her Home Loop Monitor. But there are no arrhythmias seen. Wanting know if she is having symptoms or if she is unclear that we receive her arrhythmias automatically. She just needs to sleep by the monitor at night. For symptoms, she should be pressing her symptom button so it will store egram data for that particular symptom. It then downloads at night. Sending a Full Report daily will cause her implanted loop recorder to be depleted of battery life prematurely.  - LM on

## 2018-02-12 ENCOUNTER — CLINICAL SUPPORT (OUTPATIENT)
Dept: ELECTROPHYSIOLOGY | Facility: CLINIC | Age: 63
End: 2018-02-12
Attending: INTERNAL MEDICINE
Payer: COMMERCIAL

## 2018-02-12 DIAGNOSIS — I63.9 CRYPTOGENIC STROKE: ICD-10-CM

## 2018-02-12 DIAGNOSIS — Z95.818 STATUS POST PLACEMENT OF IMPLANTABLE LOOP RECORDER: ICD-10-CM

## 2018-02-12 PROCEDURE — 93299 LOOP RECORDER REMOTE: CPT | Mod: S$GLB,,, | Performed by: INTERNAL MEDICINE

## 2018-02-12 PROCEDURE — 93298 REM INTERROG DEV EVAL SCRMS: CPT | Mod: S$GLB,,, | Performed by: INTERNAL MEDICINE

## 2018-02-22 ENCOUNTER — TELEPHONE (OUTPATIENT)
Dept: ELECTROPHYSIOLOGY | Facility: CLINIC | Age: 63
End: 2018-02-22

## 2018-02-22 NOTE — TELEPHONE ENCOUNTER
Tried to call Ms Smart back @ 12:14 and then again @ 12:50. Spoke to Ms Harman who took her call and she stated Ms Smart said she was going to the ER for her blood pressure.        ----- Message from Barby Harman sent at 2/22/2018 12:02 PM CST -----  Contact: pt nurse  Pt just wants to report that she will be going to ER    Thanks

## 2018-03-13 ENCOUNTER — OFFICE VISIT (OUTPATIENT)
Dept: NEUROLOGY | Facility: CLINIC | Age: 63
End: 2018-03-13
Payer: COMMERCIAL

## 2018-03-13 VITALS — BODY MASS INDEX: 30.04 KG/M2 | WEIGHT: 198.19 LBS | HEIGHT: 68 IN

## 2018-03-13 DIAGNOSIS — Z86.73 HISTORY OF STROKE: Primary | ICD-10-CM

## 2018-03-13 DIAGNOSIS — I67.2 INTRACRANIAL ATHEROSCLEROSIS: ICD-10-CM

## 2018-03-13 DIAGNOSIS — E78.2 MIXED HYPERLIPIDEMIA: Chronic | ICD-10-CM

## 2018-03-13 DIAGNOSIS — I10 ESSENTIAL HYPERTENSION: ICD-10-CM

## 2018-03-13 PROCEDURE — 99999 PR PBB SHADOW E&M-EST. PATIENT-LVL III: CPT | Mod: PBBFAC,,, | Performed by: PSYCHIATRY & NEUROLOGY

## 2018-03-13 PROCEDURE — 99214 OFFICE O/P EST MOD 30 MIN: CPT | Mod: S$GLB,,, | Performed by: PSYCHIATRY & NEUROLOGY

## 2018-03-13 NOTE — PROGRESS NOTES
"Vascular Neurology  Clinic Note    Reason For Visit (Chief Complaint): admit 11/23 -  11/24 to stroke service, f/u from last clinic appointment 1/23    Interval History: ILR wnl, no events consistent with atrial fibrillation. Recently in end of February on 2/18 she was walking down a handicap ramp and she had a TV monitor in her hand and while holding onto the handle on the ramp the handle broke lose, she fell of the ramp and the monitor fell on her. She was sent to [facility]. She had absolutely no focal neurological weakness. She was sent back for a physical therapist who has been helping for the left knee pain. It is very confusing because she doesn't understand why she is back in clinic to see me. I am also confused. This physical therapist told her to come back to see me in clinic because something was clogged in the "L cerebral artery [place]" that he wrote down on piece of paper. I believe that he saw the workup which showed a stenosis of the MCA and other intracranial stenoses and did not realize she had already seen me in clinic for follow up and a plan is already in place. He told her a procedure could be done.    HPI: 62 y.o. right handed female with transient bilateral upper extremity weakness, R > L, w/ symptoms improving prior to arrival.  The aforementioned symptoms have never happened prior to the event. There are no identified triggers or modifying factors. There have been no recurrent events. There are no other associated symptoms.  MRI revealed left MCA posterior division infarct. 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  No further events from stroke. Daily marijuana smoker. She says she can't survive without it.    Brain Imaging:  MRI 11/23 - moderate sized L MCA inferior division infarct  Vessel Imaging:  CTA -  occlusion or high grade stenosis of R A2, L M2 high grade stenosis, and occlusion vs " "high grade stenosis of L P2  Cardiac Evaluation:  TTE - 60-65% with diastolic dysfunction and mild LA enlargement  Other:   None  Relevant Labwork:    Recent Labs  Lab 11/23/17 2025 11/24/17  0440   HEMOGLOBIN A1C  --  7.8 H   LDL CHOLESTEROL 65.2  --    HDL 44  --    TRIGLYCERIDES 114  --    CHOLESTEROL 132  --        I independently viewed the above imaging studies in addition to reviewing the report.  I reviewed the above labwork.    Review of Systems  Msk: negative for muscle pain  Skin: negative for pruritis  Neuro: negative for headache  All others negative    Past Medical History  Past Medical History:   Diagnosis Date    Back pain     CVA (cerebral vascular accident)     Diabetes mellitus     Embolic stroke involving left middle cerebral artery 11/23/2017    Hypertension      Family History  No relevant history   Social History  never tobacco; still smokes marijuana     Medication List with Changes/Refills   Current Medications    ATORVASTATIN (LIPITOR) 40 MG TABLET    Take 1 tablet (40 mg total) by mouth once daily.    CLOPIDOGREL (PLAVIX) 75 MG TABLET    Take 1 tablet (75 mg total) by mouth once daily.    HYDROCHLOROTHIAZIDE (HYDRODIURIL) 25 MG TABLET    Take 25 mg by mouth once daily.    LISINOPRIL (PRINIVIL,ZESTRIL) 20 MG TABLET    Take 1 tablet (20 mg total) by mouth once daily.    METFORMIN (GLUCOPHAGE) 1000 MG TABLET    Take 1 tablet (1,000 mg total) by mouth 2 (two) times daily with meals.    OXYCODONE (ROXICODONE) 30 MG TAB    Take 5 mg by mouth every 8 (eight) hours.    QUETIAPINE (SEROQUEL XR) 400 MG TB24    Take by mouth every evening.       EXAM  Vital Signs:Height 5' 8" (1.727 m), weight 89.9 kg (198 lb 3.1 oz).  General: well appearing without discomfort   Mental Status:alert, oriented to person - place - age - month   Language: able to name, repeat, comprehend commands   Cranial Nerves: EOMI, PERRL, no facial asymmetry, tongue to midline, palate midline  Motor: 5/5 power in all " extremities, normal tone  Sensory: intact light touch bilaterally, intact proprioception bilaterally  Coordination: no ataxia on finger-to-nose or heel-to-shin testing; no truncal ataxia  Gait & Stance: normal    NIH Stroke Scale:    Level of Consciousness: 0 - alert  LOC Questions: 0 - answers both correctly  LOC Commands: 0 - performs both correctly  Best Gaze: 0 - normal  Visual: 0 - no visual loss  Facial Palsy: 0 - normal  Motor Left Arm: 0 - no drift  Motor Right Arm: 0 - no drift  Motor Left Le - no drift  Motor Right Le - no drift  Limb Ataxia: 0 - absent  Sensory: 0 - normal  Best Language: 0 - no aphasia  Dysarthria: 0 - normal articulation  Extinction and Inattention: 0 - no neglect  NIH Stroke Scale Total: 0        ___________________  ASSESSMENT & PLAN    Problem List Items Addressed This Visit        1 - High    History of stroke - Primary    Overview     L MCA posterior division infarct 17         Current Assessment & Plan     Etiology: Large Artery Atherosclerosis Evident  OBDULIO Major: multiple intracranial stenoses, notably in left posterior division MCA where infarct is related -- CE Absent --  Absent -- Other Absent   · Diagnostic Orders: none , ILR implanted and will continue to follow-up, no events thusfar  · Secondary stroke prevention: Continue plavix; no indication for stenting or angioplasty at this point as she has not failed medical management; in addition, lesion is quite distal making any intervention high risk  · Continue current statin therapy lipitor 40  · Blood pressure goal <140/90 mmHg   · Stroke Risk Factors Addressed: HTN, HLD, DM, intracranial atherosclerotic disease  · Stroke education administered            Unprioritized    HLD (hyperlipidemia) (Chronic)    Current Assessment & Plan     Goal LDL < 70  Continue current statin therapy         Essential hypertension    Current Assessment & Plan     Continue goal BP < 130/80  Continue current medications          Intracranial atherosclerosis    Current Assessment & Plan     Continue plavix and statin  No indication for angioplasty or stenting at this time               MD Beba  Vascular Neurology  Office [phone]  Fax [phone]

## 2018-03-13 NOTE — ASSESSMENT & PLAN NOTE
Etiology: Large Artery Atherosclerosis Evident  OBDULIO Major: multiple intracranial stenoses, notably in left posterior division MCA where infarct is related -- CE Absent --  Absent -- Other Absent   · Diagnostic Orders: none , ILR implanted and will continue to follow-up, no events thusfar  · Secondary stroke prevention: Continue plavix; no indication for stenting or angioplasty at this point as she has not failed medical management; in addition, lesion is quite distal making any intervention high risk  · Continue current statin therapy lipitor 40  · Blood pressure goal <140/90 mmHg   · Stroke Risk Factors Addressed: HTN, HLD, DM, intracranial atherosclerotic disease  · Stroke education administered

## 2018-03-20 ENCOUNTER — CLINICAL SUPPORT (OUTPATIENT)
Dept: ELECTROPHYSIOLOGY | Facility: CLINIC | Age: 63
End: 2018-03-20
Attending: INTERNAL MEDICINE
Payer: COMMERCIAL

## 2018-03-20 DIAGNOSIS — Z95.818 STATUS POST PLACEMENT OF IMPLANTABLE LOOP RECORDER: ICD-10-CM

## 2018-03-20 DIAGNOSIS — I63.9 CRYPTOGENIC STROKE: ICD-10-CM

## 2018-03-20 PROCEDURE — 93298 REM INTERROG DEV EVAL SCRMS: CPT | Mod: S$GLB,,, | Performed by: INTERNAL MEDICINE

## 2018-03-20 PROCEDURE — 93299 LOOP RECORDER REMOTE: CPT | Mod: S$GLB,,, | Performed by: INTERNAL MEDICINE

## 2018-03-22 ENCOUNTER — TELEPHONE (OUTPATIENT)
Dept: ELECTROPHYSIOLOGY | Facility: CLINIC | Age: 63
End: 2018-03-22

## 2018-03-23 ENCOUNTER — TELEPHONE (OUTPATIENT)
Dept: ELECTROPHYSIOLOGY | Facility: CLINIC | Age: 63
End: 2018-03-23

## 2018-04-10 DIAGNOSIS — R48.8 ACALCULIA: Primary | ICD-10-CM

## 2018-04-16 ENCOUNTER — TELEPHONE (OUTPATIENT)
Dept: ELECTROPHYSIOLOGY | Facility: CLINIC | Age: 63
End: 2018-04-16

## 2018-04-22 ENCOUNTER — HOSPITAL ENCOUNTER (EMERGENCY)
Facility: HOSPITAL | Age: 63
Discharge: HOME | End: 2018-04-22
Attending: EMERGENCY MEDICINE
Payer: COMMERCIAL

## 2018-04-22 VITALS
BODY MASS INDEX: 28.25 KG/M2 | OXYGEN SATURATION: 98 % | HEART RATE: 68 BPM | SYSTOLIC BLOOD PRESSURE: 151 MMHG | TEMPERATURE: 99 F | WEIGHT: 180 LBS | DIASTOLIC BLOOD PRESSURE: 79 MMHG | RESPIRATION RATE: 16 BRPM | HEIGHT: 67 IN

## 2018-04-22 DIAGNOSIS — A59.01 TRICHOMONAL VAGINITIS: Primary | ICD-10-CM

## 2018-04-22 DIAGNOSIS — N30.90 CYSTITIS: ICD-10-CM

## 2018-04-22 LAB
B-HCG UR QL: NEGATIVE
BACTERIA #/AREA URNS AUTO: ABNORMAL /HPF
BACTERIA GENITAL QL WET PREP: ABNORMAL
BILIRUB UR QL STRIP: NEGATIVE
CLARITY UR REFRACT.AUTO: CLEAR
CLUE CELLS VAG QL WET PREP: ABNORMAL
COLOR UR AUTO: YELLOW
CTP QC/QA: YES
FILAMENT FUNGI VAG WET PREP-#/AREA: ABNORMAL
GLUCOSE UR QL STRIP: ABNORMAL
HGB UR QL STRIP: NEGATIVE
KETONES UR QL STRIP: NEGATIVE
LEUKOCYTE ESTERASE UR QL STRIP: ABNORMAL
MICROSCOPIC COMMENT: ABNORMAL
NITRITE UR QL STRIP: NEGATIVE
PH UR STRIP: 5 [PH] (ref 5–8)
PROT UR QL STRIP: NEGATIVE
RBC #/AREA URNS AUTO: 2 /HPF (ref 0–4)
SP GR UR STRIP: 1.01 (ref 1–1.03)
SPECIMEN SOURCE: ABNORMAL
SQUAMOUS #/AREA URNS AUTO: 2 /HPF
T VAGINALIS GENITAL QL WET PREP: ABNORMAL
URN SPEC COLLECT METH UR: ABNORMAL
UROBILINOGEN UR STRIP-ACNC: NEGATIVE EU/DL
WBC #/AREA URNS AUTO: 9 /HPF (ref 0–5)
WBC #/AREA VAG WET PREP: ABNORMAL
YEAST GENITAL QL WET PREP: ABNORMAL
YEAST UR QL AUTO: ABNORMAL

## 2018-04-22 PROCEDURE — 99284 EMERGENCY DEPT VISIT MOD MDM: CPT | Mod: 25

## 2018-04-22 PROCEDURE — 87210 SMEAR WET MOUNT SALINE/INK: CPT

## 2018-04-22 PROCEDURE — 81025 URINE PREGNANCY TEST: CPT | Performed by: PHYSICIAN ASSISTANT

## 2018-04-22 PROCEDURE — 87491 CHLMYD TRACH DNA AMP PROBE: CPT

## 2018-04-22 PROCEDURE — 99283 EMERGENCY DEPT VISIT LOW MDM: CPT | Mod: ,,, | Performed by: PHYSICIAN ASSISTANT

## 2018-04-22 PROCEDURE — 81001 URINALYSIS AUTO W/SCOPE: CPT

## 2018-04-22 RX ORDER — METRONIDAZOLE 500 MG/1
500 TABLET ORAL EVERY 12 HOURS
Qty: 14 TABLET | Refills: 0 | Status: SHIPPED | OUTPATIENT
Start: 2018-04-22 | End: 2018-04-30

## 2018-04-22 RX ORDER — NITROFURANTOIN 25; 75 MG/1; MG/1
100 CAPSULE ORAL 2 TIMES DAILY
Qty: 6 CAPSULE | Refills: 0 | Status: SHIPPED | OUTPATIENT
Start: 2018-04-22 | End: 2018-04-25

## 2018-04-22 NOTE — DISCHARGE INSTRUCTIONS
Take prescription medications for bladder infection and trichomonas infection (STD). We recommend speaking to partner about results so that they receive treatment. Use protection during intercourse, especially while you are taking the antibiotic. Return to the ED immediately if you develop fever, chills or abdominal pain. Make an appointment with gynecology to receive routine pap smears.    Our goal in the emergency department is to always give you outstanding care and exceptional service. You may receive a survey by mail or e-mail in the next week regarding your experience in our ED. We would greatly appreciate your completing and returning the survey. Your feedback provides us with a way to recognize our staff who give very good care and it helps us learn how to improve when your experience was below our aspiration of excellence.

## 2018-04-22 NOTE — ED NOTES
Patient identifiers verified and correct for Ms Berry  C/C: Vaginal drainage and itching  APPEARANCE: awake and alert in NAD.  SKIN: warm, dry and intact. No breakdown or bruising.  MUSCULOSKELETAL: Patient moving all extremities spontaneously, no obvious swelling or deformities noted. Ambulates independently.  RESPIRATORY: Denies shortness of breath.Respirations unlabored.   CARDIAC: Denies CP, 2+ distal pulses; no peripheral edema  ABDOMEN: S/ND/NT, Denies nausea, denies abd pain or nausea.  : voids spontaneously states no pain   Neurologic: AAO x 4; follows commands equal strength in all extremities; denies numbness/tingling. Denies dizziness Denies weakness

## 2018-04-22 NOTE — ED TRIAGE NOTES
Patient states she has been having sex with the same man x 2 years, last time 2 weeks ago, states itching and burning onset last Monday. Light green discharge with odor. Denies fever. Using Blue Star Ointment on outside of vagina.

## 2018-04-23 LAB
C TRACH DNA SPEC QL NAA+PROBE: NOT DETECTED
N GONORRHOEA DNA SPEC QL NAA+PROBE: NOT DETECTED

## 2018-04-26 ENCOUNTER — CLINICAL SUPPORT (OUTPATIENT)
Dept: ELECTROPHYSIOLOGY | Facility: CLINIC | Age: 63
End: 2018-04-26
Attending: INTERNAL MEDICINE
Payer: COMMERCIAL

## 2018-04-26 DIAGNOSIS — Z95.818 STATUS POST PLACEMENT OF IMPLANTABLE LOOP RECORDER: ICD-10-CM

## 2018-04-26 DIAGNOSIS — I63.9 CRYPTOGENIC STROKE: ICD-10-CM

## 2018-04-26 PROCEDURE — 93299 LOOP RECORDER REMOTE: CPT | Mod: S$GLB,,, | Performed by: INTERNAL MEDICINE

## 2018-04-26 PROCEDURE — 93298 REM INTERROG DEV EVAL SCRMS: CPT | Mod: S$GLB,,, | Performed by: INTERNAL MEDICINE

## 2018-04-29 ENCOUNTER — OFFICE VISIT (OUTPATIENT)
Dept: URGENT CARE | Facility: CLINIC | Age: 63
End: 2018-04-29
Payer: COMMERCIAL

## 2018-04-29 VITALS
DIASTOLIC BLOOD PRESSURE: 95 MMHG | TEMPERATURE: 100 F | RESPIRATION RATE: 18 BRPM | WEIGHT: 180 LBS | OXYGEN SATURATION: 98 % | HEART RATE: 76 BPM | HEIGHT: 67 IN | BODY MASS INDEX: 28.25 KG/M2 | SYSTOLIC BLOOD PRESSURE: 131 MMHG

## 2018-04-29 DIAGNOSIS — R19.7 DIARRHEA, UNSPECIFIED TYPE: Primary | ICD-10-CM

## 2018-04-29 LAB — GLUCOSE SERPL-MCNC: NORMAL MG/DL (ref 70–110)

## 2018-04-29 PROCEDURE — 3080F DIAST BP >= 90 MM HG: CPT | Mod: CPTII,S$GLB,, | Performed by: FAMILY MEDICINE

## 2018-04-29 PROCEDURE — 82948 REAGENT STRIP/BLOOD GLUCOSE: CPT | Mod: S$GLB,,, | Performed by: FAMILY MEDICINE

## 2018-04-29 PROCEDURE — 3075F SYST BP GE 130 - 139MM HG: CPT | Mod: CPTII,S$GLB,, | Performed by: FAMILY MEDICINE

## 2018-04-29 PROCEDURE — 99214 OFFICE O/P EST MOD 30 MIN: CPT | Mod: S$GLB,,, | Performed by: FAMILY MEDICINE

## 2018-04-29 RX ORDER — OXYCODONE HYDROCHLORIDE 15 MG/1
TABLET ORAL
Refills: 0 | COMMUNITY
Start: 2018-04-04 | End: 2018-07-02

## 2018-04-29 NOTE — PATIENT INSTRUCTIONS
PLEASE READ YOUR DISCHARGE INSTRUCTIONS ENTIRELY AS IT CONTAINS IMPORTANT INFORMATION.    Take over the counter imodium to control the diarrhea. You can stop once you have formed stools.     Wash hands frequently    Use gatorade/pedialyte or rehydration packets to help stay hydrated. Vitamin water and plain water do not contain rehydrating electrolytes.  Increase clear liquids (water, gatorade, pedialyte, broths, jello, etc) Hold off on solids for 12-18 hours. Then advance to BRAT diet (banana, rice, applesauce, tea, toast/crackers), then advance further as tolerated. Avoid spicy or fatty foods.   Use Peptobismol to help alleviate your diarrhea symptoms.     Please go to the ER if you experience worsening pain, blood in your vomit or stool, high fever, dizziness, fainting, chest pain, shortness of breath, funny heart beats, headache, blurred vision, weakness in one arm or leg, slurred speech, numbness, inability to walk or talk, confusion.     For your blood pressure, you can go to any local pharmacy or drug store to check your pressure. If still elevated once you are feeling well please see your primary care doctor for further management of your medications    Uncertain Causes of Diarrhea (Adult)    Diarrhea is when stools are loose and watery. This can be caused by:  · Viral infections  · Bacterial infections  · Food poisoning  · Parasites  · Irritable bowel syndrome (IBS)  · Inflammatory bowel diseases such as ulcerative colitis, Crohn's disease, and celiac disease  · Food intolerance, such as to lactose, the sugar found in milk and milk products  · Reaction to medicines like antibiotics, laxatives, cancer drugs, and antacids  Along with diarrhea, you may also have:  · Abdominal pain and cramping  · Nausea and vomiting  · Loss of bowel control  · Fever and chills  · Bloody stools  In some cases, antibiotics may help to treat diarrhea. You may have a stool sample test. This is done to see what is causing your  diarrhea, and if antibiotics will help treat it. The results of a stool sample test may take up to 2 days. The healthcare provider may not give you antibiotics until he or she has the stool test results.  Diarrhea can cause dehydration. This is the loss of too much water and other fluids from the body. When this occurs, body fluid must be replaced. This can be done with oral rehydration solutions. Oral rehydration solutions are available at drugstores and grocery stores without a prescription.  Home care  Follow all instructions given by your healthcare provider. Rest at home for the next 24 hours, or until you feel better. Avoid caffeine, tobacco, and alcohol. These can make diarrhea, cramping, and pain worse.  If taking medicines:  · Dont take over-the-counter diarrhea or nausea medicines unless your healthcare provider tells you to.  · You may use acetaminophen or NSAID medicines like ibuprofen or naproxen to reduce pain and fever. Dont use these if you have chronic liver or kidney disease, or ever had a stomach ulcer or gastrointestinal bleeding. Don't use NSAID medicines if you are already taking one for another condition (like arthritis) or are on daily aspirin therapy (such as for heart disease or after a stroke). Talk with your healthcare provider first.  · If antibiotics were prescribed, be sure you take them until they are finished. Dont stop taking them even when you feel better. Antibiotics must be taken as a full course.  To prevent the spread of illness:  · Remember that washing with soap and water and using alcohol-based  is the best way to prevent the spread of infection.  · Clean the toilet after each use.  · Wash your hands before eating.  · Wash your hands before and after preparing food. Keep in mind that people with diarrhea or vomiting should not prepare food for others.  · Wash your hands after using cutting boards, countertops, and knives that have been in contact with raw  foods.  · Wash and then peel fruits and vegetables.  · Keep uncooked meats away from cooked and ready-to-eat foods.  · Use a food thermometer when cooking. Cook poultry to at least 165°F (74°C). Cook ground meat (beef, veal, pork, lamb) to at least 160°F (71°C). Cook fresh beef, veal, lamb, and pork to at least 145°F (63°C).  · Dont eat raw or undercooked eggs (poached or bertha side up), poultry, meat, or unpasteurized milk and juices.  Food and drinks  The main goal while treating vomiting or diarrhea is to prevent dehydration. This is done by taking small amounts of liquids often.  · Keep in mind that liquids are more important than food right now.  · Drink only small amounts of liquids at a time.  · Dont force yourself to eat, especially if you are having cramping, vomiting, or diarrhea. Dont eat large amounts at a time, even if you are hungry.  · If you eat, avoid fatty, greasy, spicy, or fried foods.  · Dont eat dairy foods or drink milk if you have diarrhea. These can make diarrhea worse.  During the first 24 hours you can try:  · Oral rehydration solutions. Do not use sports drinks. They have too much sugar and not enough electrolytes.  · Soft drinks without caffeine  · Ginger ale  · Water (plain or flavored)  · Decaf tea or coffee  · Clear broth, consommé, or bouillon  · Gelatin, popsicles, or frozen fruit juice bars  The second 24 hours, if you are feeling better, you can add:  · Hot cereal, plain toast, bread, rolls, or crackers  · Plain noodles, rice, mashed potatoes, chicken noodle soup, or rice soup  · Unsweetened canned fruit (no pineapple)  · Bananas  As you recover:  · Limit fat intake to less than 15 grams per day. Dont eat margarine, butter, oils, mayonnaise, sauces, gravies, fried foods, peanut butter, meat, poultry, or fish.  · Limit fiber. Dont eat raw or cooked vegetables, fresh fruits except bananas, or bran cereals.  · Limit caffeine and chocolate.  · Limit dairy.  · Dont use spices or  seasonings except salt.  · Go back to your normal diet over time, as you feel better and your symptoms improve.  · If the symptoms come back, go back to a simple diet or clear liquids.  Follow-up care  Follow up with your healthcare provider, or as advised. If a stool sample was taken or cultures were done, call the healthcare provider for the results as instructed.  Call 911  Call 911 if you have any of these symptoms:  · Trouble breathing  · Confusion  · Extreme drowsiness or trouble walking  · Loss of consciousness  · Rapid heart rate  · Chest pain  · Stiff neck  · Seizure  When to seek medical advice  Call your healthcare provider right away if any of these occur:  · Abdominal pain that gets worse  · Constant lower right abdominal pain  · Continued vomiting and inability to keep liquids down  · Diarrhea more than 5 times a day  · Blood in vomit or stool  · Dark urine or no urine for 8 hours, dry mouth and tongue, tiredness, weakness, or dizziness  · Drowsiness  · New rash  · You dont get better in 2 to 3 days  · Fever of 100.4°F (38°C) or higher that doesnt get lower with medicine  Date Last Reviewed: 1/3/2016  © 1326-1260 [employer]. [address] [place] [address]. All rights reserved. This information is not intended as a substitute for professional medical care. Always follow your healthcare professional's instructions.        Diet for Diarrhea Only (Child)    Diarrhea is common in children. A child can quickly lose too much fluid and become dehydrated. This is the loss of too much water and minerals from the body. This can be serious and even life-threatening. When this occurs, body fluids must be replaced. This is done by giving small amounts of liquids often.  If your child shows signs of dehydration, the health care provider may tell you to use an oral rehydration solution. Oral rehydration solution can replace lost minerals called electrolytes. Oral rehydration solution can be  used in addition to breast or bottle feedings. Oral rehydration solution may also reduce diarrhea. You can buy oral rehydration solution at grocery stores and drugstores without a prescription.  In cases of severe dehydration, a child may need to go to a hospital to have intravenous (IV) fluids.  Giving liquids and food  If using oral rehydration solution:  · Follow your healthcare providers instructions when giving the solution to your child.  · Use only prepared, purchased oral rehydration solution. Don't make your own solution. Sports drinks are not the same as oral rehydration solutions. Sports drinks contain too much sugar and not enough electrolytes for correct dehydration.  · If diarrhea gets better after 2 to 3 hours, you can stop giving oral rehydration solution.  For solid foods:  · Follow the diet your childs provider advises.  · If desired and tolerated, your child may eat regular food.  · If unable to eat regular food, your child can drink clear liquids such as water, or suck on ice cubes. Dont give high-sugar fluids like juice or soda. Slowly increase the amount of clear liquids. Alternate these fluids with oral rehydration solution as the provider advises.  · Avoid high-fat foods.  · Your child can start a regular diet 12 to 24 hours after diarrhea has stopped. Continue to give plenty of clear liquids.  · You can resume your child's normal diet over time as he or she feels better. Dont force your child to eat, especially if he or she is having stomach pain or cramping. Dont feed your child large amounts at a time, even if he or she is hungry. This can make your child feel worse. You can give your child more food over time if he or she can tolerate it. Foods you can give include cereal, mashed potatoes, applesauce, mashed bananas, crackers, dry toast, rice, oatmeal, bread, noodles, pretzels, soups with rice or noodles, and cooked vegetables.  · If the symptoms come back, go back to a simple diet  or clear liquids.  Follow-up care  Follow up with your childs healthcare provider, or as advised. If a stool sample was taken or cultures were done, call the healthcare provider for the results as instructed.  Call 911  Call 911 if your child has any of these symptoms:  · Trouble breathing  · Confusion  · Extreme drowsiness or trouble walking  · Loss of consciousness  · Rapid heart rate  · Stiff neck  · Seizure  When to seek medical advice  Call your childs healthcare provider right away if any of these occur:  · Abdominal pain that gets worse  · Constant lower right abdominal pain  · Continued severe diarrhea for more than 24 hours  · Blood in vomit or stool  · Reduced oral intake  · Dark urine or no urine or dry diapers for 4 to 6 hours in an infant or toddler, or 6 to 8 hours in an older child, no tears when crying, sunken eyes, or dry mouth  · Fussiness or crying that cannot be soothed  · Unusual drowsiness  · New rash  · More than 8 diarrhea stools within 8 hours  · Diarrhea lasts more than 10 days  Unless advised otherwise by your childs healthcare provider, call the provider right away if:  · Your child is 3 months old or younger and has a fever of 100.4°F (38°C) or higher. Get medical care right away. Fever in a young baby can be a sign of a dangerous infection.  · Your child is of any age and has repeated fevers above 104°F (40°C).  · Your child is younger than 2 years of age and a fever of 100.4°F (38°C) continues for more than 1 day.  · Your child is 2 years old or older and a fever of 100.4°F (38°C) continues for more than 3 days.  · Your baby is fussy or cries and cannot be soothed.  Date Last Reviewed: 12/13/2015  © 7429-4676 [employer]. [address] [place] [address], [place] [address]. All rights reserved. This information is not intended as a substitute for professional medical care. Always follow your healthcare professional's instructions.        Self-Care for Vomiting and  Diarrhea    Vomiting and diarrhea can make you miserable. Your stomach and bowels are reacting to an irritant. This might be food, medicine, or viral stomach flu. Vomiting and diarrhea are two ways your body can remove the problem from your system. Nausea is a symptom that discourages you from eating. This gives your stomach and bowels time to recover. To get back to normal, start with self-care to ease your discomfort.  Drink liquids  Drink or sip liquids to avoid losing too much fluid (dehydration):  · Clear liquids such as water or broth are the best choices.  · Do not drink beverages with a lot of sugar in them, such as juices and sodas. These can make diarrhea worse.  · If you have severe vomiting, do not drink sport drinks, such as electrolyte solutions. These don't have the right mix of water, sugar, and minerals. They can also make the symptoms worse. In this situation, commercially available oral rehydration solutions are best.   · Suck on ice chips if the thought of drinking something makes you queasy.  When youre able to eat again  Tips include the following:  · As your appetite comes back, you can resume your normal diet.  · Ask your healthcare provider whether you should stay away from any foods.  Medicines  Know the following about medicines:  · Vomiting and diarrhea are ways your body uses to rid itself of harmful substances such as bacteria. DO NOT use antidiarrheal or antivomiting (antiemetic) medicines unless your healthcare provider tells you to do so.  · Aspirin, medicine with aspirin, and many aspirin substitutes can irritate your stomach. So avoid them when you have stomach upset.  · Certain prescription and over-the-counter medicines can cause vomiting and diarrhea. Talk with your healthcare provider about any medicines you take that may be causing these symptoms.  · Certain over-the-counter antihistamines can help control nausea. Other medicines can help soothe stomach upset. Ask your  healthcare provider which medicines may help you.  When to call your healthcare provider  Call your healthcare provider if you have:  · Bloody or black vomit or stools  · Severe, steady belly pain  · Vomiting with a severe headache or vomiting after a head injury  · Vomiting and diarrhea together for more than an hour  · An inability to hold down even sips of liquids for more than 12 hours  · Vomiting that lasts more than 24 hours  · Severe diarrhea that lasts more than 2 days  · Yellowish color to your skin or the whites of your eyes  · Inability to urinate. In infants and young children, not making wet diapers.    Date Last Reviewed: 6/1/2016 © 2000-2017 [employer]. [address] [place] [address], [place] [address]. All rights reserved. This information is not intended as a substitute for professional medical care. Always follow your healthcare professional's instructions.

## 2018-04-29 NOTE — PROGRESS NOTES
"Subjective:       Patient ID: Autumn Cortez is a 62 y.o. female.    Vitals:    04/29/18 1645 04/29/18 1722 04/29/18 1723 04/29/18 1724   BP: (!) 155/95 (!) 138/94 lying (!) 132/94 sitting (!) 131/95 standing   Pulse: 76      Resp: 18      Temp: 100.2 °F (37.9 °C)      SpO2: 98%      Weight: 81.6 kg (180 lb)      Height: 5' 7" (1.702 m)          Chief Complaint: Diarrhea (started on friday )    Pt was in the ER 1 week ago diagnosed with UTI and trich. Took flagyl and macrobid to completion today - started having watery diarrhea on Friday. No blood in her stool. No abdominal pain or nausea. Did not take any over the counter medications to stop the diarrhea. Pt states she was just going to treat herself at home but she started to feel lightheaded so she went out and bought a wrist blood pressure cuff. Her blood pressure in the ER was 150/79 and at home she was getting readings of 180/100s on the wrist cuff so she was concerned because she has had a stroke before. States she wanted to come in to get help about why her blood pressure readings were so different. Denies headache, slurred speech, confusion, extremity weakness. Dysuria and vaginal d/c has subsided.       Diarrhea    This is a new problem. The current episode started in the past 7 days. The problem occurs less than 2 times per day. The problem has been gradually worsening. The stool consistency is described as watery. The patient states that diarrhea awakens her from sleep. Pertinent negatives include no abdominal pain, chills, fever, headaches or vomiting. Nothing aggravates the symptoms. She has tried change of diet for the symptoms. The treatment provided no relief.     Review of Systems   Constitution: Positive for decreased appetite (has not eaten much since friday - able to keep down liquids). Negative for chills and fever.   Eyes: Negative for blurred vision.   Cardiovascular: Negative for chest pain.   Respiratory: Negative for shortness of breath.  "   Musculoskeletal: Negative for back pain and falls.   Gastrointestinal: Positive for diarrhea. Negative for abdominal pain, constipation, hematemesis, hematochezia, melena, nausea and vomiting.   Genitourinary: Negative for dysuria.   Neurological: Positive for light-headedness (with position changes - then subsides with rest). Negative for headaches, numbness and paresthesias.       Objective:      Physical Exam   Constitutional: She is oriented to person, place, and time. She appears well-developed and well-nourished.   HENT:   Head: Normocephalic and atraumatic.   Right Ear: External ear normal.   Left Ear: External ear normal.   Nose: Nose normal.   Mouth/Throat: Mucous membranes are normal.   Eyes: Conjunctivae and lids are normal.   Neck: Trachea normal and full passive range of motion without pain. Neck supple.   Cardiovascular: Normal rate, regular rhythm and normal heart sounds.    Pulmonary/Chest: Effort normal and breath sounds normal. No respiratory distress. She has no wheezes.   Abdominal: Soft. Normal appearance. She exhibits no distension, no abdominal bruit, no pulsatile midline mass and no mass. Bowel sounds are decreased. There is no hepatosplenomegaly. There is no tenderness. There is no rigidity, no guarding, no CVA tenderness, no tenderness at McBurney's point and negative Forbes's sign.   Musculoskeletal: Normal range of motion. She exhibits no edema.   Neurological: She is alert and oriented to person, place, and time. She has normal strength. No cranial nerve deficit or sensory deficit. She exhibits normal muscle tone. She displays a negative Romberg sign. Coordination and gait normal. GCS eye subscore is 4. GCS verbal subscore is 5. GCS motor subscore is 6.   Normal finger to nose - strength equal in extremities. No facial droop.  Normal EOM  PERRLA     Skin: Skin is warm, dry and intact. She is not diaphoretic. No pallor.   Psychiatric: She has a normal mood and affect. Her speech is  normal and behavior is normal. Judgment and thought content normal. Cognition and memory are normal.   Nursing note and vitals reviewed.      Results for orders placed or performed in visit on 04/29/18   POCT glucose   Result Value Ref Range    POC Glucose 174mg/dl 70 - 110 mg/dL       Assessment:       1. Diarrhea, unspecified type        Plan:       Autumn was seen today for diarrhea.    Diagnoses and all orders for this visit:    Diarrhea, unspecified type  -     POCT glucose    otc imodium/fluids - orthostatic vital signs normal cbg normal  Neurologically intact  ER precautions discussed.   F/u with pcp if pressure still high once well for change in her medications.       Patient Instructions     PLEASE READ YOUR DISCHARGE INSTRUCTIONS ENTIRELY AS IT CONTAINS IMPORTANT INFORMATION.    Take over the counter imodium to control the diarrhea. You can stop once you have formed stools.     Wash hands frequently    Use gatorade/pedialyte or rehydration packets to help stay hydrated. Vitamin water and plain water do not contain rehydrating electrolytes.  Increase clear liquids (water, gatorade, pedialyte, broths, jello, etc) Hold off on solids for 12-18 hours. Then advance to BRAT diet (banana, rice, applesauce, tea, toast/crackers), then advance further as tolerated. Avoid spicy or fatty foods.   Use Peptobismol to help alleviate your diarrhea symptoms.     Please go to the ER if you experience worsening pain, blood in your vomit or stool, high fever, dizziness, fainting, chest pain, shortness of breath, funny heart beats, headache, blurred vision, weakness in one arm or leg, slurred speech, numbness, inability to walk or talk, confusion.     For your blood pressure, you can go to any local pharmacy or drug store to check your pressure. If still elevated once you are feeling well please see your primary care doctor for further management of your medications    Uncertain Causes of Diarrhea (Adult)    Diarrhea is when  stools are loose and watery. This can be caused by:  · Viral infections  · Bacterial infections  · Food poisoning  · Parasites  · Irritable bowel syndrome (IBS)  · Inflammatory bowel diseases such as ulcerative colitis, Crohn's disease, and celiac disease  · Food intolerance, such as to lactose, the sugar found in milk and milk products  · Reaction to medicines like antibiotics, laxatives, cancer drugs, and antacids  Along with diarrhea, you may also have:  · Abdominal pain and cramping  · Nausea and vomiting  · Loss of bowel control  · Fever and chills  · Bloody stools  In some cases, antibiotics may help to treat diarrhea. You may have a stool sample test. This is done to see what is causing your diarrhea, and if antibiotics will help treat it. The results of a stool sample test may take up to 2 days. The healthcare provider may not give you antibiotics until he or she has the stool test results.  Diarrhea can cause dehydration. This is the loss of too much water and other fluids from the body. When this occurs, body fluid must be replaced. This can be done with oral rehydration solutions. Oral rehydration solutions are available at drugstores and grocery stores without a prescription.  Home care  Follow all instructions given by your healthcare provider. Rest at home for the next 24 hours, or until you feel better. Avoid caffeine, tobacco, and alcohol. These can make diarrhea, cramping, and pain worse.  If taking medicines:  · Dont take over-the-counter diarrhea or nausea medicines unless your healthcare provider tells you to.  · You may use acetaminophen or NSAID medicines like ibuprofen or naproxen to reduce pain and fever. Dont use these if you have chronic liver or kidney disease, or ever had a stomach ulcer or gastrointestinal bleeding. Don't use NSAID medicines if you are already taking one for another condition (like arthritis) or are on daily aspirin therapy (such as for heart disease or after a  stroke). Talk with your healthcare provider first.  · If antibiotics were prescribed, be sure you take them until they are finished. Dont stop taking them even when you feel better. Antibiotics must be taken as a full course.  To prevent the spread of illness:  · Remember that washing with soap and water and using alcohol-based  is the best way to prevent the spread of infection.  · Clean the toilet after each use.  · Wash your hands before eating.  · Wash your hands before and after preparing food. Keep in mind that people with diarrhea or vomiting should not prepare food for others.  · Wash your hands after using cutting boards, countertops, and knives that have been in contact with raw foods.  · Wash and then peel fruits and vegetables.  · Keep uncooked meats away from cooked and ready-to-eat foods.  · Use a food thermometer when cooking. Cook poultry to at least 165°F (74°C). Cook ground meat (beef, veal, pork, lamb) to at least 160°F (71°C). Cook fresh beef, veal, lamb, and pork to at least 145°F (63°C).  · Dont eat raw or undercooked eggs (poached or bertha side up), poultry, meat, or unpasteurized milk and juices.  Food and drinks  The main goal while treating vomiting or diarrhea is to prevent dehydration. This is done by taking small amounts of liquids often.  · Keep in mind that liquids are more important than food right now.  · Drink only small amounts of liquids at a time.  · Dont force yourself to eat, especially if you are having cramping, vomiting, or diarrhea. Dont eat large amounts at a time, even if you are hungry.  · If you eat, avoid fatty, greasy, spicy, or fried foods.  · Dont eat dairy foods or drink milk if you have diarrhea. These can make diarrhea worse.  During the first 24 hours you can try:  · Oral rehydration solutions. Do not use sports drinks. They have too much sugar and not enough electrolytes.  · Soft drinks without caffeine  · Ginger ale  · Water (plain or  flavored)  · Decaf tea or coffee  · Clear broth, consommé, or bouillon  · Gelatin, popsicles, or frozen fruit juice bars  The second 24 hours, if you are feeling better, you can add:  · Hot cereal, plain toast, bread, rolls, or crackers  · Plain noodles, rice, mashed potatoes, chicken noodle soup, or rice soup  · Unsweetened canned fruit (no pineapple)  · Bananas  As you recover:  · Limit fat intake to less than 15 grams per day. Dont eat margarine, butter, oils, mayonnaise, sauces, gravies, fried foods, peanut butter, meat, poultry, or fish.  · Limit fiber. Dont eat raw or cooked vegetables, fresh fruits except bananas, or bran cereals.  · Limit caffeine and chocolate.  · Limit dairy.  · Dont use spices or seasonings except salt.  · Go back to your normal diet over time, as you feel better and your symptoms improve.  · If the symptoms come back, go back to a simple diet or clear liquids.  Follow-up care  Follow up with your healthcare provider, or as advised. If a stool sample was taken or cultures were done, call the healthcare provider for the results as instructed.  Call 911  Call 911 if you have any of these symptoms:  · Trouble breathing  · Confusion  · Extreme drowsiness or trouble walking  · Loss of consciousness  · Rapid heart rate  · Chest pain  · Stiff neck  · Seizure  When to seek medical advice  Call your healthcare provider right away if any of these occur:  · Abdominal pain that gets worse  · Constant lower right abdominal pain  · Continued vomiting and inability to keep liquids down  · Diarrhea more than 5 times a day  · Blood in vomit or stool  · Dark urine or no urine for 8 hours, dry mouth and tongue, tiredness, weakness, or dizziness  · Drowsiness  · New rash  · You dont get better in 2 to 3 days  · Fever of 100.4°F (38°C) or higher that doesnt get lower with medicine  Date Last Reviewed: 1/3/2016  © 8674-8811 [employer]. [address] [place] [address], [place] [address]. All rights  reserved. This information is not intended as a substitute for professional medical care. Always follow your healthcare professional's instructions.        Diet for Diarrhea Only (Child)    Diarrhea is common in children. A child can quickly lose too much fluid and become dehydrated. This is the loss of too much water and minerals from the body. This can be serious and even life-threatening. When this occurs, body fluids must be replaced. This is done by giving small amounts of liquids often.  If your child shows signs of dehydration, the health care provider may tell you to use an oral rehydration solution. Oral rehydration solution can replace lost minerals called electrolytes. Oral rehydration solution can be used in addition to breast or bottle feedings. Oral rehydration solution may also reduce diarrhea. You can buy oral rehydration solution at grocery stores and drugstores without a prescription.  In cases of severe dehydration, a child may need to go to a hospital to have intravenous (IV) fluids.  Giving liquids and food  If using oral rehydration solution:  · Follow your healthcare providers instructions when giving the solution to your child.  · Use only prepared, purchased oral rehydration solution. Don't make your own solution. Sports drinks are not the same as oral rehydration solutions. Sports drinks contain too much sugar and not enough electrolytes for correct dehydration.  · If diarrhea gets better after 2 to 3 hours, you can stop giving oral rehydration solution.  For solid foods:  · Follow the diet your childs provider advises.  · If desired and tolerated, your child may eat regular food.  · If unable to eat regular food, your child can drink clear liquids such as water, or suck on ice cubes. Dont give high-sugar fluids like juice or soda. Slowly increase the amount of clear liquids. Alternate these fluids with oral rehydration solution as the provider advises.  · Avoid high-fat foods.  · Your  child can start a regular diet 12 to 24 hours after diarrhea has stopped. Continue to give plenty of clear liquids.  · You can resume your child's normal diet over time as he or she feels better. Dont force your child to eat, especially if he or she is having stomach pain or cramping. Dont feed your child large amounts at a time, even if he or she is hungry. This can make your child feel worse. You can give your child more food over time if he or she can tolerate it. Foods you can give include cereal, mashed potatoes, applesauce, mashed bananas, crackers, dry toast, rice, oatmeal, bread, noodles, pretzels, soups with rice or noodles, and cooked vegetables.  · If the symptoms come back, go back to a simple diet or clear liquids.  Follow-up care  Follow up with your childs healthcare provider, or as advised. If a stool sample was taken or cultures were done, call the healthcare provider for the results as instructed.  Call 911  Call 911 if your child has any of these symptoms:  · Trouble breathing  · Confusion  · Extreme drowsiness or trouble walking  · Loss of consciousness  · Rapid heart rate  · Stiff neck  · Seizure  When to seek medical advice  Call your childs healthcare provider right away if any of these occur:  · Abdominal pain that gets worse  · Constant lower right abdominal pain  · Continued severe diarrhea for more than 24 hours  · Blood in vomit or stool  · Reduced oral intake  · Dark urine or no urine or dry diapers for 4 to 6 hours in an infant or toddler, or 6 to 8 hours in an older child, no tears when crying, sunken eyes, or dry mouth  · Fussiness or crying that cannot be soothed  · Unusual drowsiness  · New rash  · More than 8 diarrhea stools within 8 hours  · Diarrhea lasts more than 10 days  Unless advised otherwise by your childs healthcare provider, call the provider right away if:  · Your child is 3 months old or younger and has a fever of 100.4°F (38°C) or higher. Get medical care right  away. Fever in a young baby can be a sign of a dangerous infection.  · Your child is of any age and has repeated fevers above 104°F (40°C).  · Your child is younger than 2 years of age and a fever of 100.4°F (38°C) continues for more than 1 day.  · Your child is 2 years old or older and a fever of 100.4°F (38°C) continues for more than 3 days.  · Your baby is fussy or cries and cannot be soothed.  Date Last Reviewed: 12/13/2015 © 2000-2017 [employer]. [address] [place] [address], [place] [address]. All rights reserved. This information is not intended as a substitute for professional medical care. Always follow your healthcare professional's instructions.        Self-Care for Vomiting and Diarrhea    Vomiting and diarrhea can make you miserable. Your stomach and bowels are reacting to an irritant. This might be food, medicine, or viral stomach flu. Vomiting and diarrhea are two ways your body can remove the problem from your system. Nausea is a symptom that discourages you from eating. This gives your stomach and bowels time to recover. To get back to normal, start with self-care to ease your discomfort.  Drink liquids  Drink or sip liquids to avoid losing too much fluid (dehydration):  · Clear liquids such as water or broth are the best choices.  · Do not drink beverages with a lot of sugar in them, such as juices and sodas. These can make diarrhea worse.  · If you have severe vomiting, do not drink sport drinks, such as electrolyte solutions. These don't have the right mix of water, sugar, and minerals. They can also make the symptoms worse. In this situation, commercially available oral rehydration solutions are best.   · Suck on ice chips if the thought of drinking something makes you queasy.  When youre able to eat again  Tips include the following:  · As your appetite comes back, you can resume your normal diet.  · Ask your healthcare provider whether you should stay away from any  foods.  Medicines  Know the following about medicines:  · Vomiting and diarrhea are ways your body uses to rid itself of harmful substances such as bacteria. DO NOT use antidiarrheal or antivomiting (antiemetic) medicines unless your healthcare provider tells you to do so.  · Aspirin, medicine with aspirin, and many aspirin substitutes can irritate your stomach. So avoid them when you have stomach upset.  · Certain prescription and over-the-counter medicines can cause vomiting and diarrhea. Talk with your healthcare provider about any medicines you take that may be causing these symptoms.  · Certain over-the-counter antihistamines can help control nausea. Other medicines can help soothe stomach upset. Ask your healthcare provider which medicines may help you.  When to call your healthcare provider  Call your healthcare provider if you have:  · Bloody or black vomit or stools  · Severe, steady belly pain  · Vomiting with a severe headache or vomiting after a head injury  · Vomiting and diarrhea together for more than an hour  · An inability to hold down even sips of liquids for more than 12 hours  · Vomiting that lasts more than 24 hours  · Severe diarrhea that lasts more than 2 days  · Yellowish color to your skin or the whites of your eyes  · Inability to urinate. In infants and young children, not making wet diapers.    Date Last Reviewed: 6/1/2016  © 2158-8120 [employer]. [address] [place] [address], [place] [address]. All rights reserved. This information is not intended as a substitute for professional medical care. Always follow your healthcare professional's instructions.

## 2018-04-30 ENCOUNTER — HOSPITAL ENCOUNTER (EMERGENCY)
Facility: HOSPITAL | Age: 63
Discharge: HOME | End: 2018-05-01
Attending: EMERGENCY MEDICINE
Payer: COMMERCIAL
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
## 2018-04-30 DIAGNOSIS — E87.6 HYPOKALEMIA: ICD-10-CM

## 2018-04-30 DIAGNOSIS — R55 SYNCOPE: ICD-10-CM

## 2018-04-30 DIAGNOSIS — M54.50 ACUTE RIGHT-SIDED LOW BACK PAIN WITHOUT SCIATICA: Primary | ICD-10-CM
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
## 2018-04-30 DIAGNOSIS — R73.9 HYPERGLYCEMIA WITHOUT KETOSIS: ICD-10-CM

## 2018-04-30 DIAGNOSIS — Z98.890 HISTORY OF LOOP RECORDER: ICD-10-CM

## 2018-04-30 LAB
BASOPHILS # BLD AUTO: 0.04 K/UL
BASOPHILS NFR BLD: 0.5 %
DIFFERENTIAL METHOD: ABNORMAL
EOSINOPHIL # BLD AUTO: 0 K/UL
EOSINOPHIL NFR BLD: 0.4 %
ERYTHROCYTE [DISTWIDTH] IN BLOOD BY AUTOMATED COUNT: 14.2 %
HCT VFR BLD AUTO: 35.5 %
HGB BLD-MCNC: 11.3 G/DL
IMM GRANULOCYTES # BLD AUTO: 0.1 K/UL
IMM GRANULOCYTES NFR BLD AUTO: 1.3 %
LYMPHOCYTES # BLD AUTO: 2.7 K/UL
LYMPHOCYTES NFR BLD: 35.2 %
MCH RBC QN AUTO: 26.2 PG
MCHC RBC AUTO-ENTMCNC: 31.8 G/DL
MCV RBC AUTO: 82 FL
MONOCYTES # BLD AUTO: 0.5 K/UL
MONOCYTES NFR BLD: 5.8 %
NEUTROPHILS # BLD AUTO: 4.4 K/UL
NEUTROPHILS NFR BLD: 56.8 %
NRBC BLD-RTO: 0 /100 WBC
PLATELET # BLD AUTO: 243 K/UL
PMV BLD AUTO: 10.4 FL
RBC # BLD AUTO: 4.32 M/UL
WBC # BLD AUTO: 7.78 K/UL

## 2018-04-30 PROCEDURE — 93005 ELECTROCARDIOGRAM TRACING: CPT

## 2018-04-30 PROCEDURE — 83735 ASSAY OF MAGNESIUM: CPT

## 2018-04-30 PROCEDURE — 81001 URINALYSIS AUTO W/SCOPE: CPT

## 2018-04-30 PROCEDURE — 84484 ASSAY OF TROPONIN QUANT: CPT

## 2018-04-30 PROCEDURE — 99284 EMERGENCY DEPT VISIT MOD MDM: CPT | Mod: ,,, | Performed by: PHYSICIAN ASSISTANT

## 2018-04-30 PROCEDURE — 96361 HYDRATE IV INFUSION ADD-ON: CPT

## 2018-04-30 PROCEDURE — 80053 COMPREHEN METABOLIC PANEL: CPT

## 2018-04-30 PROCEDURE — 96374 THER/PROPH/DIAG INJ IV PUSH: CPT

## 2018-04-30 PROCEDURE — 85025 COMPLETE CBC W/AUTO DIFF WBC: CPT

## 2018-04-30 PROCEDURE — 99285 EMERGENCY DEPT VISIT HI MDM: CPT | Mod: 25

## 2018-04-30 PROCEDURE — 96375 TX/PRO/DX INJ NEW DRUG ADDON: CPT

## 2018-04-30 PROCEDURE — 84100 ASSAY OF PHOSPHORUS: CPT

## 2018-04-30 RX ORDER — KETOROLAC TROMETHAMINE 30 MG/ML
10 INJECTION, SOLUTION INTRAMUSCULAR; INTRAVENOUS
Status: COMPLETED | OUTPATIENT
Start: 2018-04-30 | End: 2018-05-01

## 2018-04-30 RX ORDER — ORPHENADRINE CITRATE 30 MG/ML
60 INJECTION INTRAMUSCULAR; INTRAVENOUS
Status: COMPLETED | OUTPATIENT
Start: 2018-04-30 | End: 2018-05-01

## 2018-05-01 VITALS
SYSTOLIC BLOOD PRESSURE: 186 MMHG | HEART RATE: 52 BPM | RESPIRATION RATE: 15 BRPM | WEIGHT: 180 LBS | DIASTOLIC BLOOD PRESSURE: 88 MMHG | BODY MASS INDEX: 28.19 KG/M2 | TEMPERATURE: 98 F | OXYGEN SATURATION: 97 %

## 2018-05-01 LAB
ALBUMIN SERPL BCP-MCNC: 3.3 G/DL
ALP SERPL-CCNC: 51 U/L
ALT SERPL W/O P-5'-P-CCNC: 13 U/L
ANION GAP SERPL CALC-SCNC: 10 MMOL/L
AST SERPL-CCNC: 25 U/L
BACTERIA #/AREA URNS AUTO: ABNORMAL /HPF
BILIRUB SERPL-MCNC: 0.6 MG/DL
BILIRUB UR QL STRIP: NEGATIVE
BUN SERPL-MCNC: 8 MG/DL
CALCIUM SERPL-MCNC: 8.9 MG/DL
CHLORIDE SERPL-SCNC: 102 MMOL/L
CLARITY UR REFRACT.AUTO: ABNORMAL
CO2 SERPL-SCNC: 26 MMOL/L
COLOR UR AUTO: YELLOW
CREAT SERPL-MCNC: 0.9 MG/DL
EST. GFR  (AFRICAN AMERICAN): >60 ML/MIN/1.73 M^2
EST. GFR  (NON AFRICAN AMERICAN): >60 ML/MIN/1.73 M^2
GLUCOSE SERPL-MCNC: 233 MG/DL
GLUCOSE UR QL STRIP: ABNORMAL
HGB UR QL STRIP: NEGATIVE
HYALINE CASTS UR QL AUTO: 3 /LPF
KETONES UR QL STRIP: ABNORMAL
LEUKOCYTE ESTERASE UR QL STRIP: ABNORMAL
MAGNESIUM SERPL-MCNC: 2.7 MG/DL
MICROSCOPIC COMMENT: ABNORMAL
NITRITE UR QL STRIP: NEGATIVE
PH UR STRIP: 7 [PH] (ref 5–8)
PHOSPHATE SERPL-MCNC: 2.6 MG/DL
POTASSIUM SERPL-SCNC: 3.3 MMOL/L
PROT SERPL-MCNC: 6.6 G/DL
PROT UR QL STRIP: NEGATIVE
RBC #/AREA URNS AUTO: 0 /HPF (ref 0–4)
SODIUM SERPL-SCNC: 138 MMOL/L
SP GR UR STRIP: 1.02 (ref 1–1.03)
SQUAMOUS #/AREA URNS AUTO: 2 /HPF
TROPONIN I SERPL DL<=0.01 NG/ML-MCNC: 0.01 NG/ML
URN SPEC COLLECT METH UR: ABNORMAL
UROBILINOGEN UR STRIP-ACNC: 2 EU/DL
WBC #/AREA URNS AUTO: 4 /HPF (ref 0–5)

## 2018-05-01 PROCEDURE — 93010 ELECTROCARDIOGRAM REPORT: CPT | Mod: ,,, | Performed by: INTERNAL MEDICINE

## 2018-05-01 PROCEDURE — 25000003 PHARM REV CODE 250: Performed by: EMERGENCY MEDICINE

## 2018-05-01 PROCEDURE — 63600175 PHARM REV CODE 636 W HCPCS: Performed by: EMERGENCY MEDICINE

## 2018-05-01 RX ORDER — POTASSIUM CHLORIDE 20 MEQ/15ML
60 SOLUTION ORAL
Status: COMPLETED | OUTPATIENT
Start: 2018-05-01 | End: 2018-05-01

## 2018-05-01 RX ADMIN — POTASSIUM CHLORIDE 60 MEQ: 20 SOLUTION ORAL at 12:05

## 2018-05-01 RX ADMIN — SODIUM CHLORIDE, SODIUM LACTATE, POTASSIUM CHLORIDE, AND CALCIUM CHLORIDE 1000 ML: .6; .31; .03; .02 INJECTION, SOLUTION INTRAVENOUS at 12:05

## 2018-05-01 RX ADMIN — SODIUM CHLORIDE, SODIUM LACTATE, POTASSIUM CHLORIDE, AND CALCIUM CHLORIDE 500 ML: .6; .31; .03; .02 INJECTION, SOLUTION INTRAVENOUS at 01:05

## 2018-05-01 RX ADMIN — ORPHENADRINE CITRATE 60 MG: 30 INJECTION INTRAMUSCULAR; INTRAVENOUS at 12:05

## 2018-05-01 RX ADMIN — KETOROLAC TROMETHAMINE 10 MG: 30 INJECTION, SOLUTION INTRAMUSCULAR at 12:05

## 2018-05-01 RX ADMIN — DIBASIC SODIUM PHOSPHATE, MONOBASIC POTASSIUM PHOSPHATE AND MONOBASIC SODIUM PHOSPHATE 2 TABLET: 852; 155; 130 TABLET ORAL at 01:05

## 2018-05-01 NOTE — ED PROVIDER NOTES
Encounter Date: 4/30/2018    SCRIBE #1 NOTE: I, Priscilla Springer, am scribing for, and in the presence of,  Dr. Camargo . I have scribed the entire note.       History     Chief Complaint   Patient presents with    Loss of Consciousness     Syncope, no LOC. Multiple episodes over the past several days. Seen at urgent care yesterday.      Time patient was seen by the provider: 11:00 PM      The patient is a 62 y.o. female with co-morbidities including HTN, back pain and history of CVA who presents to the ED by EMS with a complaint of syncope. Patient reports she passed out today at 6 PM at her home after standing up from couch and walking approximately 10 feet. She endorses back pain, dizziness, lightheadedness, minimal nausea, and reports her last episode of loose stool as greater than 24 hr prior to arrival.  She denies numbness or tingling, SOB or chest pain. Patient was seen at Urgent Care yesterday for her lightheadedness with recent recurrent diarrhea.          The history is provided by the patient and medical records.     Review of patient's allergies indicates:  No Known Allergies  Past Medical History:   Diagnosis Date    Back pain     CVA (cerebral vascular accident)     Diabetes mellitus     Embolic stroke involving left middle cerebral artery 11/23/2017    Hypertension      Past Surgical History:   Procedure Laterality Date    BACK SURGERY      FRACTURE SURGERY       Family History   Problem Relation Age of Onset    Family history unknown: Yes     Social History   Substance Use Topics    Smoking status: Never Smoker    Smokeless tobacco: Never Used    Alcohol use No     Review of Systems   Constitutional: Negative for fever.   HENT: Negative for sore throat.    Respiratory: Negative for shortness of breath.    Cardiovascular: Negative for chest pain.   Gastrointestinal: Positive for diarrhea and nausea (minimal).   Genitourinary: Negative for dysuria.   Musculoskeletal: Positive for back pain.    Skin: Negative for rash.   Neurological: Positive for dizziness and light-headedness. Negative for weakness.   Hematological: Does not bruise/bleed easily.       Physical Exam     Initial Vitals [04/30/18 2035]   BP Pulse Resp Temp SpO2   (!) 160/90 84 18 98.4 °F (36.9 °C) 98 %      MAP       113.33         Physical Exam    Vitals reviewed.  Constitutional: She appears well-developed and well-nourished. She is not diaphoretic. No distress.   62 y.o.  female, mild discomfort noted.   HENT:   Head: Normocephalic and atraumatic.   Edentulous maxilla without intraoral injury. Mildly anhydrous mucous membranes noted.   Eyes: EOM are normal. Pupils are equal, round, and reactive to light.   Neck: No tracheal deviation present.   Cardiovascular: Normal rate, regular rhythm and intact distal pulses.   No murmur heard.  Pulmonary/Chest: Breath sounds normal. No stridor. No respiratory distress.   Abdominal: Soft. She exhibits no distension. There is no tenderness.   Musculoskeletal: Normal range of motion. She exhibits no edema (peripheral).   Moves all extremities x4. Mild to moderate palpation tenderness to upper lumbar spine and left lumbar paraspinous musculature without palpable deformity.   Neurological: She is alert and oriented to person, place, and time. No sensory deficit.   Skin: Skin is warm and dry.   Psychiatric: Her behavior is normal. Thought content normal.   Mildly anxious.         ED Course   Procedures  Labs Reviewed   CBC W/ AUTO DIFFERENTIAL - Abnormal; Notable for the following:        Result Value    Hemoglobin 11.3 (*)     Hematocrit 35.5 (*)     MCH 26.2 (*)     MCHC 31.8 (*)     Immature Granulocytes 1.3 (*)     Immature Grans (Abs) 0.10 (*)     All other components within normal limits   COMPREHENSIVE METABOLIC PANEL - Abnormal; Notable for the following:     Potassium 3.3 (*)     Glucose 233 (*)     Albumin 3.3 (*)     Alkaline Phosphatase 51 (*)     All other components within  normal limits   MAGNESIUM - Abnormal; Notable for the following:     Magnesium 2.7 (*)     All other components within normal limits   URINALYSIS, REFLEX TO URINE CULTURE - Abnormal; Notable for the following:     Appearance, UA Hazy (*)     Glucose, UA 2+ (*)     Ketones, UA Trace (*)     Leukocytes, UA Trace (*)     All other components within normal limits    Narrative:     Preferred Collection Type->Urine, Clean Catch  Received a cup of urine.   PHOSPHORUS - Abnormal; Notable for the following:     Phosphorus 2.6 (*)     All other components within normal limits   URINALYSIS MICROSCOPIC - Abnormal; Notable for the following:     Hyaline Casts, UA 3 (*)     All other components within normal limits    Narrative:     Preferred Collection Type->Urine, Clean Catch  Received a cup of urine.   TROPONIN I     EKG Readings: (Independently Interpreted)   Initial Reading: No STEMI. Rhythm: Normal Sinus Rhythm. Heart Rate: 62. Ectopy: No Ectopy. ST Segments: Normal ST Segments. Axis: Normal.          Medical Decision Making:   History:   Old Medical Records: I decided to obtain old medical records.  Differential Diagnosis:   Syncopal, lethal arrhythmia, dehydration, electrolyte derangement.    Clinical Tests:   Lab Tests: Ordered and Reviewed  Radiological Study: Ordered and Reviewed  Medical Tests: Ordered and Reviewed  ED Management:  Leobardo Early PA-C  Cardiology has evaluated the patient in the ER, appreciate consult  Electrolyte derangements replaced in the ED  Laboratory evaluation showing no acute findings otherwise  Plan: Fluid resuscitation and discharged home follow up with PCP            Scribe Attestation:   Scribe #1: I performed the above scribed service and the documentation accurately describes the services I performed. I attest to the accuracy of the note.               Clinical Impression:   The primary encounter diagnosis was Acute right-sided low back pain without sciatica. Diagnoses of Syncope, Fall  at home, Hypokalemia, Hyperglycemia without ketosis, and History of loop recorder were also pertinent to this visit.    Disposition:   Disposition: Discharged  Condition: Stable                        Leobardo Early PA-C  05/01/18 [id number]       Chris Camargo MD  05/02/18 2210

## 2018-05-01 NOTE — PROGRESS NOTES
Device Programming Note    Type of Device: [employer] Linq  Loop recorder implanted for cryptogenic stroke    Reason for interrogation was syncopal episode at 6pm on 4/30/18    Patient had no arrhythmias noted on the loop recorder on 4/30/18 or 5/1/18    Since implantation, she had two tachy episodes.  2/10/18 - tachy lasted for 1 minute and 36 seconds at an average HR of 182 bpm  1/18/18 - tachy lasted for 1 minute and 33 seconds at an average HR of 176 bpm    Will send message to inform Dr. Clements of the results      Signed:    Edgar Richard MD  Chief Cardiology Fellow  Pager: [phone]  5/1/2018 1:03 AM

## 2018-05-01 NOTE — ED NOTES
Two patient identifiers checked and confirmed.    APPEARANCE: Resting comfortably in no acute distress. Patient has clean hair, skin and nails. Clothing is appropriate and properly fastened.  NEURO: Awake, alert, appropriate for age, and cooperative with a calm affect; pupils equal and round. Oriented x4.  HEENT: Head symmetrical. Bilateral eyes without redness or drainage. Pt reports feeling lightheaded.  CARDIAC: Regular rate and rhythm. S1, S2 noted.  RESPIRATORY: Airway is open and patent. Respirations are spontaneous on room air, even and unlabored. Normal respiratory effort and rate noted.   GI/: Abdomen soft and non-distended. Patient is reported to void and stool appropriately for age.   NEUROVASCULAR: All extremities are warm and pink with +2 pulses and capillary refill less than 3 seconds. No peripheral edema noted.  MUSCULOSKELETAL: Moves all extremities well; no obvious deformities noted.  the patient reports lower back pain.   SKIN: Warm, dry, and intact. Mucus membranes moist and pink.   PSYCH: Pt has calm affect, appropriate speech and thought process.

## 2018-05-01 NOTE — DISCHARGE INSTRUCTIONS
Follow up with your primary care doctor  Drink plenty of fluids  Return to the emergency department for any new symptoms

## 2018-05-01 NOTE — ED TRIAGE NOTES
Pt reports she was seen about a week for tric. Pt was given antibiotics and has finished the medications. Pt states she was here yesterday with diarrhea and high blood pressure. Pt states she was told to drink pedialyte. Pt states she got up off the couch earlier this evening and passed out as she got up off the couch. Pt states she did not hit her head but fell onto her back. Pt states she is having some lower back pain.

## 2018-05-03 ENCOUNTER — TELEPHONE (OUTPATIENT)
Dept: URGENT CARE | Facility: CLINIC | Age: 63
End: 2018-05-03

## 2018-05-04 ENCOUNTER — TELEPHONE (OUTPATIENT)
Dept: URGENT CARE | Facility: CLINIC | Age: 63
End: 2018-05-04

## 2018-05-28 ENCOUNTER — CLINICAL SUPPORT (OUTPATIENT)
Dept: ELECTROPHYSIOLOGY | Facility: CLINIC | Age: 63
End: 2018-05-28
Attending: INTERNAL MEDICINE
Payer: COMMERCIAL

## 2018-05-28 DIAGNOSIS — Z95.818 STATUS POST PLACEMENT OF IMPLANTABLE LOOP RECORDER: ICD-10-CM

## 2018-05-28 DIAGNOSIS — I63.9 CRYPTOGENIC STROKE: ICD-10-CM

## 2018-06-06 ENCOUNTER — HOSPITAL ENCOUNTER (EMERGENCY)
Facility: HOSPITAL | Age: 63
Discharge: HOME | End: 2018-06-06
Payer: COMMERCIAL

## 2018-06-06 VITALS
DIASTOLIC BLOOD PRESSURE: 82 MMHG | TEMPERATURE: 99 F | RESPIRATION RATE: 18 BRPM | HEART RATE: 47 BPM | OXYGEN SATURATION: 98 % | SYSTOLIC BLOOD PRESSURE: 172 MMHG

## 2018-06-06 DIAGNOSIS — M17.12 ARTHRITIS OF LEFT KNEE: ICD-10-CM

## 2018-06-06 DIAGNOSIS — I10 ACCELERATED HYPERTENSION: Primary | ICD-10-CM

## 2018-06-06 DIAGNOSIS — E86.0 DEHYDRATION: ICD-10-CM

## 2018-06-06 DIAGNOSIS — R52 PAIN: ICD-10-CM

## 2018-06-06 LAB
ALBUMIN SERPL BCP-MCNC: 4 G/DL
ALP SERPL-CCNC: 51 U/L
ALT SERPL W/O P-5'-P-CCNC: 17 U/L
ANION GAP SERPL CALC-SCNC: 7 MMOL/L
AST SERPL-CCNC: 19 U/L
BASOPHILS # BLD AUTO: 0.01 K/UL
BASOPHILS NFR BLD: 0.2 %
BILIRUB SERPL-MCNC: 0.4 MG/DL
BILIRUB UR QL STRIP: NEGATIVE
BUN SERPL-MCNC: 13 MG/DL
CALCIUM SERPL-MCNC: 9.9 MG/DL
CHLORIDE SERPL-SCNC: 102 MMOL/L
CLARITY UR: CLEAR
CO2 SERPL-SCNC: 32 MMOL/L
COLOR UR: YELLOW
CREAT SERPL-MCNC: 0.8 MG/DL
DIFFERENTIAL METHOD: ABNORMAL
EOSINOPHIL # BLD AUTO: 0 K/UL
EOSINOPHIL NFR BLD: 0.9 %
ERYTHROCYTE [DISTWIDTH] IN BLOOD BY AUTOMATED COUNT: 14.4 %
EST. GFR  (AFRICAN AMERICAN): >60 ML/MIN/1.73 M^2
EST. GFR  (NON AFRICAN AMERICAN): >60 ML/MIN/1.73 M^2
GLUCOSE SERPL-MCNC: 125 MG/DL
GLUCOSE UR QL STRIP: NEGATIVE
HCT VFR BLD AUTO: 36.4 %
HGB BLD-MCNC: 11.3 G/DL
HGB UR QL STRIP: NEGATIVE
KETONES UR QL STRIP: NEGATIVE
LEUKOCYTE ESTERASE UR QL STRIP: NEGATIVE
LYMPHOCYTES # BLD AUTO: 2.5 K/UL
LYMPHOCYTES NFR BLD: 53.9 %
MCH RBC QN AUTO: 25.7 PG
MCHC RBC AUTO-ENTMCNC: 31 G/DL
MCV RBC AUTO: 83 FL
MONOCYTES # BLD AUTO: 0.2 K/UL
MONOCYTES NFR BLD: 5.1 %
NEUTROPHILS # BLD AUTO: 1.9 K/UL
NEUTROPHILS NFR BLD: 39.7 %
NITRITE UR QL STRIP: NEGATIVE
PH UR STRIP: 6 [PH] (ref 5–8)
PLATELET # BLD AUTO: 231 K/UL
PMV BLD AUTO: 10.4 FL
POTASSIUM SERPL-SCNC: 3.6 MMOL/L
PROT SERPL-MCNC: 7.3 G/DL
PROT UR QL STRIP: NEGATIVE
RBC # BLD AUTO: 4.4 M/UL
SODIUM SERPL-SCNC: 141 MMOL/L
SP GR UR STRIP: >=1.03 (ref 1–1.03)
URN SPEC COLLECT METH UR: ABNORMAL
UROBILINOGEN UR STRIP-ACNC: NEGATIVE EU/DL
WBC # BLD AUTO: 4.69 K/UL

## 2018-06-06 PROCEDURE — 93010 ELECTROCARDIOGRAM REPORT: CPT | Mod: ,,, | Performed by: INTERNAL MEDICINE

## 2018-06-06 PROCEDURE — 96360 HYDRATION IV INFUSION INIT: CPT

## 2018-06-06 PROCEDURE — 81003 URINALYSIS AUTO W/O SCOPE: CPT

## 2018-06-06 PROCEDURE — 85025 COMPLETE CBC W/AUTO DIFF WBC: CPT

## 2018-06-06 PROCEDURE — 99284 EMERGENCY DEPT VISIT MOD MDM: CPT | Mod: 25

## 2018-06-06 PROCEDURE — 25000003 PHARM REV CODE 250

## 2018-06-06 PROCEDURE — 80053 COMPREHEN METABOLIC PANEL: CPT

## 2018-06-06 PROCEDURE — 93005 ELECTROCARDIOGRAM TRACING: CPT

## 2018-06-06 RX ORDER — CLONIDINE HYDROCHLORIDE 0.1 MG/1
0.1 TABLET ORAL
Status: COMPLETED | OUTPATIENT
Start: 2018-06-06 | End: 2018-06-06

## 2018-06-06 RX ORDER — CLONIDINE HYDROCHLORIDE 0.1 MG/1
0.1 TABLET ORAL 3 TIMES DAILY
Qty: 45 TABLET | Refills: 0 | Status: SHIPPED | OUTPATIENT
Start: 2018-06-06 | End: 2018-07-02

## 2018-06-06 RX ADMIN — SODIUM CHLORIDE 1000 ML: 0.9 INJECTION, SOLUTION INTRAVENOUS at 09:06

## 2018-06-06 RX ADMIN — CLONIDINE HYDROCHLORIDE 0.1 MG: 0.1 TABLET ORAL at 09:06

## 2018-06-07 NOTE — ED PROVIDER NOTES
"Encounter Date: 6/6/2018    SCRIBE #1 NOTE: I, López Ge, am scribing for, and in the presence of,  Dr. Chandler . I have scribed the entire note.       History     Chief Complaint   Patient presents with    Dizziness     onset 4/30/18 was seen at [facility] on 4/30/18 pt reports symptoms have not improved states " Feeling Lightheaded and I have doubled up taking my BP meds to lower my pressure."     This is a 62 y.o. female who presents with complaint of light-headedness to near syncope that began in mid-May, approximately 1 month ago. She also complains of shortness of breath and left leg pain that has persisted since this time.  She notes that she has doubled her BP medicine (lisinopril) on her own, to help her dizziness. She states she is currently light headed in the ED. She also states her symptoms are exacerbated by standing up or sitting up and she has to catch herself.  The patient reports past medical history of stroke.       The history is provided by the patient.     Review of patient's allergies indicates:  No Known Allergies  Past Medical History:   Diagnosis Date    Back pain     CVA (cerebral vascular accident)     Diabetes mellitus     Embolic stroke involving left middle cerebral artery 11/23/2017    Hypertension      Past Surgical History:   Procedure Laterality Date    BACK SURGERY      FRACTURE SURGERY       Family History   Problem Relation Age of Onset    Family history unknown: Yes     Social History   Substance Use Topics    Smoking status: Never Smoker    Smokeless tobacco: Never Used    Alcohol use No     Review of Systems   Respiratory: Positive for shortness of breath.    Musculoskeletal:        Left leg pain    Neurological: Positive for light-headedness.   All other systems reviewed and are negative.      Physical Exam     Initial Vitals [06/06/18 2021]   BP Pulse Resp Temp SpO2   (!) 189/91 62 16 98.7 °F (37.1 °C) 99 %      MAP       123.67         Physical " Exam    Nursing note and vitals reviewed.  Constitutional: She appears well-developed.   HENT:   Head: Normocephalic and atraumatic.   Eyes: EOM are normal.   Neck: Normal range of motion. Neck supple.   Cardiovascular: Regular rhythm, S1 normal and S2 normal.   Pulses:       Dorsalis pedis pulses are 2+ on the right side, and 2+ on the left side.   Pulmonary/Chest: No respiratory distress.   Abdominal: She exhibits no distension.   Musculoskeletal:   No deformity, no calf swelling or tenderness, no peripheral edema    Neurological: She is alert and oriented to person, place, and time. She has normal strength and normal reflexes. No sensory deficit.   Skin: Skin is warm and dry.   Psychiatric:   anxious         ED Course   Procedures  Labs Reviewed   CBC W/ AUTO DIFFERENTIAL - Abnormal; Notable for the following:        Result Value    Hemoglobin 11.3 (*)     Hematocrit 36.4 (*)     MCH 25.7 (*)     MCHC 31.0 (*)     Mono # 0.2 (*)     Lymph% 53.9 (*)     All other components within normal limits   COMPREHENSIVE METABOLIC PANEL - Abnormal; Notable for the following:     CO2 32 (*)     Glucose 125 (*)     Alkaline Phosphatase 51 (*)     Anion Gap 7 (*)     All other components within normal limits   URINALYSIS - Abnormal; Notable for the following:     Specific Gravity, UA >=1.030 (*)     All other components within normal limits     EKG Readings: (Independently Interpreted)   Heart Rate: 57.   Benign, rate: 57, QTc: 436     Imaging Results          X-Ray Knee 3 View Left (Final result)  Result time 06/06/18 23:07:31    Final result by Eleazar Verdin MD (06/06/18 23:07:31)                 Impression:      No acute fracture.    DJD.      Electronically signed by: Eleazar Verdin MD  Date:    06/06/2018  Time:    23:07             Narrative:    EXAMINATION:  XR KNEE 3 VIEW LEFT    CLINICAL HISTORY:  Pain, unspecified    TECHNIQUE:  AP, lateral, and Merchant views of the left knee were  performed.    COMPARISON:  None    FINDINGS:  No fracture or dislocation.  No joint effusion.  Moderate tricompartment degenerative change.                               CT Head Without Contrast (Final result)  Result time 06/06/18 22:10:32    Final result by Cal May MD (06/06/18 22:10:32)                 Impression:      No acute intracranial abnormalities identified.    Multifocal remote infarcts involving the left parietal and occipital lobes.      Electronically signed by: Cal May MD  Date:    06/06/2018  Time:    22:10             Narrative:    EXAMINATION:  CT HEAD WITHOUT CONTRAST    CLINICAL HISTORY:  dizziness;    TECHNIQUE:  Low dose axial images were obtained through the head.  Coronal and sagittal reformations were also performed. Contrast was not administered.    COMPARISON:  CT head and MRI brain from November 2017.    FINDINGS:  Regions of remote infarction are again seen involving the left parietal lobe in left occipital lobe.  No evidence of acute/recent major vascular distribution cerebral infarction, intraparenchymal hemorrhage, or intra-axial space occupying lesion. The ventricular system is normal in size and configuration with no evidence of hydrocephalus. No effacement of the skull-base cisterns. No abnormal extra-axial fluid collections or blood products. The visualized paranasal sinuses and mastoid air cells are clear. The calvarium shows no significant abnormality.                              X-Ray Knee 3 View Left   Final Result      No acute fracture.      DJD.         Electronically signed by: Eleazar Verdin MD   Date:    06/06/2018   Time:    23:07      CT Head Without Contrast   Final Result      No acute intracranial abnormalities identified.      Multifocal remote infarcts involving the left parietal and occipital lobes.         Electronically signed by: Cal May MD   Date:    06/06/2018   Time:    22:10           Medical Decision Making:   Initial  Assessment:   Patient with lightheadedness on standing which she has been treating with increased doses of her lisinopril.  She demonstrates hypertension in the emergency department which may be physiologic secondary to anxiety. She has some concern regarding the pain in her legs being caused by a blood clot, however I note that she lacks risk factors for DVT and has no physical exam signs consistent with DVT.  She notes that this leg pain has been persistent since her fall approximately 1 month ago which she was evaluated for, but her leg was not imaged at that time.  We will check x-ray here.    Patient with persistently elevated blood pressure despite resting and relaxing in the emergency department.  No evidence of acute end-organ damage.  We will treat and reassess.  Clinical Tests:   Lab Tests: Ordered and Reviewed  Radiological Study: Ordered and Reviewed  Medical Tests: Ordered and Reviewed  ED Management:  On reevaluation, the patient is comfortable in the ED, blood pressure trend appropriate, leg imaging consistent of knee arthritis, and the remainder of studies are benign. Plan to adjust BP meds.    Patient is nontoxic appearing in the ED.  No persistent emergent issues detected.  Exam benign.  We will discharge home to follow up with primary care.  Patient will return to the ED as needed for any deterioration or any other concerns.  Verbal discharge instructions and return precautions given.                        Clinical Impression:     1. Accelerated hypertension    2. Pain    3. Dehydration    4. Arthritis of left knee         I, Gerardo Chandler, personally performed the services described in this documentation. All medical record entries made by the scribe were at my direction and in my presence.  I have reviewed the chart and agree that the record reflects my personal performance and is accurate and complete. Gerardo Chandler MD  06/07/18 [id number]

## 2018-06-07 NOTE — ED NOTES
Pt to ED with complaints of dizziness and syncope. She states this has been ongoing since the 1st of May, and has not resolved since then. States she would have occasional episodes of feeling syncopal and have to hold onto something to keep herself from falling. She denies these symptoms at this time. She also states her left leg hurts while walking. Denies hitting the leg recently. The leg has no deformities compared to the right, but there is tenderness around the knee.     APPEARANCE: Alert, oriented and in no acute distress.  CARDIAC: Normal rate and rhythm. S1S2 noted. Pt denying chest pain. She does have occasional dizziness and syncope she states, that has been ongoing since being evaluated for this in early May. Pt denies shortness of breath or diaphoresis.  PERIPHERAL VASCULAR: peripheral pulses present. Normal cap refill. No edema. Warm to touch. 2+ radial pulses  RESPIRATORY:Normal rate and effort, breath sounds clear bilaterally throughout chest. Respirations are equal and unlabored no obvious signs of distress.  GASTRO: soft, bowel sounds normal, no tenderness, no abdominal distention.  MUSC: Full ROM. No bony tenderness or soft tissue tenderness. No obvious deformity. Pt states pain to the left leg when walking, but no pain at rest. She has some tenderness around the knee.   SKIN: Skin is warm and dry, normal skin turgor, mucous membranes moist.  NEURO: 5/5 strength major flexors/extensors bilaterally. Sensory intact to light touch bilaterally. [place] coma scale: eyes open spontaneously-4, oriented & converses-5, obeys commands-6. No neurological abnormalities.   MENTAL STATUS: awake, alert and aware of environment.  EYE: No obvious discharge.   ENT: EARS: no obvious drainage. NOSE: no active bleeding.

## 2018-06-28 ENCOUNTER — CLINICAL SUPPORT (OUTPATIENT)
Dept: ELECTROPHYSIOLOGY | Facility: CLINIC | Age: 63
End: 2018-06-28
Attending: INTERNAL MEDICINE
Payer: COMMERCIAL

## 2018-06-28 DIAGNOSIS — I63.9 CRYPTOGENIC STROKE: ICD-10-CM

## 2018-06-28 DIAGNOSIS — Z95.818 STATUS POST PLACEMENT OF IMPLANTABLE LOOP RECORDER: ICD-10-CM

## 2018-06-28 PROCEDURE — 93299 LOOP RECORDER REMOTE: CPT | Mod: S$GLB,,, | Performed by: INTERNAL MEDICINE

## 2018-06-28 PROCEDURE — 93298 REM INTERROG DEV EVAL SCRMS: CPT | Mod: S$GLB,,, | Performed by: INTERNAL MEDICINE

## 2018-07-02 ENCOUNTER — OFFICE VISIT (OUTPATIENT)
Dept: INTERNAL MEDICINE | Facility: CLINIC | Age: 63
End: 2018-07-02
Payer: COMMERCIAL

## 2018-07-02 ENCOUNTER — TELEPHONE (OUTPATIENT)
Dept: INTERNAL MEDICINE | Facility: CLINIC | Age: 63
End: 2018-07-02

## 2018-07-02 VITALS
SYSTOLIC BLOOD PRESSURE: 160 MMHG | OXYGEN SATURATION: 99 % | HEIGHT: 67 IN | WEIGHT: 178.13 LBS | DIASTOLIC BLOOD PRESSURE: 100 MMHG | HEART RATE: 55 BPM | BODY MASS INDEX: 27.96 KG/M2

## 2018-07-02 DIAGNOSIS — E78.2 MIXED HYPERLIPIDEMIA: Chronic | ICD-10-CM

## 2018-07-02 DIAGNOSIS — I67.2 INTRACRANIAL ATHEROSCLEROSIS: ICD-10-CM

## 2018-07-02 DIAGNOSIS — E11.9 TYPE 2 DIABETES MELLITUS WITHOUT COMPLICATION, WITHOUT LONG-TERM CURRENT USE OF INSULIN: ICD-10-CM

## 2018-07-02 DIAGNOSIS — I10 HTN (HYPERTENSION), MALIGNANT: Primary | ICD-10-CM

## 2018-07-02 DIAGNOSIS — Z12.31 BREAST CANCER SCREENING BY MAMMOGRAM: ICD-10-CM

## 2018-07-02 DIAGNOSIS — Z12.11 COLON CANCER SCREENING: ICD-10-CM

## 2018-07-02 DIAGNOSIS — Z01.419 PAP TEST, AS PART OF ROUTINE GYNECOLOGICAL EXAMINATION: ICD-10-CM

## 2018-07-02 DIAGNOSIS — Z86.73 HISTORY OF STROKE: ICD-10-CM

## 2018-07-02 PROCEDURE — 3080F DIAST BP >= 90 MM HG: CPT | Mod: CPTII,S$GLB,, | Performed by: NURSE PRACTITIONER

## 2018-07-02 PROCEDURE — 3045F PR MOST RECENT HEMOGLOBIN A1C LEVEL 7.0-9.0%: CPT | Mod: CPTII,S$GLB,, | Performed by: NURSE PRACTITIONER

## 2018-07-02 PROCEDURE — 99999 PR PBB SHADOW E&M-EST. PATIENT-LVL V: CPT | Mod: PBBFAC,,, | Performed by: NURSE PRACTITIONER

## 2018-07-02 PROCEDURE — 3008F BODY MASS INDEX DOCD: CPT | Mod: CPTII,S$GLB,, | Performed by: NURSE PRACTITIONER

## 2018-07-02 PROCEDURE — 99214 OFFICE O/P EST MOD 30 MIN: CPT | Mod: S$GLB,,, | Performed by: NURSE PRACTITIONER

## 2018-07-02 PROCEDURE — 3077F SYST BP >= 140 MM HG: CPT | Mod: CPTII,S$GLB,, | Performed by: NURSE PRACTITIONER

## 2018-07-02 RX ORDER — LISINOPRIL 40 MG/1
40 TABLET ORAL DAILY
Qty: 30 TABLET | Refills: 11 | Status: SHIPPED | OUTPATIENT
Start: 2018-07-02 | End: 2019-07-03 | Stop reason: SDUPTHER

## 2018-07-02 RX ORDER — HYDROCHLOROTHIAZIDE 25 MG/1
25 TABLET ORAL DAILY
COMMUNITY
End: 2018-07-02 | Stop reason: SDUPTHER

## 2018-07-02 RX ORDER — LOSARTAN POTASSIUM AND HYDROCHLOROTHIAZIDE 25; 100 MG/1; MG/1
1 TABLET ORAL DAILY
COMMUNITY
End: 2018-07-02

## 2018-07-02 RX ORDER — AMLODIPINE BESYLATE 5 MG/1
5 TABLET ORAL DAILY
Qty: 30 TABLET | Refills: 11 | Status: SHIPPED | OUTPATIENT
Start: 2018-07-02 | End: 2018-09-05 | Stop reason: DRUGHIGH

## 2018-07-02 RX ORDER — HYDROCHLOROTHIAZIDE 25 MG/1
25 TABLET ORAL DAILY
Qty: 30 TABLET | Refills: 11 | Status: SHIPPED | OUTPATIENT
Start: 2018-07-02 | End: 2019-07-03 | Stop reason: SDUPTHER

## 2018-07-02 NOTE — TELEPHONE ENCOUNTER
----- Message from Delonte Escobedo sent at 7/2/2018  1:09 PM CDT -----  Contact: [phone]  Type: Returning a call    Who left a message? Zoe    When did the practice call? 07/02/18    Comments: please advise, Thanks

## 2018-07-02 NOTE — PATIENT INSTRUCTIONS
Stop losartan-HCTZ 100-25 for high blood pressure    Hold off on the clonidine given by hospital. Only take once if BP > 160/100    New BP meds: Lisinopril 40mg daily, HCTZ 25mg daily, and Amlodipine 5mg daily    Follow low salt DASH diet, avoid alcohol and smoking.    Call me in 1 week with BP readings, check them twice a week, once in the morning and once in the evening.    Mammogram ordered    Referred to GYN for pap smear    Colonoscopy ordered, Endoscopy will call you with that appt    Will reschedule appt with [facility], pt missed on 6-28-18    F/U in 4 weeks with MD, Dr. Hoyos or Dr. Philip for establishing MD and f/u on chronic conditions

## 2018-07-02 NOTE — TELEPHONE ENCOUNTER
I attempted to call pt below to address her concerns with Norvasc at the number provided. No answer, unable to leave VM as VM was full.      If she calls back tell her my advice is that I am not aware that people cannot drive while on norvasc, I take norvasc myself and I am able to drive. There are warnings of the side effects of Norvasc which ALL Medications have. And one is drowsiness, that is a potential side effect. But There is no warning about not being able to drive. So I would advised her to take it. If she is worried than she can take it and not drive right after taking it, wait at least 2 hours after taking it before driving, OR she can take it at bedtime to alleviate that worry.    Any other med adjustments would have to be done when she comes back in 4 weeks with the MD after trying what I have prescribed and following the instructions I gave her during the visit as prior providers have tried different meds and combos for her BP which she told me has not worked.

## 2018-07-02 NOTE — TELEPHONE ENCOUNTER
----- Message from Eusebia Marshall sent at 7/2/2018 12:41 PM CDT -----  Contact: self [phone]  Patient is calling to speak with someone regarding medication amLODIPine (NORVASC) 5 MG tablet, she's concern because of the side effects she's reading specially the one stating she can't drive a vehicle while taking this medication because it can cause dizziness. Please call her back and advise.      Thank you

## 2018-07-02 NOTE — PROGRESS NOTES
"Subjective:       Patient ID: Autumn Cortez is a 62 y.o. female.    Chief Complaint: Hypertension    Pt new to me. Presents for f/u on BP and to establish with a new PCP. Former PCP Dr. Morgan.    She has in and out of the ED several times since April due to dizziness and passing out. Nothing significant found other than elevated BP readings. Was recently seen 6/6/18 for same issue, they made changes to her BP. She was previously on Lisinopril 40mg daily and HCTZ 25mg daily. When she went to ED they added Clonidine 0.3 mg TID on 6/6/18. After that visit, she followed up in [facility] with PCP Dr. Morgan who told her not to take the clonidine, and they d'cd the lisinopril and HCTZ and put her on losartan 100/25 daily. She states that this did not work for her, she was still dizzy, and so she decided to cut her lisinopril 40mg in 1/2 and take with the losartan 100/25 daily until she saw us.    She is here to establish a new PCP, states she no longer wants to be seen in [facility] and wants to be seen at [facility].    She has T2DM, on oral meds. Does not check her BS at home as she states she does not know how.     Pt is very anxious during interview. She states she is on Seroquel 400mg XL at night for sleep. She later states she sees psych somewhere in [place] and they told her to take 800mg at night, she in unsure for what now, states she has "psych issues", but cannot tell me the diagnosis, although earlier states she is on it for sleep. She states she is a nervous person and that this is she smokes marijuana. She inquired about me getting her qualified for medical marijuana.    She does have a hx of CVA.    I have reviewed the ED notes and imaging from 6/6/18 and from 4/30/18.    She also has a home link heart monitor. She had an appt on 6-28-18 with [facility] that she missed.      Hypertension   This is a chronic problem. The current episode started more than 1 month ago. The problem has been waxing " and waning since onset. The problem is uncontrolled. Associated symptoms include anxiety. Pertinent negatives include no blurred vision, chest pain, headaches, malaise/fatigue, neck pain, orthopnea, palpitations, peripheral edema, PND, shortness of breath or sweats. There are no associated agents to hypertension. Risk factors for coronary artery disease include diabetes mellitus and dyslipidemia. Past treatments include angiotensin blockers, ACE inhibitors and diuretics. The current treatment provides no improvement. Compliance problems include exercise and psychosocial issues.  Hypertensive end-organ damage includes CVA.     Review of Systems   Constitutional: Negative for activity change, fatigue, malaise/fatigue and unexpected weight change.   HENT: Negative.    Eyes: Negative for blurred vision and visual disturbance.   Respiratory: Negative.  Negative for cough, chest tightness, shortness of breath and wheezing.    Cardiovascular: Negative.  Negative for chest pain, palpitations, orthopnea, leg swelling and PND.        As documented in HPI   Gastrointestinal: Negative.  Negative for abdominal distention, abdominal pain, blood in stool and nausea.   Endocrine: Negative for cold intolerance, heat intolerance, polydipsia, polyphagia and polyuria.        As documented in HPI   Genitourinary: Negative.  Negative for dysuria.   Musculoskeletal: Negative.  Negative for arthralgias, back pain, gait problem and neck pain.   Skin: Negative.  Negative for color change and pallor.   Allergic/Immunologic: Negative.  Negative for environmental allergies, food allergies and immunocompromised state.   Neurological: Positive for dizziness, syncope and weakness. Negative for speech difficulty, light-headedness, numbness and headaches.        As documented in HPI   Hematological: Negative.  Negative for adenopathy. Does not bruise/bleed easily.   Psychiatric/Behavioral: The patient is nervous/anxious.        Review of patient's  allergies indicates:  No Known Allergies    Current Outpatient Prescriptions on File Prior to Visit   Medication Sig Dispense Refill Last Dose    atorvastatin (LIPITOR) 40 MG tablet Take 1 tablet (40 mg total) by mouth once daily. 30 tablet 1 Taking    clopidogrel (PLAVIX) 75 mg tablet Take 1 tablet (75 mg total) by mouth once daily. 30 tablet 1 Taking    metformin (GLUCOPHAGE) 1000 MG tablet Take 1 tablet (1,000 mg total) by mouth 2 (two) times daily with meals. 60 tablet 3 Taking    QUEtiapine (SEROQUEL XR) 400 MG Tb24 Take by mouth every evening.   Taking    [DISCONTINUED] hydroCHLOROthiazide (HYDRODIURIL) 25 MG tablet Take 25 mg by mouth once daily.   Taking    [DISCONTINUED] cloNIDine (CATAPRES) 0.1 MG tablet Take 1 tablet (0.1 mg total) by mouth 3 (three) times daily. 45 tablet 0 Not Taking    [DISCONTINUED] lisinopril (PRINIVIL,ZESTRIL) 20 MG tablet Take 1 tablet (20 mg total) by mouth once daily. (Patient taking differently: Take 40 mg by mouth once daily. ) 30 tablet 0 4/30/2018    Losartan HCTZ 100-25mg Take 1 tablet by mouth daily 30 tab  Pt stopped taking due to side effects             Patient Active Problem List   Diagnosis    Right sided weakness    Visual disturbance    Hypovolemia    HLD (hyperlipidemia)    Microcytosis    Vertebrobasilar artery syndrome    Left arm weakness    Headache    Acute vaginitis    Type 2 diabetes mellitus without complication    Essential hypertension    History of stroke    Intracranial atherosclerosis     Past Medical History:   Diagnosis Date    Back pain     CVA (cerebral vascular accident)     Diabetes mellitus     Diabetes mellitus, type 2     Embolic stroke involving left middle cerebral artery 11/23/2017    Hyperlipidemia     Hypertension     Insomnia      Past Surgical History:   Procedure Laterality Date    BACK SURGERY      FRACTURE SURGERY      right leg surgery Right     dionna in right leg     Social History     Social History  "   Marital status:      Spouse name: N/A    Number of children: N/A    Years of education: N/A     Social History Main Topics    Smoking status: Never Smoker    Smokeless tobacco: Never Used    Alcohol use No    Drug use: Yes     Types: Marijuana      Comment: denies    Sexual activity: Yes     Other Topics Concern    None     Social History Narrative    None     Family History   Problem Relation Age of Onset    Diabetes Mother     Hypertension Mother     Diabetes Father     Hypertension Father            Objective:       Vitals:    07/02/18 0757 07/02/18 0926   BP: (!) 160/90 (!) 160/100   Pulse: (!) 55    SpO2: 99%    Weight: 80.8 kg (178 lb 2.1 oz)    Height: 5' 7" (1.702 m)    PainSc:   9    PainLoc: Leg        Body mass index is 27.9 kg/m².      Physical Exam   Constitutional: She is oriented to person, place, and time. Vital signs are normal. She appears well-developed and well-nourished.   HENT:   Head: Normocephalic.   Right Ear: Hearing, tympanic membrane, external ear and ear canal normal.   Left Ear: Hearing, tympanic membrane, external ear and ear canal normal.   Eyes: Conjunctivae, EOM and lids are normal. Pupils are equal, round, and reactive to light. Lids are everted and swept, no foreign bodies found.   Neck: Trachea normal, normal range of motion and full passive range of motion without pain. Neck supple. No JVD present. Carotid bruit is not present.   Cardiovascular: Regular rhythm, S2 normal, normal heart sounds, intact distal pulses and normal pulses.  Bradycardia present.  Exam reveals no gallop and no friction rub.    No murmur heard.  Pulmonary/Chest: Effort normal and breath sounds normal.   Abdominal: Soft. Normal appearance and bowel sounds are normal. There is no hepatosplenomegaly.   Musculoskeletal: Normal range of motion.   Neurological: She is alert and oriented to person, place, and time. She has normal strength and normal reflexes.   Skin: Skin is warm, dry and " intact. Capillary refill takes less than 2 seconds.   Psychiatric: Thought content normal. Her mood appears anxious. Her speech is rapid and/or pressured. Her speech is not delayed and not slurred. She is agitated and hyperactive. She is not aggressive, not slowed, not withdrawn, not actively hallucinating and not combative. Thought content is not paranoid and not delusional. Cognition and memory are normal. She expresses impulsivity. She expresses no homicidal and no suicidal ideation. She expresses no suicidal plans and no homicidal plans. She is attentive.   Vitals reviewed.      Assessment:       1. HTN (hypertension), malignant  lisinopril (PRINIVIL,ZESTRIL) 40 MG tablet    amLODIPine (NORVASC) 5 MG tablet    hydroCHLOROthiazide (HYDRODIURIL) 25 MG tablet   2. History of stroke     3. Intracranial atherosclerosis     4. Mixed hyperlipidemia     5. Type 2 diabetes mellitus without complication, without long-term current use of insulin  Ambulatory consult to Optometry   6. BMI 27.0-27.9,adult     7. Breast cancer screening by mammogram  Mammo Digital Screening Bilat with CAD   8. Colon cancer screening  Case request GI: COLONOSCOPY   9. Pap test, as part of routine gynecological examination  Ambulatory consult to Gynecology         Plan:     Autumn was seen today for hypertension.    Diagnoses and all orders for this visit:    HTN (hypertension), malignant  Stop losartan /25.  Take meds below:  -     lisinopril (PRINIVIL,ZESTRIL) 40 MG tablet; Take 1 tablet (40 mg total) by mouth once daily.  -     amLODIPine (NORVASC) 5 MG tablet; Take 1 tablet (5 mg total) by mouth once daily.  -     hydroCHLOROthiazide (HYDRODIURIL) 25 MG tablet; Take 1 tablet (25 mg total) by mouth once daily.  Monitor BP at home in AM and PM 2 times a week and call me in 1 week with readings.    May take the clonidine 0.3mg once if BP > or equal to 160/100    Take medications as prescribed.    Monitor BP at home, goal BP < or = 140/80,  call office if consistently above this range.    Follow low salt DASH diet and exercise.    BMI reviewed.    Go to ED if Headaches, blurred vision, chest pain, or SOB occurs along with elevated readings > or = 160/90.with meds    History of stroke  Stable, Recent CT scan from ED showed nothing Acute onl evidence of Old CVA    Intracranial atherosclerosis  Stable, Recent CT scan from ED showed nothing Acute onl evidence of Old CVA    Mixed hyperlipidemia  Continue cholesterol med, low fat diet, and exercise. Check lipids next visit in 4 weeks with new PCP    Type 2 diabetes mellitus without complication, without long-term current use of insulin  -     Ambulatory consult to Optometry    A1C goal < 7.0, BP goal < 140/80, LDL goal < 100.    Adhere to ADA diet.    Take meds as prescribed, check BS daily. Notify if sugars are out of range discussed during visit.. Recheck A1c in 4 weeks with visit with new PCP    Yearly eye exam discussed.    Yearly foot exam discussed.    BMI 27.0-27.9,adult    Breast cancer screening by mammogram  -     Mammo Digital Screening Bilat with CAD; Future    Colon cancer screening  -     Case request GI: COLONOSCOPY    Pap test, as part of routine gynecological examination  -     Ambulatory consult to Gynecology    In summary on AVS:    Hold off on the clonidine given by hospital. Only take once if BP > 160/100    New BP meds: Lisinopril 40mg daily, HCTZ 25mg daily, and Amlodipine 5mg daily    Follow low salt DASH diet, avoid alcohol and smoking.    Call me in 1 week with BP readings, check them twice a week, once in the morning and once in the evening.    Mammogram ordered    Referred to GYN for pap smear    Colonoscopy ordered, Endoscopy will call you with that appt    Will reschedule appt with [facility], pt missed on 6-28-18    F/U in 4 weeks with MD, Dr. Hoyos or Dr. Philip for establishing MD and f/u on chronic conditions    Other health maintenance overdue can be addressed  in 4 weeks with new PCP as pt did not want vaccines today.    Addendum:    Pt returned back to clinic with her BP machine she has been using at home for me to check. The machine's readings are not accurate compared to the readings checked manually in clinic. I advised pt to try to exchange her meter for one that goes on her arm as opposed to her wrist. Verbalized understanding.

## 2018-07-25 ENCOUNTER — HOSPITAL ENCOUNTER (OUTPATIENT)
Dept: RADIOLOGY | Facility: HOSPITAL | Age: 63
Discharge: HOME | End: 2018-07-25
Attending: NURSE PRACTITIONER
Payer: COMMERCIAL

## 2018-07-25 ENCOUNTER — OFFICE VISIT (OUTPATIENT)
Dept: OBSTETRICS AND GYNECOLOGY | Facility: CLINIC | Age: 63
End: 2018-07-25
Payer: COMMERCIAL

## 2018-07-25 VITALS
WEIGHT: 179.25 LBS | SYSTOLIC BLOOD PRESSURE: 170 MMHG | BODY MASS INDEX: 28.13 KG/M2 | DIASTOLIC BLOOD PRESSURE: 100 MMHG | HEIGHT: 67 IN

## 2018-07-25 DIAGNOSIS — Z01.419 ENCOUNTER FOR GYNECOLOGICAL EXAMINATION WITHOUT ABNORMAL FINDING: Primary | ICD-10-CM

## 2018-07-25 DIAGNOSIS — Z12.31 BREAST CANCER SCREENING BY MAMMOGRAM: ICD-10-CM

## 2018-07-25 DIAGNOSIS — Z12.4 PAP SMEAR FOR CERVICAL CANCER SCREENING: ICD-10-CM

## 2018-07-25 DIAGNOSIS — Z12.31 SCREENING MAMMOGRAM, ENCOUNTER FOR: ICD-10-CM

## 2018-07-25 PROCEDURE — 3080F DIAST BP >= 90 MM HG: CPT | Mod: CPTII,S$GLB,, | Performed by: OBSTETRICS & GYNECOLOGY

## 2018-07-25 PROCEDURE — 99386 PREV VISIT NEW AGE 40-64: CPT | Mod: S$GLB,,, | Performed by: OBSTETRICS & GYNECOLOGY

## 2018-07-25 PROCEDURE — 99999 PR PBB SHADOW E&M-EST. PATIENT-LVL III: CPT | Mod: PBBFAC,,, | Performed by: OBSTETRICS & GYNECOLOGY

## 2018-07-25 PROCEDURE — 77067 SCR MAMMO BI INCL CAD: CPT | Mod: TC

## 2018-07-25 PROCEDURE — 3077F SYST BP >= 140 MM HG: CPT | Mod: CPTII,S$GLB,, | Performed by: OBSTETRICS & GYNECOLOGY

## 2018-07-25 PROCEDURE — 77063 BREAST TOMOSYNTHESIS BI: CPT | Mod: 26,,, | Performed by: RADIOLOGY

## 2018-07-25 PROCEDURE — 77067 SCR MAMMO BI INCL CAD: CPT | Mod: 26,,, | Performed by: RADIOLOGY

## 2018-07-25 PROCEDURE — 88175 CYTOPATH C/V AUTO FLUID REDO: CPT

## 2018-07-25 RX ORDER — POTASSIUM CHLORIDE 1500 MG/1
20 TABLET, EXTENDED RELEASE ORAL DAILY
Refills: 2 | COMMUNITY
Start: 2018-06-08 | End: 2018-09-06 | Stop reason: SDUPTHER

## 2018-07-25 NOTE — PROGRESS NOTES
Subjective:       Patient ID: Autumn Rene is a 62 y.o. female.    Chief Complaint:  Well Woman      History of Present Illness  HPI    Autumn Rene is a 62 y.o. female  NEW TO ME here for her annual GYN exam.    She describes her periods as stopped about 9 years ago,   denies break through bleeding.   denies vaginal itching or irritation.  Denies vaginal discharge.  She is not currently sexually active. She has recently broken up with her partner of 2 years DUE TO INFIDELITY, WAS TREATED FOR TRICH IN April AT THE ED.   History of abnormal pap: No  Last Pap: patient does not recall when last pap was  Last MMG: Done today  Last Colonoscopy:  Scheduled  denies domestic violence. She does feel safe at home.     Past Medical History:   Diagnosis Date    Back pain     CVA (cerebral vascular accident)     Diabetes mellitus     Diabetes mellitus, type 2     Embolic stroke involving left middle cerebral artery 2017    Hyperlipidemia     Hypertension     Insomnia      Past Surgical History:   Procedure Laterality Date    BACK SURGERY      FRACTURE SURGERY      right leg surgery Right     dionna in right leg     Social History     Social History    Marital status:      Spouse name: N/A    Number of children: N/A    Years of education: N/A     Occupational History    Not on file.     Social History Main Topics    Smoking status: Never Smoker    Smokeless tobacco: Never Used    Alcohol use No    Drug use: Yes     Types: Marijuana      Comment: denies    Sexual activity: Yes     Partners: Male      Comment: relationship of 2 years broke up APril due to infidelity     Other Topics Concern    Not on file     Social History Narrative    No narrative on file     Family History   Problem Relation Age of Onset    Diabetes Mother     Hypertension Mother     Diabetes Father     Hypertension Father     Breast cancer Neg Hx     Colon cancer Neg Hx     Ovarian cancer Neg Hx   "    OB History      Para Term  AB Living    3 3 3     3    SAB TAB Ectopic Multiple Live Births            3          BP (!) 170/100   Ht 5' 7" (1.702 m)   Wt 81.3 kg (179 lb 3.7 oz)   LMP 2009 (Approximate)   BMI 28.07 kg/m²         GYN & OB History  Patient's last menstrual period was 2009 (approximate).   Date of Last Pap: No result found    OB History    Para Term  AB Living   3 3 3     3   SAB TAB Ectopic Multiple Live Births           3      # Outcome Date GA Lbr [place]/2nd Weight Sex Delivery Anes PTL Lv   3 Term      Vag-Spont   STU   2 Term      Vag-Spont   STU   1 Term      Vag-Spont   STU          Review of Systems  Review of Systems   Constitutional: Negative for activity change, appetite change, fatigue and unexpected weight change.   HENT: Negative.    Eyes: Negative for visual disturbance.   Respiratory: Negative for shortness of breath and wheezing.    Cardiovascular: Negative for chest pain, palpitations and leg swelling.   Gastrointestinal: Negative for abdominal pain, bloating and blood in stool.   Endocrine: Negative for diabetes, hair loss and hot flashes.   Genitourinary: Negative for decreased libido, dyspareunia, postcoital bleeding and postmenopausal bleeding.   Musculoskeletal: Negative for back pain and joint swelling.   Skin:  Negative for no acne and hair changes.   Neurological: Negative for headaches.   Hematological: Does not bruise/bleed easily.   Psychiatric/Behavioral: Positive for depression. Negative for sleep disturbance. The patient is nervous/anxious.    Breast: Negative for breast pain and nipple discharge          Objective:    Physical Exam:   Constitutional: She is oriented to person, place, and time. She appears well-developed and well-nourished.    HENT:   Head: Normocephalic and atraumatic.    Eyes: EOM are normal. Pupils are equal, round, and reactive to light.    Neck: Normal range of motion. Neck supple.    Cardiovascular: " Normal rate and regular rhythm.     Pulmonary/Chest: Effort normal and breath sounds normal.   BREASTS:  no mass, no tenderness, no deformity and no retraction. Right breast exhibits no inverted nipple, no mass, no nipple discharge, no skin change, no tenderness, no bleeding and no swelling. Left breast exhibits no inverted nipple, no mass, no nipple discharge, no skin change, no tenderness, no bleeding and no swelling. Breasts are symmetrical.              Abdominal: Soft. Bowel sounds are normal.     Genitourinary: Pelvic exam was performed with patient supine.   Genitourinary Comments: PELVIC: Normal external genitalia without lesions.  Normal hair distribution.  Adequate perineal body, normal urethral meatus.  Vagina  Dry and poorly rugated, atrophic, without lesions or discharge.  Cervix pink, without lesions, discharge or tenderness.  No significant cystocele or rectocele.  Bimanual exam shows uterus to be NOT PALPABLE SECONDARY TO HABITUS,and nontender.  Adnexa without masses or tenderness.    RECTAL:Deferred             Musculoskeletal: Normal range of motion and moves all extremeties.       Neurological: She is alert and oriented to person, place, and time.    Skin: Skin is warm and dry.    Psychiatric: She has a normal mood and affect.          Assessment:        1. Encounter for gynecological examination without abnormal finding    2. Pap smear for cervical cancer screening    3. Screening mammogram, encounter for               Plan:        1. Encounter for gynecological examination without abnormal finding  COUNSELING:  The patient was counseled today on osteoporosis prevention, calcium supplementation, and regular weight bearing exercise. The patient was also counseled today on ACS PAP guidelines, with recommendations for yearly pelvic exams unless their uterus, cervix, and ovaries were removed for benign reasons; in that case, examinations every 3-5 years are recommended. The patient was also counseled  regarding monthly breast self-examination, routine STD screening for at-risk populations, prophylactic immunizations for transmitted infections such as HPV, Pertussis, or Influenza as appropriate, and yearly mammograms when indicated by ACS guidelines. She was advised to see her primary care physician for all other health maintenance.   FOLLOW-UP with me for next routine visit.         2. Pap smear for cervical cancer screening    - Liquid-based pap smear, screening    3. Screening mammogram, encounter for    - Mammo Digital Screening Bilat with Tomosynthesis CAD; Future       Follow-up in about 1 year (around 7/25/2019).

## 2018-07-25 NOTE — LETTER
July 25, 2018      Zoe Davis DNP  [address]  [place] [address]           [facility]th [facility]  [address]  [place] [address]  Phone: [phone]          Patient: Autumn Rene   MR Number: [id number]   YOB: 1955   Date of Visit: 7/25/2018       Dear Zoe Davis:    Thank you for referring Autumn Rene to me for evaluation. Attached you will find relevant portions of my assessment and plan of care.    If you have questions, please do not hesitate to call me. I look forward to following Autumn Rene along with you.    Sincerely,    Kristy Patterson MD    Enclosure  CC:  No Recipients    If you would like to receive this communication electronically, please contact [email or web address] or [phone] to request more information on [facility] Link access.    For providers and/or their staff who would like to refer a patient to [facility], please contact us through our one-stop-shop provider referral line, [facility], at [phone].    If you feel you have received this communication in error or would no longer like to receive these types of communications, please e-mail [email or web address]

## 2018-07-30 ENCOUNTER — OFFICE VISIT (OUTPATIENT)
Dept: INTERNAL MEDICINE | Facility: CLINIC | Age: 63
End: 2018-07-30
Payer: COMMERCIAL

## 2018-07-30 VITALS
WEIGHT: 177.25 LBS | HEIGHT: 67 IN | BODY MASS INDEX: 27.82 KG/M2 | SYSTOLIC BLOOD PRESSURE: 134 MMHG | HEART RATE: 75 BPM | OXYGEN SATURATION: 99 % | DIASTOLIC BLOOD PRESSURE: 78 MMHG

## 2018-07-30 DIAGNOSIS — I10 ESSENTIAL HYPERTENSION: Primary | ICD-10-CM

## 2018-07-30 DIAGNOSIS — G89.4 CHRONIC PAIN DISORDER: ICD-10-CM

## 2018-07-30 DIAGNOSIS — M47.816 SPONDYLOSIS OF LUMBAR REGION WITHOUT MYELOPATHY OR RADICULOPATHY: ICD-10-CM

## 2018-07-30 PROCEDURE — 3075F SYST BP GE 130 - 139MM HG: CPT | Mod: CPTII,S$GLB,, | Performed by: NURSE PRACTITIONER

## 2018-07-30 PROCEDURE — 99999 PR PBB SHADOW E&M-EST. PATIENT-LVL IV: CPT | Mod: PBBFAC,,, | Performed by: NURSE PRACTITIONER

## 2018-07-30 PROCEDURE — 3078F DIAST BP <80 MM HG: CPT | Mod: CPTII,S$GLB,, | Performed by: NURSE PRACTITIONER

## 2018-07-30 PROCEDURE — 99213 OFFICE O/P EST LOW 20 MIN: CPT | Mod: S$GLB,,, | Performed by: NURSE PRACTITIONER

## 2018-07-30 PROCEDURE — 3008F BODY MASS INDEX DOCD: CPT | Mod: CPTII,S$GLB,, | Performed by: NURSE PRACTITIONER

## 2018-07-30 RX ORDER — CLONIDINE HYDROCHLORIDE 0.3 MG/1
0.3 TABLET ORAL DAILY PRN
Qty: 30 TABLET | Refills: 0 | Status: SHIPPED | OUTPATIENT
Start: 2018-07-30 | End: 2018-08-26 | Stop reason: SDUPTHER

## 2018-07-30 NOTE — PROGRESS NOTES
Subjective:       Patient ID: Autumn Rene is a 62 y.o. female.    Chief Complaint: Follow-up; Hypertension; and Dizziness    Pt is here for a 4 week follow up on HTN. She has not established her new MD PCP yet. Saw me 4 weeks ago to establish and for HTN.   Former PCP Dr. Morgan.     She had been in and out of the ED several times since April due to dizziness and passing out. Nothing significant found other than elevated BP readings. Was recently seen 6/6/18 for same issue, they made changes to her BP. She was previously on Lisinopril 40mg daily and HCTZ 25mg daily. When she went to ED they added Clonidine 0.3 mg TID on 6/6/18. After that visit, she followed up in [facility] with PCP Dr. Morgan who told her not to take the clonidine, and they d'cd the lisinopril and HCTZ and put her on losartan 100/25 daily. She states that this did not work for her, she was still dizzy, and so she decided to cut her lisinopril 40mg in 1/2 and take with the losartan 100/25 daily until she saw us.     Made some changes to her BP meds, told to keep logs and bring machine back in 4 weeks for f/u with an MD PCP on BP. She was scheduled with me today. I had provided her with names of the MDs she could schedule with last visit. My d/c instructions to her was:    Stop losartan /25.  Take meds below:  -     lisinopril (PRINIVIL,ZESTRIL) 40 MG tablet; Take 1 tablet (40 mg total) by mouth once daily.  -     amLODIPine (NORVASC) 5 MG tablet; Take 1 tablet (5 mg total) by mouth once daily.  -     hydroCHLOROthiazide (HYDRODIURIL) 25 MG tablet; Take 1 tablet (25 mg total) by mouth once daily.  Monitor BP at home in AM and PM 2 times a week and call me in 1 week with readings.     May take the clonidine 0.3mg once if BP > or equal to 160/100.    She unfortunately just got the BP machine and has not started using it. Needs help on how to use. Will demonstrate during visit today.    Pt also complains of left leg and left arm  pain off and on for 2 yrs. She saw pain management in the past for this up until April, and she stopped seeing them. She wants to see Orthopedics for this. She saw a Dr. Lorena back in 2013, had a MRI, showed mild DJD lumbar spine. She was getting injections in her leg and arms for this. She would like referral for pain management, she has seen them in the past before.      Review of Systems   Constitutional: Negative for activity change, appetite change and unexpected weight change.   Eyes: Negative for visual disturbance.   Respiratory: Negative for apnea, cough, chest tightness and shortness of breath.    Cardiovascular: Negative for chest pain, palpitations and leg swelling.   Gastrointestinal: Negative for abdominal distention, abdominal pain, anal bleeding, blood in stool, constipation, diarrhea, nausea, rectal pain and vomiting.   Endocrine: Negative for cold intolerance, heat intolerance, polydipsia, polyphagia and polyuria.   Genitourinary: Negative for difficulty urinating.   Musculoskeletal: Positive for back pain. Negative for arthralgias.        Left leg and should pain as documented in HPI   Skin: Negative for color change, pallor, rash and wound.   Allergic/Immunologic: Negative for environmental allergies, food allergies and immunocompromised state.   Neurological: Negative for dizziness, speech difficulty and weakness.   Psychiatric/Behavioral: Negative for agitation, behavioral problems, sleep disturbance and suicidal ideas.       Review of patient's allergies indicates:  No Known Allergies    Current Outpatient Prescriptions:     amLODIPine (NORVASC) 5 MG tablet, Take 1 tablet (5 mg total) by mouth once daily., Disp: 30 tablet, Rfl: 11    atorvastatin (LIPITOR) 40 MG tablet, Take 1 tablet (40 mg total) by mouth once daily., Disp: 30 tablet, Rfl: 1    clopidogrel (PLAVIX) 75 mg tablet, Take 1 tablet (75 mg total) by mouth once daily., Disp: 30 tablet, Rfl: 1    hydroCHLOROthiazide  (HYDRODIURIL) 25 MG tablet, Take 1 tablet (25 mg total) by mouth once daily., Disp: 30 tablet, Rfl: 11    lisinopril (PRINIVIL,ZESTRIL) 40 MG tablet, Take 1 tablet (40 mg total) by mouth once daily., Disp: 30 tablet, Rfl: 11    metformin (GLUCOPHAGE) 1000 MG tablet, Take 1 tablet (1,000 mg total) by mouth 2 (two) times daily with meals., Disp: 60 tablet, Rfl: 3    potassium chloride (K-TAB) 20 mEq, Take 20 mEq by mouth once daily., Disp: , Rfl: 2    QUEtiapine (SEROQUEL XR) 400 MG Tb24, Take by mouth every evening., Disp: , Rfl:     Patient Active Problem List   Diagnosis    Right sided weakness    Visual disturbance    Hypovolemia    HLD (hyperlipidemia)    Microcytosis    Vertebrobasilar artery syndrome    Left arm weakness    Headache    Acute vaginitis    Type 2 diabetes mellitus without complication    Essential hypertension    History of stroke    Intracranial atherosclerosis     Past Medical History:   Diagnosis Date    Back pain     CVA (cerebral vascular accident)     Diabetes mellitus     Diabetes mellitus, type 2     Embolic stroke involving left middle cerebral artery 11/23/2017    Hyperlipidemia     Hypertension     Insomnia      Past Surgical History:   Procedure Laterality Date    BACK SURGERY      FRACTURE SURGERY      right leg surgery Right     dionna in right leg     Social History     Social History    Marital status:      Spouse name: N/A    Number of children: N/A    Years of education: N/A     Social History Main Topics    Smoking status: Never Smoker    Smokeless tobacco: Never Used    Alcohol use No    Drug use: Yes     Types: Marijuana      Comment: denies    Sexual activity: Yes     Partners: Male      Comment: relationship of 2 years broke up APril due to infidelity     Other Topics Concern    Not on file     Social History Narrative    No narrative on file     Family History   Problem Relation Age of Onset    Diabetes Mother     Hypertension  "Mother     Diabetes Father     Hypertension Father     Breast cancer Neg Hx     Colon cancer Neg Hx     Ovarian cancer Neg Hx        Objective:       Vitals:    07/30/18 1427   BP: 134/78   Pulse: 75   SpO2: 99%   Weight: 80.4 kg (177 lb 4 oz)   Height: 5' 7" (1.702 m)   PainSc: 10-Worst pain ever   PainLoc: Leg     Body mass index is 27.76 kg/m².    Physical Exam   Constitutional: She is oriented to person, place, and time. Vital signs are normal. She appears well-developed and well-nourished.   overweight   Eyes: Conjunctivae, EOM and lids are normal. Pupils are equal, round, and reactive to light. Lids are everted and swept, no foreign bodies found.   Neck: Trachea normal, normal range of motion and full passive range of motion without pain. Neck supple. No JVD present. Carotid bruit is not present.   Cardiovascular: Normal rate, regular rhythm, S1 normal, S2 normal, normal heart sounds, intact distal pulses and normal pulses.    Pulmonary/Chest: Effort normal and breath sounds normal.   Abdominal: Soft. Normal appearance and bowel sounds are normal. There is no hepatosplenomegaly.   Musculoskeletal: Normal range of motion. She exhibits no edema, tenderness or deformity.   Neurological: She is alert and oriented to person, place, and time. She has normal strength and normal reflexes.   Skin: Skin is warm, dry and intact. Capillary refill takes less than 2 seconds.   Psychiatric: Her speech is normal. Judgment and thought content normal. Her mood appears anxious. Cognition and memory are normal.   Vitals reviewed.      Assessment:       1. Essential hypertension     2. Spondylosis of lumbar region without myelopathy or radiculopathy  Ambulatory Referral to Pain Clinic   3. BMI 27.0-27.9,adult     4. Chronic pain disorder  Ambulatory Referral to Pain Clinic         Plan:     Autumn was seen today for follow-up, hypertension and dizziness.    Diagnoses and all orders for this visit:    Essential " hypertension  Stable today, reiterated my instructions to pt given last time. Taught pt how to properly check her BP. See AVS for instructions given to pt    Spondylosis of lumbar region without myelopathy or radiculopathy  -     Ambulatory Referral to Pain Clinic    BMI 27.0-27.9,adult  BMI reviewed    Chronic pain disorder  -     Ambulatory Referral to Pain Clinic    RTC in 6 months or sooner prn with one of the MD providers listed on her AVS

## 2018-07-30 NOTE — PATIENT INSTRUCTIONS
Referred to Pain Management for chronic pain management, [facility]. Bring letter from previous Doctor to that appt.    Continue meds for BP, Take meds below, you have refills for the year on these:  -     lisinopril (PRINIVIL,ZESTRIL) 40 MG tablet; Take 1 tablet (40 mg total) by mouth once daily.  -     amLODIPine (NORVASC) 5 MG tablet; Take 1 tablet (5 mg total) by mouth once daily.  -     hydroCHLOROthiazide (HYDRODIURIL) 25 MG tablet; Take 1 tablet (25 mg total) by mouth once daily.  Monitor BP at home in AM and PM 2 times a week and call me in 1 week with readings.     May take the clonidine 0.3mg once if BP > or equal to 160/100-- this is the med from ER, only use as needed.    Need to establish with one of the MDs here for ongoing care, here are the names again for the MDs I work with: Dr. Hoyos, Dr. Philip, Dr. Lawrence, Dr. Sultana, or Dr. Hutchison.

## 2018-07-31 ENCOUNTER — TELEPHONE (OUTPATIENT)
Dept: SPINE | Facility: CLINIC | Age: 63
End: 2018-07-31

## 2018-07-31 NOTE — TELEPHONE ENCOUNTER
----- Message from Luzma Mohamud PA-C sent at 7/31/2018  3:26 PM CDT -----  She has appt tomorrow. Referral was done for pain management. Make sure she is aware that I am not pain management and that I do not prescribe narcotics.

## 2018-08-01 ENCOUNTER — TELEPHONE (OUTPATIENT)
Dept: SPINE | Facility: CLINIC | Age: 63
End: 2018-08-01

## 2018-08-02 ENCOUNTER — CLINICAL SUPPORT (OUTPATIENT)
Dept: ELECTROPHYSIOLOGY | Facility: CLINIC | Age: 63
End: 2018-08-02
Attending: INTERNAL MEDICINE
Payer: COMMERCIAL

## 2018-08-02 DIAGNOSIS — Z95.818 STATUS POST PLACEMENT OF IMPLANTABLE LOOP RECORDER: ICD-10-CM

## 2018-08-02 DIAGNOSIS — I63.9 CRYPTOGENIC STROKE: ICD-10-CM

## 2018-08-02 PROCEDURE — 93298 REM INTERROG DEV EVAL SCRMS: CPT | Mod: S$GLB,,, | Performed by: INTERNAL MEDICINE

## 2018-08-02 PROCEDURE — 93299 LOOP RECORDER REMOTE: CPT | Mod: S$GLB,,, | Performed by: INTERNAL MEDICINE

## 2018-08-26 DIAGNOSIS — I10 ESSENTIAL HYPERTENSION: ICD-10-CM

## 2018-08-27 RX ORDER — CLONIDINE HYDROCHLORIDE 0.3 MG/1
TABLET ORAL
Qty: 30 TABLET | Refills: 0 | Status: SHIPPED | OUTPATIENT
Start: 2018-08-27 | End: 2018-09-27 | Stop reason: SDUPTHER

## 2018-09-04 ENCOUNTER — CLINICAL SUPPORT (OUTPATIENT)
Dept: ELECTROPHYSIOLOGY | Facility: CLINIC | Age: 63
End: 2018-09-04
Attending: INTERNAL MEDICINE
Payer: COMMERCIAL

## 2018-09-04 DIAGNOSIS — I63.9 CRYPTOGENIC STROKE: ICD-10-CM

## 2018-09-04 DIAGNOSIS — Z95.818 STATUS POST PLACEMENT OF IMPLANTABLE LOOP RECORDER: ICD-10-CM

## 2018-09-04 PROCEDURE — 93298 REM INTERROG DEV EVAL SCRMS: CPT | Mod: ,,, | Performed by: INTERNAL MEDICINE

## 2018-09-04 PROCEDURE — 93299 LOOP RECORDER REMOTE: CPT | Mod: ,,, | Performed by: INTERNAL MEDICINE

## 2018-09-05 ENCOUNTER — LAB VISIT (OUTPATIENT)
Dept: LAB | Facility: HOSPITAL | Age: 63
End: 2018-09-05
Payer: COMMERCIAL

## 2018-09-05 ENCOUNTER — OFFICE VISIT (OUTPATIENT)
Dept: INTERNAL MEDICINE | Facility: CLINIC | Age: 63
End: 2018-09-05
Payer: COMMERCIAL

## 2018-09-05 VITALS
OXYGEN SATURATION: 99 % | BODY MASS INDEX: 27.06 KG/M2 | DIASTOLIC BLOOD PRESSURE: 90 MMHG | HEART RATE: 96 BPM | SYSTOLIC BLOOD PRESSURE: 140 MMHG | WEIGHT: 172.38 LBS | HEIGHT: 67 IN

## 2018-09-05 DIAGNOSIS — I10 ESSENTIAL HYPERTENSION: Primary | ICD-10-CM

## 2018-09-05 DIAGNOSIS — H61.23 BILATERAL IMPACTED CERUMEN: ICD-10-CM

## 2018-09-05 DIAGNOSIS — E87.6 HYPOKALEMIA: ICD-10-CM

## 2018-09-05 DIAGNOSIS — J00 ACUTE NASOPHARYNGITIS: ICD-10-CM

## 2018-09-05 DIAGNOSIS — I10 HTN (HYPERTENSION), MALIGNANT: ICD-10-CM

## 2018-09-05 DIAGNOSIS — J06.9 UPPER RESPIRATORY TRACT INFECTION, UNSPECIFIED TYPE: ICD-10-CM

## 2018-09-05 PROBLEM — H61.20 IMPACTED CERUMEN: Status: ACTIVE | Noted: 2018-09-05

## 2018-09-05 LAB
ANION GAP SERPL CALC-SCNC: 10 MMOL/L
BUN SERPL-MCNC: 9 MG/DL
CALCIUM SERPL-MCNC: 10.5 MG/DL
CHLORIDE SERPL-SCNC: 100 MMOL/L
CO2 SERPL-SCNC: 32 MMOL/L
CREAT SERPL-MCNC: 0.8 MG/DL
EST. GFR  (AFRICAN AMERICAN): >60 ML/MIN/1.73 M^2
EST. GFR  (NON AFRICAN AMERICAN): >60 ML/MIN/1.73 M^2
GLUCOSE SERPL-MCNC: 133 MG/DL
POTASSIUM SERPL-SCNC: 3.4 MMOL/L
SODIUM SERPL-SCNC: 142 MMOL/L

## 2018-09-05 PROCEDURE — 99999 PR PBB SHADOW E&M-EST. PATIENT-LVL V: CPT | Mod: PBBFAC,,,

## 2018-09-05 PROCEDURE — 36415 COLL VENOUS BLD VENIPUNCTURE: CPT

## 2018-09-05 PROCEDURE — 3077F SYST BP >= 140 MM HG: CPT | Mod: CPTII,S$GLB,,

## 2018-09-05 PROCEDURE — 3008F BODY MASS INDEX DOCD: CPT | Mod: CPTII,S$GLB,,

## 2018-09-05 PROCEDURE — 80048 BASIC METABOLIC PNL TOTAL CA: CPT

## 2018-09-05 PROCEDURE — 3080F DIAST BP >= 90 MM HG: CPT | Mod: CPTII,S$GLB,,

## 2018-09-05 PROCEDURE — 99214 OFFICE O/P EST MOD 30 MIN: CPT | Mod: S$GLB,,,

## 2018-09-05 RX ORDER — IPRATROPIUM BROMIDE 21 UG/1
2 SPRAY, METERED NASAL 2 TIMES DAILY
Qty: 30 ML | Refills: 0 | Status: SHIPPED | OUTPATIENT
Start: 2018-09-05 | End: 2019-12-03

## 2018-09-05 RX ORDER — BENZONATATE 100 MG/1
100 CAPSULE ORAL 3 TIMES DAILY PRN
Qty: 15 CAPSULE | Refills: 1 | Status: SHIPPED | OUTPATIENT
Start: 2018-09-05 | End: 2018-09-15

## 2018-09-05 RX ORDER — AMLODIPINE BESYLATE 10 MG/1
10 TABLET ORAL DAILY
Qty: 30 TABLET | Refills: 11 | Status: SHIPPED | OUTPATIENT
Start: 2018-09-05 | End: 2019-09-05

## 2018-09-05 NOTE — PROGRESS NOTES
"Clinic Note  09/05/2018      Subjective:       Patient ID: Autumn Rene is a 62 y.o. female being seen for an established visit.    Chief Complaint: Establish Care      HPI    Ms. Rene is a 61 yo woman being seen in clinic today to establish care and with multiple complaints. Patient is often tearful with an extremely labile mood during interview and exam. Expresses frustration with "people guessing and what medicine I should take" and - she feels - not being understood by her healthcare providers. Also displays splitting during exam, frequently making statements to the effect of no other medical provider has every listened to her or helped her, and this interviewer is the only provider to ever truly understand her problems and help her. Patient lists a large amount of complaints at the beginning of clinic visit; therefore, she was asked to pick the two complaints that concern her most. Those will be addressed today and she will follow up in clinic again soon to address further complaints.    1. URI  Patient reports 4-5 days of sinus congestion, frontal headache, ear pressure, sore throat and cough productive of white to green-tinged sputum. No fevers. Does have pleuritic CP over sternum which is worse with palpation. No GI complaints    2. HTN  Patient states over and over again that no one has fixed this problem. She checks her BP frequently and SBP occasionally as high as 180-190. Was told to take clonidine PRN for SBP > 160. Current regimen is amlodipine 5, lisinopril 50, HCTZ 25 and PRN clonidine. Patient reports that she has problems with her R leg giving out (has extensive history of lumbar back surgery and fracture and ortho surgeries to that leg), perseverates on the idea that this is caused by HTN. Even takes half a lisinopril to "get my leg back."     Past Medical History:   Diagnosis Date    Back pain     CVA (cerebral vascular accident)     Diabetes mellitus     Diabetes mellitus, " type 2     Embolic stroke involving left middle cerebral artery 11/23/2017    Hyperlipidemia     Hypertension     Insomnia        Past Surgical History:   Procedure Laterality Date    BACK SURGERY      FRACTURE SURGERY      right leg surgery Right     dionna in right leg       Family History   Problem Relation Age of Onset    Diabetes Mother     Hypertension Mother     Diabetes Father     Hypertension Father     Breast cancer Neg Hx     Colon cancer Neg Hx     Ovarian cancer Neg Hx        Social History     Socioeconomic History    Marital status:      Spouse name: None    Number of children: None    Years of education: None    Highest education level: None   Social Needs    Financial resource strain: None    Food insecurity - worry: None    Food insecurity - inability: None    Transportation needs - medical: None    Transportation needs - non-medical: None   Occupational History    None   Tobacco Use    Smoking status: Never Smoker    Smokeless tobacco: Never Used   Substance and Sexual Activity    Alcohol use: No     Alcohol/week: 0.6 oz     Types: 1 Cans of beer per week    Drug use: Yes     Types: Marijuana     Comment: denies    Sexual activity: Yes     Partners: Male     Comment: relationship of 2 years broke up APril due to infidelity   Other Topics Concern    None   Social History Narrative    None       Review of Systems   Constitutional: Positive for activity change, appetite change and fatigue. Negative for fever and unexpected weight change.   HENT: Positive for congestion, ear pain, postnasal drip, rhinorrhea, sinus pressure, sinus pain, sneezing and sore throat. Negative for ear discharge, facial swelling, hearing loss and mouth sores.    Eyes: Negative for visual disturbance.   Respiratory: Positive for cough. Negative for shortness of breath.    Cardiovascular: Positive for chest pain (MSK, non-cardiac). Negative for palpitations and leg swelling.    Gastrointestinal: Negative for constipation, diarrhea, nausea and vomiting.   Genitourinary: Negative for dysuria.   Skin: Negative for wound.   Neurological: Positive for headaches. Negative for syncope.   Psychiatric/Behavioral: Positive for agitation, behavioral problems, dysphoric mood and sleep disturbance. Negative for suicidal ideas. The patient is nervous/anxious.        Patient's Medications   New Prescriptions    AMLODIPINE (NORVASC) 10 MG TABLET    Take 1 tablet (10 mg total) by mouth once daily.    BENZONATATE (TESSALON) 100 MG CAPSULE    Take 1 capsule (100 mg total) by mouth 3 (three) times daily as needed for Cough.    IPRATROPIUM (ATROVENT) 0.03 % NASAL SPRAY    2 sprays by Nasal route 2 (two) times daily.   Previous Medications    ATORVASTATIN (LIPITOR) 40 MG TABLET    Take 1 tablet (40 mg total) by mouth once daily.    CLONIDINE (CATAPRES) 0.3 MG TABLET    TAKE 1 TABLET BY MOUTH DAILY AS NEEDED ONLY IF BLOOD PRESSURE IS GREATER THAN OR EQUAL /100    CLOPIDOGREL (PLAVIX) 75 MG TABLET    Take 1 tablet (75 mg total) by mouth once daily.    HYDROCHLOROTHIAZIDE (HYDRODIURIL) 25 MG TABLET    Take 1 tablet (25 mg total) by mouth once daily.    LISINOPRIL (PRINIVIL,ZESTRIL) 40 MG TABLET    Take 1 tablet (40 mg total) by mouth once daily.    METFORMIN (GLUCOPHAGE) 1000 MG TABLET    Take 1 tablet (1,000 mg total) by mouth 2 (two) times daily with meals.    POTASSIUM CHLORIDE (K-TAB) 20 MEQ    Take 20 mEq by mouth once daily.    QUETIAPINE (SEROQUEL XR) 400 MG TB24    Take by mouth every evening.   Modified Medications    No medications on file   Discontinued Medications    AMLODIPINE (NORVASC) 5 MG TABLET    Take 1 tablet (5 mg total) by mouth once daily.       Patient Active Problem List    Diagnosis Date Noted    Hypokalemia 09/05/2018    URI (upper respiratory infection) 09/05/2018    Impacted cerumen 09/05/2018    Intracranial atherosclerosis 01/23/2018    Type 2 diabetes mellitus without  "complication 11/23/2017    Essential hypertension 11/23/2017    History of stroke 11/23/2017     L MCA posterior division infarct 11/23/17      Acute vaginitis 07/31/2017    Headache     Left arm weakness 06/02/2017    Vertebrobasilar artery syndrome     Microcytosis 05/27/2017    HLD (hyperlipidemia) 05/21/2016    Hypovolemia 05/20/2016    Right sided weakness 05/19/2016    Visual disturbance 05/19/2016           Objective:      BP (!) 140/90 (BP Location: Left arm, Patient Position: Sitting, BP Method: Medium (Manual))   Pulse 96   Ht 5' 7" (1.702 m)   Wt 78.2 kg (172 lb 6.4 oz)   LMP 07/25/2009 (Approximate)   SpO2 99%   BMI 27.00 kg/m²   Estimated body mass index is 27 kg/m² as calculated from the following:    Height as of this encounter: 5' 7" (1.702 m).    Weight as of this encounter: 78.2 kg (172 lb 6.4 oz).    Physical Exam   Constitutional: She is oriented to person, place, and time. She appears well-developed and well-nourished. No distress.   Visibly upset and anxious. Cries through out physical exam.   HENT:   Head: Normocephalic and atraumatic.   Mouth/Throat: No oropharyngeal exudate.   No tenderness over sinuses  No pharyngitis  Could not visualize TM due to impacted cerumen bilaterally   Eyes: Conjunctivae are normal. No scleral icterus.   Neck: Normal range of motion.   Cardiovascular: Normal rate, regular rhythm, normal heart sounds and intact distal pulses.   Pulmonary/Chest: Effort normal and breath sounds normal.   Abdominal: Soft. She exhibits no distension. There is no tenderness.   Musculoskeletal: Normal range of motion. She exhibits no edema.   Well healed surgical scar over R knee   Neurological: She is alert and oriented to person, place, and time.   Skin: Skin is warm and dry.   Psychiatric: She has a normal mood and affect. Her behavior is normal.       Assessment:         1. Essential hypertension  NURSING COMMUNICATION: Create [facility]    Hypertension " Digital Medicine ([facility]) Enrollment Order    Hypertension Digital Medicine ([facility]): Assign Onboarding Questionnaires    amLODIPine (NORVASC) 10 MG tablet   2. Acute nasopharyngitis     3. Hypokalemia  Basic metabolic panel   4. Upper respiratory tract infection, unspecified type  benzonatate (TESSALON) 100 MG capsule    ipratropium (ATROVENT) 0.03 % nasal spray   5. HTN (hypertension), malignant     6. Bilateral impacted cerumen  Ambulatory consult to ENT         Plan:         1. Essential hypertension  - Increased amlodipine to 10 mg qD as patient was 140/90 on recheck - attempted to check manual while patient was calm  - Will enroll in Digital HTN program  - Assurred patient that her HTN has nothing to do with her leg giving out or chronic pain; instructed to stop taking BP pills for this; has appointment with Pain Medicine soon, advised patient to keep this appointment and we'll follow up soon  - Discontinued clonidine  - NURSING COMMUNICATION: Create [facility]  - Hypertension Digital Medicine ([facility]) Enrollment Order  - Hypertension Digital Medicine ([facility]): Assign Onboarding Questionnaires  - amLODIPine (NORVASC) 10 MG tablet; Take 1 tablet (10 mg total) by mouth once daily.  Dispense: 30 tablet; Refill: 11    2. Hypokalemia  - While wrapping up appointment, patient states she was told she had low potassium once and was prescribed KCl supplementation, needs a refill. Told her that I would feel more comfortable checking a lab before prescribing more KCl  - Basic metabolic panel; Future    3. Upper respiratory tract infection, unspecified type  - Advised her to take plain mucinex and OTC cough syrup, avoid decongestants due to HTN  - Contact clinic if symptoms do not start to improve in 2-3 days  - benzonatate (TESSALON) 100 MG capsule; Take 1 capsule (100 mg total) by mouth 3 (three) times daily as needed for Cough.  Dispense: 15 capsule; Refill: 1  - ipratropium (ATROVENT) 0.03 % nasal spray; 2 sprays by  Nasal route 2 (two) times daily.  Dispense: 30 mL; Refill: 0    4. Bilateral impacted cerumen  - Ambulatory consult to ENT      Orders Placed This Encounter   Procedures    Basic metabolic panel     Standing Status:   Future     Standing Expiration Date:   11/4/2019    Ambulatory consult to ENT     Referral Priority:   Routine     Referral Type:   Consultation     Referral Reason:   Specialty Services Required     Requested Specialty:   Otolaryngology     Number of Visits Requested:   1    NURSING COMMUNICATION: Create [facility]    Hypertension Digital Medicine (HDMP) Enrollment Order     I. PURPOSE  To provide [facility] patients with innovative, specialized blood pressure monitoring and optimal dosing of antihypertensive therapy to improve health outcomes and decrease microvascular and macrovascular complications    II. GOALS  To maintain a systematic, coordinated and cost-effective process to monitor blood pressure in patients with hypertension  To attain and maintain optimal antihypertensive therapy while ensuring patient safety using the most recent evidence-based guidelines for the management of hypertension as reported by AHA/ACC in 2017.   Provide consultative services to providers, patients and caregivers regarding optimal antihypertensive therapy  To improve patient/caregiver understanding and compliance related to antihypertensive therapies by providing continuous patient and caregiver education about their prescribed medications and associated disease state  To provide education, guidance and reinforcement regarding lifestyle modifications including weight loss, adopting and maintaining the Dietary Approaches to Stop Hypertension or DASH diet, sodium restriction, physical activity, and moderation of alcohol consumption to patients and caregivers  Allow providers increased availability of clinic time for direct patient care   To provide patient care and education within an  interdisciplinary framework and enhance partnering with other members of health care team  Improve continuity of care for high blood pressure patients and improve patient engagement  Collect and utilize pharmacy related outcomes to improve quality of patient care    III. COLLABORATIVE PRACTICE AGREEMENT    A.  Under this collaborative practice agreement, an [facility] pharmacist according to and in compliance with [place] Board of Pharmacy Title 46, Part LIII, section 523 and [place] Board of Medical Examiners definition of the Collaborative Drug Therapy Management (CDTM) may initiate, implement, alter and monitor a therapeutic drug plan intended to manage antihypertensive therapy.  Services offered by the hypertension pharmacy specialist may include education on disease state and lifestyle modification, in addition to the drug therapy services listed above. Written and audio/visual educational materials and patient specific information may be provided to improve quality of care.    B. Primary Collaborating Physician:    Primary Physician: Jaspreet Danielson M.D., [facility]  , Cardiology  License number: [id number]  CDTM number:  [id number]  Telephone number: [phone]   E-mail address: [email or web address]  Emergency Contact Information: [phone]    Back-Up Physician:  Frederic Lagos M.D.  Cardiology  License number:  [id number]  CDTM number:  [id number]  Telephone number:  [phone]  E-mail address: [email or web address]  Emergency Contact Information: [phone]    C.  Pharmacists:   Each of the following pharmacists will serve as the primary pharmacist on CDTM and any pharmacist may serve as the secondary pharmacist for another pharmacists agreement.  Each of the pharmacists listed below has a Pharm.D degree, with completion of an accredited pharmacy practice residency and as well as experience with managing patients along with a physician.  The outpatient monitoring service will be  provided under the Cardiology Department at [facility] [place] location in [place] at [facility] [place] [address]. The pharmacist will contact patients via telephone from a remote location.    Pharmacist: Christine Jarquin PharmD  This pharmacist has completed a pharmacy practice residency and has practiced clinical pharmacy for 7 years. She has experience in the areas of cardiology, acute care, and managed care. She started a pharmacist run hypertension clinic at [facility] during her residency and was published in The American Journal of Health-System Pharmacists.     [place] Pharmacist License Number: [id number]  CDTM number:  [id number]  Contact Number:  [phone]  Contact E-mail: [email or web address]  Emergency Contact Number:  [phone]    Pharmacist:  Albina Villafuerte PharmD  This pharmacist has completed a pharmacy practice residency and has practiced clinical pharmacy for 17 years in the areas of cardiology, geriatric medicine, anticoagulation management, retail and ambulatory care.  She served as a pharmacy practice clinical preceptor and lead pharmacist in anticoagulation clinic for 10 years.  [place] Pharmacist License # [id number]  CDTM number:  [id number]  Contact Number:  [phone]  Contact E-mail:  [email or web address]   Emergency Contact Number:  [phone]    Pharmacist: Fe Soto PharmD, BCACP, CDE  This pharmacist has completed a pharmacy practice residency with emphasis in ambulatory care and has practiced clinical pharmacy for 8 years in the areas of dyslipidemia, hypertension, diabetes, and anticoagulation. She is also a Certified Diabetes Educator.      [place] Pharmacist License # [id number]  CDTM number: [id number]  Contact number: [phone]  Contact E-mail:  [email or web address]  Emergency Contact Number: [phone]    Pharmacist: Cristina Correa PharmD  This pharmacist started practicing at [facility] in 2011 following her one year residency at [facility]. She serves as an Adjunct Clinical  in the College of Pharmacy at [employer]. She served as the lead pharmacist for the Coumadin Clinic team for 4 years.   [place] Pharmacist License: [id number]  CDTM number: [id number]  Contact number: [phone]  Contact E-mail: [email or web address]   Emergency Contact Number: [phone]    Pharmacist:  Reba Alvarado PharmD  This pharmacist has completed a pharmacy practice residency (PGY-1) and has practiced clinical pharmacy for 16  years in the areas of heart and abdominal transplant, retail and ambulatory care.  She was directly involved in the care of congestive heart failure, left ventricular assist device and heart transplant patients from [id number].  She is currently practicing as a digital medicine clinical specialist in heart failure.    [place] Pharmacist License # [id number]  CDTM number:  [id number]  Contact Number:  [phone]  Contact E-mail:  [email or web address]   Emergency Contact Number:  [phone]    Pharmacist: Cristina Bangura PharmD  This pharmacist obtained her Pharm.D. from [employer], and has completed an Ambulatory Care Pharmacy Practice residency at [employer]. She has practiced clinical pharmacy for 9  years and has experience in the areas of Hypertension and Diabetes.      [place] Pharmacist License: [id number]  CDTM Number: [id number]  Telephone number: [phone]  E-mail: [email or web address]  Emergency Contact Number: [phone]      Pharmacist: Leann Smith PharmD  This pharmacist has completed a pharmacy practice residency with an emphasis in ambulatory care and has practiced clinical pharmacy for one year. She has experience in the areas of diabetes, hypertension and dyslipidemia.   [place] Pharmacist License: [id number]  CDTM Number: [id number]  Contact Number: [phone]  Contact Email: [email or web address]   Emergency Contact: [phone]    Pharmacist: Romelia Billings, PharmD, BCPS  This pharmacist has completed a pharmacy practice residency with an emphasis in ambulatory care and is a Board Certified Pharmacotherapy Specialist (BCPS). She has practiced clinical pharmacy for  years in areas of ambulatory care, internal medicine, cardiology and specialty pharmacy.    [place] Pharmacist License Number: [id number]  CDTM number:  [id number]  Contact Number:  [phone]  Contact E-mail:  [email or web address]   Emergency Contact Number:  [phone]    D.  Eligible Patients  Patients whose antihypertensive therapy is managed under this agreement must have a diagnosis of hypertension as documented in the patient record, and have established care with a provider within [facility]. All aspects of the patients hypertension medication management will be followed in collaboration with the physician treating the patients hypertension.  The patient will be seen by his or her physician per their discretion or by the recommendation by the hypertension pharmacist.  The patients case may be reviewed with clinic medical personnel as needed, but will be reported at least every 30 days to the collaborating physician regarding the patients drug therapy management. All decisions made by the pharmacist will be recorded in [facility] and are readily available for physician review. All issues outside of the scope of antihypertensive therapy shall be reported to the collaborating physician.     E.  Medications in CDTM  This collaborative practice proposal involves the management of patients who are receiving antihypertensive therapy, specifically, thiazide-type diuretics, angiotensin-converting enzyme inhibtors (ACE-I), angiotensin receptor blockers (ARB), calcium channel blockers (CCB), beta-blockers, loop diuretics, potassium-sparing diuretics, potassium  supplementation, aldosterone antagonists, direct renin inhibitors, alpha-1 receptor blockers, central alpha-2 agonists, direct arterial vasodilators and peripheral adrenergic antoagonists, or those patients in which hypertension is controlled by lifestyle modifcation alone.      F.  Clinical Procedure  All patients will be notified that a hypertension CDTM exists.  A signed physician order will be required for each patient to be enrolled into the hypertension CDTM.  Informed consent and an electronic signature will be obtained from each patient and documented in the patients record.  This patient signature will be valid for 1 year, requiring a new physician order and patient consent yearly.  The patient must also be enrolled in the electornic communication program ([facility]) to be elegible for the monitoring program.  The following management plan will be utilized for CDTM:    Patients must submit blood pressures at a minimum of once weekly from the patient- purchased wireless blood pressure cuff. Patients who fail to comply with this requirement are subject to removal from [facility].   Evaluate the change in blood pressure, if any, from previous measurements performed on the same cuff and arm.  Determine and document contributing factors for blood pressure change.    Review initial drug therapy made by clinic physician upon program enrollment with patient to ensure compliance.    Identify target blood pressure based on age and comorbidities. Therapeutic changes will be made in collaboration with PharmD and collaborating physician based on the AHA/ACC 2017 guidelines for the treatment of hypertension.  Guide drug optimization based on patient response at 2-4 week intervals until control is achieved.  The PharmD will continue to monitor blood pressure and make adjustments to hypertension medications in order to achieve optimal blood pressure.   Order follow up labs when changing or adding ACE inhibitors and diuretics.     Refer to hypertension specialist if  blood pressure goals cannot be achieved using the noted algorithm.  Notify physician with specific problems or request clinic visit if deemed necessary.  Document antihypertensive therapy changes, interventions, and outcomes in EMR.   Provide patient/caregiver continuous education or reinforcement regarding hypertension management,  medications and lifestyle modifications.    Laboratory Tests  Pharmacists will be authorized to order and evaluate laboratory tests directly related to the disease specific drug therapy being managed. Pharmacists will also be authorized to order additional specific labs based on patient clinical presentation or description. Pharmacists may also review other lab data available in the patient record which may be necessary for the evaluation and assessment of the impact on antihypertensive therapy (i.e. drug interaction, disease interaction, etc.).     b.  Documentation   Documentation of patient encounters and lab results will be permanently placed in the [facility].  Laboratory results will automatically populate prompting review and assessment of each patient.  Laboratory results obtained from outside laboratories will be entered into EMR manually.  This documentation will include lab values, medication changes, identification and assessment of adverse events related to therapy, antihypertensive medication dose adjustments, therapeutic management plan and follow up as well as any other information given to the patient during the telemedicine visit.    c.     1.   will be carried out through quarterly reports tracking following statistics:   a. Percent of patients requiring a change to antihypertensive medications   b. Number of emergency visits due to hypertensive urgency or emergency, hypotension or medication side effects for participating patients  Percent of patients who are controlled (BP <130/80 mmHg)  and  uncontrolled (BP>130 mmHg)     2.  Patient specific quality indicators will be identified through quarterly review of the following data:   a.  Average change in blood pressure after a dose adjument by the hypertension pharmacist    3.  A random sample of patient records shall be reviewed by the primary physician quarterly to ensure adherence by the hypertension clinical specialists to the collaborative practice agreement.     4.   measures will be reported to Pharmacy & Therapeutics Committee yearly.     References:    PERFECTO Dorantes, et al. 2017. 2017. Guidelines for the Prevention, Detection, Evaluation and Management of High Blood Pressure in Adults.      Order Specific Question:   BP Control Goal     Answer:   Current (2017) AHA Guidelines             Patient seen and plan of care discussed with Dr. Raquel Harris MD  Internal Medicine, PGY-2

## 2018-09-05 NOTE — PATIENT INSTRUCTIONS
For your cough and congestion I sent prescriptions for cough medicine and nasal spray.    Also get these over the counter medicines: Mucinex (plain NO decongestant - decongestants can raise your blood pressure), and cough syrup like Robitussin (again WITHOUT decongestant).

## 2018-09-06 ENCOUNTER — TELEPHONE (OUTPATIENT)
Dept: INTERNAL MEDICINE | Facility: CLINIC | Age: 63
End: 2018-09-06

## 2018-09-06 DIAGNOSIS — J01.90 ACUTE NON-RECURRENT SINUSITIS, UNSPECIFIED LOCATION: ICD-10-CM

## 2018-09-06 DIAGNOSIS — E87.6 HYPOKALEMIA: Primary | ICD-10-CM

## 2018-09-06 RX ORDER — POTASSIUM CHLORIDE 1500 MG/1
20 TABLET, EXTENDED RELEASE ORAL DAILY
Qty: 30 TABLET | Refills: 2 | Status: SHIPPED | OUTPATIENT
Start: 2018-09-06 | End: 2019-02-03 | Stop reason: SDUPTHER

## 2018-09-06 RX ORDER — AMOXICILLIN AND CLAVULANATE POTASSIUM 875; 125 MG/1; MG/1
1 TABLET, FILM COATED ORAL EVERY 12 HOURS
Qty: 10 TABLET | Refills: 0 | Status: SHIPPED | OUTPATIENT
Start: 2018-09-06 | End: 2019-03-02

## 2018-09-06 NOTE — TELEPHONE ENCOUNTER
Called MsKayla Berry as she is reporting continued symptoms and an increase in productive cough with more yellow-green sputum. Will send prescription for augmentin BID for 5 day course to pharmacy for bacterial sinusitis.    Also reviewed BMP with her. KCl mildly low, will send refill for KCl supplementation.

## 2018-09-06 NOTE — TELEPHONE ENCOUNTER
Spoke with pt, she states that she has filled Tessalon pearls however it didn't help her at all. She has a bad night coughing. Pt was told to call the office if not better to get antibiotic to be call in to [facility].

## 2018-09-06 NOTE — TELEPHONE ENCOUNTER
----- Message from Delonte Escobedo sent at 9/6/2018  7:37 AM CDT -----  Contact: self [phone]  Patient requesting a call from the office win regards to having medication for her cough .she requesting an urgent call today  Please call and advise, Thanks

## 2018-09-07 ENCOUNTER — TELEPHONE (OUTPATIENT)
Dept: PAIN MEDICINE | Facility: CLINIC | Age: 63
End: 2018-09-07

## 2018-09-07 NOTE — TELEPHONE ENCOUNTER
Called pt in an attempt to inform her of the role of IPM. Pt didn't answer and her voicemail is full.

## 2018-09-10 ENCOUNTER — TELEPHONE (OUTPATIENT)
Dept: INTERNAL MEDICINE | Facility: CLINIC | Age: 63
End: 2018-09-10

## 2018-09-10 ENCOUNTER — TELEPHONE (OUTPATIENT)
Dept: PAIN MEDICINE | Facility: CLINIC | Age: 63
End: 2018-09-10

## 2018-09-10 DIAGNOSIS — R20.0 NUMBNESS AND TINGLING OF BOTH LOWER EXTREMITIES: Primary | ICD-10-CM

## 2018-09-10 DIAGNOSIS — R20.2 NUMBNESS AND TINGLING OF BOTH LOWER EXTREMITIES: Primary | ICD-10-CM

## 2018-09-10 NOTE — TELEPHONE ENCOUNTER
Spoke with pt and informed them of the role of IPM. Understanding verbalized. Pt explained that she is experiencing several falls due to her legs going numb as if she is paralyzed, c/o hx of stroke and stated that this is her main reason for coming in. Pt asked if we could guide her in the right direction for medical attention.  After speaking with Dr. Barkley, informed pt that her PCP should refer her to see a neurologist for her issues. Pt verbalized understanding and will cx appt with Pain management.

## 2018-09-10 NOTE — TELEPHONE ENCOUNTER
----- Message from Steven Georges sent at 9/10/2018  9:53 AM CDT -----  Contact: Patient [phone]  Type: Referral to a Specialist    Where would you like a referral to? Neurologist Specialist     Have you previously requested? No     Is the physician within the [facility]? Yes     [facility] phone [facility]     Reason for appointment: pain management stating patient is in needing of seeing Neurologist and not pain management due to symptoms, losing feeling in legs and gracie in spine.    Is an appointment scheduled with specialist? When?    Comments: would like a call to inform ready to schedule.    Please call an advise  Thank you

## 2018-09-10 NOTE — TELEPHONE ENCOUNTER
----- Message from Ramo Simms sent at 9/10/2018  9:44 AM CDT -----  Contact: self/[phone]  Pt is calling to speak with someone in the office in regards to getting a call back. Pt states that she had spoken with the pain management doctor and they stated that she needs to see a urology doctor. Please advise.      Thanks

## 2018-09-17 ENCOUNTER — TELEPHONE (OUTPATIENT)
Dept: ELECTROPHYSIOLOGY | Facility: CLINIC | Age: 63
End: 2018-09-17

## 2018-09-17 DIAGNOSIS — R53.1 RIGHT SIDED WEAKNESS: ICD-10-CM

## 2018-09-17 DIAGNOSIS — R20.2 NUMBNESS AND TINGLING OF BOTH LOWER EXTREMITIES: Primary | ICD-10-CM

## 2018-09-17 DIAGNOSIS — R29.898 LEFT ARM WEAKNESS: ICD-10-CM

## 2018-09-17 DIAGNOSIS — R20.0 NUMBNESS AND TINGLING OF BOTH LOWER EXTREMITIES: Primary | ICD-10-CM

## 2018-09-17 NOTE — TELEPHONE ENCOUNTER
Following up w/ patient. She sent several symptom events across her home loop monitor. Also, she is sending manual transmissions 1-3 times daily. We  have discussed that she does not need to send a manual transmission several times per day. Her symptom button should be pressed if she has any events she feels are related to her heart. Otherwise, just sleeping by the monitor at night will send any arrhythmias caught by the implant or by her symptom button will send nightly automatically. Continuous transmissions can deplete the battery prematurely. Reaching out to the patient to discuss her symptoms and to further educate Ms Cortez re: the loop implant.  Her contact # has a VM that is full.

## 2018-09-21 ENCOUNTER — TELEPHONE (OUTPATIENT)
Dept: OBSTETRICS AND GYNECOLOGY | Facility: CLINIC | Age: 63
End: 2018-09-21

## 2018-09-21 NOTE — TELEPHONE ENCOUNTER
----- Message from Mellisanoel Ge sent at 9/21/2018  2:48 PM CDT -----  Contact: pt [phone]  Pt called requested visit.  Was last seen July 25, 2018.  Pt thinks she has a bladder infection with an itch that needs attention  and also wants blood work to make sure that no SED are present.  Please call pt to make arrangements    [phone]    Thanks  ca

## 2018-09-21 NOTE — TELEPHONE ENCOUNTER
----- Message from Parisa Otoole sent at 9/21/2018  3:14 PM CDT -----  Contact: Pt   Pt missed a phone call from the nurse and would like a call back at her earliest convenience       Pt can be contacted at [phone]

## 2018-09-27 DIAGNOSIS — I10 ESSENTIAL HYPERTENSION: ICD-10-CM

## 2018-09-27 RX ORDER — CLONIDINE HYDROCHLORIDE 0.3 MG/1
TABLET ORAL
Qty: 30 TABLET | Refills: 0 | Status: SHIPPED | OUTPATIENT
Start: 2018-09-27 | End: 2020-03-03

## 2018-10-08 ENCOUNTER — CLINICAL SUPPORT (OUTPATIENT)
Dept: ELECTROPHYSIOLOGY | Facility: CLINIC | Age: 63
End: 2018-10-08
Attending: INTERNAL MEDICINE
Payer: COMMERCIAL

## 2018-10-08 DIAGNOSIS — I63.9 CRYPTOGENIC STROKE: ICD-10-CM

## 2018-10-08 DIAGNOSIS — Z95.818 STATUS POST PLACEMENT OF IMPLANTABLE LOOP RECORDER: ICD-10-CM

## 2018-11-12 ENCOUNTER — CLINICAL SUPPORT (OUTPATIENT)
Dept: CARDIOLOGY | Facility: HOSPITAL | Age: 63
End: 2018-11-12
Attending: INTERNAL MEDICINE
Payer: COMMERCIAL

## 2018-11-12 DIAGNOSIS — I63.9 CRYPTOGENIC STROKE: ICD-10-CM

## 2018-11-12 DIAGNOSIS — Z95.818 STATUS POST PLACEMENT OF IMPLANTABLE LOOP RECORDER: ICD-10-CM

## 2018-11-12 PROCEDURE — 93299 CARDIAC DEVICE CHECK CHECK - REMOTE: CPT

## 2018-11-12 PROCEDURE — 93298 REM INTERROG DEV EVAL SCRMS: CPT | Mod: ,,, | Performed by: INTERNAL MEDICINE

## 2018-12-10 PROBLEM — J01.90 ACUTE SINUSITIS: Status: RESOLVED | Noted: 2018-09-06 | Resolved: 2018-12-10

## 2018-12-27 DIAGNOSIS — Z95.818 STATUS POST PLACEMENT OF IMPLANTABLE LOOP RECORDER: Primary | ICD-10-CM

## 2018-12-28 ENCOUNTER — CLINICAL SUPPORT (OUTPATIENT)
Dept: CARDIOLOGY | Facility: HOSPITAL | Age: 63
End: 2018-12-28
Attending: INTERNAL MEDICINE
Payer: COMMERCIAL

## 2018-12-28 DIAGNOSIS — Z95.818 STATUS POST PLACEMENT OF IMPLANTABLE LOOP RECORDER: ICD-10-CM

## 2018-12-28 PROCEDURE — 93299 CARDIAC DEVICE CHECK - REMOTE: CPT

## 2019-01-01 PROCEDURE — 93298 REM INTERROG DEV EVAL SCRMS: CPT | Mod: ,,, | Performed by: INTERNAL MEDICINE

## 2019-01-14 ENCOUNTER — CLINICAL SUPPORT (OUTPATIENT)
Dept: CARDIOLOGY | Facility: HOSPITAL | Age: 64
End: 2019-01-14
Attending: INTERNAL MEDICINE
Payer: COMMERCIAL

## 2019-01-14 DIAGNOSIS — Z46.9 DEVICE FITTING OR ADJUSTMENT: ICD-10-CM

## 2019-01-14 PROCEDURE — 93298 CARDIAC DEVICE CHECK - REMOTE: ICD-10-PCS | Mod: ,,, | Performed by: INTERNAL MEDICINE

## 2019-01-14 PROCEDURE — 93299 CARDIAC DEVICE CHECK - REMOTE: CPT

## 2019-01-14 PROCEDURE — 93298 REM INTERROG DEV EVAL SCRMS: CPT | Mod: ,,, | Performed by: INTERNAL MEDICINE

## 2019-01-21 DIAGNOSIS — Z46.9 DEVICE FITTING OR ADJUSTMENT: Primary | ICD-10-CM

## 2019-02-03 RX ORDER — POTASSIUM CHLORIDE 1500 MG/1
20 TABLET, EXTENDED RELEASE ORAL DAILY
Qty: 30 TABLET | Refills: 2 | Status: SHIPPED | OUTPATIENT
Start: 2019-02-03 | End: 2019-05-04 | Stop reason: SDUPTHER

## 2019-02-15 ENCOUNTER — CLINICAL SUPPORT (OUTPATIENT)
Dept: CARDIOLOGY | Facility: HOSPITAL | Age: 64
End: 2019-02-15
Attending: INTERNAL MEDICINE
Payer: COMMERCIAL

## 2019-02-15 DIAGNOSIS — Z46.9 FITTING AND ADJUSTMENT OF DEVICE: ICD-10-CM

## 2019-02-15 PROCEDURE — 93299 CARDIAC DEVICE CHECK - REMOTE: CPT

## 2019-02-18 DIAGNOSIS — Z46.9 FITTING AND ADJUSTMENT OF DEVICE: Primary | ICD-10-CM

## 2019-03-02 ENCOUNTER — OFFICE VISIT (OUTPATIENT)
Dept: URGENT CARE | Facility: CLINIC | Age: 64
End: 2019-03-02
Payer: COMMERCIAL

## 2019-03-02 VITALS
HEIGHT: 67 IN | RESPIRATION RATE: 18 BRPM | HEART RATE: 108 BPM | DIASTOLIC BLOOD PRESSURE: 104 MMHG | OXYGEN SATURATION: 99 % | SYSTOLIC BLOOD PRESSURE: 161 MMHG | BODY MASS INDEX: 27.47 KG/M2 | WEIGHT: 175 LBS | TEMPERATURE: 101 F

## 2019-03-02 DIAGNOSIS — R05.9 COUGH: ICD-10-CM

## 2019-03-02 DIAGNOSIS — J20.9 ACUTE PURULENT BRONCHITIS: Primary | ICD-10-CM

## 2019-03-02 DIAGNOSIS — R50.9 FEVER, UNSPECIFIED FEVER CAUSE: ICD-10-CM

## 2019-03-02 LAB
CTP QC/QA: YES
FLUAV AG NPH QL: NEGATIVE
FLUBV AG NPH QL: NEGATIVE

## 2019-03-02 PROCEDURE — 87804 INFLUENZA ASSAY W/OPTIC: CPT | Mod: QW,S$GLB,, | Performed by: NURSE PRACTITIONER

## 2019-03-02 PROCEDURE — 99214 PR OFFICE/OUTPT VISIT, EST, LEVL IV, 30-39 MIN: ICD-10-PCS | Mod: S$GLB,,, | Performed by: NURSE PRACTITIONER

## 2019-03-02 PROCEDURE — 3008F PR BODY MASS INDEX (BMI) DOCUMENTED: ICD-10-PCS | Mod: CPTII,S$GLB,, | Performed by: NURSE PRACTITIONER

## 2019-03-02 PROCEDURE — 3008F BODY MASS INDEX DOCD: CPT | Mod: CPTII,S$GLB,, | Performed by: NURSE PRACTITIONER

## 2019-03-02 PROCEDURE — 3080F PR MOST RECENT DIASTOLIC BLOOD PRESSURE >= 90 MM HG: ICD-10-PCS | Mod: CPTII,S$GLB,, | Performed by: NURSE PRACTITIONER

## 2019-03-02 PROCEDURE — 3077F PR MOST RECENT SYSTOLIC BLOOD PRESSURE >= 140 MM HG: ICD-10-PCS | Mod: CPTII,S$GLB,, | Performed by: NURSE PRACTITIONER

## 2019-03-02 PROCEDURE — 3077F SYST BP >= 140 MM HG: CPT | Mod: CPTII,S$GLB,, | Performed by: NURSE PRACTITIONER

## 2019-03-02 PROCEDURE — 99214 OFFICE O/P EST MOD 30 MIN: CPT | Mod: S$GLB,,, | Performed by: NURSE PRACTITIONER

## 2019-03-02 PROCEDURE — 3080F DIAST BP >= 90 MM HG: CPT | Mod: CPTII,S$GLB,, | Performed by: NURSE PRACTITIONER

## 2019-03-02 PROCEDURE — 87804 POCT INFLUENZA A/B: ICD-10-PCS | Mod: QW,S$GLB,, | Performed by: NURSE PRACTITIONER

## 2019-03-02 RX ORDER — AZITHROMYCIN 250 MG/1
TABLET, FILM COATED ORAL
Qty: 6 TABLET | Refills: 0 | Status: SHIPPED | OUTPATIENT
Start: 2019-03-02 | End: 2019-09-24

## 2019-03-02 RX ORDER — IBUPROFEN 200 MG
TABLET ORAL
Refills: 0 | COMMUNITY
Start: 2018-12-04 | End: 2020-03-03

## 2019-03-02 RX ORDER — IBUPROFEN 200 MG
600 TABLET ORAL
Status: COMPLETED | OUTPATIENT
Start: 2019-03-02 | End: 2019-03-02

## 2019-03-02 RX ORDER — BENZONATATE 100 MG/1
200 CAPSULE ORAL EVERY 6 HOURS PRN
Qty: 30 CAPSULE | Refills: 0 | Status: SHIPPED | OUTPATIENT
Start: 2019-03-02 | End: 2019-04-01

## 2019-03-02 RX ORDER — IBUPROFEN 600 MG/1
600 TABLET ORAL EVERY 6 HOURS PRN
Qty: 60 TABLET | Refills: 2 | Status: SHIPPED | OUTPATIENT
Start: 2019-03-02 | End: 2019-03-02

## 2019-03-02 RX ORDER — AMLODIPINE BESYLATE 5 MG/1
5 TABLET ORAL DAILY
Refills: 11 | COMMUNITY
Start: 2019-02-03 | End: 2019-07-03

## 2019-03-02 RX ADMIN — Medication 600 MG: at 06:03

## 2019-03-03 NOTE — PATIENT INSTRUCTIONS
Use warm salt water gargles to ease your throat pain. Warm salt water gargles as needed for sore throat-  1/2 tsp salt to 1 cup warm water, gargle as desired. Hot tea with honey helps soothe.     Use an antihistamine such as Claritin, Zyrtec or Allegra to dry you out.     Use pseudoephedrine (behind the counter) to decongest. Pseudoephedrine  30 mg up to 240 mg /day. It can raise your blood pressure and give you palpitations.    Use mucinex (guaifenisin) to break up mucous up to 2400mg/day to loosen any mucous. The mucinex DM pill has a cough suppressant that can be used at night to stop the tickle at the back of your throat.    Use Flonase 1-2 sprays/nostril per day. It is a local acting steroid nasal spray, if you develop a bloody nose, stop using the medication immediately.    If symptoms persist or worsen patient is to follow up with primary.          Bronchitis, Antibiotic Treatment (Adult)    Bronchitis is an infection of the air passages (bronchial tubes) in your lungs. It often occurs when you have a cold. This illness is contagious during the first few days and is spread through the air by coughing and sneezing, or by direct contact (touching the sick person and then touching your own eyes, nose, or mouth).  Symptoms of bronchitis include cough with mucus (phlegm) and low-grade fever. Bronchitis usually lasts 7 to 14 days. Mild cases can be treated with simple home remedies. More severe infection is treated with an antibiotic.  Home care  Follow these guidelines when caring for yourself at home:  · If your symptoms are severe, rest at home for the first 2 to 3 days. When you go back to your usual activities, don't let yourself get too tired.  · Do not smoke. Also avoid being exposed to secondhand smoke.  · You may use over-the-counter medicines to control fever or pain, unless another medicine was prescribed. (Note: If you have chronic liver or kidney disease or have ever had a stomach ulcer or  gastrointestinal bleeding, talk with your healthcare provider before using these medicines. Also talk to your provider if you are taking medicine to prevent blood clots.) Aspirin should never be given to anyone younger than 18 years of age who is ill with a viral infection or fever. It may cause severe liver or brain damage.  · Your appetite may be poor, so a light diet is fine. Avoid dehydration by drinking 6 to 8 glasses of fluids per day (such as water, soft drinks, sports drinks, juices, tea, or soup). Extra fluids will help loosen secretions in the nose and lungs.  · Over-the-counter cough, cold, and sore-throat medicines will not shorten the length of the illness, but they may be helpful to reduce symptoms. (Note: Do not use decongestants if you have high blood pressure.)  · Finish all antibiotic medicine. Do this even if you are feeling better after only a few days.  Follow-up care  Follow up with your healthcare provider, or as advised. If you had an X-ray or ECG (electrocardiogram), a specialist will review it. You will be notified of any new findings that may affect your care.  Note: If you are age 65 or older, or if you have a chronic lung disease or condition that affects your immune system, or you smoke, talk to your healthcare provider about having pneumococcal vaccinations and a yearly influenza vaccination (flu shot).  When to seek medical advice  Call your healthcare provider right away if any of these occur:  · Fever of 100.4°F (38°C) or higher  · Coughing up increased amounts of colored sputum  · Weakness, drowsiness, headache, facial pain, ear pain, or a stiff neck  Call 911, or get immediate medical care  Contact emergency services right away if any of these occur.  · Coughing up blood  · Worsening weakness, drowsiness, headache, or stiff neck  · Trouble breathing, wheezing, or pain with breathing  Date Last Reviewed: 9/13/2015  © 3951-5247 [employer]. [address] [place] [address],  [employer] [address]. All rights reserved. This information is not intended as a substitute for professional medical care. Always follow your healthcare professional's instructions.

## 2019-03-03 NOTE — PROGRESS NOTES
"Subjective:       Patient ID: Autumn Smart is a 63 y.o. female.    Vitals:  height is 5' 7" (1.702 m) and weight is 79.4 kg (175 lb). Her temperature is 100.9 °F (38.3 °C) (abnormal). Her blood pressure is 161/104 (abnormal) and her pulse is 108. Her respiration is 18 and oxygen saturation is 99%.     Chief Complaint: Sinus Problem    Sinus Problem   This is a new problem. The current episode started in the past 7 days. The problem has been gradually worsening since onset. The maximum temperature recorded prior to her arrival was 100.4 - 100.9 F. The fever has been present for 1 to 2 days. Her pain is at a severity of 9/10. The pain is moderate. Associated symptoms include chills, congestion, coughing, diaphoresis, ear pain, headaches, sinus pressure, sneezing and a sore throat. Pertinent negatives include no shortness of breath. Treatments tried: Theraflu and Coricidin  The treatment provided no relief.       Constitution: Positive for chills, sweating, fatigue and fever.   HENT: Positive for ear pain, congestion, sinus pain, sinus pressure and sore throat. Negative for voice change.    Neck: Negative for painful lymph nodes.   Eyes: Negative for eye redness.   Respiratory: Positive for cough and sputum production. Negative for chest tightness, bloody sputum, COPD, shortness of breath, stridor, wheezing and asthma.    Gastrointestinal: Negative for nausea and vomiting.   Musculoskeletal: Positive for muscle ache.   Skin: Negative for rash.   Allergic/Immunologic: Positive for sneezing. Negative for seasonal allergies and asthma.   Neurological: Positive for headaches.   Hematologic/Lymphatic: Negative for swollen lymph nodes.       Objective:      Physical Exam   Constitutional: She is oriented to person, place, and time. She appears well-developed and well-nourished. She is cooperative.  Non-toxic appearance. She appears ill. No distress.   HENT:   Head: Normocephalic and atraumatic.   Right Ear: Hearing, " tympanic membrane, external ear and ear canal normal.   Left Ear: Hearing, tympanic membrane, external ear and ear canal normal.   Nose: Nose normal. No mucosal edema, rhinorrhea or nasal deformity. No epistaxis. Right sinus exhibits no maxillary sinus tenderness and no frontal sinus tenderness. Left sinus exhibits no maxillary sinus tenderness and no frontal sinus tenderness.   Mouth/Throat: Uvula is midline and mucous membranes are normal. No trismus in the jaw. Normal dentition. No uvula swelling. Posterior oropharyngeal erythema present.   Eyes: Conjunctivae and lids are normal. No scleral icterus.   Sclera clear bilat   Neck: Trachea normal, full passive range of motion without pain and phonation normal. Neck supple.   Cardiovascular: Normal rate, regular rhythm, normal heart sounds, intact distal pulses and normal pulses.   Pulmonary/Chest: Effort normal and breath sounds normal. No respiratory distress.   Cough present    Abdominal: Soft. Normal appearance and bowel sounds are normal. She exhibits no distension. There is no tenderness.   Musculoskeletal: Normal range of motion. She exhibits no edema or deformity.   Neurological: She is alert and oriented to person, place, and time. She exhibits normal muscle tone. Coordination normal.   Skin: Skin is warm, dry and intact. She is not diaphoretic. No pallor.   Psychiatric: She has a normal mood and affect. Her speech is normal and behavior is normal. Judgment and thought content normal. Cognition and memory are normal.   Nursing note and vitals reviewed.      Results for orders placed or performed in visit on 03/02/19   POCT Influenza A/B   Result Value Ref Range    Rapid Influenza A Ag Negative Negative    Rapid Influenza B Ag Negative Negative     Acceptable Yes      Assessment:       1. Acute purulent bronchitis    2. Cough    3. Fever, unspecified fever cause        Plan:         Acute purulent bronchitis  -     azithromycin (ZITHROMAX Z-DUSTIN)  250 MG tablet; Take 2 tablets (500 mg) on  Day 1,  followed by 1 tablet (250 mg) once daily on Days 2 through 5.  Dispense: 6 tablet; Refill: 0  -     benzonatate (TESSALON PERLES) 100 MG capsule; Take 2 capsules (200 mg total) by mouth every 6 (six) hours as needed.  Dispense: 30 capsule; Refill: 0    Cough  -     POCT Influenza A/B    Fever, unspecified fever cause  -     ibuprofen (ADVIL,MOTRIN) 600 MG tablet; Take 1 tablet (600 mg total) by mouth every 6 (six) hours as needed for Pain.  Dispense: 60 tablet; Refill: 2        Patient Instructions   Use warm salt water gargles to ease your throat pain. Warm salt water gargles as needed for sore throat-  1/2 tsp salt to 1 cup warm water, gargle as desired. Hot tea with honey helps soothe.     Use an antihistamine such as Claritin, Zyrtec or Allegra to dry you out.     Use pseudoephedrine (behind the counter) to decongest. Pseudoephedrine  30 mg up to 240 mg /day. It can raise your blood pressure and give you palpitations.    Use mucinex (guaifenisin) to break up mucous up to 2400mg/day to loosen any mucous. The mucinex DM pill has a cough suppressant that can be used at night to stop the tickle at the back of your throat.    Use Flonase 1-2 sprays/nostril per day. It is a local acting steroid nasal spray, if you develop a bloody nose, stop using the medication immediately.    If symptoms persist or worsen patient is to follow up with primary.          Bronchitis, Antibiotic Treatment (Adult)    Bronchitis is an infection of the air passages (bronchial tubes) in your lungs. It often occurs when you have a cold. This illness is contagious during the first few days and is spread through the air by coughing and sneezing, or by direct contact (touching the sick person and then touching your own eyes, nose, or mouth).  Symptoms of bronchitis include cough with mucus (phlegm) and low-grade fever. Bronchitis usually lasts 7 to 14 days. Mild cases can be treated with simple  home remedies. More severe infection is treated with an antibiotic.  Home care  Follow these guidelines when caring for yourself at home:  · If your symptoms are severe, rest at home for the first 2 to 3 days. When you go back to your usual activities, don't let yourself get too tired.  · Do not smoke. Also avoid being exposed to secondhand smoke.  · You may use over-the-counter medicines to control fever or pain, unless another medicine was prescribed. (Note: If you have chronic liver or kidney disease or have ever had a stomach ulcer or gastrointestinal bleeding, talk with your healthcare provider before using these medicines. Also talk to your provider if you are taking medicine to prevent blood clots.) Aspirin should never be given to anyone younger than 18 years of age who is ill with a viral infection or fever. It may cause severe liver or brain damage.  · Your appetite may be poor, so a light diet is fine. Avoid dehydration by drinking 6 to 8 glasses of fluids per day (such as water, soft drinks, sports drinks, juices, tea, or soup). Extra fluids will help loosen secretions in the nose and lungs.  · Over-the-counter cough, cold, and sore-throat medicines will not shorten the length of the illness, but they may be helpful to reduce symptoms. (Note: Do not use decongestants if you have high blood pressure.)  · Finish all antibiotic medicine. Do this even if you are feeling better after only a few days.  Follow-up care  Follow up with your healthcare provider, or as advised. If you had an X-ray or ECG (electrocardiogram), a specialist will review it. You will be notified of any new findings that may affect your care.  Note: If you are age 65 or older, or if you have a chronic lung disease or condition that affects your immune system, or you smoke, talk to your healthcare provider about having pneumococcal vaccinations and a yearly influenza vaccination (flu shot).  When to seek medical advice  Call your  healthcare provider right away if any of these occur:  · Fever of 100.4°F (38°C) or higher  · Coughing up increased amounts of colored sputum  · Weakness, drowsiness, headache, facial pain, ear pain, or a stiff neck  Call 911, or get immediate medical care  Contact emergency services right away if any of these occur.  · Coughing up blood  · Worsening weakness, drowsiness, headache, or stiff neck  · Trouble breathing, wheezing, or pain with breathing  Date Last Reviewed: 9/13/2015 © 2000-2017 [employer]. [address] [place] [address]. All rights reserved. This information is not intended as a substitute for professional medical care. Always follow your healthcare professional's instructions.

## 2019-03-12 ENCOUNTER — CLINICAL SUPPORT (OUTPATIENT)
Dept: CARDIOLOGY | Facility: HOSPITAL | Age: 64
End: 2019-03-12
Attending: INTERNAL MEDICINE
Payer: COMMERCIAL

## 2019-03-12 DIAGNOSIS — Z95.818 STATUS POST PLACEMENT OF IMPLANTABLE LOOP RECORDER: ICD-10-CM

## 2019-03-12 PROCEDURE — 93299 CARDIAC DEVICE CHECK - REMOTE: CPT

## 2019-03-20 ENCOUNTER — TELEPHONE (OUTPATIENT)
Dept: ELECTROPHYSIOLOGY | Facility: CLINIC | Age: 64
End: 2019-03-20

## 2019-03-20 NOTE — TELEPHONE ENCOUNTER
"Attempted to reach patient by phone to review her symptom transmissions from her ILR.  There have been 33 patient activated transmissions since 2/27/19:  Of the events included in todays report from [place], all demonstrate SR/ST.  The episode list indicates the patient is pressing the symptom trigger at least once daily.  Will need to assess if she is truly symptomatic or "testing" her button.   on proper use of the patient trigger.  Events seen in this report are NOT c/w a significant heart rhythm issue. Continue to monitor.   "

## 2019-03-21 ENCOUNTER — TELEPHONE (OUTPATIENT)
Dept: CARDIOLOGY | Facility: HOSPITAL | Age: 64
End: 2019-03-21
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
## 2019-04-11 ENCOUNTER — CLINICAL SUPPORT (OUTPATIENT)
Dept: CARDIOLOGY | Facility: HOSPITAL | Age: 64
End: 2019-04-11
Attending: INTERNAL MEDICINE
Payer: COMMERCIAL

## 2019-04-11 DIAGNOSIS — Z95.818 STATUS POST PLACEMENT OF IMPLANTABLE LOOP RECORDER: ICD-10-CM

## 2019-04-11 PROCEDURE — 93299 CARDIAC DEVICE CHECK - REMOTE: CPT

## 2019-05-05 RX ORDER — POTASSIUM CHLORIDE 1500 MG/1
20 TABLET, EXTENDED RELEASE ORAL DAILY
Qty: 30 TABLET | Refills: 2 | Status: SHIPPED | OUTPATIENT
Start: 2019-05-05 | End: 2019-09-24

## 2019-05-14 ENCOUNTER — CLINICAL SUPPORT (OUTPATIENT)
Dept: CARDIOLOGY | Facility: HOSPITAL | Age: 64
End: 2019-05-14
Attending: INTERNAL MEDICINE
Payer: COMMERCIAL

## 2019-05-14 DIAGNOSIS — Z95.818 STATUS POST PLACEMENT OF IMPLANTABLE LOOP RECORDER: ICD-10-CM

## 2019-05-14 PROCEDURE — 93299 CARDIAC DEVICE CHECK - REMOTE: CPT

## 2019-06-11 ENCOUNTER — CLINICAL SUPPORT (OUTPATIENT)
Dept: CARDIOLOGY | Facility: HOSPITAL | Age: 64
End: 2019-06-11
Attending: INTERNAL MEDICINE
Payer: COMMERCIAL

## 2019-06-11 DIAGNOSIS — Z95.818 STATUS POST PLACEMENT OF IMPLANTABLE LOOP RECORDER: ICD-10-CM

## 2019-06-11 PROCEDURE — 93299 CARDIAC DEVICE CHECK - REMOTE: CPT

## 2019-06-14 ENCOUNTER — OFFICE VISIT (OUTPATIENT)
Dept: URGENT CARE | Facility: CLINIC | Age: 64
End: 2019-06-14
Payer: COMMERCIAL

## 2019-06-14 VITALS
TEMPERATURE: 98 F | WEIGHT: 175 LBS | HEIGHT: 67 IN | HEART RATE: 56 BPM | SYSTOLIC BLOOD PRESSURE: 186 MMHG | OXYGEN SATURATION: 100 % | BODY MASS INDEX: 27.47 KG/M2 | DIASTOLIC BLOOD PRESSURE: 83 MMHG

## 2019-06-14 DIAGNOSIS — N94.9 VAGINAL DISCOMFORT: Primary | ICD-10-CM

## 2019-06-14 DIAGNOSIS — Z76.0 MEDICATION REFILL: ICD-10-CM

## 2019-06-14 DIAGNOSIS — B37.31 YEAST VAGINITIS: ICD-10-CM

## 2019-06-14 LAB
BILIRUB UR QL STRIP: NEGATIVE
GLUCOSE UR QL STRIP: POSITIVE
KETONES UR QL STRIP: NEGATIVE
LEUKOCYTE ESTERASE UR QL STRIP: POSITIVE
PH, POC UA: 5.5 (ref 5–8)
POC BLOOD, URINE: NEGATIVE
POC NITRATES, URINE: NEGATIVE
PROT UR QL STRIP: NEGATIVE
SP GR UR STRIP: 1.02 (ref 1–1.03)
UROBILINOGEN UR STRIP-ACNC: ABNORMAL (ref 0.1–1.1)

## 2019-06-14 PROCEDURE — 3077F PR MOST RECENT SYSTOLIC BLOOD PRESSURE >= 140 MM HG: ICD-10-PCS | Mod: CPTII,S$GLB,, | Performed by: PHYSICIAN ASSISTANT

## 2019-06-14 PROCEDURE — 3079F DIAST BP 80-89 MM HG: CPT | Mod: CPTII,S$GLB,, | Performed by: PHYSICIAN ASSISTANT

## 2019-06-14 PROCEDURE — 3008F BODY MASS INDEX DOCD: CPT | Mod: CPTII,S$GLB,, | Performed by: PHYSICIAN ASSISTANT

## 2019-06-14 PROCEDURE — 3077F SYST BP >= 140 MM HG: CPT | Mod: CPTII,S$GLB,, | Performed by: PHYSICIAN ASSISTANT

## 2019-06-14 PROCEDURE — 99214 OFFICE O/P EST MOD 30 MIN: CPT | Mod: S$GLB,,, | Performed by: PHYSICIAN ASSISTANT

## 2019-06-14 PROCEDURE — 3008F PR BODY MASS INDEX (BMI) DOCUMENTED: ICD-10-PCS | Mod: CPTII,S$GLB,, | Performed by: PHYSICIAN ASSISTANT

## 2019-06-14 PROCEDURE — 81003 POCT URINALYSIS, DIPSTICK, AUTOMATED, W/O SCOPE: ICD-10-PCS | Mod: QW,S$GLB,, | Performed by: PHYSICIAN ASSISTANT

## 2019-06-14 PROCEDURE — 99214 PR OFFICE/OUTPT VISIT, EST, LEVL IV, 30-39 MIN: ICD-10-PCS | Mod: S$GLB,,, | Performed by: PHYSICIAN ASSISTANT

## 2019-06-14 PROCEDURE — 81003 URINALYSIS AUTO W/O SCOPE: CPT | Mod: QW,S$GLB,, | Performed by: PHYSICIAN ASSISTANT

## 2019-06-14 PROCEDURE — 3079F PR MOST RECENT DIASTOLIC BLOOD PRESSURE 80-89 MM HG: ICD-10-PCS | Mod: CPTII,S$GLB,, | Performed by: PHYSICIAN ASSISTANT

## 2019-06-14 RX ORDER — ASPIRIN 325 MG
1 TABLET, DELAYED RELEASE (ENTERIC COATED) ORAL NIGHTLY
Qty: 45 G | Refills: 0 | Status: SHIPPED | OUTPATIENT
Start: 2019-06-14 | End: 2019-06-14

## 2019-06-14 RX ORDER — FLUCONAZOLE 150 MG/1
150 TABLET ORAL DAILY
Qty: 1 TABLET | Refills: 0 | Status: SHIPPED | OUTPATIENT
Start: 2019-06-14 | End: 2019-06-15 | Stop reason: SDUPTHER

## 2019-06-14 RX ORDER — ASPIRIN 325 MG
1 TABLET, DELAYED RELEASE (ENTERIC COATED) ORAL NIGHTLY
Qty: 45 G | Refills: 0 | Status: SHIPPED | OUTPATIENT
Start: 2019-06-14 | End: 2019-06-15 | Stop reason: SDUPTHER

## 2019-06-14 RX ORDER — FLUCONAZOLE 150 MG/1
150 TABLET ORAL DAILY
Qty: 1 TABLET | Refills: 0 | Status: SHIPPED | OUTPATIENT
Start: 2019-06-14 | End: 2019-06-14 | Stop reason: SDUPTHER

## 2019-06-14 RX ORDER — FLUCONAZOLE 150 MG/1
150 TABLET ORAL DAILY
Qty: 1 TABLET | Refills: 0 | Status: SHIPPED | OUTPATIENT
Start: 2019-06-14 | End: 2019-06-14

## 2019-06-14 NOTE — PATIENT INSTRUCTIONS
Vaginal Infection: Yeast (Candidiasis)  Yeast infection occurs when yeast in the vagina increase and attacks the vaginal tissues. Yeast is a type of fungus. These infections are often caused by a type of yeast called Candida albicans. Other species of yeast can also cause infections. Factors that may make infection more likely include recent antibiotic use, douching, or increased sex. Yeast infections are more common in women who have diabetes, or are obese or pregnant, or have a weak immune system.  Symptoms of yeast infection  · Clumpy or thin, white discharge, which may look like cottage cheese  · No odor or minimal odor  · Severe vaginal itching or burning  · Burning with urination  · Swelling, redness of vulva  · Pain during sex  Treating yeast infection  Yeast infection is treated with a vaginal antifungal cream. In some cases, antifungal pills are prescribed instead. During treatment:  · Finish all of your medicine, even if your symptoms go away.  · Apply the cream before going to bed. Lie flat after applying so that it doesn't drip out.  · Do not douche or use tampons.  · Don't rely on a diaphragm or condoms, since the cream may weaken them.  · Avoid intercourse if advised by your healthcare provider.     Should I treat a yeast infection myself?  Discuss with your healthcare provider whether you should use over-the-counter medicines to treat a yeast infection. Self-treatment may depend on whether:  · You've had a yeast infection in the past.  · You're at risk for STDs.  Call your healthcare provider if symptoms do not go away or come back after treatment.   Date Last Reviewed: 3/1/2017  © 2569-4136 [employer]. [address] [place] [address], [place] [address]. All rights reserved. This information is not intended as a substitute for professional medical care. Always follow your healthcare professional's instructions.        Preventing Vaginitis     Use mild, unscented soap when you bathe or shower  to avoid irritating your vagina.    Vaginitis is irritation or infection of the vagina or vulva (the outside opening of the vagina). Vaginitis can be caused by bacteria, viruses, parasites, or yeast. Chemicals (such as in perfumes or soaps or in spermicides) can sometimes be a cause. Vaginitis can be caused by hormone changes in pregnancy or with menopause. You can help prevent vaginitis. Follow the tips below. And see your healthcare provider if you have any symptoms.  Hygiene  · Avoid chemicals. Do not use vaginal sprays. Do not use scented toilet paper or tampons that are scented. Sprays and scents have chemicals that can irritate your vagina.  · Do not douche unless you are told to by your healthcare provider. Douching is rarely needed. And it upsets the normal balance in the vagina.  · Wash yourself well. Wash the outer vaginal area (vulva) every day with mild, unscented soap. Keep it as dry as possible.  · Wipe correctly. Make sure to wipe from front to back after a bowel movement. This helps keep from spreading bacteria from your anus to your vagina.  · Change your tampon often. During your period, make sure to change your tampon as often as directed on the package. This allows the normal flow of vaginal discharge and blood.  Lifestyle  · Limit your number of sexual partners. The more partners you have, the greater your risk of infection. Using condoms helps reduce your risk.  · Get enough sleep. Sleep helps keep your bodys immune system healthy. This helps you fight infection.  · Lose weight, if needed. Excess weight can reduce air circulation around your vagina. This can increase your risk of infection.  · Exercise regularly. Regular activity helps keep your body healthy.  · Take antibiotics only as directed. Antibiotics can change the normal chemical balance in the vagina.    Clothing  · Dont sit in wet clothes. Yeast thrives when its warm and damp.  · Dont wear tight pants. And dont wear tights,  leggings, or hose without a cotton crotch. These types of clothing trap warmth and moisture.  · Wear cotton underwear. Cotton lets air circulate around the vagina.  Symptoms of vaginitis  · Irritation, swelling, or itching of the genital area  · Vaginal discharge  · Bad vaginal odor  · Pain or burning during urination   Date Last Reviewed: 12/1/2016 © 2000-2017 [employer]. [address] [place] [address], [place] [address]. All rights reserved. This information is not intended as a substitute for professional medical care. Always follow your healthcare professional's instructions.      Discussed discontinuation of douching.  Discussed pH healthy balance soap.  Discussed Kegel exercises for discomfort.  Discussed possible initiation of a topical estrogen gel which she can discuss at her gynecology appointment for next week.    Please follow up with your Primary care provider within 2-5 days if your signs and symptoms have not resolved or worsen.     If your condition worsens or fails to improve we recommend that you receive another evaluation at the emergency room immediately or contact your primary medical clinic to discuss your concerns.   You must understand that you have received an Urgent Care treatment only and that you may be released before all of your medical problems are known or treated. You, the patient, will arrange for follow up care as instructed.     RED FLAGS/WARNING SYMPTOMS DISCUSSED WITH PATIENT THAT WOULD WARRANT EMERGENT MEDICAL ATTENTION. PATIENT VERBALIZED UNDERSTANDING.     Elevated Blood Pressure  Your blood pressure was elevated during your visit to the urgent care.  It was not so high that immediate care was needed but it is recommended that you monitor your blood pressure over the next week or two to make sure that it is not staying elevated.  Please have your blood pressure taken 2-3 times daily at different times of the day.  Write all of those blood pressures down and record the  time that they were taken.  Keep all that information and take it with you to see your Primary Care Physician.  If your blood pressure is consistently above 140/90 you will need to follow up with your PCP more quickly

## 2019-06-15 RX ORDER — ASPIRIN 325 MG
1 TABLET, DELAYED RELEASE (ENTERIC COATED) ORAL NIGHTLY
Qty: 45 G | Refills: 0 | Status: SHIPPED | OUTPATIENT
Start: 2019-06-15 | End: 2019-06-22

## 2019-06-15 RX ORDER — ATORVASTATIN CALCIUM 40 MG/1
40 TABLET, FILM COATED ORAL DAILY
Qty: 90 TABLET | Refills: 0 | Status: SHIPPED | OUTPATIENT
Start: 2019-06-15 | End: 2019-12-03

## 2019-06-15 RX ORDER — FLUCONAZOLE 150 MG/1
150 TABLET ORAL DAILY
Qty: 1 TABLET | Refills: 0 | Status: SHIPPED | OUTPATIENT
Start: 2019-06-15 | End: 2019-06-16

## 2019-07-03 DIAGNOSIS — I10 HTN (HYPERTENSION), MALIGNANT: ICD-10-CM

## 2019-07-03 RX ORDER — AMLODIPINE BESYLATE 5 MG/1
TABLET ORAL
Qty: 30 TABLET | Refills: 11 | Status: SHIPPED | OUTPATIENT
Start: 2019-07-03 | End: 2021-03-24 | Stop reason: SDUPTHER

## 2019-07-03 RX ORDER — HYDROCHLOROTHIAZIDE 25 MG/1
TABLET ORAL
Qty: 30 TABLET | Refills: 11 | Status: SHIPPED | OUTPATIENT
Start: 2019-07-03 | End: 2020-07-02 | Stop reason: SDUPTHER

## 2019-07-03 RX ORDER — LISINOPRIL 40 MG/1
TABLET ORAL
Qty: 30 TABLET | Refills: 11 | Status: SHIPPED | OUTPATIENT
Start: 2019-07-03 | End: 2020-07-02 | Stop reason: SDUPTHER

## 2019-07-11 ENCOUNTER — CLINICAL SUPPORT (OUTPATIENT)
Dept: CARDIOLOGY | Facility: HOSPITAL | Age: 64
End: 2019-07-11
Attending: INTERNAL MEDICINE
Payer: COMMERCIAL

## 2019-07-11 DIAGNOSIS — Z46.9 DEVICE FITTING OR ADJUSTMENT: ICD-10-CM

## 2019-07-11 PROCEDURE — 93299 CARDIAC DEVICE CHECK - REMOTE: CPT

## 2019-08-27 ENCOUNTER — HOSPITAL ENCOUNTER (OUTPATIENT)
Dept: RADIOLOGY | Facility: HOSPITAL | Age: 64
Discharge: HOME | End: 2019-08-27
Attending: PHYSICAL MEDICINE & REHABILITATION
Payer: COMMERCIAL

## 2019-08-27 DIAGNOSIS — M54.2 CERVICALGIA: ICD-10-CM

## 2019-08-27 DIAGNOSIS — M25.562 PAIN IN LEFT KNEE: ICD-10-CM

## 2019-08-27 DIAGNOSIS — M25.562 PAIN IN LEFT KNEE: Primary | ICD-10-CM

## 2019-08-27 DIAGNOSIS — M54.2 CERVICALGIA: Primary | ICD-10-CM

## 2019-08-27 DIAGNOSIS — M48.9 SPONDYLOPATHY: ICD-10-CM

## 2019-08-27 PROCEDURE — 72040 XR CERVICAL SPINE 2 OR 3 VIEWS: ICD-10-PCS | Mod: 26,,, | Performed by: RADIOLOGY

## 2019-08-27 PROCEDURE — 72040 X-RAY EXAM NECK SPINE 2-3 VW: CPT | Mod: TC

## 2019-08-27 PROCEDURE — 72040 X-RAY EXAM NECK SPINE 2-3 VW: CPT | Mod: 26,,, | Performed by: RADIOLOGY

## 2019-08-27 PROCEDURE — 73562 X-RAY EXAM OF KNEE 3: CPT | Mod: 26,LT,, | Performed by: RADIOLOGY

## 2019-08-27 PROCEDURE — 73562 XR KNEE 3 VIEW LEFT: ICD-10-PCS | Mod: 26,LT,, | Performed by: RADIOLOGY

## 2019-08-27 PROCEDURE — 73562 X-RAY EXAM OF KNEE 3: CPT | Mod: TC,LT

## 2019-08-28 ENCOUNTER — CLINICAL SUPPORT (OUTPATIENT)
Dept: CARDIOLOGY | Facility: HOSPITAL | Age: 64
End: 2019-08-28
Payer: COMMERCIAL

## 2019-08-28 PROCEDURE — 93298 CARDIAC DEVICE CHECK - REMOTE: ICD-10-PCS | Mod: ,,, | Performed by: INTERNAL MEDICINE

## 2019-08-28 PROCEDURE — 93298 REM INTERROG DEV EVAL SCRMS: CPT | Mod: ,,, | Performed by: INTERNAL MEDICINE

## 2019-08-28 PROCEDURE — 93299 CARDIAC DEVICE CHECK - REMOTE: CPT | Performed by: INTERNAL MEDICINE

## 2019-09-24 ENCOUNTER — TELEPHONE (OUTPATIENT)
Dept: INTERNAL MEDICINE | Facility: CLINIC | Age: 64
End: 2019-09-24

## 2019-09-24 ENCOUNTER — OFFICE VISIT (OUTPATIENT)
Dept: INTERNAL MEDICINE | Facility: CLINIC | Age: 64
End: 2019-09-24
Payer: COMMERCIAL

## 2019-09-24 ENCOUNTER — LAB VISIT (OUTPATIENT)
Dept: LAB | Facility: HOSPITAL | Age: 64
End: 2019-09-24
Payer: COMMERCIAL

## 2019-09-24 VITALS
RESPIRATION RATE: 20 BRPM | HEIGHT: 68 IN | HEART RATE: 78 BPM | DIASTOLIC BLOOD PRESSURE: 80 MMHG | SYSTOLIC BLOOD PRESSURE: 120 MMHG | BODY MASS INDEX: 26.33 KG/M2 | WEIGHT: 173.75 LBS

## 2019-09-24 DIAGNOSIS — E87.6 POTASSIUM (K) DEFICIENCY: ICD-10-CM

## 2019-09-24 DIAGNOSIS — E87.6 POTASSIUM (K) DEFICIENCY: Primary | ICD-10-CM

## 2019-09-24 LAB
ANION GAP SERPL CALC-SCNC: 8 MMOL/L (ref 8–16)
BUN SERPL-MCNC: 15 MG/DL (ref 8–23)
CALCIUM SERPL-MCNC: 9.5 MG/DL (ref 8.7–10.5)
CHLORIDE SERPL-SCNC: 101 MMOL/L (ref 95–110)
CO2 SERPL-SCNC: 31 MMOL/L (ref 23–29)
CREAT SERPL-MCNC: 0.8 MG/DL (ref 0.5–1.4)
EST. GFR  (AFRICAN AMERICAN): >60 ML/MIN/1.73 M^2
EST. GFR  (NON AFRICAN AMERICAN): >60 ML/MIN/1.73 M^2
GLUCOSE SERPL-MCNC: 122 MG/DL (ref 70–110)
POTASSIUM SERPL-SCNC: 3.5 MMOL/L (ref 3.5–5.1)
SODIUM SERPL-SCNC: 140 MMOL/L (ref 136–145)

## 2019-09-24 PROCEDURE — 99999 PR PBB SHADOW E&M-EST. PATIENT-LVL IV: CPT | Mod: PBBFAC,,, | Performed by: NURSE PRACTITIONER

## 2019-09-24 PROCEDURE — 3079F DIAST BP 80-89 MM HG: CPT | Mod: CPTII,S$GLB,, | Performed by: NURSE PRACTITIONER

## 2019-09-24 PROCEDURE — 99213 PR OFFICE/OUTPT VISIT, EST, LEVL III, 20-29 MIN: ICD-10-PCS | Mod: S$GLB,,, | Performed by: NURSE PRACTITIONER

## 2019-09-24 PROCEDURE — 3074F PR MOST RECENT SYSTOLIC BLOOD PRESSURE < 130 MM HG: ICD-10-PCS | Mod: CPTII,S$GLB,, | Performed by: NURSE PRACTITIONER

## 2019-09-24 PROCEDURE — 99999 PR PBB SHADOW E&M-EST. PATIENT-LVL IV: ICD-10-PCS | Mod: PBBFAC,,, | Performed by: NURSE PRACTITIONER

## 2019-09-24 PROCEDURE — 3008F BODY MASS INDEX DOCD: CPT | Mod: CPTII,S$GLB,, | Performed by: NURSE PRACTITIONER

## 2019-09-24 PROCEDURE — 3079F PR MOST RECENT DIASTOLIC BLOOD PRESSURE 80-89 MM HG: ICD-10-PCS | Mod: CPTII,S$GLB,, | Performed by: NURSE PRACTITIONER

## 2019-09-24 PROCEDURE — 3008F PR BODY MASS INDEX (BMI) DOCUMENTED: ICD-10-PCS | Mod: CPTII,S$GLB,, | Performed by: NURSE PRACTITIONER

## 2019-09-24 PROCEDURE — 80048 BASIC METABOLIC PNL TOTAL CA: CPT

## 2019-09-24 PROCEDURE — 36415 COLL VENOUS BLD VENIPUNCTURE: CPT

## 2019-09-24 PROCEDURE — 3074F SYST BP LT 130 MM HG: CPT | Mod: CPTII,S$GLB,, | Performed by: NURSE PRACTITIONER

## 2019-09-24 PROCEDURE — 99213 OFFICE O/P EST LOW 20 MIN: CPT | Mod: S$GLB,,, | Performed by: NURSE PRACTITIONER

## 2019-09-24 RX ORDER — POTASSIUM CHLORIDE 1500 MG/1
20 TABLET, EXTENDED RELEASE ORAL DAILY
Qty: 30 TABLET | Refills: 1 | Status: SHIPPED | OUTPATIENT
Start: 2019-09-24 | End: 2019-10-30 | Stop reason: SDUPTHER

## 2019-09-24 NOTE — TELEPHONE ENCOUNTER
"BMP  Lab Results   Component Value Date     09/24/2019    K 3.5 09/24/2019     09/24/2019    CO2 31 (H) 09/24/2019    BUN 15 09/24/2019    CREATININE 0.8 09/24/2019    CALCIUM 9.5 09/24/2019    ANIONGAP 8 09/24/2019    ESTGFRAFRICA >60 09/24/2019    EGFRNONAA >60 09/24/2019     Will refill her chronic Potassium pills.     She has follow up with a reported "outside team of doctors at [facility] that is following her for the leg issues".     Was once again advised to get and keep the appt for her Annual Physical.     SDJ  "

## 2019-10-10 ENCOUNTER — CLINICAL SUPPORT (OUTPATIENT)
Dept: CARDIOLOGY | Facility: HOSPITAL | Age: 64
End: 2019-10-10
Payer: COMMERCIAL

## 2019-10-10 DIAGNOSIS — Z95.818 PRESENCE OF OTHER CARDIAC IMPLANTS AND GRAFTS: ICD-10-CM

## 2019-10-10 PROCEDURE — 93298 REM INTERROG DEV EVAL SCRMS: CPT | Mod: ,,, | Performed by: INTERNAL MEDICINE

## 2019-10-10 PROCEDURE — 93298 CARDIAC DEVICE CHECK - REMOTE: ICD-10-PCS | Mod: ,,, | Performed by: INTERNAL MEDICINE

## 2019-10-30 RX ORDER — POTASSIUM CHLORIDE 1500 MG/1
20 TABLET, EXTENDED RELEASE ORAL DAILY
Qty: 90 TABLET | Refills: 3 | Status: SHIPPED | OUTPATIENT
Start: 2019-10-30 | End: 2021-11-02 | Stop reason: SDUPTHER

## 2019-11-19 ENCOUNTER — OFFICE VISIT (OUTPATIENT)
Dept: URGENT CARE | Facility: CLINIC | Age: 64
End: 2019-11-19
Payer: COMMERCIAL

## 2019-11-19 VITALS
RESPIRATION RATE: 18 BRPM | TEMPERATURE: 99 F | SYSTOLIC BLOOD PRESSURE: 157 MMHG | BODY MASS INDEX: 26.22 KG/M2 | HEART RATE: 61 BPM | HEIGHT: 68 IN | WEIGHT: 173 LBS | OXYGEN SATURATION: 99 % | DIASTOLIC BLOOD PRESSURE: 73 MMHG

## 2019-11-19 DIAGNOSIS — N89.8 VAGINA ITCHING: ICD-10-CM

## 2019-11-19 DIAGNOSIS — N39.0 URINARY TRACT INFECTION WITHOUT HEMATURIA, SITE UNSPECIFIED: ICD-10-CM

## 2019-11-19 DIAGNOSIS — Z20.2 POSSIBLE EXPOSURE TO STD: ICD-10-CM

## 2019-11-19 DIAGNOSIS — R30.0 DYSURIA: ICD-10-CM

## 2019-11-19 DIAGNOSIS — N94.9 VAGINAL DISCOMFORT: Primary | ICD-10-CM

## 2019-11-19 LAB
BILIRUB UR QL STRIP: NEGATIVE
GLUCOSE UR QL STRIP: NEGATIVE
KETONES UR QL STRIP: POSITIVE
LEUKOCYTE ESTERASE UR QL STRIP: POSITIVE
PH, POC UA: 5.5 (ref 5–8)
POC BLOOD, URINE: NEGATIVE
POC NITRATES, URINE: NEGATIVE
PROT UR QL STRIP: NEGATIVE
SP GR UR STRIP: 1.03 (ref 1–1.03)
UROBILINOGEN UR STRIP-ACNC: 1 (ref 0.1–1.1)

## 2019-11-19 PROCEDURE — 87186 SC STD MICRODIL/AGAR DIL: CPT

## 2019-11-19 PROCEDURE — 99214 PR OFFICE/OUTPT VISIT, EST, LEVL IV, 30-39 MIN: ICD-10-PCS | Mod: S$GLB,,, | Performed by: NURSE PRACTITIONER

## 2019-11-19 PROCEDURE — 3077F PR MOST RECENT SYSTOLIC BLOOD PRESSURE >= 140 MM HG: ICD-10-PCS | Mod: CPTII,S$GLB,, | Performed by: NURSE PRACTITIONER

## 2019-11-19 PROCEDURE — 99214 OFFICE O/P EST MOD 30 MIN: CPT | Mod: S$GLB,,, | Performed by: NURSE PRACTITIONER

## 2019-11-19 PROCEDURE — 81003 POCT URINALYSIS, DIPSTICK, AUTOMATED, W/O SCOPE: ICD-10-PCS | Mod: QW,S$GLB,, | Performed by: NURSE PRACTITIONER

## 2019-11-19 PROCEDURE — 3008F BODY MASS INDEX DOCD: CPT | Mod: CPTII,S$GLB,, | Performed by: NURSE PRACTITIONER

## 2019-11-19 PROCEDURE — 87077 CULTURE AEROBIC IDENTIFY: CPT

## 2019-11-19 PROCEDURE — 3078F PR MOST RECENT DIASTOLIC BLOOD PRESSURE < 80 MM HG: ICD-10-PCS | Mod: CPTII,S$GLB,, | Performed by: NURSE PRACTITIONER

## 2019-11-19 PROCEDURE — 81003 URINALYSIS AUTO W/O SCOPE: CPT | Mod: QW,S$GLB,, | Performed by: NURSE PRACTITIONER

## 2019-11-19 PROCEDURE — 3077F SYST BP >= 140 MM HG: CPT | Mod: CPTII,S$GLB,, | Performed by: NURSE PRACTITIONER

## 2019-11-19 PROCEDURE — 3078F DIAST BP <80 MM HG: CPT | Mod: CPTII,S$GLB,, | Performed by: NURSE PRACTITIONER

## 2019-11-19 PROCEDURE — 87491 CHLMYD TRACH DNA AMP PROBE: CPT

## 2019-11-19 PROCEDURE — 87086 URINE CULTURE/COLONY COUNT: CPT

## 2019-11-19 PROCEDURE — 87088 URINE BACTERIA CULTURE: CPT

## 2019-11-19 PROCEDURE — 3008F PR BODY MASS INDEX (BMI) DOCUMENTED: ICD-10-PCS | Mod: CPTII,S$GLB,, | Performed by: NURSE PRACTITIONER

## 2019-11-19 RX ORDER — NITROFURANTOIN 25; 75 MG/1; MG/1
100 CAPSULE ORAL 2 TIMES DAILY
Qty: 10 CAPSULE | Refills: 0 | Status: SHIPPED | OUTPATIENT
Start: 2019-11-19 | End: 2019-11-24

## 2019-11-19 RX ORDER — NITROFURANTOIN 25; 75 MG/1; MG/1
100 CAPSULE ORAL 2 TIMES DAILY
Qty: 10 CAPSULE | Refills: 0 | Status: SHIPPED | OUTPATIENT
Start: 2019-11-19 | End: 2019-11-19

## 2019-11-20 ENCOUNTER — TELEPHONE (OUTPATIENT)
Dept: URGENT CARE | Facility: CLINIC | Age: 64
End: 2019-11-20

## 2019-11-20 NOTE — TELEPHONE ENCOUNTER
Lab called and told us that the vaginosis swap was cancelled. It was sent in the wrong container. Called pt and left message. She needs to come back in for recollection of lab.

## 2019-11-20 NOTE — PATIENT INSTRUCTIONS
"  Take all antibiotics until they are finished.  Stopped using the feminine vagina washes.  Use unscented soap to wash.  We will call you in your test results are back.    You must understand that you've received an Urgent Care treatment only and that you may be released before all your medical problems are known or treated. You, the patient, will arrange for follow up care as instructed.  If your condition worsens we recommend that you receive another evaluation at the emergency room immediately or contact your primary medical clinics after hours call service to discuss your concerns.  Please return here or go to the Emergency Department for any concerns or worsening of condition.    Bladder Infection, Female (Adult)    Urine is normally doesn't have any bacteria in it. But bacteria can get into the urinary tract from the skin around the rectum. Or they can travel in the blood from elsewhere in the body. Once they are in your urinary tract, they can cause infection in the urethra (urethritis), the bladder (cystitis), or the kidneys (pyelonephritis).  The most common place for an infection is in the bladder. This is called a bladder infection. This is one of the most common infections in women. Most bladder infections are easily treated. They are not serious unless the infection spreads to the kidney.  The phrases "bladder infection," "UTI," and "cystitis" are often used to describe the same thing. But they are not always the same. Cystitis is an inflammation of the bladder. The most common cause of cystitis is an infection.  Symptoms  The infection causes inflammation in the urethra and bladder. This causes many of the symptoms. The most common symptoms of a bladder infection are:  · Pain or burning when urinating  · Having to urinate more often than usual  · Urgent need to urinate  · Only a small amount of urine comes out  · Blood in urine  · Abdominal discomfort. This is usually in the lower abdomen above the " pubic bone.  · Cloudy urine  · Strong- or bad-smelling urine  · Unable to urinate (urinary retention)  · Unable to hold urine in (urinary incontinence)  · Fever  · Loss of appetite  · Confusion (in older adults)  Causes  Bladder infections are not contagious. You can't get one from someone else, from a toilet seat, or from sharing a bath.  The most common cause of bladder infections is bacteria from the bowels. The bacteria get onto the skin around the opening of the urethra. From there, they can get into the urine and travel up to the bladder, causing inflammation and infection. This usually happens because of:  · Wiping improperly after urinating. Always wipe from front to back.  · Bowel incontinence  · Pregnancy  · Procedures such as having a catheter inserted  · Older age  · Not emptying your bladder. This can allow bacteria a chance to grow in your urine.  · Dehydration  · Constipation  · Sex  · Use of a diaphragm for birth control   Treatment  Bladder infections are diagnosed by a urine test. They are treated with antibiotics and usually clear up quickly without complications. Treatment helps prevent a more serious kidney infection.  Medicines  Medicines can help in the treatment of a bladder infection:  · Take antibiotics until they are used up, even if you feel better. It is important to finish them to make sure the infection has cleared.  · You can use acetaminophen or ibuprofen for pain, fever, or discomfort, unless another medicine was prescribed. If you have chronic liver or kidney disease, talk with your healthcare provider before using these medicines. Also talk with your provider if you've ever had a stomach ulcer or gastrointestinal bleeding, or are taking blood-thinner medicines.  · If you are given phenazopydridine to reduce burning with urination, it will cause your urine to become a bright orange color. This can stain clothing.  Care and prevention  These self-care steps can help prevent future  infections:  · Drink plenty of fluids to prevent dehydration and flush out your bladder. Do this unless you must restrict fluids for other health reasons, or your doctor told you not to.  · Proper cleaning after going to the bathroom is important. Wipe from front to back after using the toilet to prevent the spread of bacteria.  · Urinate more often. Don't try to hold urine in for a long time.  · Wear loose-fitting clothes and cotton underwear. Avoid tight-fitting pants.  · Improve your diet and prevent constipation. Eat more fresh fruit and vegetables, and fiber, and less junk and fatty foods.  · Avoid sex until your symptoms are gone.  · Avoid caffeine, alcohol, and spicy foods. These can irritate your bladder.  · Urinate right after intercourse to flush out your bladder.  · If you use birth control pills and have frequent bladder infections, discuss it with your doctor.  Follow-up care  Call your healthcare provider if all symptoms are not gone after 3 days of treatment. This is especially important if you have repeat infections.  If a culture was done, you will be told if your treatment needs to be changed. If directed, you can call to find out the results.  If X-rays were done, you will be told if the results will affect your treatment.  Call 911  Call 911 if any of the following occur:  · Trouble breathing  · Hard to wake up or confusion  · Fainting or loss of consciousness  · Rapid heart rate  When to seek medical advice  Call your healthcare provider right away if any of these occur:  · Fever of 100.4ºF (38.0ºC) or higher, or as directed by your healthcare provider  · Symptoms are not better by the third day of treatment  · Back or belly (abdominal) pain that gets worse  · Repeated vomiting, or unable to keep medicine down  · Weakness or dizziness  · Vaginal discharge  · Pain, redness, or swelling in the outer vaginal area (labia)  Date Last Reviewed: 10/1/2016  © 8048-9754 [employer] [address]  [place] [address]. All rights reserved. This information is not intended as a substitute for professional medical care. Always follow your healthcare professional's instructions.

## 2019-11-20 NOTE — PROGRESS NOTES
"Subjective:       Patient ID: Autumn Smart is a 64 y.o. female.    Vitals:  height is 5' 8" (1.727 m) and weight is 78.5 kg (173 lb). Her temperature is 99.1 °F (37.3 °C). Her blood pressure is 157/73 (abnormal) and her pulse is 61. Her respiration is 18 and oxygen saturation is 99%.     Chief Complaint: Vaginal Itching    Pt said she started having symptoms a day ago.  Also concerned she may have a STI from her boyfriend.  Pt has been using scented feminine washes.    Vaginal Itching   The patient's primary symptoms include genital itching. The patient's pertinent negatives include no genital odor, genital rash, pelvic pain or vaginal discharge. This is a new problem. The current episode started yesterday. The problem occurs constantly. The problem has been unchanged. The pain is moderate. The problem affects both sides. She is not pregnant. Associated symptoms include dysuria. Pertinent negatives include no abdominal pain, back pain, chills, diarrhea, fever, flank pain, frequency, hematuria, nausea, rash, urgency or vomiting. Nothing aggravates the symptoms. Treatments tried: vaseline. The treatment provided no relief. She is sexually active. It is possible that her partner has an STD. She uses nothing for contraception. She is postmenopausal.       Constitution: Negative for chills and fever.   Neck: Negative for painful lymph nodes.   Gastrointestinal: Negative for abdominal pain, nausea, vomiting and diarrhea.   Genitourinary: Positive for dysuria and vaginal pain. Negative for frequency, urgency, urine decreased, flank pain, hematuria, history of kidney stones, genital trauma, vaginal discharge, vaginal bleeding, vaginal odor, painful intercourse, genital sore, painful ejaculation and pelvic pain.   Musculoskeletal: Negative for back pain.   Skin: Negative for rash and lesion.   Hematologic/Lymphatic: Negative for swollen lymph nodes.       Objective:      Physical Exam   Constitutional: She is " oriented to person, place, and time. She appears well-developed and well-nourished.   HENT:   Head: Normocephalic and atraumatic.   Right Ear: External ear normal.   Left Ear: External ear normal.   Nose: Nose normal. No nasal deformity. No epistaxis.   Mouth/Throat: Oropharynx is clear and moist and mucous membranes are normal.   Eyes: Lids are normal.   Neck: Trachea normal, normal range of motion and phonation normal. Neck supple.   Cardiovascular: Normal pulses.   Pulmonary/Chest: Effort normal.   Abdominal: Soft. Normal appearance and bowel sounds are normal. She exhibits no distension. There is no tenderness. There is no CVA tenderness.   Musculoskeletal:        Lumbar back: Normal.   Neurological: She is alert and oriented to person, place, and time.   Skin: Skin is warm, dry, intact and no rash. Capillary refill takes less than 2 seconds.   Psychiatric: She has a normal mood and affect. Her speech is normal and behavior is normal. Cognition and memory are normal.   Nursing note and vitals reviewed.        Assessment:       1. Vaginal discomfort    2. Vagina itching    3. Dysuria    4. Urinary tract infection without hematuria, site unspecified    5. Possible exposure to STD        Plan:         Vaginal discomfort  -     Bv, Trich, Candida by DNA Probe (Swab Only)  -     C. trachomatis/N. gonorrhoeae by AMP DNA [facility]; Cervicovaginal  -     POCT Urinalysis, Dipstick, Automated, W/O Scope    Vagina itching  -     POCT Urinalysis, Dipstick, Automated, W/O Scope    Dysuria  -     Urine culture    Urinary tract infection without hematuria, site unspecified    Possible exposure to STD    Other orders  -     nitrofurantoin, macrocrystal-monohydrate, (MACROBID) 100 MG capsule; Take 1 capsule (100 mg total) by mouth 2 (two) times daily. for 5 days  Dispense: 10 capsule; Refill: 0          Results for orders placed or performed in visit on 11/19/19   POCT Urinalysis, Dipstick, Automated, W/O Scope   Result Value Ref  "Range    POC Blood, Urine Negative Negative    POC Bilirubin, Urine Negative Negative    POC Urobilinogen, Urine 1.0 0.1 - 1.1    POC Ketones, Urine Positive (A) Negative    POC Protein, Urine Negative Negative    POC Nitrates, Urine Negative Negative    POC Glucose, Urine Negative Negative    pH, UA 5.5 5 - 8    POC Specific Gravity, Urine 1.030 (A) 1.003 - 1.029    POC Leukocytes, Urine Positive (A) Negative     Patient Instructions     Take all antibiotics until they are finished.  Stopped using the feminine vagina washes.  Use unscented soap to wash.  We will call you in your test results are back.    You must understand that you've received an Urgent Care treatment only and that you may be released before all your medical problems are known or treated. You, the patient, will arrange for follow up care as instructed.  If your condition worsens we recommend that you receive another evaluation at the emergency room immediately or contact your primary medical clinics after hours call service to discuss your concerns.  Please return here or go to the Emergency Department for any concerns or worsening of condition.    Bladder Infection, Female (Adult)    Urine is normally doesn't have any bacteria in it. But bacteria can get into the urinary tract from the skin around the rectum. Or they can travel in the blood from elsewhere in the body. Once they are in your urinary tract, they can cause infection in the urethra (urethritis), the bladder (cystitis), or the kidneys (pyelonephritis).  The most common place for an infection is in the bladder. This is called a bladder infection. This is one of the most common infections in women. Most bladder infections are easily treated. They are not serious unless the infection spreads to the kidney.  The phrases "bladder infection," "UTI," and "cystitis" are often used to describe the same thing. But they are not always the same. Cystitis is an inflammation of the bladder. The most " common cause of cystitis is an infection.  Symptoms  The infection causes inflammation in the urethra and bladder. This causes many of the symptoms. The most common symptoms of a bladder infection are:  · Pain or burning when urinating  · Having to urinate more often than usual  · Urgent need to urinate  · Only a small amount of urine comes out  · Blood in urine  · Abdominal discomfort. This is usually in the lower abdomen above the pubic bone.  · Cloudy urine  · Strong- or bad-smelling urine  · Unable to urinate (urinary retention)  · Unable to hold urine in (urinary incontinence)  · Fever  · Loss of appetite  · Confusion (in older adults)  Causes  Bladder infections are not contagious. You can't get one from someone else, from a toilet seat, or from sharing a bath.  The most common cause of bladder infections is bacteria from the bowels. The bacteria get onto the skin around the opening of the urethra. From there, they can get into the urine and travel up to the bladder, causing inflammation and infection. This usually happens because of:  · Wiping improperly after urinating. Always wipe from front to back.  · Bowel incontinence  · Pregnancy  · Procedures such as having a catheter inserted  · Older age  · Not emptying your bladder. This can allow bacteria a chance to grow in your urine.  · Dehydration  · Constipation  · Sex  · Use of a diaphragm for birth control   Treatment  Bladder infections are diagnosed by a urine test. They are treated with antibiotics and usually clear up quickly without complications. Treatment helps prevent a more serious kidney infection.  Medicines  Medicines can help in the treatment of a bladder infection:  · Take antibiotics until they are used up, even if you feel better. It is important to finish them to make sure the infection has cleared.  · You can use acetaminophen or ibuprofen for pain, fever, or discomfort, unless another medicine was prescribed. If you have chronic liver or  kidney disease, talk with your healthcare provider before using these medicines. Also talk with your provider if you've ever had a stomach ulcer or gastrointestinal bleeding, or are taking blood-thinner medicines.  · If you are given phenazopydridine to reduce burning with urination, it will cause your urine to become a bright orange color. This can stain clothing.  Care and prevention  These self-care steps can help prevent future infections:  · Drink plenty of fluids to prevent dehydration and flush out your bladder. Do this unless you must restrict fluids for other health reasons, or your doctor told you not to.  · Proper cleaning after going to the bathroom is important. Wipe from front to back after using the toilet to prevent the spread of bacteria.  · Urinate more often. Don't try to hold urine in for a long time.  · Wear loose-fitting clothes and cotton underwear. Avoid tight-fitting pants.  · Improve your diet and prevent constipation. Eat more fresh fruit and vegetables, and fiber, and less junk and fatty foods.  · Avoid sex until your symptoms are gone.  · Avoid caffeine, alcohol, and spicy foods. These can irritate your bladder.  · Urinate right after intercourse to flush out your bladder.  · If you use birth control pills and have frequent bladder infections, discuss it with your doctor.  Follow-up care  Call your healthcare provider if all symptoms are not gone after 3 days of treatment. This is especially important if you have repeat infections.  If a culture was done, you will be told if your treatment needs to be changed. If directed, you can call to find out the results.  If X-rays were done, you will be told if the results will affect your treatment.  Call 911  Call 911 if any of the following occur:  · Trouble breathing  · Hard to wake up or confusion  · Fainting or loss of consciousness  · Rapid heart rate  When to seek medical advice  Call your healthcare provider right away if any of these  occur:  · Fever of 100.4ºF (38.0ºC) or higher, or as directed by your healthcare provider  · Symptoms are not better by the third day of treatment  · Back or belly (abdominal) pain that gets worse  · Repeated vomiting, or unable to keep medicine down  · Weakness or dizziness  · Vaginal discharge  · Pain, redness, or swelling in the outer vaginal area (labia)  Date Last Reviewed: 10/1/2016  © 8067-9432 [employer]. [address] [place] [address]. All rights reserved. This information is not intended as a substitute for professional medical care. Always follow your healthcare professional's instructions.

## 2019-11-21 ENCOUNTER — HOSPITAL ENCOUNTER (OUTPATIENT)
Dept: RADIOLOGY | Facility: HOSPITAL | Age: 64
Discharge: HOME | End: 2019-11-21
Attending: ORTHOPAEDIC SURGERY
Payer: COMMERCIAL

## 2019-11-21 ENCOUNTER — OFFICE VISIT (OUTPATIENT)
Dept: URGENT CARE | Facility: CLINIC | Age: 64
End: 2019-11-21
Payer: COMMERCIAL

## 2019-11-21 ENCOUNTER — OFFICE VISIT (OUTPATIENT)
Dept: ORTHOPEDICS | Facility: CLINIC | Age: 64
End: 2019-11-21
Payer: COMMERCIAL

## 2019-11-21 ENCOUNTER — OFFICE VISIT (OUTPATIENT)
Dept: INTERNAL MEDICINE | Facility: CLINIC | Age: 64
End: 2019-11-21
Payer: COMMERCIAL

## 2019-11-21 VITALS
OXYGEN SATURATION: 98 % | TEMPERATURE: 98 F | HEIGHT: 68 IN | HEART RATE: 60 BPM | DIASTOLIC BLOOD PRESSURE: 86 MMHG | WEIGHT: 173 LBS | SYSTOLIC BLOOD PRESSURE: 179 MMHG | RESPIRATION RATE: 18 BRPM | BODY MASS INDEX: 26.22 KG/M2

## 2019-11-21 VITALS — WEIGHT: 173.94 LBS | HEIGHT: 67 IN | BODY MASS INDEX: 27.3 KG/M2

## 2019-11-21 VITALS
WEIGHT: 173.94 LBS | SYSTOLIC BLOOD PRESSURE: 156 MMHG | HEART RATE: 59 BPM | BODY MASS INDEX: 27.3 KG/M2 | OXYGEN SATURATION: 100 % | DIASTOLIC BLOOD PRESSURE: 75 MMHG | HEIGHT: 67 IN

## 2019-11-21 DIAGNOSIS — M25.562 LEFT KNEE PAIN, UNSPECIFIED CHRONICITY: ICD-10-CM

## 2019-11-21 DIAGNOSIS — M17.12 PRIMARY OSTEOARTHRITIS OF LEFT KNEE: Primary | ICD-10-CM

## 2019-11-21 DIAGNOSIS — H53.9 VISUAL DISTURBANCE: Primary | ICD-10-CM

## 2019-11-21 DIAGNOSIS — M25.552 LEFT HIP PAIN: ICD-10-CM

## 2019-11-21 DIAGNOSIS — M25.562 ACUTE PAIN OF LEFT KNEE: ICD-10-CM

## 2019-11-21 DIAGNOSIS — M25.562 ACUTE PAIN OF LEFT KNEE: Primary | ICD-10-CM

## 2019-11-21 DIAGNOSIS — N76.0 ACUTE VAGINITIS: Primary | ICD-10-CM

## 2019-11-21 PROCEDURE — 3077F PR MOST RECENT SYSTOLIC BLOOD PRESSURE >= 140 MM HG: ICD-10-PCS | Mod: CPTII,S$GLB,, | Performed by: INTERNAL MEDICINE

## 2019-11-21 PROCEDURE — 3079F PR MOST RECENT DIASTOLIC BLOOD PRESSURE 80-89 MM HG: ICD-10-PCS | Mod: CPTII,S$GLB,, | Performed by: FAMILY MEDICINE

## 2019-11-21 PROCEDURE — 3008F BODY MASS INDEX DOCD: CPT | Mod: CPTII,S$GLB,, | Performed by: ORTHOPAEDIC SURGERY

## 2019-11-21 PROCEDURE — 3078F DIAST BP <80 MM HG: CPT | Mod: CPTII,S$GLB,, | Performed by: INTERNAL MEDICINE

## 2019-11-21 PROCEDURE — 86703 HIV-1/HIV-2 1 RESULT ANTBDY: CPT

## 2019-11-21 PROCEDURE — 99203 PR OFFICE/OUTPT VISIT, NEW, LEVL III, 30-44 MIN: ICD-10-PCS | Mod: 25,S$GLB,, | Performed by: ORTHOPAEDIC SURGERY

## 2019-11-21 PROCEDURE — 99213 PR OFFICE/OUTPT VISIT, EST, LEVL III, 20-29 MIN: ICD-10-PCS | Mod: S$GLB,,, | Performed by: INTERNAL MEDICINE

## 2019-11-21 PROCEDURE — 3077F PR MOST RECENT SYSTOLIC BLOOD PRESSURE >= 140 MM HG: ICD-10-PCS | Mod: CPTII,S$GLB,, | Performed by: ORTHOPAEDIC SURGERY

## 2019-11-21 PROCEDURE — 73562 X-RAY EXAM OF KNEE 3: CPT | Mod: 26,59,RT, | Performed by: RADIOLOGY

## 2019-11-21 PROCEDURE — 3078F DIAST BP <80 MM HG: CPT | Mod: CPTII,S$GLB,, | Performed by: ORTHOPAEDIC SURGERY

## 2019-11-21 PROCEDURE — 3077F PR MOST RECENT SYSTOLIC BLOOD PRESSURE >= 140 MM HG: ICD-10-PCS | Mod: CPTII,S$GLB,, | Performed by: FAMILY MEDICINE

## 2019-11-21 PROCEDURE — 3008F PR BODY MASS INDEX (BMI) DOCUMENTED: ICD-10-PCS | Mod: CPTII,S$GLB,, | Performed by: INTERNAL MEDICINE

## 2019-11-21 PROCEDURE — 3077F SYST BP >= 140 MM HG: CPT | Mod: CPTII,S$GLB,, | Performed by: INTERNAL MEDICINE

## 2019-11-21 PROCEDURE — 3008F PR BODY MASS INDEX (BMI) DOCUMENTED: ICD-10-PCS | Mod: CPTII,S$GLB,, | Performed by: ORTHOPAEDIC SURGERY

## 2019-11-21 PROCEDURE — 99999 PR PBB SHADOW E&M-EST. PATIENT-LVL III: ICD-10-PCS | Mod: PBBFAC,,, | Performed by: INTERNAL MEDICINE

## 2019-11-21 PROCEDURE — 73564 XR KNEE ORTHO LEFT WITH FLEXION: ICD-10-PCS | Mod: 26,LT,, | Performed by: RADIOLOGY

## 2019-11-21 PROCEDURE — 86696 HERPES SIMPLEX TYPE 2 TEST: CPT

## 2019-11-21 PROCEDURE — 99214 PR OFFICE/OUTPT VISIT, EST, LEVL IV, 30-39 MIN: ICD-10-PCS | Mod: S$GLB,,, | Performed by: FAMILY MEDICINE

## 2019-11-21 PROCEDURE — 73502 XR HIP 2 VIEW LEFT: ICD-10-PCS | Mod: 26,LT,, | Performed by: RADIOLOGY

## 2019-11-21 PROCEDURE — 73562 XR KNEE ORTHO LEFT WITH FLEXION: ICD-10-PCS | Mod: 26,59,RT, | Performed by: RADIOLOGY

## 2019-11-21 PROCEDURE — 73502 X-RAY EXAM HIP UNI 2-3 VIEWS: CPT | Mod: 26,LT,, | Performed by: RADIOLOGY

## 2019-11-21 PROCEDURE — 99203 OFFICE O/P NEW LOW 30 MIN: CPT | Mod: 25,S$GLB,, | Performed by: ORTHOPAEDIC SURGERY

## 2019-11-21 PROCEDURE — 20610 DRAIN/INJ JOINT/BURSA W/O US: CPT | Mod: LT,S$GLB,, | Performed by: ORTHOPAEDIC SURGERY

## 2019-11-21 PROCEDURE — 99999 PR PBB SHADOW E&M-EST. PATIENT-LVL V: CPT | Mod: PBBFAC,,, | Performed by: ORTHOPAEDIC SURGERY

## 2019-11-21 PROCEDURE — 99999 PR PBB SHADOW E&M-EST. PATIENT-LVL III: CPT | Mod: PBBFAC,,, | Performed by: INTERNAL MEDICINE

## 2019-11-21 PROCEDURE — 99214 OFFICE O/P EST MOD 30 MIN: CPT | Mod: S$GLB,,, | Performed by: FAMILY MEDICINE

## 2019-11-21 PROCEDURE — 73502 X-RAY EXAM HIP UNI 2-3 VIEWS: CPT | Mod: TC,LT

## 2019-11-21 PROCEDURE — 20610 LARGE JOINT ASPIRATION/INJECTION: L KNEE: ICD-10-PCS | Mod: LT,S$GLB,, | Performed by: ORTHOPAEDIC SURGERY

## 2019-11-21 PROCEDURE — 3077F SYST BP >= 140 MM HG: CPT | Mod: CPTII,S$GLB,, | Performed by: ORTHOPAEDIC SURGERY

## 2019-11-21 PROCEDURE — 3077F SYST BP >= 140 MM HG: CPT | Mod: CPTII,S$GLB,, | Performed by: FAMILY MEDICINE

## 2019-11-21 PROCEDURE — 99213 OFFICE O/P EST LOW 20 MIN: CPT | Mod: S$GLB,,, | Performed by: INTERNAL MEDICINE

## 2019-11-21 PROCEDURE — 73562 X-RAY EXAM OF KNEE 3: CPT | Mod: TC,RT

## 2019-11-21 PROCEDURE — 99999 PR PBB SHADOW E&M-EST. PATIENT-LVL V: ICD-10-PCS | Mod: PBBFAC,,, | Performed by: ORTHOPAEDIC SURGERY

## 2019-11-21 PROCEDURE — 3008F BODY MASS INDEX DOCD: CPT | Mod: CPTII,S$GLB,, | Performed by: FAMILY MEDICINE

## 2019-11-21 PROCEDURE — 3008F PR BODY MASS INDEX (BMI) DOCUMENTED: ICD-10-PCS | Mod: CPTII,S$GLB,, | Performed by: FAMILY MEDICINE

## 2019-11-21 PROCEDURE — 3078F PR MOST RECENT DIASTOLIC BLOOD PRESSURE < 80 MM HG: ICD-10-PCS | Mod: CPTII,S$GLB,, | Performed by: ORTHOPAEDIC SURGERY

## 2019-11-21 PROCEDURE — 73564 X-RAY EXAM KNEE 4 OR MORE: CPT | Mod: 26,LT,, | Performed by: RADIOLOGY

## 2019-11-21 PROCEDURE — 3008F BODY MASS INDEX DOCD: CPT | Mod: CPTII,S$GLB,, | Performed by: INTERNAL MEDICINE

## 2019-11-21 PROCEDURE — 3079F DIAST BP 80-89 MM HG: CPT | Mod: CPTII,S$GLB,, | Performed by: FAMILY MEDICINE

## 2019-11-21 PROCEDURE — 3078F PR MOST RECENT DIASTOLIC BLOOD PRESSURE < 80 MM HG: ICD-10-PCS | Mod: CPTII,S$GLB,, | Performed by: INTERNAL MEDICINE

## 2019-11-21 RX ORDER — OXYCODONE AND ACETAMINOPHEN 10; 325 MG/1; MG/1
1 TABLET ORAL EVERY 4 HOURS PRN
COMMUNITY

## 2019-11-21 RX ORDER — VALACYCLOVIR HYDROCHLORIDE 500 MG/1
500 TABLET, FILM COATED ORAL 3 TIMES DAILY
Qty: 30 TABLET | Refills: 0 | Status: SHIPPED | OUTPATIENT
Start: 2019-11-21 | End: 2019-11-21 | Stop reason: SDUPTHER

## 2019-11-21 RX ORDER — VALACYCLOVIR HYDROCHLORIDE 500 MG/1
500 TABLET, FILM COATED ORAL 3 TIMES DAILY
Qty: 30 TABLET | Refills: 0 | Status: SHIPPED | OUTPATIENT
Start: 2019-11-21 | End: 2019-12-21

## 2019-11-21 RX ORDER — TRIAMCINOLONE ACETONIDE 40 MG/ML
40 INJECTION, SUSPENSION INTRA-ARTICULAR; INTRAMUSCULAR
Status: DISCONTINUED | OUTPATIENT
Start: 2019-11-21 | End: 2019-11-21 | Stop reason: HOSPADM

## 2019-11-21 RX ORDER — VALACYCLOVIR HYDROCHLORIDE 500 MG/1
500 TABLET, FILM COATED ORAL 3 TIMES DAILY
Qty: 30 TABLET | Refills: 0 | Status: SHIPPED | OUTPATIENT
Start: 2019-11-21 | End: 2019-11-21

## 2019-11-21 RX ORDER — CYCLOBENZAPRINE HCL 5 MG
5 TABLET ORAL 3 TIMES DAILY PRN
Qty: 30 TABLET | Refills: 0 | Status: SHIPPED | OUTPATIENT
Start: 2019-11-21 | End: 2019-12-01

## 2019-11-21 RX ADMIN — TRIAMCINOLONE ACETONIDE 40 MG: 40 INJECTION, SUSPENSION INTRA-ARTICULAR; INTRAMUSCULAR at 01:11

## 2019-11-21 NOTE — LETTER
November 21, 2019      Samaria Madrid MD  [address]  [facility] [address]           [facility] - Orthopedics  [address]  [facility] [address]  Phone: [phone]          Patient: Autumn Smart   MR Number: [id number]   YOB: 1955   Date of Visit: 11/21/2019       Dear Dr. Samaria Madrid:    Thank you for referring Autumn Smart to me for evaluation. Attached you will find relevant portions of my assessment and plan of care.    If you have questions, please do not hesitate to call me. I look forward to following Autumn Smart along with you.    Sincerely,    Naresh Giles III, MD    Enclosure  CC:  No Recipients    If you would like to receive this communication electronically, please contact [email or web address] or [phone] to request more information on [email or web address] Link access.    For providers and/or their staff who would like to refer a patient to [facility], please contact us through our one-stop-shop provider referral line, [facility], at [phone].    If you feel you have received this communication in error or would no longer like to receive these types of communications, please e-mail [email or web address]

## 2019-11-21 NOTE — PATIENT INSTRUCTIONS
For Teens: Understanding Herpes    Herpes is a sexually transmitted disease (STD) that causes painful outbreaks of blisters and sores. It spreads through contact with an infected area--usually a sore on the genitals, rectum, or mouth. Herpes can also spread even when you cant see any sores. Theres no cure for herpes. But treatment can help make outbreaks happen less often and be less severe.  What to look for  An outbreak can happen a few days or weeks after sex. Some people have only 1 outbreak in their lifetime. Others have several outbreaks a year.  · An outbreak begins with blisters on the penis, or in or around the vagina, mouth, or rectum.  · After a few days, the blisters break and leave painful sores. These may take days or weeks to heal.  · The first outbreak is often the worst. Later outbreaks are also painful, but tend to heal faster.  Treatment  Medicines can help reduce how often outbreaks happen. They can also lessen pain. You can help sores heal faster by keeping them clean and dry. To avoid spreading the virus, dont have sex during an outbreak. Also, avoid oral sex if you have a cold sore. Cold sores are another form of the herpes virus (oral herpes). Oral herpes can be passed from the mouth to the genitals.  There is no cure for herpes. Medicines can help you manage herpes. But they can't rid your body of the virus. The herpes virus will stay inactive (dormant) inside your body. It can become active again due to certain triggers such as stress.    If you dont get treated  Herpes isnt deadly. But it does have risks:  · Having herpes increases the chance of catching HIV (the virus that causes AIDS). Herpes sores make it easier for the HIV virus to be passed during sex.  · If a woman has an outbreak during pregnancy, herpes can be passed to the baby at birth. This can cause very serious problems or even be fatal for the baby.  Dont kiss or have sex if you or your partner has a herpes sore. And  use latex condoms every time you have sex--even between outbreaks. Using latex condoms correctly and consistently can help reduce the spread of sexually transmitted diseases.   Date Last Reviewed: 1/1/2017  © 5175-0576 [employer]. [address] [place] [address], [place] [address]. All rights reserved. This information is not intended as a substitute for professional medical care. Always follow your healthcare professional's instructions.

## 2019-11-21 NOTE — PROGRESS NOTES
Subjective:       Patient ID: Autumn Smart is a 64 y.o. female.    Chief Complaint: Leg Pain (left)    64 year old lady complains of terrible pain in left knee.  No injury.  Had knee xrays in August and never heard report    Review of Systems   Constitutional: Negative for activity change, chills, fatigue and fever.   HENT: Negative for congestion, ear pain, nosebleeds, postnasal drip, sinus pressure and sore throat.    Eyes: Negative.  Negative for visual disturbance.   Respiratory: Negative for cough, chest tightness, shortness of breath and wheezing.    Cardiovascular: Negative for chest pain.   Gastrointestinal: Negative for abdominal pain, diarrhea, nausea and vomiting.   Genitourinary: Negative for difficulty urinating, dysuria, frequency and urgency.   Musculoskeletal: Negative for arthralgias and neck stiffness.   Skin: Negative for rash.   Neurological: Negative for dizziness, weakness and headaches.   Psychiatric/Behavioral: Negative for sleep disturbance. The patient is not nervous/anxious.        Objective:      Physical Exam   Musculoskeletal:        Left knee: She exhibits normal range of motion, no swelling, no effusion, no ecchymosis, no deformity, no laceration, no erythema, normal alignment, no LCL laxity and no MCL laxity. No tenderness found.       Assessment:       1. Acute pain of left knee        Plan:   Autumn was seen today for leg pain.    Diagnoses and all orders for this visit:    Acute pain of left knee  -     Ambulatory referral to Orthopedics; Future    Other orders  -     cyclobenzaprine (FLEXERIL) 5 MG tablet; Take 1 tablet (5 mg total) by mouth 3 (three) times daily as needed for Muscle spasms.

## 2019-11-21 NOTE — PROGRESS NOTES
"Subjective:       Patient ID: Autumn Smart is a 64 y.o. female.    Vitals:  height is 5' 8" (1.727 m) and weight is 78.5 kg (173 lb). Her temperature is 97.8 °F (36.6 °C). Her blood pressure is 179/86 (abnormal) and her pulse is 60. Her respiration is 18 and oxygen saturation is 98%.     Chief Complaint: Dysuria    Pt was seen 11/19/2019 w/ the same complaints that's unchanged.  Patient was seen here 2 days ago with similar symptoms and states that she is having with increased blistering on the lip as well as on the private area states that she has no discharge and otherwise just having pain in the private area as well as on the left    Dysuria    The current episode started in the past 7 days (5 days ). The problem occurs every urination. The problem has been gradually worsening. The pain is at a severity of 8/10. The pain is moderate. There has been no fever. She is sexually active. There is no history of pyelonephritis. Pertinent negatives include no chills, frequency, hematuria, nausea, urgency, vomiting or rash. Treatments tried: pain pills  The treatment provided mild relief. Her past medical history is significant for recurrent UTIs and STD.       Constitution: Negative for chills and fever.   Neck: Negative for painful lymph nodes.   Gastrointestinal: Negative for abdominal pain, nausea and vomiting.   Genitourinary: Positive for dysuria, vaginal pain and genital sore. Negative for frequency, urgency, urine decreased, hematuria, history of kidney stones, painful menstruation, irregular menstruation, missed menses, heavy menstrual bleeding, ovarian cysts, genital trauma, vaginal discharge, vaginal bleeding, vaginal odor, painful intercourse, painful ejaculation and pelvic pain.   Musculoskeletal: Negative for back pain.   Skin: Negative for rash and lesion.   Allergic/Immunologic: Positive for itching.   Hematologic/Lymphatic: Negative for swollen lymph nodes.       Objective:      Physical Exam "   Constitutional: She is oriented to person, place, and time. She appears well-developed and well-nourished. She is cooperative.  Non-toxic appearance. She does not appear ill. No distress.   HENT:   Head: Normocephalic and atraumatic.   Right Ear: Hearing, tympanic membrane, external ear and ear canal normal.   Left Ear: Hearing, tympanic membrane, external ear and ear canal normal.   Nose: Nose normal. No mucosal edema, rhinorrhea or nasal deformity. No epistaxis. Right sinus exhibits no maxillary sinus tenderness and no frontal sinus tenderness. Left sinus exhibits no maxillary sinus tenderness and no frontal sinus tenderness.   Mouth/Throat: Uvula is midline, oropharynx is clear and moist and mucous membranes are normal. No trismus in the jaw. Normal dentition. No uvula swelling. No posterior oropharyngeal erythema.   Eyes: Conjunctivae and lids are normal. Right eye exhibits no discharge. Left eye exhibits no discharge. No scleral icterus.   Neck: Trachea normal, normal range of motion, full passive range of motion without pain and phonation normal. Neck supple.   Cardiovascular: Normal rate, regular rhythm, normal heart sounds, intact distal pulses and normal pulses.   Pulmonary/Chest: Effort normal and breath sounds normal. No respiratory distress.   Abdominal: Soft. Normal appearance and bowel sounds are normal. She exhibits no distension, no pulsatile midline mass and no mass. There is no tenderness.   Genitourinary:   Genitourinary Comments: Small blisters on the upper lip as well as on the in the vaginal area   Musculoskeletal: Normal range of motion. She exhibits no edema or deformity.   Neurological: She is alert and oriented to person, place, and time. She exhibits normal muscle tone. Coordination normal.   Skin: Skin is warm, dry, intact, not diaphoretic and not pale.   Psychiatric: She has a normal mood and affect. Her speech is normal and behavior is normal. Judgment and thought content normal.  Cognition and memory are normal.   Nursing note and vitals reviewed.        Assessment:       1. Acute vaginitis        Plan:         Acute vaginitis  -     Herpes Simplex Virus (HSV) Types 1 & 2, IgG, Herpes Titer    Other orders  -     valACYclovir (VALTREX) 500 MG tablet; Take 1 tablet (500 mg total) by mouth 3 (three) times daily.  Dispense: 30 tablet; Refill: 0

## 2019-11-21 NOTE — PROCEDURES
Large Joint Aspiration/Injection: L knee  Date/Time: 11/21/2019 1:40 PM  Performed by: Naresh Giles III, MD  Authorized by: Naresh Giles III, MD     Consent Done?:  Yes (Verbal)  Indications:  Pain  Timeout: Prior to procedure the correct patient, procedure, and site was verified    Anesthesia  Local anesthesia used  Anesthetic: lidocaine 1% without epinephrine  Anesthetic total: 4mL    Location:  Knee  Site:  L knee  Prep: Patient was prepped and draped in usual sterile fashion    Needle size:  21 G  Approach: anterior peripatellar.  Medications:  40 mg triamcinolone acetonide 40 mg/mL  Patient tolerance:  Patient tolerated the procedure well with no immediate complications

## 2019-11-21 NOTE — PROGRESS NOTES
"Subjective:     HPI:   Autumn Smart is a 64 y.o. female who presents as a same day appointment for evaluation of left knee pain    She was seen at [facility] earlier today for dysuria and a 8 diagnosed with acute vaginitis and prescribed Valtrex.  Later in the day she came over to a our Primary Care Internal Medicine office and saw Dr. Madrid who sent her here for left knee pain    She has been diagnosed with bilateral lower extremity pain following her stroke Jazmin back in 2018 there is a note of leg pain legs going numb and sensation that she feels paralyzed there was a referral to the Pain Clinic but then ultimately apparently she was referred to Neurology.    She complains of left knee pain has been going on since the summer no acute injuries.  She has got a stream of consciousness in terms of her history she is a very poor historian.  She says the knee "is very much alive" and she feels fluid waves and which sounds like she has tried describing effusion    She has seen an outside neurologist they have gotten some x-rays and diagnosed with left knee arthritis. She says they tried to do an injection the left knee but she does not feel like the actually got in the joint they stuck the needle around and it took 15 or 20 minutes supposedly so sounds like it was he map questionable successful intra-articular injection she said it did not help significantly     She has been on Percocet 10 milligrams her neurologist's office is given her 90 tab some month currently she has also tried Aleve.  She has done some physical therapy but she says it increased her blood pressure.  A brace has been ordered.  No assistive devices.  She says she can walk about 2 blocks she walked from the primary care office across the street to our office today limitations include everything.    She lives alone her  has  she has a paraplegic son so she is on disability for that her mom was murder and her other son was " shot in the brain.  She says she does have a 40-year-old son that does help her    ROS:  The updated medical history is in the chart and has been reviewed. A review of systems is updated and there is no reported vision changes, ear/nose/mouth/throat complaints,  chest pain, shortness of breath, abdominal pain, urological complaints, fevers or chills, psychiatric complaints. Musculoskeletal and neurologcial symptoms are as documented. All other systems are negative.      Objective:   Exam:  There were no vitals filed for this visit.  Body mass index is 27.24 kg/m².    Physical examination included assessment of the patient's general appearance with particular attention to development, nutrition, body habitus, attention to grooming, and any evidence of distress.  Constitutional: The patient is a well-developed, well-nourished patient in no acute distress.   Cardiovascular: Vascular examination included warmth and capillary refill as well inspection for edema and assessment of pedal pulses. Pulses are palpable and regular.  Musculoskeletal: Gait was assessed as to whether it was steady, non-antalgic, and/or required the use of an assist device. The patient was also asked to walk independently and get onto the examination table.  Skin: The skin was examined for any obvious rashes or lesions in the extremity.  Neurologic: Sensation is intact to light touch in the extremity. The patient has good coordination without hyperreflexia and is alert and oriented to person, place and time and has normal mood and affect.     All of the above were examined and found to be within normal limits except for the following pertinent clinical findings:    She is a slow steppage neuropathic style gait likely related to a previous stroke she does have some antalgia and a limp on the left knee left knee 0-90 degree knee range of motion neutral alignment knee stable to anterior-posterior varus and valgus stresses with no flexion contracture  or extensor lag she has got pain out of proportion to exam diffusely tender to medial lateral patellofemoral joint lines of mild-to-moderate effusion no concerning signs for infection nor erythema she does have pain with internal-external hip range of motion as well.  She begins crying during the exam pain significantly out of proportion      Imaging:  Indication:  Left knee pain  Exam Ordered: Radiographs of the left knee include a standing anteroposterior view, a standing posterioanterior view, a lateral view in full flexion, and a sunrise view  Details of Examination: Todays exam shows evidence of joint space narrowing, osteophyte formation, and subchondral sclerosis, all consistent with degenerative arthritis of the knee.  No other significant findings are noted.  Impression:  Degenerative Arthritis, Left Knee  Varus medial compartment grade 2  Previous right tibial nail        Assessment:     Primary osteoarthritis of left knee [M17.12]  Varus medial compartment grade 2    History of stroke and vertebral basilar syndrome  Diabetes last A1c in 2017 was 7.8  Right tibial nail  With sounds like ablation in her spine  Chronic narcotic usage prescribed by neurologist  Concern for possible psychiatric history given her affect and general demeanor       Plan:       The above findings were discussed with patient length. We discussed the risks of conservative versus surgical management knee arthritis. Conservative management consisting of anti-inflammatory medications, glucosamine/chondroitin sulfate, weight loss, physical therapy, activity modification, as well as injections (lubricant versus corticosteroid) was discussed at length. At this point considering the patient's level of activity, pain, and radiographic findings I recommend continued conservative management of the knee arthritis. Conservative management could involve treatment with anti-inflammatory medications.     We offered her another steroid injection  into the knee the previous 1 she got at her neurologist sounds like there may have been issues with technique.  We did discuss that she needs to check her blood sugars regularly after the injection as they could be elevated she says she does have the ability to check her blood sugars at home.    We discussed that at this point I think she would be a very poor surgical candidate given her multiple medical comorbidities her general mood and affect and what looked is concerning for some type of psychiatric history I think she would do terribly for the knee replacement and we would likely make her far worse    I did offer her the option of referral to our pain clinic she would likely be a excellent candidate for geniculate nerve blocks and other types of non operative interventions.  Unfortunately she has [employer] and some of her options would be limited due to her insurance    If symptoms persist she could continue to follow with any of our APPs for continued non operative arthritis management    Procedure:  The patient would like to proceed with a steroid injection into the knee.  We discussed the risks and benefits of cortisone injections.  After sterile preparation 1 cc of 40 mg of triamcinolone and 4 cc of 1% lidocaine was injected into the knee.  The patient tolerated the injection without any difficulty.  We discussed that this can be repeated every 3-4 months at the earliest.   Left knee    Orders Placed This Encounter   Procedures    Large Joint Aspiration/Injection: L knee     This order was created via procedure documentation    X-ray Knee Ortho Left with Flexion     Standing Status:   Future     Number of Occurrences:   1     Standing Expiration Date:   11/21/2020     Order Specific Question:   May the Radiologist modify the order per protocol to meet the clinical needs of the patient?     Answer:   Yes    X-Ray Hip 2 View Left     Standing Status:   Future     Number of Occurrences:   1     Standing  Expiration Date:   11/21/2020     Order Specific Question:   May the Radiologist modify the order per protocol to meet the clinical needs of the patient?     Answer:   Yes    Ambulatory referral to Pain Clinic     Referral Priority:   Routine     Referral Type:   Consultation     Referral Reason:   Specialty Services Required     Requested Specialty:   Pain Medicine     Number of Visits Requested:   1       Past Medical History:   Diagnosis Date    Back pain     CVA (cerebral vascular accident)     Diabetes mellitus     Diabetes mellitus, type 2     Embolic stroke involving left middle cerebral artery 11/23/2017    Hyperlipidemia     Hypertension     Insomnia        Past Surgical History:   Procedure Laterality Date    BACK SURGERY      FRACTURE SURGERY      right leg surgery Right     dionna in right leg       Family History   Problem Relation Age of Onset    Diabetes Mother     Hypertension Mother     Diabetes Father     Hypertension Father     Breast cancer Neg Hx     Colon cancer Neg Hx     Ovarian cancer Neg Hx        Social History     Socioeconomic History    Marital status:      Spouse name: Not on file    Number of children: Not on file    Years of education: Not on file    Highest education level: Not on file   Occupational History    Not on file   Social Needs    Financial resource strain: Not on file    Food insecurity:     Worry: Not on file     Inability: Not on file    Transportation needs:     Medical: Not on file     Non-medical: Not on file   Tobacco Use    Smoking status: Never Smoker    Smokeless tobacco: Never Used   Substance and Sexual Activity    Alcohol use: No     Alcohol/week: 1.0 standard drinks     Types: 1 Cans of beer per week    Drug use: Yes     Types: Marijuana     Comment: denies    Sexual activity: Yes     Partners: Male     Comment: relationship of 2 years broke up APril due to infidelity   Lifestyle    Physical activity:     Days per week:  Not on file     Minutes per session: Not on file    Stress: Not on file   Relationships    Social connections:     Talks on phone: Not on file     Gets together: Not on file     Attends Spiritism service: Not on file     Active member of club or organization: Not on file     Attends meetings of clubs or organizations: Not on file     Relationship status: Not on file   Other Topics Concern    Not on file   Social History Narrative    Not on file

## 2019-11-22 ENCOUNTER — TELEPHONE (OUTPATIENT)
Dept: URGENT CARE | Facility: CLINIC | Age: 64
End: 2019-11-22

## 2019-11-22 LAB
C TRACH DNA SPEC QL NAA+PROBE: NOT DETECTED
HIV 1+2 AB+HIV1 P24 AG SERPL QL IA: NEGATIVE
HSV1 IGG SERPL QL IA: POSITIVE
HSV2 IGG SERPL QL IA: POSITIVE
N GONORRHOEA DNA SPEC QL NAA+PROBE: NOT DETECTED

## 2019-11-22 NOTE — TELEPHONE ENCOUNTER
Patient returned phone call.  Discussed neg HIV results, HSV still pending.  She verbalized understanding.

## 2019-11-23 ENCOUNTER — TELEPHONE (OUTPATIENT)
Dept: URGENT CARE | Facility: CLINIC | Age: 64
End: 2019-11-23

## 2019-11-23 LAB — BACTERIA UR CULT: ABNORMAL

## 2019-11-25 ENCOUNTER — TELEPHONE (OUTPATIENT)
Dept: URGENT CARE | Facility: CLINIC | Age: 64
End: 2019-11-25

## 2019-11-25 RX ORDER — SULFAMETHOXAZOLE AND TRIMETHOPRIM 800; 160 MG/1; MG/1
1 TABLET ORAL 2 TIMES DAILY
Qty: 14 TABLET | Refills: 0 | Status: SHIPPED | OUTPATIENT
Start: 2019-11-25 | End: 2019-12-03

## 2019-11-26 ENCOUNTER — TELEPHONE (OUTPATIENT)
Dept: PAIN MEDICINE | Facility: CLINIC | Age: 64
End: 2019-11-26

## 2019-11-26 NOTE — TELEPHONE ENCOUNTER
Returned call to patient and rescheduled her appointment to 12/5/19. Pt verbalized understanding.       ----- Message from Randall Harman sent at 11/26/2019 12:53 PM CST -----  Contact: [phone] / self  Patient states that she will not be able to make it to her appointment today. Please Advise.

## 2019-12-03 ENCOUNTER — OFFICE VISIT (OUTPATIENT)
Dept: INTERNAL MEDICINE | Facility: CLINIC | Age: 64
End: 2019-12-03
Payer: COMMERCIAL

## 2019-12-03 VITALS
WEIGHT: 166.69 LBS | SYSTOLIC BLOOD PRESSURE: 122 MMHG | OXYGEN SATURATION: 98 % | BODY MASS INDEX: 26.16 KG/M2 | HEIGHT: 67 IN | DIASTOLIC BLOOD PRESSURE: 68 MMHG | HEART RATE: 77 BPM

## 2019-12-03 DIAGNOSIS — D50.9 IRON DEFICIENCY ANEMIA, UNSPECIFIED IRON DEFICIENCY ANEMIA TYPE: ICD-10-CM

## 2019-12-03 DIAGNOSIS — B00.9 HSV-2 INFECTION: ICD-10-CM

## 2019-12-03 DIAGNOSIS — B00.9 HSV-1 INFECTION: Primary | ICD-10-CM

## 2019-12-03 DIAGNOSIS — H53.9 VISION CHANGES: ICD-10-CM

## 2019-12-03 PROBLEM — N76.0 ACUTE VAGINITIS: Status: RESOLVED | Noted: 2017-07-31 | Resolved: 2019-12-03

## 2019-12-03 PROBLEM — H61.20 IMPACTED CERUMEN: Status: RESOLVED | Noted: 2018-09-05 | Resolved: 2019-12-03

## 2019-12-03 PROBLEM — J06.9 URI (UPPER RESPIRATORY INFECTION): Status: RESOLVED | Noted: 2018-09-05 | Resolved: 2019-12-03

## 2019-12-03 PROBLEM — E87.6 HYPOKALEMIA: Status: RESOLVED | Noted: 2018-09-05 | Resolved: 2019-12-03

## 2019-12-03 PROCEDURE — 3074F PR MOST RECENT SYSTOLIC BLOOD PRESSURE < 130 MM HG: ICD-10-PCS | Mod: CPTII,S$GLB,, | Performed by: FAMILY MEDICINE

## 2019-12-03 PROCEDURE — 3078F PR MOST RECENT DIASTOLIC BLOOD PRESSURE < 80 MM HG: ICD-10-PCS | Mod: CPTII,S$GLB,, | Performed by: FAMILY MEDICINE

## 2019-12-03 PROCEDURE — 99214 PR OFFICE/OUTPT VISIT, EST, LEVL IV, 30-39 MIN: ICD-10-PCS | Mod: S$GLB,,, | Performed by: FAMILY MEDICINE

## 2019-12-03 PROCEDURE — 3078F DIAST BP <80 MM HG: CPT | Mod: CPTII,S$GLB,, | Performed by: FAMILY MEDICINE

## 2019-12-03 PROCEDURE — 99214 OFFICE O/P EST MOD 30 MIN: CPT | Mod: S$GLB,,, | Performed by: FAMILY MEDICINE

## 2019-12-03 PROCEDURE — 99999 PR PBB SHADOW E&M-EST. PATIENT-LVL IV: ICD-10-PCS | Mod: PBBFAC,,, | Performed by: FAMILY MEDICINE

## 2019-12-03 PROCEDURE — 3008F PR BODY MASS INDEX (BMI) DOCUMENTED: ICD-10-PCS | Mod: CPTII,S$GLB,, | Performed by: FAMILY MEDICINE

## 2019-12-03 PROCEDURE — 3074F SYST BP LT 130 MM HG: CPT | Mod: CPTII,S$GLB,, | Performed by: FAMILY MEDICINE

## 2019-12-03 PROCEDURE — 99999 PR PBB SHADOW E&M-EST. PATIENT-LVL IV: CPT | Mod: PBBFAC,,, | Performed by: FAMILY MEDICINE

## 2019-12-03 PROCEDURE — 3008F BODY MASS INDEX DOCD: CPT | Mod: CPTII,S$GLB,, | Performed by: FAMILY MEDICINE

## 2019-12-03 RX ORDER — PERMETHRIN 50 MG/G
CREAM TOPICAL
Refills: 0 | COMMUNITY
Start: 2019-08-29 | End: 2021-03-24

## 2019-12-03 RX ORDER — NITROFURANTOIN 25; 75 MG/1; MG/1
CAPSULE ORAL
Refills: 0 | COMMUNITY
Start: 2019-11-29 | End: 2020-03-03

## 2019-12-03 RX ORDER — TIZANIDINE 4 MG/1
TABLET ORAL
Refills: 1 | COMMUNITY
Start: 2019-11-18 | End: 2021-11-18

## 2019-12-03 RX ORDER — VALACYCLOVIR HYDROCHLORIDE 500 MG/1
500 TABLET, FILM COATED ORAL 2 TIMES DAILY
Qty: 60 TABLET | Refills: 2 | Status: SHIPPED | OUTPATIENT
Start: 2019-12-03 | End: 2020-12-02

## 2019-12-03 RX ORDER — FERROUS SULFATE 324(65)MG
325 TABLET, DELAYED RELEASE (ENTERIC COATED) ORAL DAILY
Qty: 90 TABLET | Refills: 3 | Status: SHIPPED | OUTPATIENT
Start: 2019-12-03 | End: 2021-11-18

## 2019-12-03 NOTE — PROGRESS NOTES
"Subjective:       Patient ID: Autumn Smart is a 64 y.o. female.    Chief Complaint: Follow-up    HPI    64yoF to [facility].    Patient is following up from urgent care visit on 11/21 where found to be HSV 1-2 positive. Presenting symptoms of rash around lip and also  area.    Originally dx with HSV per patient report in 2017. Had recent sickness and rash re-presented in last week.    Patient wants a "full blood transfusion to get rid of hsv."    Review of Systems   Constitutional: Negative for appetite change, fatigue and fever.   HENT: Negative for ear pain, sinus pain and sore throat.    Respiratory: Negative for cough and shortness of breath.    Cardiovascular: Negative for chest pain and palpitations.   Gastrointestinal: Negative for abdominal pain, constipation, diarrhea, nausea and vomiting.   Endocrine: Positive for cold intolerance.   Genitourinary: Negative for dysuria.   Musculoskeletal: Negative for back pain.   Skin: Negative for rash.   Neurological: Negative for weakness, numbness and headaches.         Past Medical History:   Diagnosis Date    Back pain     CVA (cerebral vascular accident)     Diabetes mellitus     Diabetes mellitus, type 2     Embolic stroke involving left middle cerebral artery 11/23/2017    Hyperlipidemia     Hypertension     Insomnia         Prior to Admission medications    Medication Sig Start Date End Date Taking? Authorizing Provider   amLODIPine (NORVASC) 5 MG tablet TAKE 1 TABLET BY MOUTH EVERY DAY 7/3/19   Zoe Davis DNP   atorvastatin (LIPITOR) 40 MG tablet Take 1 tablet (40 mg total) by mouth once daily. 6/15/19   Niecy Flores PA-C   cloNIDine (CATAPRES) 0.3 MG tablet TAKE 1 TABLET BY MOUTH DAILY AS NEEDED ONLY IF BLOOD PRESSURE IS GREATER THAN OR EQUAL /100 9/27/18   Zoe Davis DNP   clopidogrel (PLAVIX) 75 mg tablet Take 1 tablet (75 mg total) by mouth once daily. 11/25/17 3/2/19  Parisa Damon PA-C   hydroCHLOROthiazide " "(HYDRODIURIL) 25 MG tablet TAKE 1 TABLET BY MOUTH EVERY DAY 7/3/19   Zoe Davis DNP   ibuprofen (ADVIL,MOTRIN) 200 MG tablet  12/4/18   Historical Provider, MD   ipratropium (ATROVENT) 0.03 % nasal spray 2 sprays by Nasal route 2 (two) times daily. 9/5/18   Sebastian Harris MD   lisinopril (PRINIVIL,ZESTRIL) 40 MG tablet TAKE 1 TABLET BY MOUTH EVERY DAY 7/3/19   Zoe Davis DNP   metformin (GLUCOPHAGE) 1000 MG tablet Take 1 tablet (1,000 mg total) by mouth 2 (two) times daily with meals. 5/31/17   Mynor Sauceda MD   oxyCODONE-acetaminophen (PERCOCET)  mg per tablet Take 1 tablet by mouth every 4 (four) hours as needed for Pain.    Historical Provider, MD   potassium chloride (K-TAB) 20 mEq Take 1 tablet (20 mEq total) by mouth once daily. 10/30/19   Sebastian Harris MD   QUEtiapine (SEROQUEL XR) 400 MG Tb24 Take by mouth every evening.    Historical Provider, MD   sulfamethoxazole-trimethoprim 800-160mg (BACTRIM DS) 800-160 mg Tab Take 1 tablet by mouth 2 (two) times daily. for 7 days 11/25/19 12/2/19  Kasey Bell NP   valACYclovir (VALTREX) 500 MG tablet Take 1 tablet (500 mg total) by mouth 3 (three) times daily. 11/21/19 12/21/19  Brennon Melendez MD        Past medical history, surgical history, and family medical history reviewed and updated as appropriate.    Medications and allergies reviewed.     Objective:          Vitals:    12/03/19 1353   BP: 122/68   BP Location: Left arm   Patient Position: Sitting   BP Method: Medium (Manual)   Pulse: 77   SpO2: 98%   Weight: 75.6 kg (166 lb 10.7 oz)   Height: 5' 7" (1.702 m)     Body mass index is 26.1 kg/m².  Physical Exam   Constitutional: She appears well-developed and well-nourished.   Eyes: EOM are normal.   Cardiovascular: Normal rate, regular rhythm, normal heart sounds and intact distal pulses.   No murmur heard.  Pulmonary/Chest: Effort normal and breath sounds normal. No respiratory distress. She has no wheezes.   Vitals " reviewed.      Lab Results   Component Value Date    WBC 4.69 06/06/2018    HGB 11.3 (L) 06/06/2018    HCT 36.4 (L) 06/06/2018     06/06/2018    CHOL 132 11/23/2017    TRIG 114 11/23/2017    HDL 44 11/23/2017    ALT 17 06/06/2018    AST 19 06/06/2018     09/24/2019    K 3.5 09/24/2019     09/24/2019    CREATININE 0.8 09/24/2019    BUN 15 09/24/2019    CO2 31 (H) 09/24/2019    TSH 0.333 (L) 11/23/2017    INR 1.0 11/24/2017    HGBA1C 7.8 (H) 11/24/2017       Assessment:       1. HSV-1 infection    2. HSV-2 infection    3. Iron deficiency anemia, unspecified iron deficiency anemia type    4. Vision changes        Plan:   Autumn was seen today for follow-up.    Diagnoses and all orders for this visit:    Outbreak controlled currently, valtrex prescription for control of future outbreaks. Pt understanding of plan. F/u 3 months for annual wellness exam.    HSV-1 infection  -     valACYclovir (VALTREX) 500 MG tablet; Take 1 tablet (500 mg total) by mouth 2 (two) times daily. Take twice daily for 3 days during outbreak    HSV-2 infection  -     valACYclovir (VALTREX) 500 MG tablet; Take 1 tablet (500 mg total) by mouth 2 (two) times daily. Take twice daily for 3 days during outbreak    Iron deficiency anemia, unspecified iron deficiency anemia type  -     ferrous sulfate 324 mg (65 mg iron) TbEC; Take 1 tablet (324 mg total) by mouth once daily.    Vision changes  -     Ambulatory Referral to Ophthalmology        Health maintenance reviewed with patient.     Follow up if symptoms worsen or fail to improve.    Shravan Diane MD  Family Medicine  [facility]  12/3/2019

## 2019-12-03 NOTE — PATIENT INSTRUCTIONS
The Herpes Virus  Herpes is a virus that can cause sores on the skin. There are 2 types of the virus. Depending on how you come in contact with the virus, either type can cause outbreaks near the mouth or on the sex organs.  Understanding the herpes virus  Herpes reproduces only when it is inside the body. It does so by tricking a healthy cell into producing copies of the herpes virus. Each copy can infect nearby cells. But, before too long, the bodys defenses rally to stop the attack. The immune system forces the virus to retreat. Even then, the virus stays inside the body but does not cause disease. For some people, an acute outbreak never happens again. For others, outbreaks are more likely to occur due to menstruation, illness, poor diet, fatigue, exposure to cold or strong sunlight, or stress.    How the herpes virus attacks  1. The herpes virus enters the body through a small break in the skin. The virus can also enter by direct contact with mucous membranes, such as those of the lips, vagina, or anus.  2. Inside the body, the herpes virus binds to a special site on a skin cell. Then part of the virus moves into the cell.  3. Inside the skin cell, the virus releases a set of instructions. These commands cause the cell to begin making copies of the herpes virus.  4. Herpes blisters appear on the skin. Herpes blisters may also appear on mucous membranes lining the mouth, vagina, or anus.  Date Last Reviewed: 1/1/2017  © 2062-1705 [employer]. [address] [place] [address], [place] [address]. All rights reserved. This information is not intended as a substitute for professional medical care. Always follow your healthcare professional's instructions.

## 2019-12-04 ENCOUNTER — PATIENT OUTREACH (OUTPATIENT)
Dept: ADMINISTRATIVE | Facility: OTHER | Age: 64
End: 2019-12-04

## 2019-12-04 DIAGNOSIS — E11.9 TYPE 2 DIABETES MELLITUS WITHOUT COMPLICATION, WITHOUT LONG-TERM CURRENT USE OF INSULIN: Primary | ICD-10-CM

## 2019-12-06 DIAGNOSIS — E11.9 TYPE 2 DIABETES MELLITUS WITHOUT COMPLICATION: ICD-10-CM

## 2019-12-11 ENCOUNTER — OFFICE VISIT (OUTPATIENT)
Dept: INTERNAL MEDICINE | Facility: CLINIC | Age: 64
End: 2019-12-11
Payer: COMMERCIAL

## 2019-12-11 VITALS — SYSTOLIC BLOOD PRESSURE: 138 MMHG | DIASTOLIC BLOOD PRESSURE: 86 MMHG

## 2019-12-11 DIAGNOSIS — I10 ESSENTIAL HYPERTENSION: Primary | ICD-10-CM

## 2019-12-11 PROCEDURE — 3075F SYST BP GE 130 - 139MM HG: CPT | Mod: CPTII,S$GLB,, | Performed by: FAMILY MEDICINE

## 2019-12-11 PROCEDURE — 3079F DIAST BP 80-89 MM HG: CPT | Mod: CPTII,S$GLB,, | Performed by: FAMILY MEDICINE

## 2019-12-11 PROCEDURE — 99999 PR PBB SHADOW E&M-EST. PATIENT-LVL III: CPT | Mod: PBBFAC,,, | Performed by: FAMILY MEDICINE

## 2019-12-11 PROCEDURE — 99213 PR OFFICE/OUTPT VISIT, EST, LEVL III, 20-29 MIN: ICD-10-PCS | Mod: S$GLB,,, | Performed by: FAMILY MEDICINE

## 2019-12-11 PROCEDURE — 3079F PR MOST RECENT DIASTOLIC BLOOD PRESSURE 80-89 MM HG: ICD-10-PCS | Mod: CPTII,S$GLB,, | Performed by: FAMILY MEDICINE

## 2019-12-11 PROCEDURE — 3075F PR MOST RECENT SYSTOLIC BLOOD PRESS GE 130-139MM HG: ICD-10-PCS | Mod: CPTII,S$GLB,, | Performed by: FAMILY MEDICINE

## 2019-12-11 PROCEDURE — 99213 OFFICE O/P EST LOW 20 MIN: CPT | Mod: S$GLB,,, | Performed by: FAMILY MEDICINE

## 2019-12-11 PROCEDURE — 99999 PR PBB SHADOW E&M-EST. PATIENT-LVL III: ICD-10-PCS | Mod: PBBFAC,,, | Performed by: FAMILY MEDICINE

## 2019-12-11 RX ORDER — ATORVASTATIN CALCIUM 20 MG/1
20 TABLET, FILM COATED ORAL
COMMUNITY
End: 2020-03-03 | Stop reason: SDUPTHER

## 2019-12-11 NOTE — PROGRESS NOTES
Subjective:       Patient ID: Autumn Smart is a 64 y.o. female.    Chief Complaint: No chief complaint on file.    HPI    Here for bp check.     Refer to Dentistry note from today for reason of today's visit. Patient was unable to complete dental procedure s/t elevated bp reading and sent to our office for further eval. Patient states that she was rushed getting to her dental appt, lost $200, and felt stressed during bp reading at dentist.     Of note, patient smelled strongly of marijuana during our visit and was sleeping on exam table prior to me entering.    Review of Systems   Constitutional: Negative for activity change, appetite change and fever.   Respiratory: Negative for shortness of breath.    Cardiovascular: Negative for chest pain and palpitations.   Gastrointestinal: Negative for abdominal pain, constipation, diarrhea and nausea.   Genitourinary: Negative for difficulty urinating.   Psychiatric/Behavioral: Negative for agitation and behavioral problems.         Past Medical History:   Diagnosis Date    Back pain     CVA (cerebral vascular accident)     Diabetes mellitus     Diabetes mellitus, type 2     Embolic stroke involving left middle cerebral artery 11/23/2017    Hyperlipidemia     Hypertension     Insomnia         Prior to Admission medications    Medication Sig Start Date End Date Taking? Authorizing Provider   amLODIPine (NORVASC) 5 MG tablet TAKE 1 TABLET BY MOUTH EVERY DAY 7/3/19   Zoe Davis DNP   cloNIDine (CATAPRES) 0.3 MG tablet TAKE 1 TABLET BY MOUTH DAILY AS NEEDED ONLY IF BLOOD PRESSURE IS GREATER THAN OR EQUAL /100 9/27/18   Zoe Davis DNP   ferrous sulfate 324 mg (65 mg iron) TbEC Take 1 tablet (324 mg total) by mouth once daily. 12/3/19   Srhavan Diane MD   hydroCHLOROthiazide (HYDRODIURIL) 25 MG tablet TAKE 1 TABLET BY MOUTH EVERY DAY 7/3/19   Zoe Davis DNP   ibuprofen (ADVIL,MOTRIN) 200 MG tablet  12/4/18   Historical Provider, MD    lisinopril (PRINIVIL,ZESTRIL) 40 MG tablet TAKE 1 TABLET BY MOUTH EVERY DAY 7/3/19   Zoe Davis DNP   metformin (GLUCOPHAGE) 1000 MG tablet Take 1 tablet (1,000 mg total) by mouth 2 (two) times daily with meals. 5/31/17   Mynor Sauceda MD   nitrofurantoin, macrocrystal-monohydrate, (MACROBID) 100 MG capsule TAKE 1 CAPSULE (100 MG TOTAL) BY MOUTH 2 (TWO) TIMES DAILY. FOR 5 DAYS 11/29/19   Historical Provider, MD   oxyCODONE-acetaminophen (PERCOCET)  mg per tablet Take 1 tablet by mouth every 4 (four) hours as needed for Pain.    Historical Provider, MD   permethrin (ELIMITE) 5 % cream MARIBELL TO ENTIRE BODY FROM NECK DOWN AND SLEEP IN OINTMENT. WASH OFF IN MORNING 8/29/19   Historical Provider, MD   potassium chloride (K-TAB) 20 mEq Take 1 tablet (20 mEq total) by mouth once daily. 10/30/19   Sebastian Harris MD   QUEtiapine (SEROQUEL XR) 400 MG Tb24 Take by mouth every evening.    Historical Provider, MD   tiZANidine (ZANAFLEX) 4 MG tablet TAKE 2 TABLETS BY MOUTH AT BEDTIME AS NEEDED MUSCLE SPASM 11/18/19   Historical Provider, MD   valACYclovir (VALTREX) 500 MG tablet Take 1 tablet (500 mg total) by mouth 3 (three) times daily. 11/21/19 12/21/19  Brennon Melendez MD   valACYclovir (VALTREX) 500 MG tablet Take 1 tablet (500 mg total) by mouth 2 (two) times daily. Take twice daily for 3 days during outbreak 12/3/19 12/2/20  Shravan Diane MD        Past medical history, surgical history, and family medical history reviewed and updated as appropriate.    Medications and allergies reviewed.     Objective:          Vitals:    12/11/19 1156 12/11/19 1159   BP: (!) 160/94 138/86   BP Location: Right arm Right arm   Patient Position: Sitting Sitting     There is no height or weight on file to calculate BMI.  Physical Exam   Constitutional: She appears well-developed and well-nourished.   Eyes: EOM are normal.   Neck: Normal range of motion.   Cardiovascular: Normal rate, regular rhythm and normal  heart sounds.   Pulmonary/Chest: Effort normal. No respiratory distress.   Abdominal: Soft.       Lab Results   Component Value Date    WBC 4.69 06/06/2018    HGB 11.3 (L) 06/06/2018    HCT 36.4 (L) 06/06/2018     06/06/2018    CHOL 132 11/23/2017    TRIG 114 11/23/2017    HDL 44 11/23/2017    ALT 17 06/06/2018    AST 19 06/06/2018     09/24/2019    K 3.5 09/24/2019     09/24/2019    CREATININE 0.8 09/24/2019    BUN 15 09/24/2019    CO2 31 (H) 09/24/2019    TSH 0.333 (L) 11/23/2017    INR 1.0 11/24/2017    HGBA1C 7.8 (H) 11/24/2017       Assessment:       1. Essential hypertension        Plan:   Diagnoses and all orders for this visit:    Essential hypertension    Normal reading once patient relaxed. Given letter to take back to dentist, no change in bp regimen, okay for procedure from our standpoint after taking blood pressure in office.      No follow-ups on file.    Shravan Diane MD  Family Medicine  [facility]  12/11/2019

## 2019-12-11 NOTE — LETTER
December 11, 2019        No Recipients             [facility]  [address]  [facility] [address]  Phone: [phone]  Fax: [phone]   Patient: Autumn Smart   MR Number: [id number]   YOB: 1955   Date of Visit: 12/11/2019     To Whom it May Concern,    Ms. Autumn Smart was seen in my clinic on 12/11 following dental appointment where found to have elevated blood pressure. Patient's blood pressure readings in my clinic were 160/94 then 138/86 after period of relaxation. No changes need to be made to regimen and cleared for dental procedures from primary care standpoint. Please let us know if any questions or concerns.     Sincerely,      Shravan Diane MD            CC  No Recipients    Enclosure

## 2019-12-17 ENCOUNTER — OFFICE VISIT (OUTPATIENT)
Dept: INTERNAL MEDICINE | Facility: CLINIC | Age: 64
End: 2019-12-17
Payer: COMMERCIAL

## 2019-12-17 VITALS
DIASTOLIC BLOOD PRESSURE: 92 MMHG | WEIGHT: 170.19 LBS | OXYGEN SATURATION: 99 % | HEART RATE: 60 BPM | BODY MASS INDEX: 26.71 KG/M2 | HEIGHT: 67 IN | SYSTOLIC BLOOD PRESSURE: 162 MMHG

## 2019-12-17 DIAGNOSIS — B00.9 HSV-1 INFECTION: Primary | ICD-10-CM

## 2019-12-17 DIAGNOSIS — B00.9 HSV-2 INFECTION: ICD-10-CM

## 2019-12-17 PROCEDURE — 99999 PR PBB SHADOW E&M-EST. PATIENT-LVL III: ICD-10-PCS | Mod: PBBFAC,,, | Performed by: FAMILY MEDICINE

## 2019-12-17 PROCEDURE — 99213 OFFICE O/P EST LOW 20 MIN: CPT | Mod: S$GLB,,, | Performed by: FAMILY MEDICINE

## 2019-12-17 PROCEDURE — 3080F PR MOST RECENT DIASTOLIC BLOOD PRESSURE >= 90 MM HG: ICD-10-PCS | Mod: CPTII,S$GLB,, | Performed by: FAMILY MEDICINE

## 2019-12-17 PROCEDURE — 3077F SYST BP >= 140 MM HG: CPT | Mod: CPTII,S$GLB,, | Performed by: FAMILY MEDICINE

## 2019-12-17 PROCEDURE — 3077F PR MOST RECENT SYSTOLIC BLOOD PRESSURE >= 140 MM HG: ICD-10-PCS | Mod: CPTII,S$GLB,, | Performed by: FAMILY MEDICINE

## 2019-12-17 PROCEDURE — 3008F PR BODY MASS INDEX (BMI) DOCUMENTED: ICD-10-PCS | Mod: CPTII,S$GLB,, | Performed by: FAMILY MEDICINE

## 2019-12-17 PROCEDURE — 99213 PR OFFICE/OUTPT VISIT, EST, LEVL III, 20-29 MIN: ICD-10-PCS | Mod: S$GLB,,, | Performed by: FAMILY MEDICINE

## 2019-12-17 PROCEDURE — 3080F DIAST BP >= 90 MM HG: CPT | Mod: CPTII,S$GLB,, | Performed by: FAMILY MEDICINE

## 2019-12-17 PROCEDURE — 99999 PR PBB SHADOW E&M-EST. PATIENT-LVL III: CPT | Mod: PBBFAC,,, | Performed by: FAMILY MEDICINE

## 2019-12-17 PROCEDURE — 3008F BODY MASS INDEX DOCD: CPT | Mod: CPTII,S$GLB,, | Performed by: FAMILY MEDICINE

## 2019-12-17 NOTE — PROGRESS NOTES
Subjective:       Patient ID: Autumn Smart is a 64 y.o. female.    Chief Complaint: Follow-up    HPI    Patient is accompanied by partner. Wants to discuss herpes and risk of transmission to partner.    Review of Systems   Constitutional: Negative for activity change, appetite change and fever.   Respiratory: Negative for shortness of breath.    Cardiovascular: Negative for chest pain and palpitations.   Gastrointestinal: Negative for abdominal pain, constipation, diarrhea and nausea.   Genitourinary: Negative for difficulty urinating.   Skin: Negative for rash and wound.   Psychiatric/Behavioral: Negative for agitation and behavioral problems.         Past Medical History:   Diagnosis Date    Back pain     CVA (cerebral vascular accident)     Diabetes mellitus     Diabetes mellitus, type 2     Embolic stroke involving left middle cerebral artery 11/23/2017    Hyperlipidemia     Hypertension     Insomnia         Prior to Admission medications    Medication Sig Start Date End Date Taking? Authorizing Provider   amLODIPine (NORVASC) 5 MG tablet TAKE 1 TABLET BY MOUTH EVERY DAY 7/3/19  Yes Zoe Davis DNP   atorvastatin (LIPITOR) 20 MG tablet Take 20 mg by mouth.   Yes Historical Provider, MD   cloNIDine (CATAPRES) 0.3 MG tablet TAKE 1 TABLET BY MOUTH DAILY AS NEEDED ONLY IF BLOOD PRESSURE IS GREATER THAN OR EQUAL /100 9/27/18  Yes Zoe Davis DNP   ferrous sulfate 324 mg (65 mg iron) TbEC Take 1 tablet (324 mg total) by mouth once daily. 12/3/19  Yes Shravan Diane MD   hydroCHLOROthiazide (HYDRODIURIL) 25 MG tablet TAKE 1 TABLET BY MOUTH EVERY DAY 7/3/19  Yes Zoe Davis DNP   ibuprofen (ADVIL,MOTRIN) 200 MG tablet  12/4/18  Yes Historical Provider, MD   lisinopril (PRINIVIL,ZESTRIL) 40 MG tablet TAKE 1 TABLET BY MOUTH EVERY DAY 7/3/19  Yes Zoe Davis DNP   metformin (GLUCOPHAGE) 1000 MG tablet Take 1 tablet (1,000 mg total) by mouth 2 (two) times daily with meals.  "5/31/17  Yes Mynor Sauceda MD   nitrofurantoin, macrocrystal-monohydrate, (MACROBID) 100 MG capsule TAKE 1 CAPSULE (100 MG TOTAL) BY MOUTH 2 (TWO) TIMES DAILY. FOR 5 DAYS 11/29/19  Yes Historical Provider, MD   oxyCODONE-acetaminophen (PERCOCET)  mg per tablet Take 1 tablet by mouth every 4 (four) hours as needed for Pain.   Yes Historical Provider, MD   permethrin (ELIMITE) 5 % cream MARIBELL TO ENTIRE BODY FROM NECK DOWN AND SLEEP IN OINTMENT. WASH OFF IN MORNING 8/29/19  Yes Historical Provider, MD   potassium chloride (K-TAB) 20 mEq Take 1 tablet (20 mEq total) by mouth once daily. 10/30/19  Yes Sebastian Harris MD   QUEtiapine (SEROQUEL XR) 400 MG Tb24 Take by mouth every evening.   Yes Historical Provider, MD   tiZANidine (ZANAFLEX) 4 MG tablet TAKE 2 TABLETS BY MOUTH AT BEDTIME AS NEEDED MUSCLE SPASM 11/18/19  Yes Historical Provider, MD   valACYclovir (VALTREX) 500 MG tablet Take 1 tablet (500 mg total) by mouth 3 (three) times daily. 11/21/19 12/21/19 Yes Brennon Melendez MD   valACYclovir (VALTREX) 500 MG tablet Take 1 tablet (500 mg total) by mouth 2 (two) times daily. Take twice daily for 3 days during outbreak 12/3/19 12/2/20 Yes Shravan Diane MD        Past medical history, surgical history, and family medical history reviewed and updated as appropriate.    Medications and allergies reviewed.     Objective:          Vitals:    12/17/19 1414   BP: (!) 162/92   BP Location: Left arm   Patient Position: Sitting   BP Method: Medium (Manual)   Pulse: 60   SpO2: 99%   Weight: 77.2 kg (170 lb 3.1 oz)   Height: 5' 7" (1.702 m)     Body mass index is 26.66 kg/m².  Physical Exam   Constitutional: She appears well-developed and well-nourished.   Eyes: EOM are normal.   Neck: Normal range of motion.   Cardiovascular: Normal rate, regular rhythm and normal heart sounds.   Pulmonary/Chest: Effort normal. No respiratory distress.   Abdominal: Soft.       Lab Results   Component Value Date    WBC 4.69 " 06/06/2018    HGB 11.3 (L) 06/06/2018    HCT 36.4 (L) 06/06/2018     06/06/2018    CHOL 132 11/23/2017    TRIG 114 11/23/2017    HDL 44 11/23/2017    ALT 17 06/06/2018    AST 19 06/06/2018     09/24/2019    K 3.5 09/24/2019     09/24/2019    CREATININE 0.8 09/24/2019    BUN 15 09/24/2019    CO2 31 (H) 09/24/2019    TSH 0.333 (L) 11/23/2017    INR 1.0 11/24/2017    HGBA1C 7.8 (H) 11/24/2017       Assessment:       1. HSV-1 infection    2. HSV-2 infection        Plan:   Autumn was seen today for follow-up.    Diagnoses and all orders for this visit:    Counseled patient and partner today on herpes simplex and risk of transmission with sexual intercourse during outbreaks. Be aware of outbreaks, antiviral as needed.    HSV-1 infection    HSV-2 infection        Health maintenance reviewed with patient.     No follow-ups on file.    Shravan Diane MD  Family Medicine  [facility]  12/17/2019

## 2020-01-03 ENCOUNTER — TELEPHONE (OUTPATIENT)
Dept: OPTOMETRY | Facility: CLINIC | Age: 65
End: 2020-01-03

## 2020-01-03 ENCOUNTER — PATIENT OUTREACH (OUTPATIENT)
Dept: ADMINISTRATIVE | Facility: OTHER | Age: 65
End: 2020-01-03

## 2020-01-09 ENCOUNTER — PATIENT OUTREACH (OUTPATIENT)
Dept: ADMINISTRATIVE | Facility: OTHER | Age: 65
End: 2020-01-09

## 2020-01-18 ENCOUNTER — PATIENT OUTREACH (OUTPATIENT)
Dept: ADMINISTRATIVE | Facility: OTHER | Age: 65
End: 2020-01-18

## 2020-01-20 ENCOUNTER — OFFICE VISIT (OUTPATIENT)
Dept: INFECTIOUS DISEASES | Facility: CLINIC | Age: 65
End: 2020-01-20
Payer: COMMERCIAL

## 2020-01-20 VITALS
WEIGHT: 171.75 LBS | HEART RATE: 64 BPM | DIASTOLIC BLOOD PRESSURE: 88 MMHG | TEMPERATURE: 98 F | HEIGHT: 67 IN | SYSTOLIC BLOOD PRESSURE: 149 MMHG | BODY MASS INDEX: 26.96 KG/M2

## 2020-01-20 DIAGNOSIS — Z70.8 COUNSELING FOR SEXUALLY TRANSMITTED DISEASE: ICD-10-CM

## 2020-01-20 DIAGNOSIS — B00.9 HSV (HERPES SIMPLEX VIRUS) INFECTION: Primary | ICD-10-CM

## 2020-01-20 PROCEDURE — 99999 PR PBB SHADOW E&M-EST. PATIENT-LVL III: ICD-10-PCS | Mod: PBBFAC,,, | Performed by: INTERNAL MEDICINE

## 2020-01-20 PROCEDURE — 3079F DIAST BP 80-89 MM HG: CPT | Mod: CPTII,S$GLB,, | Performed by: INTERNAL MEDICINE

## 2020-01-20 PROCEDURE — 3077F SYST BP >= 140 MM HG: CPT | Mod: CPTII,S$GLB,, | Performed by: INTERNAL MEDICINE

## 2020-01-20 PROCEDURE — 3077F PR MOST RECENT SYSTOLIC BLOOD PRESSURE >= 140 MM HG: ICD-10-PCS | Mod: CPTII,S$GLB,, | Performed by: INTERNAL MEDICINE

## 2020-01-20 PROCEDURE — 99999 PR PBB SHADOW E&M-EST. PATIENT-LVL III: CPT | Mod: PBBFAC,,, | Performed by: INTERNAL MEDICINE

## 2020-01-20 PROCEDURE — 99203 PR OFFICE/OUTPT VISIT, NEW, LEVL III, 30-44 MIN: ICD-10-PCS | Mod: S$GLB,,, | Performed by: INTERNAL MEDICINE

## 2020-01-20 PROCEDURE — 3008F PR BODY MASS INDEX (BMI) DOCUMENTED: ICD-10-PCS | Mod: CPTII,S$GLB,, | Performed by: INTERNAL MEDICINE

## 2020-01-20 PROCEDURE — 3008F BODY MASS INDEX DOCD: CPT | Mod: CPTII,S$GLB,, | Performed by: INTERNAL MEDICINE

## 2020-01-20 PROCEDURE — 3079F PR MOST RECENT DIASTOLIC BLOOD PRESSURE 80-89 MM HG: ICD-10-PCS | Mod: CPTII,S$GLB,, | Performed by: INTERNAL MEDICINE

## 2020-01-20 PROCEDURE — 99203 OFFICE O/P NEW LOW 30 MIN: CPT | Mod: S$GLB,,, | Performed by: INTERNAL MEDICINE

## 2020-01-20 NOTE — PROGRESS NOTES
Subjective:      Patient ID: Autumn Smart is a 64 y.o. female.    Chief Complaint:Exposure to STD      History of Present Illness    A 64-year-old woman with HTN, past stroke with residual right sided weakness, DM 2, and HSV infection who is here to discuss treatment for STI's. Mrs. Smart says she was diagnosed with genital herpes in October of 2019 after an ED visit due to genital pruritic blisters. She was seen by her PCP and prescribed as needed Valtrex. She has not been taking it as she wants to be cured and not treated. She experienced symptomatic outbreak in October, December, and last week.     Mrs. Smart has NKDA. She does not have pets. She smokes marijuana and denies other toxic habits. She has not travelled.     Review of Systems   Constitution: Positive for chills and weight loss. Negative for decreased appetite, fever, malaise/fatigue, night sweats and weight gain.   HENT: Negative for congestion, ear pain, hearing loss, hoarse voice, sore throat and tinnitus.         Lip blister   Eyes: Positive for redness. Negative for blurred vision and visual disturbance.   Cardiovascular: Positive for leg swelling. Negative for chest pain and palpitations.   Respiratory: Negative for cough, hemoptysis, shortness of breath and sputum production.    Hematologic/Lymphatic: Negative for adenopathy. Does not bruise/bleed easily.   Skin: Positive for rash. Negative for dry skin, itching and suspicious lesions.   Musculoskeletal: Positive for back pain and joint pain. Negative for myalgias and neck pain.   Gastrointestinal: Negative for abdominal pain, constipation, diarrhea, heartburn, nausea and vomiting.   Genitourinary: Negative for dysuria, flank pain, frequency, hematuria, hesitancy and urgency.        Vesicular lesions.    Neurological: Negative for dizziness, headaches, numbness, paresthesias and weakness.   Psychiatric/Behavioral: Positive for depression. Negative for memory loss. The patient is  nervous/anxious. The patient does not have insomnia.      Objective:   Physical Exam   Constitutional: She is oriented to person, place, and time. She appears well-developed and well-nourished. She is cooperative. She is easily aroused.  Non-toxic appearance. No distress.   HENT:   Head: Normocephalic and atraumatic. Head is without right periorbital erythema and without left periorbital erythema.   Right Ear: Hearing, tympanic membrane, external ear and ear canal normal. No swelling.   Left Ear: Hearing, tympanic membrane, external ear and ear canal normal. No swelling.   Nose: No nasal deformity.   Mouth/Throat: Uvula is midline and mucous membranes are normal.   Eyes: Conjunctivae, EOM and lids are normal. Right eye exhibits no discharge and no exudate. Left eye exhibits no discharge and no exudate. Right conjunctiva is not injected. Left conjunctiva is not injected. No scleral icterus.   Cardiovascular: Normal rate, regular rhythm, S1 normal, S2 normal, normal heart sounds and intact distal pulses. Exam reveals no gallop and no friction rub.   No murmur heard.  Pulmonary/Chest: Effort normal and breath sounds normal. No accessory muscle usage or stridor. No tachypnea. No respiratory distress. She has no wheezes. She has no rales. She exhibits no tenderness.   Abdominal: Normal appearance and bowel sounds are normal.   Musculoskeletal: Normal range of motion. She exhibits no edema or tenderness.   Neurological: She is alert, oriented to person, place, and time and easily aroused. No cranial nerve deficit. Coordination normal.   Skin: Skin is warm and dry. No lesion and no rash noted. She is not diaphoretic. No cyanosis or erythema. No pallor. Nails show no clubbing.   Psychiatric: Her speech is normal and behavior is normal. Judgment and thought content normal. Her mood appears anxious.   Nursing note and vitals reviewed.    Assessment:       1. HSV (herpes simplex virus) infection    2. Counseling for sexually  transmitted disease        Plan:   Patient with symptomatic herpes infection. Currently with vulvar vesicle.  · Over 50% of the appointment time was spent on counseling with regards to diagnosis, treatment, lack of cure at this time, and symptom management.   · Agree with Valtrex 500 mg BID for 3 days.   · Will start Valtrex 500 mg daily for suppression for at least 3 months.   · If symptoms not well controlled then will increase Valtrex to 1 gm.   · RPR today to assess for coinfection.   · RTC in 2 months or sooner if above testing positive.

## 2020-02-14 ENCOUNTER — PATIENT OUTREACH (OUTPATIENT)
Dept: ADMINISTRATIVE | Facility: OTHER | Age: 65
End: 2020-02-14

## 2020-02-18 ENCOUNTER — OFFICE VISIT (OUTPATIENT)
Dept: PAIN MEDICINE | Facility: CLINIC | Age: 65
End: 2020-02-18
Payer: COMMERCIAL

## 2020-02-18 VITALS
HEART RATE: 60 BPM | SYSTOLIC BLOOD PRESSURE: 164 MMHG | WEIGHT: 175.81 LBS | DIASTOLIC BLOOD PRESSURE: 88 MMHG | BODY MASS INDEX: 27.54 KG/M2

## 2020-02-18 DIAGNOSIS — M17.12 PRIMARY OSTEOARTHRITIS OF LEFT KNEE: Primary | ICD-10-CM

## 2020-02-18 DIAGNOSIS — F11.90 CHRONIC, CONTINUOUS USE OF OPIOIDS: ICD-10-CM

## 2020-02-18 DIAGNOSIS — F32.A DEPRESSION, UNSPECIFIED DEPRESSION TYPE: ICD-10-CM

## 2020-02-18 DIAGNOSIS — G89.29 CHRONIC PAIN OF LEFT KNEE: ICD-10-CM

## 2020-02-18 DIAGNOSIS — M25.562 CHRONIC PAIN OF LEFT KNEE: ICD-10-CM

## 2020-02-18 PROCEDURE — 99999 PR PBB SHADOW E&M-EST. PATIENT-LVL III: CPT | Mod: PBBFAC,,, | Performed by: PHYSICAL MEDICINE & REHABILITATION

## 2020-02-18 PROCEDURE — 99244 PR OFFICE CONSULTATION,LEVEL IV: ICD-10-PCS | Mod: S$GLB,,, | Performed by: PHYSICAL MEDICINE & REHABILITATION

## 2020-02-18 PROCEDURE — 99244 OFF/OP CNSLTJ NEW/EST MOD 40: CPT | Mod: S$GLB,,, | Performed by: PHYSICAL MEDICINE & REHABILITATION

## 2020-02-18 PROCEDURE — 99999 PR PBB SHADOW E&M-EST. PATIENT-LVL III: ICD-10-PCS | Mod: PBBFAC,,, | Performed by: PHYSICAL MEDICINE & REHABILITATION

## 2020-02-18 RX ORDER — DICLOFENAC SODIUM 10 MG/G
2 GEL TOPICAL 4 TIMES DAILY PRN
Qty: 5 TUBE | Refills: 11 | Status: SHIPPED | OUTPATIENT
Start: 2020-02-18 | End: 2021-11-18
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
## 2020-02-18 NOTE — LETTER
February 18, 2020      Naresh Giles III, MD  [address] [facility] [address]           [facility]  [address] [place] [address] [address] [place] [address]  Phone: [phone]          Patient: Autumn Smart   MR Number: [id number]   YOB: 1955   Date of Visit: 2/18/2020       Dear Dr. Naresh Giles III:    Thank you for referring Autumn Smart to me for evaluation. Attached you will find relevant portions of my assessment and plan of care.    If you have questions, please do not hesitate to call me. I look forward to following Autumn Smart along with you.    Sincerely,    Ninoska Troy MD    Enclosure  CC:  No Recipients    If you would like to receive this communication electronically, please contact [email or web address] or [phone] to request more information on [facility] access.    For providers and/or their staff who would like to refer a patient to [facility], please contact us through our one-stop-shop provider referral line, [facility] , at [phone].    If you feel you have received this communication in error or would no longer like to receive these types of communications, please e-mail [email or web address]
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
## 2020-02-19 ENCOUNTER — TELEPHONE (OUTPATIENT)
Dept: PAIN MEDICINE | Facility: CLINIC | Age: 65
End: 2020-02-19

## 2020-02-19 DIAGNOSIS — M25.562 CHRONIC PAIN OF LEFT KNEE: Primary | ICD-10-CM

## 2020-02-19 DIAGNOSIS — G89.29 CHRONIC PAIN OF LEFT KNEE: Primary | ICD-10-CM

## 2020-02-19 PROCEDURE — 20610 DRAIN/INJ JOINT/BURSA W/O US: CPT | Mod: LT,,, | Performed by: PHYSICAL MEDICINE & REHABILITATION

## 2020-02-19 PROCEDURE — 20610 PR DRAIN/INJECT LARGE JOINT/BURSA: ICD-10-PCS | Mod: LT,,, | Performed by: PHYSICAL MEDICINE & REHABILITATION

## 2020-02-19 NOTE — TELEPHONE ENCOUNTER
Pt scheduled for 2/26/20 at 0915 am for Left Knee Synvisc 1 Injection. Pt aware to check in at registration desk on the first floor of the hospital for 0815 am. Pt denied taking blood thinners and is not diabetic.

## 2020-03-03 ENCOUNTER — OFFICE VISIT (OUTPATIENT)
Dept: INTERNAL MEDICINE | Facility: CLINIC | Age: 65
End: 2020-03-03
Payer: COMMERCIAL

## 2020-03-03 ENCOUNTER — LAB VISIT (OUTPATIENT)
Dept: LAB | Facility: HOSPITAL | Age: 65
End: 2020-03-03
Attending: FAMILY MEDICINE
Payer: COMMERCIAL

## 2020-03-03 VITALS
HEART RATE: 60 BPM | BODY MASS INDEX: 27.54 KG/M2 | HEIGHT: 68 IN | WEIGHT: 181.69 LBS | SYSTOLIC BLOOD PRESSURE: 124 MMHG | OXYGEN SATURATION: 99 % | DIASTOLIC BLOOD PRESSURE: 64 MMHG

## 2020-03-03 DIAGNOSIS — I10 ESSENTIAL HYPERTENSION: ICD-10-CM

## 2020-03-03 DIAGNOSIS — E66.3 OVERWEIGHT (BMI 25.0-29.9): ICD-10-CM

## 2020-03-03 DIAGNOSIS — E11.9 TYPE 2 DIABETES MELLITUS WITHOUT COMPLICATION, WITHOUT LONG-TERM CURRENT USE OF INSULIN: ICD-10-CM

## 2020-03-03 DIAGNOSIS — Z00.00 ANNUAL PHYSICAL EXAM: ICD-10-CM

## 2020-03-03 DIAGNOSIS — E78.2 MIXED HYPERLIPIDEMIA: ICD-10-CM

## 2020-03-03 DIAGNOSIS — Z00.00 ANNUAL PHYSICAL EXAM: Primary | ICD-10-CM

## 2020-03-03 LAB
ALBUMIN SERPL BCP-MCNC: 3.9 G/DL (ref 3.5–5.2)
ALP SERPL-CCNC: 47 U/L (ref 55–135)
ALT SERPL W/O P-5'-P-CCNC: 22 U/L (ref 10–44)
ANION GAP SERPL CALC-SCNC: 6 MMOL/L (ref 8–16)
AST SERPL-CCNC: 26 U/L (ref 10–40)
BASOPHILS # BLD AUTO: 0.02 K/UL (ref 0–0.2)
BASOPHILS NFR BLD: 0.5 % (ref 0–1.9)
BILIRUB SERPL-MCNC: 0.4 MG/DL (ref 0.1–1)
BUN SERPL-MCNC: 13 MG/DL (ref 8–23)
CALCIUM SERPL-MCNC: 9.1 MG/DL (ref 8.7–10.5)
CHLORIDE SERPL-SCNC: 104 MMOL/L (ref 95–110)
CHOLEST SERPL-MCNC: 163 MG/DL (ref 120–199)
CHOLEST/HDLC SERPL: 2.2 {RATIO} (ref 2–5)
CO2 SERPL-SCNC: 31 MMOL/L (ref 23–29)
CREAT SERPL-MCNC: 0.8 MG/DL (ref 0.5–1.4)
DIFFERENTIAL METHOD: ABNORMAL
EOSINOPHIL # BLD AUTO: 0.1 K/UL (ref 0–0.5)
EOSINOPHIL NFR BLD: 2 % (ref 0–8)
ERYTHROCYTE [DISTWIDTH] IN BLOOD BY AUTOMATED COUNT: 13.7 % (ref 11.5–14.5)
EST. GFR  (AFRICAN AMERICAN): >60 ML/MIN/1.73 M^2
EST. GFR  (NON AFRICAN AMERICAN): >60 ML/MIN/1.73 M^2
ESTIMATED AVG GLUCOSE: 137 MG/DL (ref 68–131)
GLUCOSE SERPL-MCNC: 156 MG/DL (ref 70–110)
HBA1C MFR BLD HPLC: 6.4 % (ref 4–5.6)
HCT VFR BLD AUTO: 35.9 % (ref 37–48.5)
HDLC SERPL-MCNC: 73 MG/DL (ref 40–75)
HDLC SERPL: 44.8 % (ref 20–50)
HGB BLD-MCNC: 10.6 G/DL (ref 12–16)
IMM GRANULOCYTES # BLD AUTO: 0 K/UL (ref 0–0.04)
IMM GRANULOCYTES NFR BLD AUTO: 0 % (ref 0–0.5)
LDLC SERPL CALC-MCNC: 78.4 MG/DL (ref 63–159)
LYMPHOCYTES # BLD AUTO: 2.2 K/UL (ref 1–4.8)
LYMPHOCYTES NFR BLD: 54.1 % (ref 18–48)
MCH RBC QN AUTO: 26.8 PG (ref 27–31)
MCHC RBC AUTO-ENTMCNC: 29.5 G/DL (ref 32–36)
MCV RBC AUTO: 91 FL (ref 82–98)
MONOCYTES # BLD AUTO: 0.3 K/UL (ref 0.3–1)
MONOCYTES NFR BLD: 7.8 % (ref 4–15)
NEUTROPHILS # BLD AUTO: 1.5 K/UL (ref 1.8–7.7)
NEUTROPHILS NFR BLD: 35.6 % (ref 38–73)
NONHDLC SERPL-MCNC: 90 MG/DL
NRBC BLD-RTO: 0 /100 WBC
PLATELET # BLD AUTO: 216 K/UL (ref 150–350)
PMV BLD AUTO: 11 FL (ref 9.2–12.9)
POTASSIUM SERPL-SCNC: 4.2 MMOL/L (ref 3.5–5.1)
PROT SERPL-MCNC: 6.8 G/DL (ref 6–8.4)
RBC # BLD AUTO: 3.96 M/UL (ref 4–5.4)
SODIUM SERPL-SCNC: 141 MMOL/L (ref 136–145)
TRIGL SERPL-MCNC: 58 MG/DL (ref 30–150)
TSH SERPL DL<=0.005 MIU/L-ACNC: 1.28 UIU/ML (ref 0.4–4)
WBC # BLD AUTO: 4.1 K/UL (ref 3.9–12.7)

## 2020-03-03 PROCEDURE — 80053 COMPREHEN METABOLIC PANEL: CPT

## 2020-03-03 PROCEDURE — 80061 LIPID PANEL: CPT

## 2020-03-03 PROCEDURE — 99396 PR PREVENTIVE VISIT,EST,40-64: ICD-10-PCS | Mod: 25,S$GLB,, | Performed by: FAMILY MEDICINE

## 2020-03-03 PROCEDURE — 3078F PR MOST RECENT DIASTOLIC BLOOD PRESSURE < 80 MM HG: ICD-10-PCS | Mod: CPTII,S$GLB,, | Performed by: FAMILY MEDICINE

## 2020-03-03 PROCEDURE — 85025 COMPLETE CBC W/AUTO DIFF WBC: CPT

## 2020-03-03 PROCEDURE — 3074F SYST BP LT 130 MM HG: CPT | Mod: CPTII,S$GLB,, | Performed by: FAMILY MEDICINE

## 2020-03-03 PROCEDURE — 3078F DIAST BP <80 MM HG: CPT | Mod: CPTII,S$GLB,, | Performed by: FAMILY MEDICINE

## 2020-03-03 PROCEDURE — 99999 PR PBB SHADOW E&M-EST. PATIENT-LVL III: CPT | Mod: PBBFAC,,, | Performed by: FAMILY MEDICINE

## 2020-03-03 PROCEDURE — 36415 COLL VENOUS BLD VENIPUNCTURE: CPT

## 2020-03-03 PROCEDURE — 83036 HEMOGLOBIN GLYCOSYLATED A1C: CPT

## 2020-03-03 PROCEDURE — 84443 ASSAY THYROID STIM HORMONE: CPT

## 2020-03-03 PROCEDURE — 3074F PR MOST RECENT SYSTOLIC BLOOD PRESSURE < 130 MM HG: ICD-10-PCS | Mod: CPTII,S$GLB,, | Performed by: FAMILY MEDICINE

## 2020-03-03 PROCEDURE — 3008F BODY MASS INDEX DOCD: CPT | Mod: CPTII,S$GLB,, | Performed by: FAMILY MEDICINE

## 2020-03-03 PROCEDURE — 3008F PR BODY MASS INDEX (BMI) DOCUMENTED: ICD-10-PCS | Mod: CPTII,S$GLB,, | Performed by: FAMILY MEDICINE

## 2020-03-03 PROCEDURE — 99396 PREV VISIT EST AGE 40-64: CPT | Mod: 25,S$GLB,, | Performed by: FAMILY MEDICINE

## 2020-03-03 PROCEDURE — 99999 PR PBB SHADOW E&M-EST. PATIENT-LVL III: ICD-10-PCS | Mod: PBBFAC,,, | Performed by: FAMILY MEDICINE

## 2020-03-03 RX ORDER — ATORVASTATIN CALCIUM 20 MG/1
20 TABLET, FILM COATED ORAL DAILY
Qty: 90 TABLET | Refills: 3 | Status: SHIPPED | OUTPATIENT
Start: 2020-03-03 | End: 2022-04-07

## 2020-03-03 RX ORDER — POTASSIUM CHLORIDE 1500 MG/1
20 TABLET, EXTENDED RELEASE ORAL DAILY
Qty: 90 TABLET | Refills: 3 | Status: CANCELLED | OUTPATIENT
Start: 2020-03-03

## 2020-03-03 RX ORDER — QUETIAPINE 400 MG/1
400 TABLET, FILM COATED, EXTENDED RELEASE ORAL NIGHTLY
Qty: 90 TABLET | Refills: 3 | Status: SHIPPED | OUTPATIENT
Start: 2020-03-03 | End: 2021-10-01

## 2020-03-03 NOTE — PROGRESS NOTES
Subjective:       Patient ID: Autumn Smart is a 64 y.o. female.    Chief Complaint: Follow-up    HPI    64yoF with PMHx HTN, h/o CVA 2017 without residual deficits, DM2, HSV infxn, marijuana use here for follow up.    Doing well today, recent hsv flare has cleared up and patient happy with results. Asking for refills today.    #Cards: HTN, HLD  - current reg: Norvasc 5; HCTZ 25 qd; Lisinopril 40 qd, Lipitor 20 qd    #Endo: NIDDM  - current reg: Metformin 500 qd (takes once daily s/t diarrhea)    #Chronic pain: chronic back pain  - [facility] and manages opioid therapy    #Insomnia:  - Quetiapine 400 qhs    Review of Systems   Constitutional: Negative for appetite change, fatigue and fever.   HENT: Negative for ear pain, sinus pain and sore throat.    Respiratory: Negative for cough and shortness of breath.    Cardiovascular: Negative for chest pain and palpitations.   Gastrointestinal: Negative for abdominal pain, constipation, diarrhea, nausea and vomiting.   Genitourinary: Negative for dysuria.   Musculoskeletal: Negative for back pain.   Skin: Negative for rash.   Neurological: Negative for weakness, numbness and headaches.         Past Medical History:   Diagnosis Date    Back pain     CVA (cerebral vascular accident)     Diabetes mellitus     Diabetes mellitus, type 2     Embolic stroke involving left middle cerebral artery 11/23/2017    Hyperlipidemia     Hypertension     Insomnia         Prior to Admission medications    Medication Sig Start Date End Date Taking? Authorizing Provider   amLODIPine (NORVASC) 5 MG tablet TAKE 1 TABLET BY MOUTH EVERY DAY 7/3/19   Zoe Davis DNP   atorvastatin (LIPITOR) 20 MG tablet Take 20 mg by mouth.    Historical Provider, MD   cloNIDine (CATAPRES) 0.3 MG tablet TAKE 1 TABLET BY MOUTH DAILY AS NEEDED ONLY IF BLOOD PRESSURE IS GREATER THAN OR EQUAL /100  Patient not taking: Reported on 2/18/2020 9/27/18   Zoe Davis DNP   diclofenac sodium  (VOLTAREN) 1 % Gel Apply 2 g topically 4 (four) times daily as needed. 2/18/20 3/19/20  Ninoska Troy MD   ferrous sulfate 324 mg (65 mg iron) TbEC Take 1 tablet (324 mg total) by mouth once daily. 12/3/19   Shravan Diane MD   hydroCHLOROthiazide (HYDRODIURIL) 25 MG tablet TAKE 1 TABLET BY MOUTH EVERY DAY 7/3/19   Zoe Davis DNP   ibuprofen (ADVIL,MOTRIN) 200 MG tablet  12/4/18   Historical Provider, MD   lisinopril (PRINIVIL,ZESTRIL) 40 MG tablet TAKE 1 TABLET BY MOUTH EVERY DAY 7/3/19   Zoe Davis DNP   metformin (GLUCOPHAGE) 1000 MG tablet Take 1 tablet (1,000 mg total) by mouth 2 (two) times daily with meals.  Patient taking differently: Take 500 mg by mouth 2 (two) times daily with meals.  5/31/17   Mynor Sauceda MD   nitrofurantoin, macrocrystal-monohydrate, (MACROBID) 100 MG capsule TAKE 1 CAPSULE (100 MG TOTAL) BY MOUTH 2 (TWO) TIMES DAILY. FOR 5 DAYS 11/29/19   Historical Provider, MD   oxyCODONE-acetaminophen (PERCOCET)  mg per tablet Take 1 tablet by mouth every 4 (four) hours as needed for Pain.    Historical Provider, MD   permethrin (ELIMITE) 5 % cream MARIBELL TO ENTIRE BODY FROM NECK DOWN AND SLEEP IN OINTMENT. WASH OFF IN MORNING 8/29/19   Historical Provider, MD   potassium chloride (K-TAB) 20 mEq Take 1 tablet (20 mEq total) by mouth once daily. 10/30/19   Sebastian Harris MD   QUEtiapine (SEROQUEL XR) 400 MG Tb24 Take by mouth every evening.    Historical Provider, MD   tiZANidine (ZANAFLEX) 4 MG tablet TAKE 2 TABLETS BY MOUTH AT BEDTIME AS NEEDED MUSCLE SPASM 11/18/19   Historical Provider, MD   valACYclovir (VALTREX) 500 MG tablet Take 1 tablet (500 mg total) by mouth 2 (two) times daily. Take twice daily for 3 days during outbreak 12/3/19 12/2/20  Shravan Diane MD        Past medical history, surgical history, and family medical history reviewed and updated as appropriate.    Medications and allergies reviewed.     Objective:          Vitals:    03/03/20 1314  "  BP: 124/64   BP Location: Right arm   Patient Position: Sitting   BP Method: Medium (Manual)   Pulse: 60   SpO2: 99%   Weight: 82.4 kg (181 lb 10.5 oz)   Height: 5' 8" (1.727 m)     Body mass index is 27.62 kg/m².  Physical Exam   Constitutional: She is oriented to person, place, and time. She appears well-developed and well-nourished. No distress.   Eyes: Pupils are equal, round, and reactive to light. EOM are normal.   Cardiovascular: Normal rate, regular rhythm, normal heart sounds and intact distal pulses.   No murmur heard.  Pulmonary/Chest: Effort normal and breath sounds normal. No respiratory distress. She has no wheezes.   Neurological: She is alert and oriented to person, place, and time.   Psychiatric: She has a normal mood and affect. Her behavior is normal.   Vitals reviewed.      Lab Results   Component Value Date    WBC 4.69 06/06/2018    HGB 11.3 (L) 06/06/2018    HCT 36.4 (L) 06/06/2018     06/06/2018    CHOL 132 11/23/2017    TRIG 114 11/23/2017    HDL 44 11/23/2017    ALT 17 06/06/2018    AST 19 06/06/2018     09/24/2019    K 3.5 09/24/2019     09/24/2019    CREATININE 0.8 09/24/2019    BUN 15 09/24/2019    CO2 31 (H) 09/24/2019    TSH 0.333 (L) 11/23/2017    INR 1.0 11/24/2017    HGBA1C 7.8 (H) 11/24/2017       Assessment:       1. Annual physical exam    2. Essential hypertension    3. Type 2 diabetes mellitus without complication, without long-term current use of insulin    4. Mixed hyperlipidemia    5. Overweight (BMI 25.0-29.9)        Plan:   Autumn was seen today for follow-up.    Diagnoses and all orders for this visit:    Refills today, meds reconciled, labs ordered and f/u results.     Annual physical exam  -     Comprehensive metabolic panel; Future  -     CBC auto differential; Future  -     Lipid panel; Future  -     Hemoglobin A1c; Future  -     TSH; Future    Essential hypertension  -     Comprehensive metabolic panel; Future  -     TSH; Future    Type 2 diabetes " mellitus without complication, without long-term current use of insulin  -     Comprehensive metabolic panel; Future  -     Lipid panel; Future  -     Hemoglobin A1c; Future    Mixed hyperlipidemia  -     atorvastatin (LIPITOR) 20 MG tablet; Take 1 tablet (20 mg total) by mouth once daily.  -     Lipid panel; Future    Overweight (BMI 25.0-29.9)  Counseled regarding benefits of healthy eating and physical activity (150 minutes of moderate-intensity aerobic activity per week) to improve overall health.    Other orders  -     QUEtiapine (SEROQUEL XR) 400 MG Tb24; Take 1 tablet (400 mg total) by mouth every evening.  -     Cancel: potassium chloride (K-TAB) 20 mEq; Take 1 tablet (20 mEq total) by mouth once daily.        Health maintenance reviewed with patient.     No follow-ups on file.    Shravan Diane MD  Family Medicine  [facility]  3/3/2020

## 2020-03-09 ENCOUNTER — TELEPHONE (OUTPATIENT)
Dept: INTERNAL MEDICINE | Facility: CLINIC | Age: 65
End: 2020-03-09

## 2020-03-09 NOTE — TELEPHONE ENCOUNTER
----- Message from Shravan Diane MD sent at 3/9/2020  3:38 PM CDT -----  Contact: Pt [phone]  Please call pt with labs results. All normal results. She does not have to take potassium for now.  ----- Message -----  From: Kvng Love MA  Sent: 3/9/2020   3:18 PM CDT  To: Shravan Diane MD        ----- Message -----  From: Glenroy Mirza  Sent: 3/9/2020   3:10 PM CDT  To: [facility] Staff    Calling to get test results.  Name of test (lab, xray, etc.):   Labs  Date of test:   3/3/20    Comments:  She also wants to know if she is stlll supposed to take the potassium pills

## 2020-03-18 ENCOUNTER — HOSPITAL ENCOUNTER (OUTPATIENT)
Facility: HOSPITAL | Age: 65
Discharge: HOME | End: 2020-03-18
Attending: PHYSICAL MEDICINE & REHABILITATION | Admitting: PHYSICAL MEDICINE & REHABILITATION
Payer: COMMERCIAL

## 2020-03-18 VITALS
HEIGHT: 67 IN | RESPIRATION RATE: 17 BRPM | DIASTOLIC BLOOD PRESSURE: 92 MMHG | TEMPERATURE: 98 F | HEART RATE: 52 BPM | WEIGHT: 181 LBS | BODY MASS INDEX: 28.41 KG/M2 | SYSTOLIC BLOOD PRESSURE: 195 MMHG | OXYGEN SATURATION: 100 %

## 2020-03-18 DIAGNOSIS — M17.12 PRIMARY OSTEOARTHRITIS OF LEFT KNEE: ICD-10-CM

## 2020-03-18 DIAGNOSIS — M25.562 CHRONIC PAIN OF LEFT KNEE: Primary | ICD-10-CM

## 2020-03-18 DIAGNOSIS — G89.29 CHRONIC PAIN: ICD-10-CM

## 2020-03-18 DIAGNOSIS — G89.29 CHRONIC PAIN OF LEFT KNEE: Primary | ICD-10-CM

## 2020-03-18 PROCEDURE — 20610 DRAIN/INJ JOINT/BURSA W/O US: CPT | Performed by: PHYSICAL MEDICINE & REHABILITATION

## 2020-03-18 PROCEDURE — 20610 DRAIN/INJ JOINT/BURSA W/O US: CPT | Mod: LT,,, | Performed by: PHYSICAL MEDICINE & REHABILITATION

## 2020-03-18 PROCEDURE — 63600175 PHARM REV CODE 636 W HCPCS: Mod: JG | Performed by: PHYSICAL MEDICINE & REHABILITATION

## 2020-03-18 PROCEDURE — 25500020 PHARM REV CODE 255: Performed by: PHYSICAL MEDICINE & REHABILITATION

## 2020-03-18 PROCEDURE — 77002 PR FLUOROSCOPIC GUIDANCE NEEDLE PLACEMENT: ICD-10-PCS | Mod: 26,,, | Performed by: PHYSICAL MEDICINE & REHABILITATION

## 2020-03-18 PROCEDURE — 20610 PR DRAIN/INJECT LARGE JOINT/BURSA: ICD-10-PCS | Mod: LT,,, | Performed by: PHYSICAL MEDICINE & REHABILITATION

## 2020-03-18 PROCEDURE — 77002 NEEDLE LOCALIZATION BY XRAY: CPT | Mod: 26,,, | Performed by: PHYSICAL MEDICINE & REHABILITATION

## 2020-03-18 PROCEDURE — 25000003 PHARM REV CODE 250: Performed by: PHYSICAL MEDICINE & REHABILITATION

## 2020-03-18 RX ORDER — INDOMETHACIN 25 MG/1
CAPSULE ORAL
Status: DISCONTINUED | OUTPATIENT
Start: 2020-03-18 | End: 2020-03-18 | Stop reason: HOSPADM

## 2020-03-18 RX ORDER — LIDOCAINE HYDROCHLORIDE 10 MG/ML
INJECTION INFILTRATION; PERINEURAL
Status: DISCONTINUED | OUTPATIENT
Start: 2020-03-18 | End: 2020-03-18 | Stop reason: HOSPADM

## 2020-03-18 NOTE — OP NOTE
Knee Viscosupplementation Injection/Arthrogram Under Fluoroscopic Guidance:  I have reviewed the patient's medications, allergies and relevant histories prior to the procedure and no contraindications have been identified. The risks, benefits and alternatives to the procedure were discussed with the patient, and all questions regarding the procedure were answered to the patient's satisfaction. I personally obtained Autumn's consent prior to the start of the procedure and the signed consent can be found in the patient's chart.                                                         Time-out was taken to identify patient, procedure, laterality, and allergies prior to starting the procedure.       Date of Service: 03/18/2020  Procedure: Left Knee Viscosupplementation with Synvisc-1 under fluoroscopy  Pre-Operative Diagnosis: Chronic Left Knee Pain  1. Chronic pain of left knee    2. Chronic pain    3. Primary osteoarthritis of left knee      Post-Operative Diagnosis: Chronic Left Knee Pain  1. Chronic pain of left knee    2. Chronic pain    3. Primary osteoarthritis of left knee      Physician: Ninoska Troy M.D.  Assistants: None    Medications Injected:  1 mL of bupivacaine 0.25% (per side) and 6 mL Synvisc One (per side)   Local Anesthetic: Xylocaine 1% 10 mL with Sodium Bicarbonate 1ml.   Sedation Medications: None    Procedural Technique:  With the patient laying in a supine position and the knees semi-flexed on a wedge, the patient was prepped and draped in the usual sterile fashion using ChloraPrep and fenestrated drape.  The knee joint was determined under fluoroscopic guidance.  Local anesthetic was utilized to anesthetize the skin and subcutaneous tissues in the area using a 25-gauge needle.  The 3.5 inch 22-gauge needle was introduced into the joint space.  Omnipaque was injected to confirm intraarticular placement.  Medication was then injected slowly with minimal resistance.  The needle was removed and  bandage applied to the area.    Estimated Blood Loss:  None.  Complications:  None.     Disposition: The patient tolerated the procedure well, and there were no apparent complications. Vital signs remained stable throughout the procedure. The patient was taken to the recovery area and monitored. The patient was supplied with written discharge instructions for the procedure. If helpful, we can repeat as needed. The patient was discharged in a stable condition.    Follow-Up: We will see the patient back in 1-2 weeks or the patient may call to inform of status.

## 2020-03-18 NOTE — DISCHARGE SUMMARY
[facility]  Discharge Note  Short Stay     Admit Date: 3/18/2020    Discharge Date: 3/18/2020     Attending Physician: Ninoska Troy M.D.    Diagnoses:  Active Hospital Problems    Diagnosis  POA    Chronic pain [G89.29]  Yes    Chronic pain of left knee [M25.562, G89.29]  Yes    Primary osteoarthritis of left knee [M17.12]  Yes      Resolved Hospital Problems   No resolved problems to display.     Discharged Condition: Good     Hospital Course: Patient was admitted for an outpatient interventional pain management procedure and tolerated the procedure well with no complications.     Final Diagnoses: Same as principal problem.     Disposition: Home or Self Care     Follow up/Patient Instructions:        Reconciled Medications:     Medication List      CHANGE how you take these medications    metFORMIN 1000 MG tablet  Commonly known as:  GLUCOPHAGE  Take 1 tablet (1,000 mg total) by mouth 2 (two) times daily with meals.  What changed:  how much to take        CONTINUE taking these medications    amLODIPine 5 MG tablet  Commonly known as:  NORVASC  TAKE 1 TABLET BY MOUTH EVERY DAY     atorvastatin 20 MG tablet  Commonly known as:  LIPITOR  Take 1 tablet (20 mg total) by mouth once daily.     diclofenac sodium 1 % Gel  Commonly known as:  VOLTAREN  Apply 2 g topically 4 (four) times daily as needed.     ferrous sulfate 324 mg (65 mg iron) Tbec  Take 1 tablet (324 mg total) by mouth once daily.     hydroCHLOROthiazide 25 MG tablet  Commonly known as:  HYDRODIURIL  TAKE 1 TABLET BY MOUTH EVERY DAY     lisinopriL 40 MG tablet  Commonly known as:  PRINIVIL,ZESTRIL  TAKE 1 TABLET BY MOUTH EVERY DAY     oxyCODONE-acetaminophen  mg per tablet  Commonly known as:  PERCOCET  Take 1 tablet by mouth every 4 (four) hours as needed for Pain.     permethrin 5 % cream  Commonly known as:  ELIMITE  MARIBELL TO ENTIRE BODY FROM NECK DOWN AND SLEEP IN OINTMENT. WASH OFF IN MORNING     potassium chloride 20 mEq  Commonly  known as:  K-TAB  Take 1 tablet (20 mEq total) by mouth once daily.     QUEtiapine 400 MG Tb24  Commonly known as:  SEROQUEL XR  Take 1 tablet (400 mg total) by mouth every evening.     tiZANidine 4 MG tablet  Commonly known as:  ZANAFLEX  TAKE 2 TABLETS BY MOUTH AT BEDTIME AS NEEDED MUSCLE SPASM     valACYclovir 500 MG tablet  Commonly known as:  VALTREX  Take 1 tablet (500 mg total) by mouth 2 (two) times daily. Take twice daily for 3 days during outbreak           Discharge Procedure Orders (must include Diet, Follow-up, Activity)   Ice to affected area   Order Comments: Limit to 20 minute intervals or until skin is numb to the touch.     No driving until:   Order Comments: Until following day     Notify your health care provider if you experience any of the following:  temperature >100.4     Notify your health care provider if you experience any of the following:  persistent nausea and vomiting or diarrhea     Notify your health care provider if you experience any of the following:  severe uncontrolled pain     Notify your health care provider if you experience any of the following:  redness, tenderness, or signs of infection (pain, swelling, redness, odor or green/yellow discharge around incision site)     Notify your health care provider if you experience any of the following:  difficulty breathing or increased cough     Notify your health care provider if you experience any of the following:  severe persistent headache     Notify your health care provider if you experience any of the following:  worsening rash     Notify your health care provider if you experience any of the following:  persistent dizziness, light-headedness, or visual disturbances     Notify your health care provider if you experience any of the following:  increased confusion or weakness     No dressing needed     Drain/Inject Large Joint/Bursa - Left     Shower on day dressing removed (No bath)   Order Comments: Do not soak in  bath/pool/hot tub day of procedure. Ulysses Troy M.D.  Interventional Pain Medicine / Physical Medicine & Rehabilitation

## 2020-03-18 NOTE — DISCHARGE INSTRUCTIONS
Home Care Instructions Pain Management:    1.  DIET:    You may resume your normal diet today.    2.  BATHING:    You may shower with luke warm water.    3.  DRESSING:    You may remove your bandage today.    4.  ACTIVITY LEVEL:      You may resume your normal activities 24 hours after your procedure.    5.  MEDICATIONS:    You may resume your normal medications today.    6.  SPECIAL INSTRUCTIONS:    No heat to the injection site for 24 hours including bath or shower, heating pad, moist heat or hot tubs.    Use an ice pack to the injection site for any pain or discomfort.  Apply ice packs for 20 minute intervals as needed.    If you have received any sedatives by mouth today, you can not drive for 12 hours.    If you have received sedation through an IV, you can not drive for 24 hours.    PLEASE CALL YOUR DOCTOR FOR THE FOLLOWIN.  Redness or swelling around the injection site.  2.  Fever of 101 degrees.  3.  Drainage (pus) from the injection site.  4.  For any continuous bleeding (some dried blood over the incision is normal.)    FOR EMERGENCIES:    If any unusual problems or difficulties occur during clinic hours, call [phone] or dial 101.    Follow up with with your physician in 2-3 weeks.

## 2020-03-23 ENCOUNTER — TELEPHONE (OUTPATIENT)
Dept: INTERNAL MEDICINE | Facility: CLINIC | Age: 65
End: 2020-03-23

## 2020-03-23 NOTE — TELEPHONE ENCOUNTER
----- Message from Shravan Diane MD sent at 3/23/2020 10:35 AM CDT -----  Contact: self/ [phone]  Patient wants to be back on potassium pills. This is not appropriate given her normal potassium levels. Please call and attempt to explain. Thank you.  ----- Message -----  From: Kvng Love MA  Sent: 3/23/2020  10:29 AM CDT  To: Shravan Diane MD        ----- Message -----  From: Beulah Padron  Sent: 3/23/2020   9:14 AM CDT  To: [facility] Staff    Pt states she is tired of catching snot from her mouth. Pt states she was on potassium pills and would like to be put back on them. The name of the pill was potassium chloride (K-TAB) 20 mEq. The pharmacy she would like the RX refill sent to is [facility]/pharmacy #[id number] - [place], [place] - [address] [phone] (Phone)  [phone] (Fax). Please call and advise.

## 2020-04-01 ENCOUNTER — PATIENT OUTREACH (OUTPATIENT)
Dept: ADMINISTRATIVE | Facility: OTHER | Age: 65
End: 2020-04-01

## 2020-04-02 ENCOUNTER — TELEPHONE (OUTPATIENT)
Dept: PAIN MEDICINE | Facility: CLINIC | Age: 65
End: 2020-04-02

## 2020-04-02 LAB — POCT GLUCOSE: 105 MG/DL (ref 70–110)

## 2020-04-02 NOTE — TELEPHONE ENCOUNTER
Pain Medicine   Telephone Encounter    Autumn Smart is scheduled for follow-up, but due to current Corona Virus Pandemic and goal of limiting patient and community exposure, telephone encounter is being performed to try to limit risks of viral transmission.     History:  The patient was last seen in clinic on 2/18/20. At that time, we scheduled for left knee viscosupplementation and Rx-ed voltaren gel.    Left Knee synvisc-1 injection was performed on 3/18/20 and this provided relief for only two days. Voltaren gel is not that helpful. Denies any new pains. Denies any new weakness or numbness. Denies any fevers, SOB, cough, chest pain.     Assessment/Plan: 64 y.o. female with chronic left knee pain, left knee pain   - Consider PT again in the future  - F/U with Dr. Kaur regarding medication management  - Discussed risks of corona virus transmission with patient, and [facility] and my own recommendations regarding elective procedures at this time.   - Consider genicular nerve block in future after corona virus crisis  - RTC as needed.     04/02/2020  Ninoska Troy  Interventional Pain Medicine

## 2020-06-25 ENCOUNTER — OFFICE VISIT (OUTPATIENT)
Dept: INTERNAL MEDICINE | Facility: CLINIC | Age: 65
End: 2020-06-25
Payer: COMMERCIAL

## 2020-06-25 ENCOUNTER — LAB VISIT (OUTPATIENT)
Dept: INTERNAL MEDICINE | Facility: CLINIC | Age: 65
End: 2020-06-25
Payer: COMMERCIAL

## 2020-06-25 VITALS
WEIGHT: 170.19 LBS | HEART RATE: 69 BPM | DIASTOLIC BLOOD PRESSURE: 90 MMHG | BODY MASS INDEX: 26.66 KG/M2 | OXYGEN SATURATION: 100 % | SYSTOLIC BLOOD PRESSURE: 150 MMHG

## 2020-06-25 DIAGNOSIS — E11.9 TYPE 2 DIABETES MELLITUS WITHOUT COMPLICATION, UNSPECIFIED WHETHER LONG TERM INSULIN USE: ICD-10-CM

## 2020-06-25 DIAGNOSIS — H57.89 IRRITATION OF RIGHT EYE: Primary | ICD-10-CM

## 2020-06-25 DIAGNOSIS — R05.9 COUGH: ICD-10-CM

## 2020-06-25 DIAGNOSIS — H11.001 PTERYGIUM EYE, RIGHT: ICD-10-CM

## 2020-06-25 LAB — SARS-COV-2 RNA RESP QL NAA+PROBE: NOT DETECTED

## 2020-06-25 PROCEDURE — 3008F BODY MASS INDEX DOCD: CPT | Mod: CPTII,S$GLB,, | Performed by: INTERNAL MEDICINE

## 2020-06-25 PROCEDURE — 99214 PR OFFICE/OUTPT VISIT, EST, LEVL IV, 30-39 MIN: ICD-10-PCS | Mod: S$GLB,,, | Performed by: INTERNAL MEDICINE

## 2020-06-25 PROCEDURE — 3077F SYST BP >= 140 MM HG: CPT | Mod: CPTII,S$GLB,, | Performed by: INTERNAL MEDICINE

## 2020-06-25 PROCEDURE — 3080F DIAST BP >= 90 MM HG: CPT | Mod: CPTII,S$GLB,, | Performed by: INTERNAL MEDICINE

## 2020-06-25 PROCEDURE — 3080F PR MOST RECENT DIASTOLIC BLOOD PRESSURE >= 90 MM HG: ICD-10-PCS | Mod: CPTII,S$GLB,, | Performed by: INTERNAL MEDICINE

## 2020-06-25 PROCEDURE — 3077F PR MOST RECENT SYSTOLIC BLOOD PRESSURE >= 140 MM HG: ICD-10-PCS | Mod: CPTII,S$GLB,, | Performed by: INTERNAL MEDICINE

## 2020-06-25 PROCEDURE — 99214 OFFICE O/P EST MOD 30 MIN: CPT | Mod: S$GLB,,, | Performed by: INTERNAL MEDICINE

## 2020-06-25 PROCEDURE — 99999 PR PBB SHADOW E&M-EST. PATIENT-LVL V: CPT | Mod: PBBFAC,,, | Performed by: INTERNAL MEDICINE

## 2020-06-25 PROCEDURE — U0003 INFECTIOUS AGENT DETECTION BY NUCLEIC ACID (DNA OR RNA); SEVERE ACUTE RESPIRATORY SYNDROME CORONAVIRUS 2 (SARS-COV-2) (CORONAVIRUS DISEASE [COVID-19]), AMPLIFIED PROBE TECHNIQUE, MAKING USE OF HIGH THROUGHPUT TECHNOLOGIES AS DESCRIBED BY CMS-2020-01-R: HCPCS

## 2020-06-25 PROCEDURE — 3008F PR BODY MASS INDEX (BMI) DOCUMENTED: ICD-10-PCS | Mod: CPTII,S$GLB,, | Performed by: INTERNAL MEDICINE

## 2020-06-25 PROCEDURE — 99999 PR PBB SHADOW E&M-EST. PATIENT-LVL V: ICD-10-PCS | Mod: PBBFAC,,, | Performed by: INTERNAL MEDICINE

## 2020-06-25 RX ORDER — CIPROFLOXACIN HYDROCHLORIDE 3 MG/ML
1 SOLUTION/ DROPS OPHTHALMIC EVERY 4 HOURS
Qty: 5 ML | Refills: 0 | Status: SHIPPED | OUTPATIENT
Start: 2020-06-25 | End: 2021-02-24

## 2020-06-25 NOTE — PROGRESS NOTES
Subjective:       Patient ID: Autumn Smart is a 64 y.o. female.    Chief Complaint: Foreign Body in Eye    64 year old lady with foreign body sensation in right eye since yesterday.  Thinks something blew in while driving    Review of Systems   Constitutional: Negative for activity change, chills, fatigue and fever.   HENT: Negative for congestion, ear pain, nosebleeds, postnasal drip, sinus pressure and sore throat.    Eyes: Negative.  Negative for visual disturbance.   Respiratory: Negative for cough, chest tightness, shortness of breath and wheezing.    Cardiovascular: Negative for chest pain.   Gastrointestinal: Negative for abdominal pain, diarrhea, nausea and vomiting.   Genitourinary: Negative for difficulty urinating, dysuria, frequency and urgency.   Musculoskeletal: Negative for arthralgias and neck stiffness.   Skin: Negative for rash.   Neurological: Negative for dizziness, weakness and headaches.   Psychiatric/Behavioral: Negative for sleep disturbance. The patient is not nervous/anxious.        Objective:      Physical Exam  Eyes:           Assessment:       1. Irritation of right eye    2. Cough    3. Pterygium eye, right        Plan:   Autumn was seen today for foreign body in eye.    Diagnoses and all orders for this visit:    Irritation of right eye  -     Ambulatory referral/consult to Optometry; Future    Cough  -     Cancel: COVID-19 Routine Screening  -     COVID-19 Routine Screening; Future    Pterygium eye, right    Other orders  -     ciprofloxacin HCl (CILOXAN) 0.3 % ophthalmic solution; Place 1 drop into the right eye every 4 (four) hours.

## 2020-06-26 ENCOUNTER — TELEPHONE (OUTPATIENT)
Dept: INTERNAL MEDICINE | Facility: CLINIC | Age: 65
End: 2020-06-26

## 2020-07-02 ENCOUNTER — PATIENT OUTREACH (OUTPATIENT)
Dept: ADMINISTRATIVE | Facility: OTHER | Age: 65
End: 2020-07-02

## 2020-07-02 DIAGNOSIS — I10 HTN (HYPERTENSION), MALIGNANT: ICD-10-CM

## 2020-07-02 DIAGNOSIS — Z12.31 ENCOUNTER FOR SCREENING MAMMOGRAM FOR MALIGNANT NEOPLASM OF BREAST: Primary | ICD-10-CM

## 2020-07-02 DIAGNOSIS — E78.2 MIXED HYPERLIPIDEMIA: ICD-10-CM

## 2020-07-02 RX ORDER — HYDROCHLOROTHIAZIDE 25 MG/1
25 TABLET ORAL DAILY
Qty: 30 TABLET | Refills: 11 | Status: SHIPPED | OUTPATIENT
Start: 2020-07-02 | End: 2020-12-30 | Stop reason: SDUPTHER

## 2020-07-02 RX ORDER — LISINOPRIL 40 MG/1
40 TABLET ORAL DAILY
Qty: 30 TABLET | Refills: 11 | Status: SHIPPED | OUTPATIENT
Start: 2020-07-02 | End: 2021-03-24 | Stop reason: SDUPTHER

## 2020-07-02 NOTE — PROGRESS NOTES
Requested updates within Care Everywhere.  Patient's chart was reviewed for overdue AQUILES topics.  Immunizations reconciled.    Orders placed:Mammogram  Tasked appts:n/a  Labs Linked:n/a

## 2020-07-06 NOTE — TELEPHONE ENCOUNTER
Pt did have her medications and she asked me to reschedule her eye exam for today as she has court this morning and she was placed on a wait list for an earlier appointment than scheduled.

## 2020-08-03 ENCOUNTER — PATIENT OUTREACH (OUTPATIENT)
Dept: ADMINISTRATIVE | Facility: OTHER | Age: 65
End: 2020-08-03

## 2020-08-03 NOTE — PROGRESS NOTES
Requested updates within Care Everywhere.  Patient's chart was reviewed for overdue AQUILES topics.  Immunizations reconciled.    Orders placed:n/a  Tasked appts:n/a  Labs Linked:n/a

## 2020-08-06 ENCOUNTER — TELEPHONE (OUTPATIENT)
Dept: INTERNAL MEDICINE | Facility: CLINIC | Age: 65
End: 2020-08-06

## 2020-08-06 NOTE — TELEPHONE ENCOUNTER
----- Message from Yamilka Alejandro sent at 8/6/2020 11:55 AM CDT -----  Regarding: Medication (for eye irritation)  Contact: Autumn  Pt calling because she was unable to be seen on yesterday with the eye doctor due to Dr. Ovalle being ill.  She was rescheduled to 09/15/20 but needs to know if there is anything she can be prescribed the medication that was given to her on that visit 06/25/20 to hold her over until the appt.  She can be reached at [phone]

## 2020-08-07 ENCOUNTER — DOCUMENTATION ONLY (OUTPATIENT)
Dept: PAIN MEDICINE | Facility: CLINIC | Age: 65
End: 2020-08-07

## 2020-08-07 NOTE — TELEPHONE ENCOUNTER
Left voicemail informing pt that Dr. Madrid stated the eye drops she was prescribed was just an antibiotic and that she should purchase Systane which is a moisturizer eye drop to help with irritation

## 2020-08-07 NOTE — PROGRESS NOTES
Outside Record Documentation: Records obtained from [facility], Dr. Gurpreet Melton    She was being treated for neck pain, low back pain, left knee pain. Toxicology screening was taken, and appear compliant with Rx-ed medications.     Pain Medications:  - Percocet  mg t.i.d. p.r.n.  - tizanidine 8 mg q.h.s. p.r.n.  - Pennsaid 2% BID    Pain Procedures:  -     Surgeries for Pain:  -     Outside Imaging Records:  - MRI lumbar spine without contrast 09/17/2019:  Alignment is normal.  Marrow signal appears normal.  The visualized spinal cord, conus and cauda equina appear normal.  The tip of the conus is at the mid L1 vertebra.  The visualized paraspinal structures and surrounding visceral spaces appear normal.  T12-L1:  Normal.  L1-L2:  Normal.  L2-L3:  Normal.  L3-L4:  There is no dorsal disc abnormality.  There is mild canal stenosis related to facet and ligamentous hypertrophy.  There is no foraminal stenosis.  L4-5:  There is mild dorsal annular bulging.  There is no protrusion or canal stenosis.  Facet and ligamentous hypertrophy contributes to bilateral mild neural foraminal stenosis.  L5-S1:  There is no dorsal disc abnormality or canal stenosis or neural foraminal stenosis.  Mild facet hypertrophy bilaterally noted.

## 2020-08-11 ENCOUNTER — TELEPHONE (OUTPATIENT)
Dept: INTERNAL MEDICINE | Facility: CLINIC | Age: 65
End: 2020-08-11

## 2020-08-11 NOTE — TELEPHONE ENCOUNTER
I do not think this is appropriate to refill.  This is not a long term medication.  If her eyes are not better she needs a visit either with a different provider in the eye dept or and urgent care visit here to be evaluated.

## 2020-08-11 NOTE — TELEPHONE ENCOUNTER
----- Message from Brittney Patel sent at 8/11/2020 12:34 PM CDT -----  Contact: patient [phone]  Patient called 1 week ago trying to refill eye drops that you prescribed, She went to her ophth. Appt but when she got there , she was told that the doctor was out sick. She waited for an hour for the appt so she is still waiting since they rescheduled her appt. On 9/15/20     Patient is still having trouble with her eyes and is asking for a refill of ciprofloxacin HCl (CILOXAN) 0.3 % ophthalmic solution       [facility]/pharmacy #[id number] - [place], [place] - [address] [phone] (Phone)

## 2020-09-03 ENCOUNTER — CLINICAL SUPPORT (OUTPATIENT)
Dept: OPTOMETRY | Facility: CLINIC | Age: 65
End: 2020-09-03
Attending: FAMILY MEDICINE
Payer: COMMERCIAL

## 2020-09-03 ENCOUNTER — OFFICE VISIT (OUTPATIENT)
Dept: INTERNAL MEDICINE | Facility: CLINIC | Age: 65
End: 2020-09-03
Payer: COMMERCIAL

## 2020-09-03 VITALS
WEIGHT: 166.69 LBS | SYSTOLIC BLOOD PRESSURE: 136 MMHG | DIASTOLIC BLOOD PRESSURE: 84 MMHG | BODY MASS INDEX: 26.16 KG/M2 | HEIGHT: 67 IN | HEART RATE: 64 BPM | OXYGEN SATURATION: 99 %

## 2020-09-03 DIAGNOSIS — Z86.73 HISTORY OF STROKE: ICD-10-CM

## 2020-09-03 DIAGNOSIS — I10 ESSENTIAL HYPERTENSION: ICD-10-CM

## 2020-09-03 DIAGNOSIS — E11.9 TYPE 2 DIABETES MELLITUS WITHOUT COMPLICATION, WITHOUT LONG-TERM CURRENT USE OF INSULIN: ICD-10-CM

## 2020-09-03 DIAGNOSIS — E66.3 OVERWEIGHT (BMI 25.0-29.9): ICD-10-CM

## 2020-09-03 DIAGNOSIS — E78.2 MIXED HYPERLIPIDEMIA: ICD-10-CM

## 2020-09-03 DIAGNOSIS — E11.9 TYPE 2 DIABETES MELLITUS WITHOUT COMPLICATION, UNSPECIFIED WHETHER LONG TERM INSULIN USE: ICD-10-CM

## 2020-09-03 DIAGNOSIS — Z79.899 ENCOUNTER FOR LONG-TERM (CURRENT) USE OF OTHER MEDICATIONS: Primary | ICD-10-CM

## 2020-09-03 DIAGNOSIS — G89.29 OTHER CHRONIC PAIN: ICD-10-CM

## 2020-09-03 PROCEDURE — 99999 PR PBB SHADOW E&M-EST. PATIENT-LVL IV: ICD-10-PCS | Mod: PBBFAC,,, | Performed by: FAMILY MEDICINE

## 2020-09-03 PROCEDURE — 92250 FUNDUS PHOTOGRAPHY W/I&R: CPT | Mod: 26,S$GLB,, | Performed by: OPHTHALMOLOGY

## 2020-09-03 PROCEDURE — 3079F DIAST BP 80-89 MM HG: CPT | Mod: CPTII,S$GLB,, | Performed by: FAMILY MEDICINE

## 2020-09-03 PROCEDURE — 3079F PR MOST RECENT DIASTOLIC BLOOD PRESSURE 80-89 MM HG: ICD-10-PCS | Mod: CPTII,S$GLB,, | Performed by: FAMILY MEDICINE

## 2020-09-03 PROCEDURE — 92250 DIABETIC EYE SCREENING PHOTO: ICD-10-PCS | Mod: 26,S$GLB,, | Performed by: OPHTHALMOLOGY

## 2020-09-03 PROCEDURE — 99999 PR PBB SHADOW E&M-EST. PATIENT-LVL I: ICD-10-PCS | Mod: PBBFAC,,,

## 2020-09-03 PROCEDURE — 99214 OFFICE O/P EST MOD 30 MIN: CPT | Mod: S$GLB,,, | Performed by: FAMILY MEDICINE

## 2020-09-03 PROCEDURE — 3008F PR BODY MASS INDEX (BMI) DOCUMENTED: ICD-10-PCS | Mod: CPTII,S$GLB,, | Performed by: FAMILY MEDICINE

## 2020-09-03 PROCEDURE — 99999 PR PBB SHADOW E&M-EST. PATIENT-LVL I: CPT | Mod: PBBFAC,,,

## 2020-09-03 PROCEDURE — 3075F PR MOST RECENT SYSTOLIC BLOOD PRESS GE 130-139MM HG: ICD-10-PCS | Mod: CPTII,S$GLB,, | Performed by: FAMILY MEDICINE

## 2020-09-03 PROCEDURE — 99214 PR OFFICE/OUTPT VISIT, EST, LEVL IV, 30-39 MIN: ICD-10-PCS | Mod: S$GLB,,, | Performed by: FAMILY MEDICINE

## 2020-09-03 PROCEDURE — 3008F BODY MASS INDEX DOCD: CPT | Mod: CPTII,S$GLB,, | Performed by: FAMILY MEDICINE

## 2020-09-03 PROCEDURE — 3075F SYST BP GE 130 - 139MM HG: CPT | Mod: CPTII,S$GLB,, | Performed by: FAMILY MEDICINE

## 2020-09-03 PROCEDURE — 99999 PR PBB SHADOW E&M-EST. PATIENT-LVL IV: CPT | Mod: PBBFAC,,, | Performed by: FAMILY MEDICINE

## 2020-09-03 PROCEDURE — 3044F PR MOST RECENT HEMOGLOBIN A1C LEVEL <7.0%: ICD-10-PCS | Mod: CPTII,S$GLB,, | Performed by: FAMILY MEDICINE

## 2020-09-03 PROCEDURE — 3044F HG A1C LEVEL LT 7.0%: CPT | Mod: CPTII,S$GLB,, | Performed by: FAMILY MEDICINE

## 2020-09-03 NOTE — PROGRESS NOTES
HPI     Diabetic Eye Exam      Additional comments: photos               Comments     Screening photos           Last edited by Jenn Thurman MA on 9/3/2020  2:55 PM. (History)            Assessment /Plan     For exam results, see Encounter Report.    Type 2 diabetes mellitus without complication, unspecified whether long term insulin use  -     Diabetic Eye Screening Photo      64 y.o. y/o here for screening for Diabetic Renopathy with non-dilated fundus photos per Shravan Diane MD

## 2020-09-03 NOTE — PROGRESS NOTES
"Subjective:       Patient ID: Autumn Smart is a 64 y.o. female.    Chief Complaint: Follow-up    HPI    64yoF PMHx HTN, h/o CVA 2017 w/o residual deficits, DM2, H/o HSV, marijuana use here for follow up.    C/o leg swelling and "wants to start HCTZ or Lasix." she is already on hctz and reviewed medications today.     Does not want labs today.     #Cards: HTN, HLD  - current reg: Norvasc 5; HCTZ 25 qd; Lisinopril 40 qd, Lipitor 20 qd     #Endo: NIDDM (A1c 3/2020 = 6.4)  - current reg: Metformin 500 qd (takes once daily s/t diarrhea)    #Neuro: H/o CVA 2017  - no longer taking ASA because "was having bleeding."     #Chronic pain: chronic back pain  - [facility] mgmt and manages opioid therapy     #Psych: Insomnia  - Quetiapine 400 qhs    Review of Systems   Constitutional: Negative for appetite change, fatigue and fever.   HENT: Negative for congestion.    Respiratory: Negative for cough and shortness of breath.    Cardiovascular: Positive for leg swelling. Negative for chest pain and palpitations.   Gastrointestinal: Negative for abdominal pain, constipation, diarrhea, nausea and vomiting.   Genitourinary: Negative for dysuria.   Musculoskeletal: Negative for back pain.   Skin: Negative for rash.   Neurological: Negative for weakness, numbness and headaches.         Past Medical History:   Diagnosis Date    Back pain     CVA (cerebral vascular accident)     Diabetes mellitus, type 2     Embolic stroke involving left middle cerebral artery 11/23/2017    Hyperlipidemia     Hypertension     Insomnia         Prior to Admission medications    Medication Sig Start Date End Date Taking? Authorizing Provider   amLODIPine (NORVASC) 5 MG tablet TAKE 1 TABLET BY MOUTH EVERY DAY 6/22/20   Zoe Davis DNP   atorvastatin (LIPITOR) 20 MG tablet Take 1 tablet (20 mg total) by mouth once daily. 3/3/20   Shravan Diane MD   ciprofloxacin HCl (CILOXAN) 0.3 % ophthalmic solution Place 1 drop into the right eye every 4 " (four) hours. 6/25/20   Samaria Madrid MD   diclofenac sodium (VOLTAREN) 1 % Gel Apply 2 g topically 4 (four) times daily as needed. 2/18/20 3/19/20  Ninoska Troy MD   ferrous sulfate 324 mg (65 mg iron) TbEC Take 1 tablet (324 mg total) by mouth once daily. 12/3/19   Shravan Diane MD   hydroCHLOROthiazide (HYDRODIURIL) 25 MG tablet Take 1 tablet (25 mg total) by mouth once daily. 7/2/20   Shravan Diane MD   lisinopriL (PRINIVIL,ZESTRIL) 40 MG tablet Take 1 tablet (40 mg total) by mouth once daily. 7/2/20   Shravan Diane MD   metformin (GLUCOPHAGE) 1000 MG tablet Take 1 tablet (1,000 mg total) by mouth 2 (two) times daily with meals.  Patient taking differently: Take 500 mg by mouth 2 (two) times daily with meals.  5/31/17   Mynor Sauceda MD   oxyCODONE-acetaminophen (PERCOCET)  mg per tablet Take 1 tablet by mouth every 4 (four) hours as needed for Pain.    Historical Provider, MD   permethrin (ELIMITE) 5 % cream MARIBELL TO ENTIRE BODY FROM NECK DOWN AND SLEEP IN OINTMENT. WASH OFF IN MORNING 8/29/19   Historical Provider, MD   potassium chloride (K-TAB) 20 mEq Take 1 tablet (20 mEq total) by mouth once daily. 10/30/19   Sebastian Harris MD   QUEtiapine (SEROQUEL XR) 400 MG Tb24 Take 1 tablet (400 mg total) by mouth every evening. 3/3/20   Shravan Diane MD   tiZANidine (ZANAFLEX) 4 MG tablet TAKE 2 TABLETS BY MOUTH AT BEDTIME AS NEEDED MUSCLE SPASM 11/18/19   Historical Provider, MD   valACYclovir (VALTREX) 500 MG tablet Take 1 tablet (500 mg total) by mouth 2 (two) times daily. Take twice daily for 3 days during outbreak 12/3/19 12/2/20  Shravan Diane MD   amLODIPine (NORVASC) 5 MG tablet TAKE 1 TABLET BY MOUTH EVERY DAY 7/3/19   Zoe Davis DNP        Past medical history, surgical history, and family medical history reviewed and updated as appropriate.    Medications and allergies reviewed.     Objective:          Vitals:    09/03/20 1339   BP: 136/84   BP Location: Right  "arm   Patient Position: Sitting   BP Method: Medium (Manual)   Pulse: 64   SpO2: 99%   Weight: 75.6 kg (166 lb 10.7 oz)   Height: 5' 7" (1.702 m)     Body mass index is 26.1 kg/m².  Physical Exam  Vitals signs and nursing note reviewed.   Constitutional:       General: She is not in acute distress.     Appearance: Normal appearance. She is well-developed.   Eyes:      Extraocular Movements: Extraocular movements intact.   Cardiovascular:      Rate and Rhythm: Normal rate and regular rhythm.      Heart sounds: Normal heart sounds. No murmur.   Pulmonary:      Effort: Pulmonary effort is normal. No respiratory distress.      Breath sounds: Normal breath sounds. No wheezing.   Musculoskeletal:      Right lower leg: No edema.      Left lower leg: No edema.   Neurological:      Mental Status: She is alert and oriented to person, place, and time.   Psychiatric:         Mood and Affect: Mood normal.         Behavior: Behavior normal.         Lab Results   Component Value Date    WBC 4.10 03/03/2020    HGB 10.6 (L) 03/03/2020    HCT 35.9 (L) 03/03/2020     03/03/2020    CHOL 163 03/03/2020    TRIG 58 03/03/2020    HDL 73 03/03/2020    ALT 22 03/03/2020    AST 26 03/03/2020     03/03/2020    K 4.2 03/03/2020     03/03/2020    CREATININE 0.8 03/03/2020    BUN 13 03/03/2020    CO2 31 (H) 03/03/2020    TSH 1.284 03/03/2020    INR 1.0 11/24/2017    HGBA1C 6.4 (H) 03/03/2020       Assessment:       1. Encounter for long-term (current) use of other medications    2. Essential hypertension    3. Mixed hyperlipidemia    4. History of stroke    5. Other chronic pain    6. Type 2 diabetes mellitus without complication, without long-term current use of insulin    7. Overweight (BMI 25.0-29.9)        Plan:   Autumn was seen today for follow-up.    Diagnoses and all orders for this visit:    No leg swelling appreciated on exam today. Already on hctz, cont. Try conservative therapy at home incl leg elevation and " compression stockings.     Encounter for long-term (current) use of other medications    Essential hypertension  at goal.  Continue current medications.    Mixed hyperlipidemia    History of stroke  Pt will start asa  Other chronic pain  Cont following with pain mgmt.  Type 2 diabetes mellitus without complication, without long-term current use of insulin   refuses bloodwork today, cont to take   Overweight (BMI 25.0-29.9)  Counseled regarding benefits of healthy eating and physical activity (150 minutes of moderate-intensity aerobic activity per week) to improve overall health.        Health maintenance reviewed with patient.     Follow up in about 6 months (around 3/3/2021) for Annual.    Shravan Diane MD  Family Medicine  [facility]  9/3/2020

## 2020-09-14 ENCOUNTER — PATIENT OUTREACH (OUTPATIENT)
Dept: ADMINISTRATIVE | Facility: OTHER | Age: 65
End: 2020-09-14

## 2020-09-14 DIAGNOSIS — E11.9 TYPE 2 DIABETES MELLITUS WITHOUT COMPLICATION, WITHOUT LONG-TERM CURRENT USE OF INSULIN: Primary | ICD-10-CM

## 2020-09-15 ENCOUNTER — LAB VISIT (OUTPATIENT)
Dept: LAB | Facility: HOSPITAL | Age: 65
End: 2020-09-15
Attending: FAMILY MEDICINE
Payer: COMMERCIAL

## 2020-09-15 ENCOUNTER — TELEPHONE (OUTPATIENT)
Dept: OPTOMETRY | Facility: CLINIC | Age: 65
End: 2020-09-15

## 2020-09-15 ENCOUNTER — OFFICE VISIT (OUTPATIENT)
Dept: OPTOMETRY | Facility: CLINIC | Age: 65
End: 2020-09-15
Payer: COMMERCIAL

## 2020-09-15 DIAGNOSIS — H02.889 MGD (MEIBOMIAN GLAND DYSFUNCTION): ICD-10-CM

## 2020-09-15 DIAGNOSIS — H35.033 HYPERTENSIVE RETINOPATHY OF BOTH EYES: ICD-10-CM

## 2020-09-15 DIAGNOSIS — E11.9 TYPE 2 DIABETES MELLITUS WITHOUT RETINOPATHY: Primary | ICD-10-CM

## 2020-09-15 DIAGNOSIS — H25.13 NUCLEAR SCLEROSIS OF BOTH EYES: ICD-10-CM

## 2020-09-15 DIAGNOSIS — H52.4 PRESBYOPIA: ICD-10-CM

## 2020-09-15 DIAGNOSIS — E11.9 TYPE 2 DIABETES MELLITUS WITHOUT COMPLICATION, WITHOUT LONG-TERM CURRENT USE OF INSULIN: ICD-10-CM

## 2020-09-15 DIAGNOSIS — H04.123 DRY EYE SYNDROME OF BOTH EYES: ICD-10-CM

## 2020-09-15 LAB
ESTIMATED AVG GLUCOSE: 128 MG/DL (ref 68–131)
HBA1C MFR BLD HPLC: 6.1 % (ref 4–5.6)

## 2020-09-15 PROCEDURE — 99999 PR PBB SHADOW E&M-EST. PATIENT-LVL III: CPT | Mod: PBBFAC,,, | Performed by: OPTOMETRIST

## 2020-09-15 PROCEDURE — 92004 COMPRE OPH EXAM NEW PT 1/>: CPT | Mod: S$GLB,,, | Performed by: OPTOMETRIST

## 2020-09-15 PROCEDURE — 83036 HEMOGLOBIN GLYCOSYLATED A1C: CPT

## 2020-09-15 PROCEDURE — 92004 PR EYE EXAM, NEW PATIENT,COMPREHESV: ICD-10-PCS | Mod: S$GLB,,, | Performed by: OPTOMETRIST

## 2020-09-15 PROCEDURE — 99999 PR PBB SHADOW E&M-EST. PATIENT-LVL III: ICD-10-PCS | Mod: PBBFAC,,, | Performed by: OPTOMETRIST

## 2020-09-15 PROCEDURE — 36415 COLL VENOUS BLD VENIPUNCTURE: CPT

## 2020-09-15 NOTE — PROGRESS NOTES
Requested updates within Care Everywhere.  Patient's chart was reviewed for overdue AQUILES topics.  Immunizations reconciled.    Orders placed:Hemoglobin A1c   Tasked appts:n/a  Labs Linked:n/a

## 2020-09-15 NOTE — PROGRESS NOTES
HPI     65 yr old here for evaluation of ocular health.    Mrs. Smart states that she has had DM for more than 10 yrs.  Does not   check her BS so she tries to control with her food choices.   S/p stroke x2.  Mild in 2016 and than another one in 2017.  Stroke   affected her left side.      Mrs. Smart states that she has noticed that her vision is blurry.  Worse   at near, but uses +1.00 OTC readers which help. She does not want a   refraction today.    She is concerned about the redness OU.  (+)itching and tearing. She is   using clear eyes all day long without relief.     Had a DM eye screening photo September, was never given the results.     No diplopia.   No headache.  No flashes or floaters.     Not interested in glasses today.     Hemoglobin A1C       Date                     Value               Ref Range             Status                03/03/2020               6.4 (H)             4.0 - 5.6 %           Final                  11/24/2017               7.8 (H)             4.0 - 5.6 %           Final                  05/27/2017               7.9 (H)             4.5 - 6.2 %           Final                  Last edited by Priscilla Ovalle, OD on 9/15/2020  5:13 PM. (History)            Assessment /Plan     For exam results, see Encounter Report.    Type 2 diabetes mellitus without retinopathy    Hypertensive retinopathy of both eyes    MGD (meibomian gland dysfunction)    Dry eye syndrome of both eyes    Nuclear sclerosis of both eyes    Presbyopia      1. No retinopathy noted, OU. Continue proper BS control and annual diabetic eye exams. Monitor yearly.     2. Mild vessel changes, OU. H/o stroke x2.   Discussed importance of BP control, taking medications as directed, and following-up with primary care. If changes noted in vision, RTC. Monitor yearly unless changes noted sooner.     3-4. Educated pt on findings. Recommend warm compresses along eyelid margin with light massage for ~10 min Qday. ATs TID-QID +  suhail/gel QHS. Monitor.    5. Educated pt on findings. Not visually significant. No need for removal at this time. Monitor yearly.     6. Continue use of OTC reading glasses prn. Monitor yearly.       RTC in 1 year for annual eye exam or sooner if needed.

## 2020-09-15 NOTE — LETTER
September 15, 2020      Samaria Madrid MD  [address]  [facility] [address]           [facility]  [address]  [facility] [address]  Phone: [phone]          Patient: Autumn Smart   MR Number: [id number]   YOB: 1955   Date of Visit: 9/15/2020       Dear Dr. Samaria Madrid:    Thank you for referring Autumn Smart to me for evaluation. Attached you will find relevant portions of my assessment and plan of care.    If you have questions, please do not hesitate to call me. I look forward to following Autumn Smart along with you.    Sincerely,    Priscilla Ovalle, OD    Enclosure  CC:  No Recipients    If you would like to receive this communication electronically, please contact [email or web address] or [phone] to request more information on [employer] access.    For providers and/or their staff who would like to refer a patient to [facility], please contact us through our one-stop-shop provider referral line, [facility], at [phone].    If you feel you have received this communication in error or would no longer like to receive these types of communications, please e-mail [email or web address]

## 2020-09-16 ENCOUNTER — TELEPHONE (OUTPATIENT)
Dept: INTERNAL MEDICINE | Facility: CLINIC | Age: 65
End: 2020-09-16

## 2020-09-16 NOTE — TELEPHONE ENCOUNTER
----- Message from Shravan Diane MD sent at 9/15/2020  5:06 PM CDT -----  Please reach out to patient with results. a1c looking improved compared to past readings over the last few years. Continue healthy habits.

## 2020-09-17 ENCOUNTER — TELEPHONE (OUTPATIENT)
Dept: INTERNAL MEDICINE | Facility: CLINIC | Age: 65
End: 2020-09-17

## 2020-09-17 NOTE — TELEPHONE ENCOUNTER
----- Message from Dagmar Diaz sent at 9/17/2020 10:45 AM CDT -----  Contact: Self   Patient is returning a phone call.  Who left a message for the patient: Mellisa Canchola LPN  Does patient know what this is regarding:    Comments:    Pt was unable to call back til now due to pt's son was in hospital.

## 2020-10-13 ENCOUNTER — HOSPITAL ENCOUNTER (EMERGENCY)
Facility: HOSPITAL | Age: 65
Discharge: HOME | End: 2020-10-13
Attending: EMERGENCY MEDICINE
Payer: COMMERCIAL

## 2020-10-13 VITALS
RESPIRATION RATE: 16 BRPM | OXYGEN SATURATION: 97 % | BODY MASS INDEX: 26.31 KG/M2 | WEIGHT: 168 LBS | HEART RATE: 65 BPM | TEMPERATURE: 99 F | DIASTOLIC BLOOD PRESSURE: 93 MMHG | SYSTOLIC BLOOD PRESSURE: 190 MMHG

## 2020-10-13 DIAGNOSIS — H02.59 SUPERFICIAL SWELLING OF EYELID: Primary | ICD-10-CM

## 2020-10-13 DIAGNOSIS — T78.40XA ALLERGIC REACTION, INITIAL ENCOUNTER: ICD-10-CM

## 2020-10-13 PROCEDURE — 96372 THER/PROPH/DIAG INJ SC/IM: CPT

## 2020-10-13 PROCEDURE — 99284 EMERGENCY DEPT VISIT MOD MDM: CPT | Mod: 25

## 2020-10-13 PROCEDURE — 63600175 PHARM REV CODE 636 W HCPCS: Performed by: PHYSICIAN ASSISTANT

## 2020-10-13 PROCEDURE — 99284 EMERGENCY DEPT VISIT MOD MDM: CPT | Mod: ,,, | Performed by: PHYSICIAN ASSISTANT

## 2020-10-13 PROCEDURE — 99284 PR EMERGENCY DEPT VISIT,LEVEL IV: ICD-10-PCS | Mod: ,,, | Performed by: PHYSICIAN ASSISTANT

## 2020-10-13 PROCEDURE — 25000003 PHARM REV CODE 250: Performed by: PHYSICIAN ASSISTANT

## 2020-10-13 RX ORDER — DEXAMETHASONE SODIUM PHOSPHATE 4 MG/ML
8 INJECTION, SOLUTION INTRA-ARTICULAR; INTRALESIONAL; INTRAMUSCULAR; INTRAVENOUS; SOFT TISSUE
Status: COMPLETED | OUTPATIENT
Start: 2020-10-13 | End: 2020-10-13

## 2020-10-13 RX ORDER — DIPHENHYDRAMINE HCL 25 MG
25 CAPSULE ORAL EVERY 6 HOURS PRN
Qty: 5 CAPSULE | Refills: 0 | Status: SHIPPED | OUTPATIENT
Start: 2020-10-13 | End: 2021-03-24

## 2020-10-13 RX ORDER — DIPHENHYDRAMINE HCL 25 MG
25 CAPSULE ORAL
Status: COMPLETED | OUTPATIENT
Start: 2020-10-13 | End: 2020-10-13

## 2020-10-13 RX ORDER — FAMOTIDINE 20 MG/1
20 TABLET, FILM COATED ORAL
Status: COMPLETED | OUTPATIENT
Start: 2020-10-13 | End: 2020-10-13

## 2020-10-13 RX ADMIN — DEXAMETHASONE SODIUM PHOSPHATE 8 MG: 4 INJECTION, SOLUTION INTRA-ARTICULAR; INTRALESIONAL; INTRAMUSCULAR; INTRAVENOUS; SOFT TISSUE at 09:10

## 2020-10-13 RX ADMIN — FAMOTIDINE 20 MG: 20 TABLET, FILM COATED ORAL at 09:10

## 2020-10-13 RX ADMIN — DIPHENHYDRAMINE HYDROCHLORIDE 25 MG: 25 CAPSULE ORAL at 09:10

## 2020-10-13 NOTE — ED PROVIDER NOTES
Encounter Date: 10/13/2020       History     Chief Complaint   Patient presents with    Facial Swelling     swelling noted tro right eye and bump to right forehead; denies fall or injury     Ms Smart is a 65yoF who presents for right eyelid swelling; pertinent PMHx DM 2, h/o embolic CVA, HTN, HLD.  Patient awoke this morning with pruritic right upper eyelid swelling, in addition to pruritic bug bite to left leg and forehead.  She reports sleeping on a couch last night and is unsure if she was bit by anything.  She denies pain to areas of swelling.  Denies eye pain, visual disturbance, shortness of breath, cough, nausea, other facial swelling, fever/chills.  No medications PTA.  She does have a dental appointment later this afternoon, denies dental pain or any dental issues recently (significant history of caries). No new medications or topical lotions/detergents.  The patients available PMH, PSH, Social History, medications, allergies, and triage vital signs were reviewed just prior to their medical evaluation.  A ten point review of systems was completed and is negative except as documented above.  Patient denies any other acute medical complaint.    Please be advised this text was dictated with Ze-gen software and may contain errors due to translation.           Review of patient's allergies indicates:  No Known Allergies  Past Medical History:   Diagnosis Date    Back pain     CVA (cerebral vascular accident)     Diabetes mellitus, type 2     Embolic stroke involving left middle cerebral artery 11/23/2017    Hyperlipidemia     Hypertension     Insomnia      Past Surgical History:   Procedure Laterality Date    BACK SURGERY      FRACTURE SURGERY      INJECTION OF JOINT Left 3/18/2020    Procedure: Injection, Joint Knee--Left knee viscosupplementation (Synvisc 1);  Surgeon: Ninoska Troy MD;  Location: [facility];  Service: Pain Management;  Laterality: Left;    right leg surgery Right     dionna  in right leg     Family History   Problem Relation Age of Onset    Diabetes Mother     Hypertension Mother     Diabetes Father     Hypertension Father     Breast cancer Neg Hx     Colon cancer Neg Hx     Ovarian cancer Neg Hx      Social History     Tobacco Use    Smoking status: Never Smoker    Smokeless tobacco: Never Used   Substance Use Topics    Alcohol use: No     Alcohol/week: 1.0 standard drinks     Types: 1 Cans of beer per week    Drug use: Yes     Types: Marijuana     Comment: denies     Review of Systems   Constitutional: Negative for chills and fever.   HENT: Positive for facial swelling (right upper eyelid, no pain,  +itching). Negative for congestion, dental problem, sinus pressure, sore throat and trouble swallowing.    Eyes: Negative for photophobia, pain, discharge, redness, itching and visual disturbance.   Respiratory: Negative for shortness of breath.    Cardiovascular: Negative for chest pain.   Gastrointestinal: Negative for nausea and vomiting.   Musculoskeletal: Negative for arthralgias.   Skin: Negative for pallor, rash and wound.   Neurological: Negative for headaches.   Psychiatric/Behavioral: Negative for confusion.       Physical Exam     Initial Vitals [10/13/20 0838]   BP Pulse Resp Temp SpO2   (!) 190/93 65 16 98.6 °F (37 °C) 97 %      MAP       --         Physical Exam    Nursing note and vitals reviewed.  Constitutional: She appears well-developed and well-nourished. She is not diaphoretic. No distress.   HENT:   Head: Atraumatic.   Right Ear: External ear normal.   Left Ear: External ear normal.   Mouth/Throat: Oropharynx is clear and moist.   Mild swelling confined to upper R eyelid, no appreciable blepharitis or meibomian gland edema. Small circular area of erythema, pruritic. No stye or chalazion    Eye unremarkable    Additional area of swelling, pruritis and mild erythema to forehead (?bug bite)    Poor dentition, no odontogenic infection   Eyes: Conjunctivae and  EOM are normal.   Cardiovascular: Normal rate, regular rhythm and intact distal pulses.   Pulmonary/Chest: Breath sounds normal. No stridor. No respiratory distress. She has no wheezes. She has no rhonchi. She has no rales.   Abdominal: Soft. Bowel sounds are normal. There is no abdominal tenderness. There is no rebound and no guarding.   Musculoskeletal: Normal range of motion. No edema.   Lymphadenopathy:     She has no cervical adenopathy.   Neurological: She is alert and oriented to person, place, and time.   Skin: Skin is warm and dry. Capillary refill takes less than 2 seconds. No rash noted. There is erythema (pruritic, red lesion to left lateral calf. suspect bug bite). No pallor.   Psychiatric: She has a normal mood and affect. Her behavior is normal. Judgment and thought content normal.         ED Course   Procedures  Labs Reviewed - No data to display       Imaging Results    None          Medical Decision Making:   History:   Old Medical Records: I decided to obtain old medical records.  Old Records Summarized: records from clinic visits and records from previous admission(s).  Initial Assessment:   Patient presents with pruritic bites to forehead and left leg in addition to right upper eyelid swelling that is pruritic and not painful, eye exam unremarkable.  VSS, afebrile  Differential Diagnosis:   DDx includes localized allergic reaction. Physical exam and history taking lower clinical suspicion for angioedema, anaphylaxis, cellulitis, blepharitis, chalazion.  ED Management:  History exam most consistent with localized allergic reaction, given IM Decadron here with Benadryl and Pepcid dosing for home. Discharged in stable condition with strict ED return precautions. Patient agreed to plan of care and voiced understanding.    Meenakshi Goodrich PA-C  10/14/2020                                 Clinical Impression:       ICD-10-CM ICD-9-CM   1. Superficial swelling of eyelid  H02.59 374.89   2. Allergic  reaction, initial encounter  T78.40XA 995.3                      Disposition:   Disposition: Discharged  Condition: Stable     ED Disposition Condition    Discharge Stable        ED Prescriptions     Medication Sig Dispense Start Date End Date Auth. Provider    diphenhydrAMINE (BENADRYL) 25 mg capsule Take 1 capsule (25 mg total) by mouth every 6 (six) hours as needed for Itching. 5 capsule 10/13/2020  Meenakshi Goodrich PA-C        Follow-up Information    None                                      Meenakshi Goodrich PA-C  10/14/20 [id number]

## 2020-10-13 NOTE — DISCHARGE INSTRUCTIONS
-take 25mg benadryl tablet as prescribed for swelling/itching  -return for any pain, vision trouble or difficulty breathing/lip or tongue swelling    Our goal in the emergency department is to always give you outstanding care and exceptional service. You may receive a survey by mail or e-mail in the next week regarding your experience in our ED. We would greatly appreciate your completing and returning the survey. Your feedback provides us with a way to recognize our staff who give very good care and it helps us learn how to improve when your experience was below our aspiration of excellence.

## 2020-10-13 NOTE — ED TRIAGE NOTES
Autumn Smart, a 65 y.o. female presents to the ED w/ complaint of swelling to right eye and right forehead    Triage note:  Chief Complaint   Patient presents with    Facial Swelling     swelling noted tro right eye and bump to right forehead; denies fall or injury     Review of patient's allergies indicates:  No Known Allergies  Past Medical History:   Diagnosis Date    Back pain     CVA (cerebral vascular accident)     Diabetes mellitus, type 2     Embolic stroke involving left middle cerebral artery 11/23/2017    Hyperlipidemia     Hypertension     Insomnia

## 2020-10-26 ENCOUNTER — PATIENT OUTREACH (OUTPATIENT)
Dept: ADMINISTRATIVE | Facility: HOSPITAL | Age: 65
End: 2020-10-26

## 2020-10-26 DIAGNOSIS — Z12.39 ENCOUNTER FOR SCREENING FOR MALIGNANT NEOPLASM OF BREAST, UNSPECIFIED SCREENING MODALITY: Primary | ICD-10-CM

## 2020-11-04 ENCOUNTER — PATIENT OUTREACH (OUTPATIENT)
Dept: ADMINISTRATIVE | Facility: HOSPITAL | Age: 65
End: 2020-11-04

## 2020-11-04 NOTE — PROGRESS NOTES
Chart Auditing Tool   Topic Results    Care Everywhere Not completed    Reconcile Immunizations completed    Care Team completed    Care Coordination Note/Care Mgt Note completed    HM Review completed    Orders Placed completed    Outreach Performed completed    Appointments scheduled No answer when called     Chart Audit Complete: Notes:    Fadia Patel LPN  Lead Clinical Care Coordinator   [facility]

## 2020-11-19 ENCOUNTER — OFFICE VISIT (OUTPATIENT)
Dept: INTERNAL MEDICINE | Facility: CLINIC | Age: 65
End: 2020-11-19
Payer: COMMERCIAL

## 2020-11-19 VITALS
WEIGHT: 169.31 LBS | SYSTOLIC BLOOD PRESSURE: 132 MMHG | HEIGHT: 67 IN | HEART RATE: 82 BPM | BODY MASS INDEX: 26.57 KG/M2 | DIASTOLIC BLOOD PRESSURE: 78 MMHG | OXYGEN SATURATION: 96 %

## 2020-11-19 DIAGNOSIS — L03.211 CELLULITIS OF FACE: Primary | ICD-10-CM

## 2020-11-19 PROCEDURE — 99999 PR PBB SHADOW E&M-EST. PATIENT-LVL III: ICD-10-PCS | Mod: PBBFAC,,, | Performed by: INTERNAL MEDICINE

## 2020-11-19 PROCEDURE — 3078F DIAST BP <80 MM HG: CPT | Mod: CPTII,S$GLB,, | Performed by: INTERNAL MEDICINE

## 2020-11-19 PROCEDURE — 99213 OFFICE O/P EST LOW 20 MIN: CPT | Mod: S$GLB,,, | Performed by: INTERNAL MEDICINE

## 2020-11-19 PROCEDURE — 3078F PR MOST RECENT DIASTOLIC BLOOD PRESSURE < 80 MM HG: ICD-10-PCS | Mod: CPTII,S$GLB,, | Performed by: INTERNAL MEDICINE

## 2020-11-19 PROCEDURE — 3075F SYST BP GE 130 - 139MM HG: CPT | Mod: CPTII,S$GLB,, | Performed by: INTERNAL MEDICINE

## 2020-11-19 PROCEDURE — 3008F PR BODY MASS INDEX (BMI) DOCUMENTED: ICD-10-PCS | Mod: CPTII,S$GLB,, | Performed by: INTERNAL MEDICINE

## 2020-11-19 PROCEDURE — 3008F BODY MASS INDEX DOCD: CPT | Mod: CPTII,S$GLB,, | Performed by: INTERNAL MEDICINE

## 2020-11-19 PROCEDURE — 99999 PR PBB SHADOW E&M-EST. PATIENT-LVL III: CPT | Mod: PBBFAC,,, | Performed by: INTERNAL MEDICINE

## 2020-11-19 PROCEDURE — 99213 PR OFFICE/OUTPT VISIT, EST, LEVL III, 20-29 MIN: ICD-10-PCS | Mod: S$GLB,,, | Performed by: INTERNAL MEDICINE

## 2020-11-19 PROCEDURE — 3075F PR MOST RECENT SYSTOLIC BLOOD PRESS GE 130-139MM HG: ICD-10-PCS | Mod: CPTII,S$GLB,, | Performed by: INTERNAL MEDICINE

## 2020-11-19 RX ORDER — SULFAMETHOXAZOLE AND TRIMETHOPRIM 800; 160 MG/1; MG/1
1 TABLET ORAL 2 TIMES DAILY
Qty: 20 TABLET | Refills: 0 | Status: SHIPPED | OUTPATIENT
Start: 2020-11-19 | End: 2021-02-24

## 2020-11-19 NOTE — PROGRESS NOTES
"Subjective:       Patient ID: Autumn Smart is a 65 y.o. female.    Chief Complaint: Abscess    65 year old lady reports that she ha a painful "bump" on her left cheek and squeezed it and stuck it with a needle.  Now she fears it is infected  No fever or chills    Review of Systems   Constitutional: Negative for activity change, chills, fatigue and fever.   HENT: Negative for congestion, ear pain, nosebleeds, postnasal drip, sinus pressure and sore throat.    Eyes: Negative.  Negative for visual disturbance.   Respiratory: Negative for cough, chest tightness, shortness of breath and wheezing.    Cardiovascular: Negative for chest pain.   Gastrointestinal: Negative for abdominal pain, diarrhea, nausea and vomiting.   Genitourinary: Negative for difficulty urinating, dysuria, frequency and urgency.   Musculoskeletal: Negative for arthralgias and neck stiffness.   Skin: Negative for rash.   Neurological: Negative for dizziness, weakness and headaches.   Psychiatric/Behavioral: Negative for sleep disturbance. The patient is not nervous/anxious.        Objective:      Physical Exam  HENT:      Head:           Assessment:       1. Cellulitis of face        Plan:   Autumn was seen today for abscess.    Diagnoses and all orders for this visit:    Cellulitis of face    Other orders  -     sulfamethoxazole-trimethoprim 800-160mg (BACTRIM DS) 800-160 mg Tab; Take 1 tablet by mouth 2 (two) times daily.        "

## 2020-12-30 DIAGNOSIS — I10 HTN (HYPERTENSION), MALIGNANT: ICD-10-CM

## 2020-12-30 RX ORDER — HYDROCHLOROTHIAZIDE 25 MG/1
25 TABLET ORAL DAILY
Qty: 30 TABLET | Refills: 11 | OUTPATIENT
Start: 2020-12-30

## 2020-12-30 NOTE — TELEPHONE ENCOUNTER
----- Message from Brittney Jurado sent at 12/30/2020 11:42 AM CST -----  Contact: self/[phone]  Is this a refill or new RX:  refill  RX name and strength: hydroCHLOROthiazide (HYDRODIURIL) 25 MG tablet  Directions: Sig - Route: Take 1 tablet (25 mg total) by mouth once daily.  Is this a 30 day or 90 day RX:  30  Pharmacy name and phone #: [id number] - [place] - [address] [phone]    Comments:      Please advise

## 2021-01-03 RX ORDER — HYDROCHLOROTHIAZIDE 25 MG/1
25 TABLET ORAL DAILY
Qty: 30 TABLET | Refills: 11 | Status: SHIPPED | OUTPATIENT
Start: 2021-01-03 | End: 2021-03-25

## 2021-02-24 ENCOUNTER — PATIENT OUTREACH (OUTPATIENT)
Dept: ADMINISTRATIVE | Facility: OTHER | Age: 66
End: 2021-02-24

## 2021-02-24 ENCOUNTER — OFFICE VISIT (OUTPATIENT)
Dept: ORTHOPEDICS | Facility: CLINIC | Age: 66
End: 2021-02-24
Payer: COMMERCIAL

## 2021-02-24 ENCOUNTER — HOSPITAL ENCOUNTER (OUTPATIENT)
Dept: RADIOLOGY | Facility: HOSPITAL | Age: 66
Discharge: HOME | End: 2021-02-24
Attending: PHYSICIAN ASSISTANT
Payer: COMMERCIAL

## 2021-02-24 ENCOUNTER — TELEPHONE (OUTPATIENT)
Dept: INTERNAL MEDICINE | Facility: CLINIC | Age: 66
End: 2021-02-24

## 2021-02-24 ENCOUNTER — OFFICE VISIT (OUTPATIENT)
Dept: INTERNAL MEDICINE | Facility: CLINIC | Age: 66
End: 2021-02-24
Payer: COMMERCIAL

## 2021-02-24 VITALS
WEIGHT: 170.19 LBS | OXYGEN SATURATION: 100 % | BODY MASS INDEX: 26.71 KG/M2 | HEIGHT: 67 IN | SYSTOLIC BLOOD PRESSURE: 156 MMHG | DIASTOLIC BLOOD PRESSURE: 84 MMHG | HEART RATE: 57 BPM

## 2021-02-24 VITALS — HEIGHT: 67 IN | WEIGHT: 170 LBS | BODY MASS INDEX: 26.68 KG/M2

## 2021-02-24 DIAGNOSIS — M25.562 CHRONIC PAIN OF LEFT KNEE: Primary | ICD-10-CM

## 2021-02-24 DIAGNOSIS — M25.462 EFFUSION, LEFT KNEE: ICD-10-CM

## 2021-02-24 DIAGNOSIS — M25.562 CHRONIC PAIN OF LEFT KNEE: ICD-10-CM

## 2021-02-24 DIAGNOSIS — M17.12 PRIMARY OSTEOARTHRITIS OF LEFT KNEE: Primary | ICD-10-CM

## 2021-02-24 DIAGNOSIS — G89.29 CHRONIC PAIN OF LEFT KNEE: Primary | ICD-10-CM

## 2021-02-24 DIAGNOSIS — I10 ESSENTIAL HYPERTENSION: ICD-10-CM

## 2021-02-24 DIAGNOSIS — G89.29 CHRONIC PAIN OF LEFT KNEE: ICD-10-CM

## 2021-02-24 PROCEDURE — 3078F DIAST BP <80 MM HG: CPT | Mod: CPTII,S$GLB,, | Performed by: PHYSICIAN ASSISTANT

## 2021-02-24 PROCEDURE — 3077F SYST BP >= 140 MM HG: CPT | Mod: CPTII,S$GLB,, | Performed by: PHYSICIAN ASSISTANT

## 2021-02-24 PROCEDURE — 73562 XR KNEE 3 VIEW LEFT: ICD-10-PCS | Mod: 26,LT,, | Performed by: RADIOLOGY

## 2021-02-24 PROCEDURE — 3072F PR LOW RISK FOR RETINOPATHY: ICD-10-PCS | Mod: S$GLB,,, | Performed by: PHYSICIAN ASSISTANT

## 2021-02-24 PROCEDURE — 3077F PR MOST RECENT SYSTOLIC BLOOD PRESSURE >= 140 MM HG: ICD-10-PCS | Mod: CPTII,S$GLB,, | Performed by: PHYSICIAN ASSISTANT

## 2021-02-24 PROCEDURE — 20610 DRAIN/INJ JOINT/BURSA W/O US: CPT | Mod: LT,S$GLB,, | Performed by: PHYSICIAN ASSISTANT

## 2021-02-24 PROCEDURE — 3008F PR BODY MASS INDEX (BMI) DOCUMENTED: ICD-10-PCS | Mod: CPTII,S$GLB,, | Performed by: PHYSICIAN ASSISTANT

## 2021-02-24 PROCEDURE — 73562 X-RAY EXAM OF KNEE 3: CPT | Mod: TC,LT

## 2021-02-24 PROCEDURE — 99213 OFFICE O/P EST LOW 20 MIN: CPT | Mod: 25,S$GLB,, | Performed by: PHYSICIAN ASSISTANT

## 2021-02-24 PROCEDURE — 1125F AMNT PAIN NOTED PAIN PRSNT: CPT | Mod: S$GLB,,, | Performed by: PHYSICIAN ASSISTANT

## 2021-02-24 PROCEDURE — 1125F PR PAIN SEVERITY QUANTIFIED, PAIN PRESENT: ICD-10-PCS | Mod: S$GLB,,, | Performed by: PHYSICIAN ASSISTANT

## 2021-02-24 PROCEDURE — 3079F DIAST BP 80-89 MM HG: CPT | Mod: CPTII,S$GLB,, | Performed by: PHYSICIAN ASSISTANT

## 2021-02-24 PROCEDURE — 99214 PR OFFICE/OUTPT VISIT, EST, LEVL IV, 30-39 MIN: ICD-10-PCS | Mod: S$GLB,,, | Performed by: PHYSICIAN ASSISTANT

## 2021-02-24 PROCEDURE — 20610 PR DRAIN/INJECT LARGE JOINT/BURSA: ICD-10-PCS | Mod: LT,S$GLB,, | Performed by: PHYSICIAN ASSISTANT

## 2021-02-24 PROCEDURE — 3072F LOW RISK FOR RETINOPATHY: CPT | Mod: S$GLB,,, | Performed by: PHYSICIAN ASSISTANT

## 2021-02-24 PROCEDURE — 99213 PR OFFICE/OUTPT VISIT, EST, LEVL III, 20-29 MIN: ICD-10-PCS | Mod: 25,S$GLB,, | Performed by: PHYSICIAN ASSISTANT

## 2021-02-24 PROCEDURE — 99999 PR PBB SHADOW E&M-EST. PATIENT-LVL II: ICD-10-PCS | Mod: PBBFAC,,, | Performed by: PHYSICIAN ASSISTANT

## 2021-02-24 PROCEDURE — 99214 OFFICE O/P EST MOD 30 MIN: CPT | Mod: S$GLB,,, | Performed by: PHYSICIAN ASSISTANT

## 2021-02-24 PROCEDURE — 3078F PR MOST RECENT DIASTOLIC BLOOD PRESSURE < 80 MM HG: ICD-10-PCS | Mod: CPTII,S$GLB,, | Performed by: PHYSICIAN ASSISTANT

## 2021-02-24 PROCEDURE — 3079F PR MOST RECENT DIASTOLIC BLOOD PRESSURE 80-89 MM HG: ICD-10-PCS | Mod: CPTII,S$GLB,, | Performed by: PHYSICIAN ASSISTANT

## 2021-02-24 PROCEDURE — 99999 PR PBB SHADOW E&M-EST. PATIENT-LVL IV: ICD-10-PCS | Mod: PBBFAC,,, | Performed by: PHYSICIAN ASSISTANT

## 2021-02-24 PROCEDURE — 99999 PR PBB SHADOW E&M-EST. PATIENT-LVL II: CPT | Mod: PBBFAC,,, | Performed by: PHYSICIAN ASSISTANT

## 2021-02-24 PROCEDURE — 3008F BODY MASS INDEX DOCD: CPT | Mod: CPTII,S$GLB,, | Performed by: PHYSICIAN ASSISTANT

## 2021-02-24 PROCEDURE — 99999 PR PBB SHADOW E&M-EST. PATIENT-LVL IV: CPT | Mod: PBBFAC,,, | Performed by: PHYSICIAN ASSISTANT

## 2021-02-24 PROCEDURE — 73562 X-RAY EXAM OF KNEE 3: CPT | Mod: 26,LT,, | Performed by: RADIOLOGY

## 2021-02-24 RX ORDER — IBUPROFEN 600 MG/1
600 TABLET ORAL
COMMUNITY
Start: 2020-09-08 | End: 2021-03-24

## 2021-02-24 RX ORDER — MELOXICAM 7.5 MG/1
7.5 TABLET ORAL DAILY
Qty: 20 TABLET | Refills: 0 | Status: SHIPPED | OUTPATIENT
Start: 2021-02-24 | End: 2021-03-26 | Stop reason: SDUPTHER

## 2021-02-24 RX ORDER — BETAMETHASONE SODIUM PHOSPHATE AND BETAMETHASONE ACETATE 3; 3 MG/ML; MG/ML
6 INJECTION, SUSPENSION INTRA-ARTICULAR; INTRALESIONAL; INTRAMUSCULAR; SOFT TISSUE
Status: COMPLETED | OUTPATIENT
Start: 2021-02-24 | End: 2021-02-24

## 2021-02-24 RX ADMIN — BETAMETHASONE SODIUM PHOSPHATE AND BETAMETHASONE ACETATE 6 MG: 3; 3 INJECTION, SUSPENSION INTRA-ARTICULAR; INTRALESIONAL; INTRAMUSCULAR; SOFT TISSUE at 06:02

## 2021-03-18 DIAGNOSIS — E11.9 TYPE 2 DIABETES MELLITUS WITHOUT COMPLICATION: ICD-10-CM

## 2021-03-19 ENCOUNTER — OFFICE VISIT (OUTPATIENT)
Dept: URGENT CARE | Facility: CLINIC | Age: 66
End: 2021-03-19
Payer: COMMERCIAL

## 2021-03-19 VITALS
TEMPERATURE: 98 F | WEIGHT: 170 LBS | HEIGHT: 67 IN | HEART RATE: 71 BPM | DIASTOLIC BLOOD PRESSURE: 80 MMHG | SYSTOLIC BLOOD PRESSURE: 155 MMHG | BODY MASS INDEX: 26.68 KG/M2 | RESPIRATION RATE: 18 BRPM | OXYGEN SATURATION: 99 %

## 2021-03-19 DIAGNOSIS — B02.9 HERPES ZOSTER WITHOUT COMPLICATION: Primary | ICD-10-CM

## 2021-03-19 PROCEDURE — 3008F BODY MASS INDEX DOCD: CPT | Mod: CPTII,S$GLB,, | Performed by: EMERGENCY MEDICINE

## 2021-03-19 PROCEDURE — 99214 PR OFFICE/OUTPT VISIT, EST, LEVL IV, 30-39 MIN: ICD-10-PCS | Mod: S$GLB,,, | Performed by: EMERGENCY MEDICINE

## 2021-03-19 PROCEDURE — 3008F PR BODY MASS INDEX (BMI) DOCUMENTED: ICD-10-PCS | Mod: CPTII,S$GLB,, | Performed by: EMERGENCY MEDICINE

## 2021-03-19 PROCEDURE — 3072F PR LOW RISK FOR RETINOPATHY: ICD-10-PCS | Mod: S$GLB,,, | Performed by: EMERGENCY MEDICINE

## 2021-03-19 PROCEDURE — 3072F LOW RISK FOR RETINOPATHY: CPT | Mod: S$GLB,,, | Performed by: EMERGENCY MEDICINE

## 2021-03-19 PROCEDURE — 99214 OFFICE O/P EST MOD 30 MIN: CPT | Mod: S$GLB,,, | Performed by: EMERGENCY MEDICINE

## 2021-03-19 RX ORDER — HYDROCHLOROTHIAZIDE 25 MG/1
25 TABLET ORAL
COMMUNITY
Start: 2021-01-03 | End: 2021-03-24

## 2021-03-19 RX ORDER — VALACYCLOVIR HYDROCHLORIDE 1 G/1
1000 TABLET, FILM COATED ORAL
Qty: 21 TABLET | Refills: 0 | Status: SHIPPED | OUTPATIENT
Start: 2021-03-19 | End: 2021-06-02

## 2021-03-20 RX ORDER — GABAPENTIN 100 MG/1
100 CAPSULE ORAL 3 TIMES DAILY
Qty: 90 CAPSULE | Refills: 0 | Status: SHIPPED | OUTPATIENT
Start: 2021-03-20 | End: 2021-03-24

## 2021-03-24 ENCOUNTER — LAB VISIT (OUTPATIENT)
Dept: LAB | Facility: HOSPITAL | Age: 66
End: 2021-03-24
Attending: FAMILY MEDICINE
Payer: MEDICARE

## 2021-03-24 ENCOUNTER — OFFICE VISIT (OUTPATIENT)
Dept: INTERNAL MEDICINE | Facility: CLINIC | Age: 66
End: 2021-03-24
Payer: COMMERCIAL

## 2021-03-24 VITALS
SYSTOLIC BLOOD PRESSURE: 130 MMHG | DIASTOLIC BLOOD PRESSURE: 90 MMHG | HEART RATE: 72 BPM | OXYGEN SATURATION: 99 % | HEIGHT: 67 IN | BODY MASS INDEX: 25.74 KG/M2 | WEIGHT: 164 LBS

## 2021-03-24 DIAGNOSIS — I10 ESSENTIAL HYPERTENSION: ICD-10-CM

## 2021-03-24 DIAGNOSIS — B02.8 HERPES ZOSTER WITH COMPLICATION: Primary | ICD-10-CM

## 2021-03-24 DIAGNOSIS — E11.9 TYPE 2 DIABETES MELLITUS WITHOUT COMPLICATION, WITHOUT LONG-TERM CURRENT USE OF INSULIN: ICD-10-CM

## 2021-03-24 DIAGNOSIS — M79.2 NEURALGIA: ICD-10-CM

## 2021-03-24 DIAGNOSIS — E78.2 MIXED HYPERLIPIDEMIA: ICD-10-CM

## 2021-03-24 DIAGNOSIS — E66.3 OVERWEIGHT (BMI 25.0-29.9): ICD-10-CM

## 2021-03-24 DIAGNOSIS — I10 HTN (HYPERTENSION), MALIGNANT: ICD-10-CM

## 2021-03-24 DIAGNOSIS — Z79.899 ENCOUNTER FOR LONG-TERM (CURRENT) USE OF OTHER MEDICATIONS: ICD-10-CM

## 2021-03-24 DIAGNOSIS — Z11.59 NEED FOR HEPATITIS C SCREENING TEST: ICD-10-CM

## 2021-03-24 DIAGNOSIS — Z60.9 SOCIAL PROBLEM: ICD-10-CM

## 2021-03-24 DIAGNOSIS — Z01.419 WELL WOMAN EXAM: ICD-10-CM

## 2021-03-24 PROBLEM — Z65.9 SOCIAL PROBLEM: Status: ACTIVE | Noted: 2021-03-24

## 2021-03-24 LAB
ALBUMIN SERPL BCP-MCNC: 3.9 G/DL (ref 3.5–5.2)
ALP SERPL-CCNC: 49 U/L (ref 55–135)
ALT SERPL W/O P-5'-P-CCNC: 13 U/L (ref 10–44)
ANION GAP SERPL CALC-SCNC: 8 MMOL/L (ref 8–16)
AST SERPL-CCNC: 21 U/L (ref 10–40)
BILIRUB SERPL-MCNC: 0.5 MG/DL (ref 0.1–1)
BUN SERPL-MCNC: 9 MG/DL (ref 8–23)
CALCIUM SERPL-MCNC: 9.4 MG/DL (ref 8.7–10.5)
CHLORIDE SERPL-SCNC: 101 MMOL/L (ref 95–110)
CHOLEST SERPL-MCNC: 173 MG/DL (ref 120–199)
CHOLEST/HDLC SERPL: 2.6 {RATIO} (ref 2–5)
CO2 SERPL-SCNC: 31 MMOL/L (ref 23–29)
CREAT SERPL-MCNC: 0.8 MG/DL (ref 0.5–1.4)
ERYTHROCYTE [DISTWIDTH] IN BLOOD BY AUTOMATED COUNT: 14.1 % (ref 11.5–14.5)
EST. GFR  (AFRICAN AMERICAN): >60 ML/MIN/1.73 M^2
EST. GFR  (NON AFRICAN AMERICAN): >60 ML/MIN/1.73 M^2
ESTIMATED AVG GLUCOSE: 140 MG/DL (ref 68–131)
GLUCOSE SERPL-MCNC: 103 MG/DL (ref 70–110)
HBA1C MFR BLD: 6.5 % (ref 4–5.6)
HCT VFR BLD AUTO: 38.5 % (ref 37–48.5)
HDLC SERPL-MCNC: 67 MG/DL (ref 40–75)
HDLC SERPL: 38.7 % (ref 20–50)
HGB BLD-MCNC: 12.1 G/DL (ref 12–16)
LDLC SERPL CALC-MCNC: 89.4 MG/DL (ref 63–159)
MCH RBC QN AUTO: 27.1 PG (ref 27–31)
MCHC RBC AUTO-ENTMCNC: 31.4 G/DL (ref 32–36)
MCV RBC AUTO: 86 FL (ref 82–98)
NONHDLC SERPL-MCNC: 106 MG/DL
PLATELET # BLD AUTO: 254 K/UL (ref 150–350)
PMV BLD AUTO: 10.9 FL (ref 9.2–12.9)
POTASSIUM SERPL-SCNC: 3.6 MMOL/L (ref 3.5–5.1)
PROT SERPL-MCNC: 7.6 G/DL (ref 6–8.4)
RBC # BLD AUTO: 4.46 M/UL (ref 4–5.4)
SODIUM SERPL-SCNC: 140 MMOL/L (ref 136–145)
TRIGL SERPL-MCNC: 83 MG/DL (ref 30–150)
WBC # BLD AUTO: 3.63 K/UL (ref 3.9–12.7)

## 2021-03-24 PROCEDURE — 3075F PR MOST RECENT SYSTOLIC BLOOD PRESS GE 130-139MM HG: ICD-10-PCS | Mod: CPTII,S$GLB,, | Performed by: FAMILY MEDICINE

## 2021-03-24 PROCEDURE — 99999 PR PBB SHADOW E&M-EST. PATIENT-LVL V: ICD-10-PCS | Mod: PBBFAC,,, | Performed by: FAMILY MEDICINE

## 2021-03-24 PROCEDURE — 99214 OFFICE O/P EST MOD 30 MIN: CPT | Mod: S$GLB,,, | Performed by: FAMILY MEDICINE

## 2021-03-24 PROCEDURE — 3075F SYST BP GE 130 - 139MM HG: CPT | Mod: CPTII,S$GLB,, | Performed by: FAMILY MEDICINE

## 2021-03-24 PROCEDURE — 3080F DIAST BP >= 90 MM HG: CPT | Mod: CPTII,S$GLB,, | Performed by: FAMILY MEDICINE

## 2021-03-24 PROCEDURE — 1125F PR PAIN SEVERITY QUANTIFIED, PAIN PRESENT: ICD-10-PCS | Mod: S$GLB,,, | Performed by: FAMILY MEDICINE

## 2021-03-24 PROCEDURE — 80053 COMPREHEN METABOLIC PANEL: CPT | Performed by: FAMILY MEDICINE

## 2021-03-24 PROCEDURE — 3044F HG A1C LEVEL LT 7.0%: CPT | Mod: CPTII,S$GLB,, | Performed by: FAMILY MEDICINE

## 2021-03-24 PROCEDURE — 3072F PR LOW RISK FOR RETINOPATHY: ICD-10-PCS | Mod: S$GLB,,, | Performed by: FAMILY MEDICINE

## 2021-03-24 PROCEDURE — 36415 COLL VENOUS BLD VENIPUNCTURE: CPT | Performed by: FAMILY MEDICINE

## 2021-03-24 PROCEDURE — 80061 LIPID PANEL: CPT | Performed by: FAMILY MEDICINE

## 2021-03-24 PROCEDURE — 1125F AMNT PAIN NOTED PAIN PRSNT: CPT | Mod: S$GLB,,, | Performed by: FAMILY MEDICINE

## 2021-03-24 PROCEDURE — 3044F PR MOST RECENT HEMOGLOBIN A1C LEVEL <7.0%: ICD-10-PCS | Mod: CPTII,S$GLB,, | Performed by: FAMILY MEDICINE

## 2021-03-24 PROCEDURE — 99999 PR PBB SHADOW E&M-EST. PATIENT-LVL V: CPT | Mod: PBBFAC,,, | Performed by: FAMILY MEDICINE

## 2021-03-24 PROCEDURE — 3008F BODY MASS INDEX DOCD: CPT | Mod: CPTII,S$GLB,, | Performed by: FAMILY MEDICINE

## 2021-03-24 PROCEDURE — 3072F LOW RISK FOR RETINOPATHY: CPT | Mod: S$GLB,,, | Performed by: FAMILY MEDICINE

## 2021-03-24 PROCEDURE — 3008F PR BODY MASS INDEX (BMI) DOCUMENTED: ICD-10-PCS | Mod: CPTII,S$GLB,, | Performed by: FAMILY MEDICINE

## 2021-03-24 PROCEDURE — 99214 PR OFFICE/OUTPT VISIT, EST, LEVL IV, 30-39 MIN: ICD-10-PCS | Mod: S$GLB,,, | Performed by: FAMILY MEDICINE

## 2021-03-24 PROCEDURE — 3080F PR MOST RECENT DIASTOLIC BLOOD PRESSURE >= 90 MM HG: ICD-10-PCS | Mod: CPTII,S$GLB,, | Performed by: FAMILY MEDICINE

## 2021-03-24 PROCEDURE — 83036 HEMOGLOBIN GLYCOSYLATED A1C: CPT | Performed by: FAMILY MEDICINE

## 2021-03-24 PROCEDURE — 85027 COMPLETE CBC AUTOMATED: CPT | Performed by: FAMILY MEDICINE

## 2021-03-24 PROCEDURE — 86803 HEPATITIS C AB TEST: CPT | Performed by: FAMILY MEDICINE

## 2021-03-24 RX ORDER — LISINOPRIL 40 MG/1
40 TABLET ORAL DAILY
Qty: 90 TABLET | Refills: 11 | Status: SHIPPED | OUTPATIENT
Start: 2021-03-24 | End: 2022-04-07

## 2021-03-24 RX ORDER — GABAPENTIN 300 MG/1
300 CAPSULE ORAL 3 TIMES DAILY
Qty: 90 CAPSULE | Refills: 2 | Status: SHIPPED | OUTPATIENT
Start: 2021-03-24 | End: 2021-11-18

## 2021-03-24 RX ORDER — AMLODIPINE BESYLATE 5 MG/1
5 TABLET ORAL DAILY
Qty: 90 TABLET | Refills: 3 | Status: SHIPPED | OUTPATIENT
Start: 2021-03-24 | End: 2022-01-06

## 2021-03-24 RX ORDER — DIPHENHYDRAMINE HCL 25 MG
25 CAPSULE ORAL EVERY 6 HOURS PRN
Qty: 30 CAPSULE | Refills: 2 | Status: SHIPPED | OUTPATIENT
Start: 2021-03-24 | End: 2021-11-18

## 2021-03-24 SDOH — SOCIAL DETERMINANTS OF HEALTH (SDOH): PROBLEM RELATED TO SOCIAL ENVIRONMENT, UNSPECIFIED: Z60.9

## 2021-03-26 ENCOUNTER — PATIENT OUTREACH (OUTPATIENT)
Dept: ADMINISTRATIVE | Facility: HOSPITAL | Age: 66
End: 2021-03-26

## 2021-03-26 DIAGNOSIS — G89.29 CHRONIC PAIN OF LEFT KNEE: ICD-10-CM

## 2021-03-26 DIAGNOSIS — M25.562 CHRONIC PAIN OF LEFT KNEE: ICD-10-CM

## 2021-03-26 LAB — HCV AB SERPL QL IA: NEGATIVE

## 2021-03-26 RX ORDER — MELOXICAM 7.5 MG/1
7.5 TABLET ORAL DAILY PRN
Qty: 20 TABLET | Refills: 0 | Status: SHIPPED | OUTPATIENT
Start: 2021-03-26 | End: 2021-11-02

## 2021-05-08 NOTE — ED NOTES
Pt refusing MRI - wants to leave AMA. Stroke team notified and coming down to talk to pt.    08-May-2021 22:56

## 2021-06-02 ENCOUNTER — OFFICE VISIT (OUTPATIENT)
Dept: INTERNAL MEDICINE | Facility: CLINIC | Age: 66
End: 2021-06-02
Payer: MEDICARE

## 2021-06-02 VITALS
SYSTOLIC BLOOD PRESSURE: 168 MMHG | HEART RATE: 78 BPM | DIASTOLIC BLOOD PRESSURE: 92 MMHG | OXYGEN SATURATION: 98 % | BODY MASS INDEX: 27.16 KG/M2 | HEIGHT: 67 IN | WEIGHT: 173.06 LBS

## 2021-06-02 DIAGNOSIS — Z79.899 ENCOUNTER FOR LONG-TERM (CURRENT) USE OF OTHER MEDICATIONS: ICD-10-CM

## 2021-06-02 DIAGNOSIS — Z00.00 ANNUAL PHYSICAL EXAM: Primary | ICD-10-CM

## 2021-06-02 DIAGNOSIS — E78.2 MIXED HYPERLIPIDEMIA: ICD-10-CM

## 2021-06-02 DIAGNOSIS — Z12.31 ENCOUNTER FOR SCREENING MAMMOGRAM FOR BREAST CANCER: ICD-10-CM

## 2021-06-02 DIAGNOSIS — E11.9 TYPE 2 DIABETES MELLITUS WITHOUT COMPLICATION, WITHOUT LONG-TERM CURRENT USE OF INSULIN: ICD-10-CM

## 2021-06-02 DIAGNOSIS — I10 ESSENTIAL HYPERTENSION: ICD-10-CM

## 2021-06-02 PROCEDURE — 99999 PR PBB SHADOW E&M-EST. PATIENT-LVL III: ICD-10-PCS | Mod: PBBFAC,,, | Performed by: FAMILY MEDICINE

## 2021-06-02 PROCEDURE — 99213 OFFICE O/P EST LOW 20 MIN: CPT | Mod: PBBFAC | Performed by: FAMILY MEDICINE

## 2021-06-02 PROCEDURE — 99214 PR OFFICE/OUTPT VISIT, EST, LEVL IV, 30-39 MIN: ICD-10-PCS | Mod: S$PBB,,, | Performed by: FAMILY MEDICINE

## 2021-06-02 PROCEDURE — 99214 OFFICE O/P EST MOD 30 MIN: CPT | Mod: S$PBB,,, | Performed by: FAMILY MEDICINE

## 2021-06-02 PROCEDURE — 99999 PR PBB SHADOW E&M-EST. PATIENT-LVL III: CPT | Mod: PBBFAC,,, | Performed by: FAMILY MEDICINE

## 2021-06-04 ENCOUNTER — PATIENT OUTREACH (OUTPATIENT)
Dept: ADMINISTRATIVE | Facility: OTHER | Age: 66
End: 2021-06-04

## 2021-06-08 ENCOUNTER — TELEPHONE (OUTPATIENT)
Dept: INTERNAL MEDICINE | Facility: CLINIC | Age: 66
End: 2021-06-08

## 2021-06-09 ENCOUNTER — HOSPITAL ENCOUNTER (OUTPATIENT)
Dept: RADIOLOGY | Facility: HOSPITAL | Age: 66
Discharge: HOME | End: 2021-06-09
Attending: OBSTETRICS & GYNECOLOGY
Payer: COMMERCIAL

## 2021-06-09 ENCOUNTER — OFFICE VISIT (OUTPATIENT)
Dept: OBSTETRICS AND GYNECOLOGY | Facility: CLINIC | Age: 66
End: 2021-06-09
Payer: MEDICARE

## 2021-06-09 VITALS
HEIGHT: 67 IN | SYSTOLIC BLOOD PRESSURE: 156 MMHG | WEIGHT: 170.44 LBS | DIASTOLIC BLOOD PRESSURE: 84 MMHG | BODY MASS INDEX: 26.75 KG/M2

## 2021-06-09 DIAGNOSIS — Z12.4 PAP SMEAR FOR CERVICAL CANCER SCREENING: ICD-10-CM

## 2021-06-09 DIAGNOSIS — Z12.31 SCREENING MAMMOGRAM, ENCOUNTER FOR: ICD-10-CM

## 2021-06-09 DIAGNOSIS — Z01.419 WELL WOMAN EXAM: Primary | ICD-10-CM

## 2021-06-09 PROCEDURE — G0101 CA SCREEN;PELVIC/BREAST EXAM: HCPCS | Mod: S$PBB,,, | Performed by: OBSTETRICS & GYNECOLOGY

## 2021-06-09 PROCEDURE — 87624 HPV HI-RISK TYP POOLED RSLT: CPT | Performed by: OBSTETRICS & GYNECOLOGY

## 2021-06-09 PROCEDURE — 99214 OFFICE O/P EST MOD 30 MIN: CPT | Mod: PBBFAC | Performed by: OBSTETRICS & GYNECOLOGY

## 2021-06-09 PROCEDURE — 77067 SCR MAMMO BI INCL CAD: CPT | Mod: 26,,, | Performed by: RADIOLOGY

## 2021-06-09 PROCEDURE — 99999 PR PBB SHADOW E&M-EST. PATIENT-LVL IV: CPT | Mod: PBBFAC,,, | Performed by: OBSTETRICS & GYNECOLOGY

## 2021-06-09 PROCEDURE — G0101 PR CA SCREEN;PELVIC/BREAST EXAM: ICD-10-PCS | Mod: S$PBB,,, | Performed by: OBSTETRICS & GYNECOLOGY

## 2021-06-09 PROCEDURE — 77067 SCR MAMMO BI INCL CAD: CPT | Mod: TC

## 2021-06-09 PROCEDURE — 77067 MAMMO DIGITAL SCREENING BILAT WITH TOMO: ICD-10-PCS | Mod: 26,,, | Performed by: RADIOLOGY

## 2021-06-09 PROCEDURE — 87625 HPV TYPES 16 & 18 ONLY: CPT | Mod: 59 | Performed by: OBSTETRICS & GYNECOLOGY

## 2021-06-09 PROCEDURE — 77063 MAMMO DIGITAL SCREENING BILAT WITH TOMO: ICD-10-PCS | Mod: 26,,, | Performed by: RADIOLOGY

## 2021-06-09 PROCEDURE — 99999 PR PBB SHADOW E&M-EST. PATIENT-LVL IV: ICD-10-PCS | Mod: PBBFAC,,, | Performed by: OBSTETRICS & GYNECOLOGY

## 2021-06-09 PROCEDURE — 77063 BREAST TOMOSYNTHESIS BI: CPT | Mod: 26,,, | Performed by: RADIOLOGY

## 2021-06-09 PROCEDURE — 88175 CYTOPATH C/V AUTO FLUID REDO: CPT | Performed by: OBSTETRICS & GYNECOLOGY

## 2021-06-14 ENCOUNTER — TELEPHONE (OUTPATIENT)
Dept: RADIOLOGY | Facility: HOSPITAL | Age: 66
End: 2021-06-14

## 2021-06-15 ENCOUNTER — TELEPHONE (OUTPATIENT)
Dept: RADIOLOGY | Facility: HOSPITAL | Age: 66
End: 2021-06-15

## 2021-06-16 ENCOUNTER — TELEPHONE (OUTPATIENT)
Dept: RADIOLOGY | Facility: HOSPITAL | Age: 66
End: 2021-06-16

## 2021-06-21 LAB
CLINICAL INFO: NORMAL
CYTO CVX: NORMAL
CYTOLOGIST CVX/VAG CYTO: NORMAL
CYTOLOGIST CVX/VAG CYTO: NORMAL
CYTOLOGY CMNT CVX/VAG CYTO-IMP: NORMAL
CYTOLOGY PAP THIN PREP EXPLANATION: NORMAL
DATE OF PREVIOUS PAP: NORMAL
DATE PREVIOUS BX: NO
HPV I/H RISK 4 DNA CVX QL NAA+PROBE: DETECTED
LMP START DATE: NORMAL
SPECIMEN SOURCE CVX/VAG CYTO: NORMAL
STAT OF ADQ CVX/VAG CYTO-IMP: NORMAL

## 2021-06-22 LAB
HPV16 DNA CVX QL PROBE+SIG AMP: NOT DETECTED
HPV18 DNA CVX QL PROBE+SIG AMP: NOT DETECTED

## 2021-06-28 ENCOUNTER — TELEPHONE (OUTPATIENT)
Dept: RADIOLOGY | Facility: HOSPITAL | Age: 66
End: 2021-06-28

## 2021-06-29 ENCOUNTER — TELEPHONE (OUTPATIENT)
Dept: RADIOLOGY | Facility: HOSPITAL | Age: 66
End: 2021-06-29

## 2021-06-30 ENCOUNTER — TELEPHONE (OUTPATIENT)
Dept: RADIOLOGY | Facility: HOSPITAL | Age: 66
End: 2021-06-30

## 2021-11-02 ENCOUNTER — OFFICE VISIT (OUTPATIENT)
Dept: INTERNAL MEDICINE | Facility: CLINIC | Age: 66
End: 2021-11-02
Payer: MEDICARE

## 2021-11-02 ENCOUNTER — LAB VISIT (OUTPATIENT)
Dept: LAB | Facility: HOSPITAL | Age: 66
End: 2021-11-02
Attending: FAMILY MEDICINE
Payer: MEDICARE

## 2021-11-02 VITALS
SYSTOLIC BLOOD PRESSURE: 150 MMHG | BODY MASS INDEX: 27.2 KG/M2 | HEIGHT: 67 IN | WEIGHT: 173.31 LBS | DIASTOLIC BLOOD PRESSURE: 90 MMHG | HEART RATE: 56 BPM | OXYGEN SATURATION: 98 %

## 2021-11-02 DIAGNOSIS — I10 ESSENTIAL HYPERTENSION: Primary | ICD-10-CM

## 2021-11-02 DIAGNOSIS — E11.9 TYPE 2 DIABETES MELLITUS WITHOUT COMPLICATION, WITHOUT LONG-TERM CURRENT USE OF INSULIN: ICD-10-CM

## 2021-11-02 DIAGNOSIS — E78.2 MIXED HYPERLIPIDEMIA: ICD-10-CM

## 2021-11-02 DIAGNOSIS — E66.3 OVERWEIGHT (BMI 25.0-29.9): ICD-10-CM

## 2021-11-02 DIAGNOSIS — E11.59 HYPERTENSION ASSOCIATED WITH DIABETES: ICD-10-CM

## 2021-11-02 DIAGNOSIS — I10 UNCONTROLLED HYPERTENSION: ICD-10-CM

## 2021-11-02 DIAGNOSIS — Z13.5 ENCOUNTER FOR SCREENING FOR DIABETIC RETINOPATHY: ICD-10-CM

## 2021-11-02 DIAGNOSIS — T30.0 BURN INJURY: ICD-10-CM

## 2021-11-02 DIAGNOSIS — I15.2 HYPERTENSION ASSOCIATED WITH DIABETES: ICD-10-CM

## 2021-11-02 DIAGNOSIS — E11.69 HYPERLIPIDEMIA ASSOCIATED WITH TYPE 2 DIABETES MELLITUS: ICD-10-CM

## 2021-11-02 DIAGNOSIS — E78.5 HYPERLIPIDEMIA ASSOCIATED WITH TYPE 2 DIABETES MELLITUS: ICD-10-CM

## 2021-11-02 LAB
ESTIMATED AVG GLUCOSE: 123 MG/DL (ref 68–131)
HBA1C MFR BLD: 5.9 % (ref 4–5.6)

## 2021-11-02 PROCEDURE — 99499 UNLISTED E&M SERVICE: CPT | Mod: S$GLB,,, | Performed by: FAMILY MEDICINE

## 2021-11-02 PROCEDURE — 36415 COLL VENOUS BLD VENIPUNCTURE: CPT | Performed by: FAMILY MEDICINE

## 2021-11-02 PROCEDURE — 83036 HEMOGLOBIN GLYCOSYLATED A1C: CPT | Performed by: FAMILY MEDICINE

## 2021-11-02 PROCEDURE — 99999 PR PBB SHADOW E&M-EST. PATIENT-LVL V: CPT | Mod: PBBFAC,,, | Performed by: FAMILY MEDICINE

## 2021-11-02 PROCEDURE — 99999 PR PBB SHADOW E&M-EST. PATIENT-LVL V: ICD-10-PCS | Mod: PBBFAC,,, | Performed by: FAMILY MEDICINE

## 2021-11-02 PROCEDURE — 99215 OFFICE O/P EST HI 40 MIN: CPT | Mod: PBBFAC | Performed by: FAMILY MEDICINE

## 2021-11-02 PROCEDURE — 99499 RISK ADDL DX/OHS AUDIT: ICD-10-PCS | Mod: S$GLB,,, | Performed by: FAMILY MEDICINE

## 2021-11-02 PROCEDURE — 99214 PR OFFICE/OUTPT VISIT, EST, LEVL IV, 30-39 MIN: ICD-10-PCS | Mod: S$GLB,,, | Performed by: FAMILY MEDICINE

## 2021-11-02 PROCEDURE — 99214 OFFICE O/P EST MOD 30 MIN: CPT | Mod: S$GLB,,, | Performed by: FAMILY MEDICINE

## 2021-11-02 RX ORDER — VALACYCLOVIR HYDROCHLORIDE 500 MG/1
TABLET, FILM COATED ORAL
COMMUNITY
End: 2021-11-18

## 2021-11-02 RX ORDER — POTASSIUM CHLORIDE 1500 MG/1
20 TABLET, EXTENDED RELEASE ORAL DAILY
Qty: 90 TABLET | Refills: 3 | Status: SHIPPED | OUTPATIENT
Start: 2021-11-02 | End: 2022-03-28 | Stop reason: SDUPTHER

## 2021-11-02 RX ORDER — QUETIAPINE 400 MG/1
400 TABLET, FILM COATED, EXTENDED RELEASE ORAL NIGHTLY
Qty: 90 TABLET | Refills: 3 | Status: SHIPPED | OUTPATIENT
Start: 2021-11-02

## 2021-11-02 RX ORDER — SILVER SULFADIAZINE 10 G/1000G
CREAM TOPICAL
COMMUNITY
Start: 2021-10-16 | End: 2021-11-18

## 2021-11-03 ENCOUNTER — TELEPHONE (OUTPATIENT)
Dept: INTERNAL MEDICINE | Facility: CLINIC | Age: 66
End: 2021-11-03
Payer: MEDICARE

## 2021-11-17 ENCOUNTER — PATIENT OUTREACH (OUTPATIENT)
Dept: ADMINISTRATIVE | Facility: OTHER | Age: 66
End: 2021-11-17
Payer: MEDICARE

## 2021-11-18 ENCOUNTER — OFFICE VISIT (OUTPATIENT)
Dept: CARDIOLOGY | Facility: CLINIC | Age: 66
End: 2021-11-18
Payer: MEDICARE

## 2021-11-18 ENCOUNTER — TELEPHONE (OUTPATIENT)
Dept: INTERNAL MEDICINE | Facility: CLINIC | Age: 66
End: 2021-11-18
Payer: MEDICARE

## 2021-11-18 VITALS
SYSTOLIC BLOOD PRESSURE: 129 MMHG | WEIGHT: 181.44 LBS | DIASTOLIC BLOOD PRESSURE: 73 MMHG | BODY MASS INDEX: 28.48 KG/M2 | HEIGHT: 67 IN | HEART RATE: 59 BPM

## 2021-11-18 DIAGNOSIS — Z98.890 HISTORY OF LOOP RECORDER: ICD-10-CM

## 2021-11-18 DIAGNOSIS — E78.5 HYPERLIPIDEMIA, UNSPECIFIED HYPERLIPIDEMIA TYPE: ICD-10-CM

## 2021-11-18 DIAGNOSIS — I10 UNCONTROLLED HYPERTENSION: ICD-10-CM

## 2021-11-18 DIAGNOSIS — Z95.818 STATUS POST PLACEMENT OF IMPLANTABLE LOOP RECORDER: ICD-10-CM

## 2021-11-18 DIAGNOSIS — I10 ESSENTIAL HYPERTENSION: ICD-10-CM

## 2021-11-18 DIAGNOSIS — I63.9 CEREBROVASCULAR ACCIDENT (CVA), UNSPECIFIED MECHANISM: Primary | ICD-10-CM

## 2021-11-18 DIAGNOSIS — I35.0 AORTIC VALVE STENOSIS, ETIOLOGY OF CARDIAC VALVE DISEASE UNSPECIFIED: ICD-10-CM

## 2021-11-18 PROCEDURE — 99999 PR PBB SHADOW E&M-EST. PATIENT-LVL IV: CPT | Mod: PBBFAC,HCNC,, | Performed by: HOSPITALIST

## 2021-11-18 PROCEDURE — 99999 PR PBB SHADOW E&M-EST. PATIENT-LVL IV: ICD-10-PCS | Mod: PBBFAC,HCNC,, | Performed by: HOSPITALIST

## 2021-11-18 PROCEDURE — 99205 OFFICE O/P NEW HI 60 MIN: CPT | Mod: HCNC,S$GLB,, | Performed by: HOSPITALIST

## 2021-11-18 PROCEDURE — 99205 PR OFFICE/OUTPT VISIT, NEW, LEVL V, 60-74 MIN: ICD-10-PCS | Mod: HCNC,S$GLB,, | Performed by: HOSPITALIST

## 2021-11-18 RX ORDER — ASPIRIN 81 MG/1
81 TABLET ORAL DAILY
Qty: 90 TABLET | Refills: 0 | Status: SHIPPED | OUTPATIENT
Start: 2021-11-18 | End: 2023-09-08

## 2021-11-23 ENCOUNTER — TELEPHONE (OUTPATIENT)
Dept: INTERNAL MEDICINE | Facility: CLINIC | Age: 66
End: 2021-11-23
Payer: MEDICARE

## 2021-11-24 DIAGNOSIS — I63.9 CEREBROVASCULAR ACCIDENT (CVA), UNSPECIFIED MECHANISM: ICD-10-CM

## 2021-11-24 DIAGNOSIS — Z95.818 STATUS POST PLACEMENT OF IMPLANTABLE LOOP RECORDER: Primary | ICD-10-CM

## 2022-01-05 DIAGNOSIS — I10 ESSENTIAL HYPERTENSION: ICD-10-CM

## 2022-01-05 NOTE — TELEPHONE ENCOUNTER
No new care gaps identified.  Powered by thePlatform by [employer]. Reference number: [id number].   1/05/2022 2:35:52 PM CST

## 2022-01-06 RX ORDER — AMLODIPINE BESYLATE 5 MG/1
TABLET ORAL
Qty: 90 TABLET | Refills: 3 | Status: SHIPPED | OUTPATIENT
Start: 2022-01-06 | End: 2022-08-11 | Stop reason: SDUPTHER

## 2022-01-06 NOTE — TELEPHONE ENCOUNTER
Refill Authorization Note   Autumn Smart  is requesting a refill authorization.  Brief Assessment and Rationale for Refill:  Approve     Medication Therapy Plan:       Medication Reconciliation Completed: No   Comments:   --->Care Gap information included below if applicable.       Requested Prescriptions   Pending Prescriptions Disp Refills    amLODIPine (NORVASC) 5 MG tablet [Pharmacy Med Name: AMLODIPINE BESYLATE 5 MG TAB] 90 tablet 3     Sig: TAKE 1 TABLET BY MOUTH EVERY DAY       Cardiovascular:  Calcium Channel Blockers Passed - 1/5/2022  2:35 PM        Passed - Patient is at least 18 years old        Passed - Last BP in normal range within 360 days     BP Readings from Last 1 Encounters:   11/18/21 129/73               Passed - Valid encounter within last 15 months     Recent Visits  Date Type Provider Dept   11/02/21 Office Visit Shravan Diane MD [facility]   06/02/21 Office Visit Shravan Diane MD [facility]   03/24/21 Office Visit Shravan Diane MD [facility]   09/03/20 Office Visit Shravan Diane MD [facility]   03/03/20 Office Visit Shravan Diane MD [facility]   Showing recent visits within past 720 days and meeting all other requirements  Future Appointments  No visits were found meeting these conditions.  Showing future appointments within next 150 days and meeting all other requirements                    Appointments  past 12m or future 3m with PCP    Date Provider   Last Visit   11/2/2021 Shravan Diane MD   Next Visit   Visit date not found Shravan Diane MD   ED visits in past 90 days: 0     Note composed:11:33 AM 01/06/2022

## 2022-01-10 ENCOUNTER — TELEPHONE (OUTPATIENT)
Dept: OBSTETRICS AND GYNECOLOGY | Facility: CLINIC | Age: 67
End: 2022-01-10
Payer: MEDICARE

## 2022-02-08 ENCOUNTER — TELEPHONE (OUTPATIENT)
Dept: OBSTETRICS AND GYNECOLOGY | Facility: CLINIC | Age: 67
End: 2022-02-08
Payer: MEDICARE

## 2022-02-08 NOTE — TELEPHONE ENCOUNTER
----- Message from Sylvia Allan sent at 2/8/2022  4:07 PM CST -----  Pt is calling to reschedule appt  Pt would like to be seen at the [facility] location  Pt can be contacted at [phone]

## 2022-03-28 RX ORDER — POTASSIUM CHLORIDE 1500 MG/1
20 TABLET, EXTENDED RELEASE ORAL DAILY
Qty: 90 TABLET | Refills: 3 | Status: SHIPPED | OUTPATIENT
Start: 2022-03-28 | End: 2023-06-02 | Stop reason: SDUPTHER

## 2022-03-28 NOTE — TELEPHONE ENCOUNTER
----- Message from Dee Dee Sanches sent at 3/28/2022  3:13 PM CDT -----  Regarding: refill  Contact: [phone]  Pt Questions    Questions: Pt calling to see if the dr can send a RX for Calcium pills I could not find the name of the medication the pt stated . Please contact pt to assist  Call Back number: [phone]

## 2022-03-28 NOTE — TELEPHONE ENCOUNTER
Care Due:                  Date            Visit Type   Department     Provider  --------------------------------------------------------------------------------                                EP -                              PRIMARY      [facility] INTERNAL  Last Visit: 11-      CARE ([facility])   MEDICINE       Shravan Diane  Next Visit: None Scheduled  None         None Found                                                            Last  Test          Frequency    Reason                     Performed    Due Date  --------------------------------------------------------------------------------    CMP.........  12 months..  hydroCHLOROthiazide,       03- 03-                             lisinopriL, potassium....    Powered by [employer]. Reference number: [id number].   3/28/2022 3:19:02 PM CDT

## 2022-04-06 DIAGNOSIS — I10 HTN (HYPERTENSION), MALIGNANT: ICD-10-CM

## 2022-04-06 DIAGNOSIS — E78.2 MIXED HYPERLIPIDEMIA: ICD-10-CM

## 2022-04-06 NOTE — TELEPHONE ENCOUNTER
No new care gaps identified.  Powered by [employer] Review[employer]. Reference number: [id number].   4/06/2022 11:06:03 AM CDT

## 2022-04-07 RX ORDER — LISINOPRIL 40 MG/1
TABLET ORAL
Qty: 90 TABLET | Refills: 3 | Status: SHIPPED | OUTPATIENT
Start: 2022-04-07 | End: 2022-08-11 | Stop reason: SDUPTHER

## 2022-04-07 RX ORDER — ATORVASTATIN CALCIUM 20 MG/1
TABLET, FILM COATED ORAL
Qty: 90 TABLET | Refills: 3 | Status: SHIPPED | OUTPATIENT
Start: 2022-04-07 | End: 2023-05-22

## 2022-04-07 NOTE — TELEPHONE ENCOUNTER
Refill Routing Note   Medication(s) are not appropriate for processing by [facility] for the following reason(s):      - Required laboratory values are outdated    [facility] action(s):  Defer Medication-related problems identified: Requires labs        Medication reconciliation completed: No     Appointments  past 12m or future 3m with PCP    Date Provider   Last Visit   11/2/2021 Shravan Diane MD   Next Visit   Visit date not found Shravan Diane MD   ED visits in past 90 days: 0        Note composed:2:30 PM 04/07/2022

## 2022-04-08 DIAGNOSIS — I10 HTN (HYPERTENSION), MALIGNANT: ICD-10-CM

## 2022-04-08 RX ORDER — HYDROCHLOROTHIAZIDE 25 MG/1
25 TABLET ORAL DAILY
Qty: 90 TABLET | Refills: 1 | Status: SHIPPED | OUTPATIENT
Start: 2022-04-08 | End: 2022-10-02

## 2022-04-08 NOTE — TELEPHONE ENCOUNTER
Care Due:                  Date            Visit Type   Department     Provider  --------------------------------------------------------------------------------                                EP -                              PRIMARY      [facility] INTERNAL  Last Visit: 11-      CARE ([facility])   MEDICINE       Shravan Diane  Next Visit: None Scheduled  None         None Found                                                            Last  Test          Frequency    Reason                     Performed    Due Date  --------------------------------------------------------------------------------    Lipid Panel.  12 months..  atorvastatin.............  03- 03-    Powered by [employer]. Reference number: [id number].   4/08/2022 1:18:27 PM CDT

## 2022-04-08 NOTE — TELEPHONE ENCOUNTER
----- Message from Sally John sent at 4/8/2022  1:07 PM CDT -----  Contact: Autumn   Requesting an RX refill or new RX.  Is this a refill or new RX: refill   RX name and strength (copy/paste from chart): Hydrochlorothiazide   Is this a 30 day or 90 day RX:   Pharmacy name and phone # (copy/paste from chart):  [facility] [phone]   The doctors have asked that we provide their patients with the following 2 reminders -- prescription refills can take up to 72 hours, and a friendly reminder that in the future you can use your [facility] account to request refills: yes  Autumn only has one pill left & would like to get the refill soon

## 2022-04-08 NOTE — TELEPHONE ENCOUNTER
----- Message from Sally John sent at 4/8/2022  1:14 PM CDT -----  Contact: Autumn [phone]  Autumn would like a call back with questions about when she should come for a follow up

## 2022-04-13 ENCOUNTER — TELEPHONE (OUTPATIENT)
Dept: OBSTETRICS AND GYNECOLOGY | Facility: CLINIC | Age: 67
End: 2022-04-13
Payer: MEDICARE

## 2022-04-13 NOTE — TELEPHONE ENCOUNTER
----- Message from Patricia Adams sent at 4/13/2022  8:40 AM CDT -----  Contact: [phone] Patient  Pt states she did not receive a call the day before about her 9:45 am appt for today. Pt states she is coming from [place] and will be about 30 minutes late. Please call and advise.

## 2022-04-13 NOTE — TELEPHONE ENCOUNTER
----- Message from Erin Billings sent at 4/13/2022 10:34 AM CDT -----  Regarding: appointment  Name of Who is Calling: EVELINE SCHOFIELD [[id number]]      What is the request in detail: Patient is requesting a call back to rescheduled her [facility]  appointment she states no one advised her it was today       Can the clinic reply by [facility]: no      What Number to Call Back if not in [facility]: [phone]

## 2022-04-19 ENCOUNTER — TELEPHONE (OUTPATIENT)
Dept: INTERNAL MEDICINE | Facility: CLINIC | Age: 67
End: 2022-04-19
Payer: MEDICARE

## 2022-04-19 NOTE — TELEPHONE ENCOUNTER
----- Message from Shirlene Decker sent at 4/18/2022  2:47 PM CDT -----  Contact: Autumn reyes [phone]  Patient would like to get medical advice.  Symptoms (please be specific):    How long have you had these symptoms:   Would you like a call back, or a response through your [facility] portal?:call back  Pharmacy name and phone # (copy from chart):    Comments: Pt is requesting a call back from the nurse because pt's left leg needs to be drained and she needs orders for that. She also needs to reschedule her appt for Dr Cote because her leg is giving her a lot of problems. She has a bunion on her foot that needs attention because she is in pain

## 2022-05-04 ENCOUNTER — OFFICE VISIT (OUTPATIENT)
Dept: OBSTETRICS AND GYNECOLOGY | Facility: CLINIC | Age: 67
End: 2022-05-04
Attending: OBSTETRICS & GYNECOLOGY
Payer: MEDICARE

## 2022-05-04 VITALS
SYSTOLIC BLOOD PRESSURE: 122 MMHG | BODY MASS INDEX: 28.65 KG/M2 | DIASTOLIC BLOOD PRESSURE: 60 MMHG | HEIGHT: 67 IN | WEIGHT: 182.56 LBS

## 2022-05-04 DIAGNOSIS — Z12.4 PAP SMEAR FOR CERVICAL CANCER SCREENING: ICD-10-CM

## 2022-05-04 DIAGNOSIS — Z12.31 SCREENING MAMMOGRAM, ENCOUNTER FOR: ICD-10-CM

## 2022-05-04 DIAGNOSIS — Z01.419 ENCOUNTER FOR GYNECOLOGICAL EXAMINATION WITHOUT ABNORMAL FINDING: Primary | ICD-10-CM

## 2022-05-04 DIAGNOSIS — R68.82 DECREASED LIBIDO: ICD-10-CM

## 2022-05-04 PROCEDURE — G0101 PR CA SCREEN;PELVIC/BREAST EXAM: ICD-10-PCS | Mod: S$GLB,,, | Performed by: OBSTETRICS & GYNECOLOGY

## 2022-05-04 PROCEDURE — 1160F PR REVIEW ALL MEDS BY PRESCRIBER/CLIN PHARMACIST DOCUMENTED: ICD-10-PCS | Mod: CPTII,S$GLB,, | Performed by: OBSTETRICS & GYNECOLOGY

## 2022-05-04 PROCEDURE — 1159F MED LIST DOCD IN RCRD: CPT | Mod: CPTII,S$GLB,, | Performed by: OBSTETRICS & GYNECOLOGY

## 2022-05-04 PROCEDURE — 99999 PR PBB SHADOW E&M-EST. PATIENT-LVL III: CPT | Mod: PBBFAC,,, | Performed by: OBSTETRICS & GYNECOLOGY

## 2022-05-04 PROCEDURE — 87624 HPV HI-RISK TYP POOLED RSLT: CPT | Performed by: OBSTETRICS & GYNECOLOGY

## 2022-05-04 PROCEDURE — 4010F ACE/ARB THERAPY RXD/TAKEN: CPT | Mod: CPTII,S$GLB,, | Performed by: OBSTETRICS & GYNECOLOGY

## 2022-05-04 PROCEDURE — 99999 PR PBB SHADOW E&M-EST. PATIENT-LVL III: ICD-10-PCS | Mod: PBBFAC,,, | Performed by: OBSTETRICS & GYNECOLOGY

## 2022-05-04 PROCEDURE — 3078F DIAST BP <80 MM HG: CPT | Mod: CPTII,S$GLB,, | Performed by: OBSTETRICS & GYNECOLOGY

## 2022-05-04 PROCEDURE — 1159F PR MEDICATION LIST DOCUMENTED IN MEDICAL RECORD: ICD-10-PCS | Mod: CPTII,S$GLB,, | Performed by: OBSTETRICS & GYNECOLOGY

## 2022-05-04 PROCEDURE — 1160F RVW MEDS BY RX/DR IN RCRD: CPT | Mod: CPTII,S$GLB,, | Performed by: OBSTETRICS & GYNECOLOGY

## 2022-05-04 PROCEDURE — 3008F PR BODY MASS INDEX (BMI) DOCUMENTED: ICD-10-PCS | Mod: CPTII,S$GLB,, | Performed by: OBSTETRICS & GYNECOLOGY

## 2022-05-04 PROCEDURE — 4010F PR ACE/ARB THEARPY RXD/TAKEN: ICD-10-PCS | Mod: CPTII,S$GLB,, | Performed by: OBSTETRICS & GYNECOLOGY

## 2022-05-04 PROCEDURE — 88175 CYTOPATH C/V AUTO FLUID REDO: CPT | Performed by: OBSTETRICS & GYNECOLOGY

## 2022-05-04 PROCEDURE — 3008F BODY MASS INDEX DOCD: CPT | Mod: CPTII,S$GLB,, | Performed by: OBSTETRICS & GYNECOLOGY

## 2022-05-04 PROCEDURE — G0101 CA SCREEN;PELVIC/BREAST EXAM: HCPCS | Mod: S$GLB,,, | Performed by: OBSTETRICS & GYNECOLOGY

## 2022-05-04 PROCEDURE — 3078F PR MOST RECENT DIASTOLIC BLOOD PRESSURE < 80 MM HG: ICD-10-PCS | Mod: CPTII,S$GLB,, | Performed by: OBSTETRICS & GYNECOLOGY

## 2022-05-04 PROCEDURE — 3074F SYST BP LT 130 MM HG: CPT | Mod: CPTII,S$GLB,, | Performed by: OBSTETRICS & GYNECOLOGY

## 2022-05-04 PROCEDURE — 3074F PR MOST RECENT SYSTOLIC BLOOD PRESSURE < 130 MM HG: ICD-10-PCS | Mod: CPTII,S$GLB,, | Performed by: OBSTETRICS & GYNECOLOGY

## 2022-05-04 NOTE — PROGRESS NOTES
Subjective:       Patient ID: Autumn Smart is a 66 y.o. female.    Chief Complaint:  Consult, Gynecologic Exam, and decreased libido      History of Present Illness  HPI    Autumn Smart is a 66 y.o. female  here for her annual GYN exam.  She complains of decreased libido and decreased sensation with sex. Also has some vaginal dryness. Had an abnormal Pap last year, needs a repeat Pap. We had previously discussed that she is not a candidate for systemic estrogen therapy with her complicated medical history.     She is menopausal since age 53.   denies break through bleeding.   denies vaginal itching or irritation.  Denies aginal discharge.  She is not currently sexually active. She has had a breakup with her previous partner.      History of abnormal pap: Yes - HPV  Last Pap: approximate date  and was abnormal: + HR HPV  Last MMG: normal-21-routine follow-up in 12 months  Last Colonoscopy:  N/A  denies domestic violence. She does feel safe at home.     Past Medical History:   Diagnosis Date    Back pain     CVA (cerebral vascular accident)     Diabetes mellitus, type 2     Embolic stroke involving left middle cerebral artery 2017    Hyperlipidemia     Hypertension     Insomnia      Past Surgical History:   Procedure Laterality Date    BACK SURGERY      FRACTURE SURGERY      INJECTION OF JOINT Left 3/18/2020    Procedure: Injection, Joint Knee--Left knee viscosupplementation (Synvisc 1);  Surgeon: Ninoska Troy MD;  Location: [facility];  Service: Pain Management;  Laterality: Left;    right leg surgery Right     dionna in right leg     Social History     Socioeconomic History    Marital status:    Tobacco Use    Smoking status: Former Smoker     Quit date: 2015     Years since quittin.3    Smokeless tobacco: Never Used   Substance and Sexual Activity    Alcohol use: No     Alcohol/week: 1.0 standard drink     Types: 1 Cans of beer per week    Drug use: Yes  "    Types: Marijuana     Comment: denies    Sexual activity: Not Currently     Partners: Male     Family History   Problem Relation Age of Onset    Diabetes Mother     Hypertension Mother     Diabetes Father     Hypertension Father     Breast cancer Neg Hx     Colon cancer Neg Hx     Ovarian cancer Neg Hx      OB History        3    Para   3    Term   3            AB        Living   3       SAB        IAB        Ectopic        Multiple        Live Births   3                 /60   Ht 5' 7" (1.702 m)   Wt 82.8 kg (182 lb 8.7 oz)   LMP 2009 (Approximate)   BMI 28.59 kg/m²         GYN & OB History  Patient's last menstrual period was 2009 (approximate).   Date of Last Pap: 2022    OB History    Para Term  AB Living   3 3 3     3   SAB IAB Ectopic Multiple Live Births           3      # Outcome Date GA Lbr [place]/2nd Weight Sex Delivery Anes PTL Lv   3 Term      Vag-Spont   STU   2 Term      Vag-Spont   STU   1 Term      Vag-Spont   STU       Review of Systems  Review of Systems   Endocrine: Negative for hot flashes.   Genitourinary: Positive for decreased libido and vaginal dryness. Negative for bladder incontinence, frequency, hot flashes and pelvic pain.           Objective:      Physical Exam:   Constitutional: She is oriented to person, place, and time. She appears well-developed and well-nourished.    HENT:   Head: Normocephalic and atraumatic.    Eyes: Pupils are equal, round, and reactive to light. EOM are normal.     Cardiovascular: Normal rate and regular rhythm.     Pulmonary/Chest: Effort normal and breath sounds normal.   BREASTS:  no mass, no tenderness, no deformity and no retraction. Right breast exhibits no inverted nipple, no mass, no nipple discharge, no skin change, no tenderness, no bleeding and no swelling. Left breast exhibits no inverted nipple, no mass, no nipple discharge, no skin change, no tenderness, no bleeding and no swelling. " Breasts are symmetrical.              Abdominal: Soft. Bowel sounds are normal.     Genitourinary:    Pelvic exam was performed with patient supine.      Genitourinary Comments: PELVIC: Normal external genitalia without lesions.  Normal hair distribution.  Adequate perineal body, normal urethral meatus.  Vagina  Dry and poorly rugated, atrophic, without lesions or discharge.  Cervix pink, without lesions, discharge or tenderness.  No significant cystocele or rectocele.  Bimanual exam shows uterus to be normal size, regular, mobile and nontender.  Adnexa without masses or tenderness.    RECTAL:Deferred               Musculoskeletal: Normal range of motion and moves all extremeties.       Neurological: She is alert and oriented to person, place, and time.    Skin: Skin is warm and dry.    Psychiatric: She has a normal mood and affect.              Assessment:        1. Encounter for gynecological examination without abnormal finding    2. Screening mammogram, encounter for    3. Pap smear for cervical cancer screening    4. Decreased libido                Plan:        1. Encounter for gynecological examination without abnormal finding  COUNSELING:  The patient was counseled today on regular weight bearing exercise. Patient was counseled today on the new ACS guidelines for cervical cytology screening as well as the current recommendations for breast cancer screening. Counseling session lasted approximately 10 minutes, and all her questions were answered. She was advised to see her primary care physician for all other health maintenance.   FOLLOW-UP with me for next routine visit.         2. Screening mammogram, encounter for    - Mammo Digital Screening Bilat w/ [employer]; Future    3. Pap smear for cervical cancer screening    - Liquid-Based Pap Smear, Screening  - HPV High Risk Genotypes, PCR    4. Decreased libido    - UNABLE TO FIND; Apply 0.25 g topically once daily. Testosterone 0.3%/ Estradiol 0.01% in versabase   Dispense: 15 g; Refill: 5       Follow up in about 1 year (around 5/4/2023).

## 2022-05-11 DIAGNOSIS — E11.9 TYPE 2 DIABETES MELLITUS WITHOUT COMPLICATION: ICD-10-CM

## 2022-06-01 DIAGNOSIS — E11.9 TYPE 2 DIABETES MELLITUS WITHOUT COMPLICATION: ICD-10-CM

## 2022-06-06 ENCOUNTER — TELEPHONE (OUTPATIENT)
Dept: OBSTETRICS AND GYNECOLOGY | Facility: CLINIC | Age: 67
End: 2022-06-06
Payer: MEDICARE

## 2022-06-06 NOTE — TELEPHONE ENCOUNTER
Called patient in reference to her message. Informed patient that Dr Patterson is in clinic and her message would be given to her to review. Patient said okay thanks.

## 2022-06-06 NOTE — TELEPHONE ENCOUNTER
----- Message from Adrian Lazo sent at 6/6/2022  1:14 PM CDT -----  Please call pt she has a question regarding taking a medication [phone]

## 2022-06-06 NOTE — TELEPHONE ENCOUNTER
----- Message from Candace Acosta sent at 6/6/2022  2:47 PM CDT -----  Pt returning missed call    Best contact [phone]

## 2022-06-08 NOTE — ASSESSMENT & PLAN NOTE
Stroke risk factor  LDL <70  Continue atorvastatin 40   no Solaraze Counseling:  I discussed with the patient the risks of Solaraze including but not limited to erythema, scaling, itching, weeping, crusting, and pain.

## 2022-06-15 ENCOUNTER — OFFICE VISIT (OUTPATIENT)
Dept: OBSTETRICS AND GYNECOLOGY | Facility: CLINIC | Age: 67
End: 2022-06-15
Payer: MEDICARE

## 2022-06-15 VITALS
WEIGHT: 176.38 LBS | DIASTOLIC BLOOD PRESSURE: 86 MMHG | BODY MASS INDEX: 27.68 KG/M2 | HEIGHT: 67 IN | SYSTOLIC BLOOD PRESSURE: 158 MMHG

## 2022-06-15 DIAGNOSIS — N76.2 ACUTE VULVITIS: Primary | ICD-10-CM

## 2022-06-15 PROCEDURE — 3008F PR BODY MASS INDEX (BMI) DOCUMENTED: ICD-10-PCS | Mod: CPTII,S$GLB,, | Performed by: OBSTETRICS & GYNECOLOGY

## 2022-06-15 PROCEDURE — 1159F MED LIST DOCD IN RCRD: CPT | Mod: CPTII,S$GLB,, | Performed by: OBSTETRICS & GYNECOLOGY

## 2022-06-15 PROCEDURE — 1159F PR MEDICATION LIST DOCUMENTED IN MEDICAL RECORD: ICD-10-PCS | Mod: CPTII,S$GLB,, | Performed by: OBSTETRICS & GYNECOLOGY

## 2022-06-15 PROCEDURE — 99213 PR OFFICE/OUTPT VISIT, EST, LEVL III, 20-29 MIN: ICD-10-PCS | Mod: S$GLB,,, | Performed by: OBSTETRICS & GYNECOLOGY

## 2022-06-15 PROCEDURE — 3077F SYST BP >= 140 MM HG: CPT | Mod: CPTII,S$GLB,, | Performed by: OBSTETRICS & GYNECOLOGY

## 2022-06-15 PROCEDURE — 87591 N.GONORRHOEAE DNA AMP PROB: CPT | Performed by: OBSTETRICS & GYNECOLOGY

## 2022-06-15 PROCEDURE — 99999 PR PBB SHADOW E&M-EST. PATIENT-LVL III: ICD-10-PCS | Mod: PBBFAC,,, | Performed by: OBSTETRICS & GYNECOLOGY

## 2022-06-15 PROCEDURE — 99999 PR PBB SHADOW E&M-EST. PATIENT-LVL III: CPT | Mod: PBBFAC,,, | Performed by: OBSTETRICS & GYNECOLOGY

## 2022-06-15 PROCEDURE — 87491 CHLMYD TRACH DNA AMP PROBE: CPT | Mod: 59 | Performed by: OBSTETRICS & GYNECOLOGY

## 2022-06-15 PROCEDURE — 99213 OFFICE O/P EST LOW 20 MIN: CPT | Mod: S$GLB,,, | Performed by: OBSTETRICS & GYNECOLOGY

## 2022-06-15 PROCEDURE — 87481 CANDIDA DNA AMP PROBE: CPT | Mod: 59 | Performed by: OBSTETRICS & GYNECOLOGY

## 2022-06-15 PROCEDURE — 4010F PR ACE/ARB THEARPY RXD/TAKEN: ICD-10-PCS | Mod: CPTII,S$GLB,, | Performed by: OBSTETRICS & GYNECOLOGY

## 2022-06-15 PROCEDURE — 87529 HSV DNA AMP PROBE: CPT | Performed by: OBSTETRICS & GYNECOLOGY

## 2022-06-15 PROCEDURE — 3079F DIAST BP 80-89 MM HG: CPT | Mod: CPTII,S$GLB,, | Performed by: OBSTETRICS & GYNECOLOGY

## 2022-06-15 PROCEDURE — 3008F BODY MASS INDEX DOCD: CPT | Mod: CPTII,S$GLB,, | Performed by: OBSTETRICS & GYNECOLOGY

## 2022-06-15 PROCEDURE — 3079F PR MOST RECENT DIASTOLIC BLOOD PRESSURE 80-89 MM HG: ICD-10-PCS | Mod: CPTII,S$GLB,, | Performed by: OBSTETRICS & GYNECOLOGY

## 2022-06-15 PROCEDURE — 4010F ACE/ARB THERAPY RXD/TAKEN: CPT | Mod: CPTII,S$GLB,, | Performed by: OBSTETRICS & GYNECOLOGY

## 2022-06-15 PROCEDURE — 3077F PR MOST RECENT SYSTOLIC BLOOD PRESSURE >= 140 MM HG: ICD-10-PCS | Mod: CPTII,S$GLB,, | Performed by: OBSTETRICS & GYNECOLOGY

## 2022-06-15 RX ORDER — MELOXICAM 7.5 MG/1
TABLET ORAL
COMMUNITY
Start: 2022-04-15 | End: 2023-07-24
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
## 2022-06-17 DIAGNOSIS — N76.0 BACTERIAL VAGINOSIS: Primary | ICD-10-CM

## 2022-06-17 DIAGNOSIS — B96.89 BACTERIAL VAGINOSIS: Primary | ICD-10-CM

## 2022-06-17 LAB
BACTERIAL VAGINOSIS DNA: POSITIVE
CANDIDA GLABRATA DNA: NEGATIVE
CANDIDA KRUSEI DNA: NEGATIVE
CANDIDA RRNA VAG QL PROBE: NEGATIVE
T VAGINALIS RRNA GENITAL QL PROBE: NEGATIVE

## 2022-06-17 RX ORDER — TINIDAZOLE 500 MG/1
2 TABLET ORAL
Qty: 8 TABLET | Refills: 0 | Status: SHIPPED | OUTPATIENT
Start: 2022-06-17 | End: 2022-06-19

## 2022-06-17 NOTE — PROGRESS NOTES
Please call patient to let her know that vaginal swab showed BV, and I have sent a RX to her pharmacy.

## 2022-06-18 LAB
HSV1 DNA SPEC QL NAA+PROBE: NEGATIVE
HSV2 DNA SPEC QL NAA+PROBE: NEGATIVE
SPECIMEN SOURCE: NORMAL

## 2022-06-19 LAB
C TRACH DNA SPEC QL NAA+PROBE: NOT DETECTED
N GONORRHOEA DNA SPEC QL NAA+PROBE: NOT DETECTED

## 2022-06-23 ENCOUNTER — OFFICE VISIT (OUTPATIENT)
Dept: URGENT CARE | Facility: CLINIC | Age: 67
End: 2022-06-23
Payer: MEDICARE

## 2022-06-23 VITALS
RESPIRATION RATE: 18 BRPM | HEIGHT: 67 IN | HEART RATE: 69 BPM | WEIGHT: 176 LBS | TEMPERATURE: 98 F | DIASTOLIC BLOOD PRESSURE: 95 MMHG | SYSTOLIC BLOOD PRESSURE: 146 MMHG | OXYGEN SATURATION: 98 % | BODY MASS INDEX: 27.62 KG/M2

## 2022-06-23 DIAGNOSIS — B96.89 BACTERIAL VAGINOSIS: Primary | ICD-10-CM

## 2022-06-23 DIAGNOSIS — N76.0 BACTERIAL VAGINOSIS: Primary | ICD-10-CM

## 2022-06-23 DIAGNOSIS — N89.8 VAGINAL ITCHING: ICD-10-CM

## 2022-06-23 LAB
BILIRUB UR QL STRIP: NEGATIVE
GLUCOSE UR QL STRIP: NEGATIVE
KETONES UR QL STRIP: NEGATIVE
LEUKOCYTE ESTERASE UR QL STRIP: NEGATIVE
PH, POC UA: 7 (ref 5–8)
POC BLOOD, URINE: NEGATIVE
POC NITRATES, URINE: NEGATIVE
PROT UR QL STRIP: NEGATIVE
SP GR UR STRIP: 1.01 (ref 1–1.03)
UROBILINOGEN UR STRIP-ACNC: NORMAL (ref 0.1–1.1)

## 2022-06-23 PROCEDURE — 99213 OFFICE O/P EST LOW 20 MIN: CPT | Mod: S$GLB,,,

## 2022-06-23 PROCEDURE — 3077F SYST BP >= 140 MM HG: CPT | Mod: CPTII,S$GLB,,

## 2022-06-23 PROCEDURE — 1160F PR REVIEW ALL MEDS BY PRESCRIBER/CLIN PHARMACIST DOCUMENTED: ICD-10-PCS | Mod: CPTII,S$GLB,,

## 2022-06-23 PROCEDURE — 1159F MED LIST DOCD IN RCRD: CPT | Mod: CPTII,S$GLB,,

## 2022-06-23 PROCEDURE — 3077F PR MOST RECENT SYSTOLIC BLOOD PRESSURE >= 140 MM HG: ICD-10-PCS | Mod: CPTII,S$GLB,,

## 2022-06-23 PROCEDURE — 1160F RVW MEDS BY RX/DR IN RCRD: CPT | Mod: CPTII,S$GLB,,

## 2022-06-23 PROCEDURE — 99213 PR OFFICE/OUTPT VISIT, EST, LEVL III, 20-29 MIN: ICD-10-PCS | Mod: S$GLB,,,

## 2022-06-23 PROCEDURE — 4010F ACE/ARB THERAPY RXD/TAKEN: CPT | Mod: CPTII,S$GLB,,

## 2022-06-23 PROCEDURE — 3080F DIAST BP >= 90 MM HG: CPT | Mod: CPTII,S$GLB,,

## 2022-06-23 PROCEDURE — 81003 POCT URINALYSIS, DIPSTICK, AUTOMATED, W/O SCOPE: ICD-10-PCS | Mod: QW,S$GLB,,

## 2022-06-23 PROCEDURE — 4010F PR ACE/ARB THEARPY RXD/TAKEN: ICD-10-PCS | Mod: CPTII,S$GLB,,

## 2022-06-23 PROCEDURE — 1159F PR MEDICATION LIST DOCUMENTED IN MEDICAL RECORD: ICD-10-PCS | Mod: CPTII,S$GLB,,

## 2022-06-23 PROCEDURE — 3080F PR MOST RECENT DIASTOLIC BLOOD PRESSURE >= 90 MM HG: ICD-10-PCS | Mod: CPTII,S$GLB,,

## 2022-06-23 PROCEDURE — 3008F PR BODY MASS INDEX (BMI) DOCUMENTED: ICD-10-PCS | Mod: CPTII,S$GLB,,

## 2022-06-23 PROCEDURE — 3008F BODY MASS INDEX DOCD: CPT | Mod: CPTII,S$GLB,,

## 2022-06-23 PROCEDURE — 81003 URINALYSIS AUTO W/O SCOPE: CPT | Mod: QW,S$GLB,,

## 2022-06-23 RX ORDER — TINIDAZOLE 500 MG/1
2 TABLET ORAL
Qty: 8 TABLET | Refills: 0 | Status: SHIPPED | OUTPATIENT
Start: 2022-06-23 | End: 2022-06-25

## 2022-06-23 NOTE — PROGRESS NOTES
"Subjective:       Patient ID: Autumn Smart is a 66 y.o. female.    Vitals:  height is 5' 7" (1.702 m) and weight is 79.8 kg (176 lb). Her temperature is 98.2 °F (36.8 °C). Her blood pressure is 146/95 (abnormal) and her pulse is 69. Her respiration is 18 and oxygen saturation is 98%.     Chief Complaint: Vaginal Itching    Pt presents to urgent care vaginal itching. Pt saw her OBGYN on 06/15/2022 and was tested with a vaginal swab and for G/C. She was also prescribed estrogen cream to start using. Pt also states she has been having sex w/o using lubrication and she thinks that is why she is experiencing vaginial itching.   She is taking Percocet for her pain.   She would like to know her test results from her OBGYN, states she was not informed of them.      Vaginal Itching  The patient's primary symptoms include genital itching. This is a new problem. The current episode started 1 to 4 weeks ago. The problem occurs constantly. The problem has been gradually worsening. The pain is moderate. The problem affects both sides. The symptoms are aggravated by urinating and intercourse. She has tried nothing for the symptoms. The treatment provided no relief. She is sexually active. Partner with STD symptoms: pt does not know.       Genitourinary:        Vaginal itching       Objective:      Physical Exam   Constitutional: She is oriented to person, place, and time. She appears well-developed.   HENT:   Head: Normocephalic and atraumatic.   Ears:   Right Ear: External ear normal.   Left Ear: External ear normal.   Nose: Nose normal. No nasal deformity. No epistaxis.   Mouth/Throat: Oropharynx is clear and moist and mucous membranes are normal.   Eyes: Lids are normal.   Neck: Trachea normal and phonation normal. Neck supple.   Cardiovascular: Normal pulses.   Pulmonary/Chest: Effort normal.   Abdominal: Normal appearance and bowel sounds are normal. She exhibits no distension. Soft. There is no abdominal tenderness. "   Genitourinary:               Comments: Thin white discharge noted in vaginal canal with speculum exam     Neurological: She is alert and oriented to person, place, and time.   Skin: Skin is warm, dry and intact.   Psychiatric: Her speech is normal and behavior is normal.   Nursing note and vitals reviewed.chaperone present ([facility] [place])           Assessment:       1. Bacterial vaginosis    2. Vaginal itching          Plan:         Re-sent Tindamax to pharmacy to treat + BV on vaginal swab. Reviewed the rest of her results with her as well. Discussed other options for symptomatic treatment. STD precautions discussed    Bacterial vaginosis  -     tinidazole (TINDAMAX) 500 MG tablet; Take 4 tablets (2 g total) by mouth daily with breakfast. for 2 days  Dispense: 8 tablet; Refill: 0    Vaginal itching  -     POCT Urinalysis, Dipstick, Automated, W/O Scope         Patient Instructions   PLEASE READ YOUR DISCHARGE INSTRUCTIONS ENTIRELY AS IT CONTAINS IMPORTANT INFORMATION.     Take the medication as prescribed - DO NOT DRINK ALCOHOL or anything containing alcohol while taking this medication       Try taking an over the counter probiotic or eating yogurt.     You can use over the counter clotrimazole (lotrimin) cream to the outer vagina for irritation.  OR OTC Monistat cream for the itching    Please return or see your primary care doctor if you develop new or worsening symptoms.      Please arrange follow up with your primary medical clinic as soon as possible. You must understand that you've received an Urgent Care treatment only and that you may be released before all of your medical problems are known or treated. You, the patient, will arrange for follow up as instructed. If your symptoms worsen or fail to improve you should go to the Emergency Room.

## 2022-06-23 NOTE — PATIENT INSTRUCTIONS
PLEASE READ YOUR DISCHARGE INSTRUCTIONS ENTIRELY AS IT CONTAINS IMPORTANT INFORMATION.     Take the medication as prescribed - DO NOT DRINK ALCOHOL or anything containing alcohol while taking this medication       Try taking an over the counter probiotic or eating yogurt.     You can use over the counter clotrimazole (lotrimin) cream to the outer vagina for irritation.  OR OTC Monistat cream for the itching    Please return or see your primary care doctor if you develop new or worsening symptoms.      Please arrange follow up with your primary medical clinic as soon as possible. You must understand that you've received an Urgent Care treatment only and that you may be released before all of your medical problems are known or treated. You, the patient, will arrange for follow up as instructed. If your symptoms worsen or fail to improve you should go to the Emergency Room.

## 2022-08-11 DIAGNOSIS — I10 HTN (HYPERTENSION), MALIGNANT: ICD-10-CM

## 2022-08-11 DIAGNOSIS — I10 ESSENTIAL HYPERTENSION: ICD-10-CM

## 2022-08-11 NOTE — TELEPHONE ENCOUNTER
----- Message from Julio Cesar Xiao sent at 8/11/2022  3:49 PM CDT -----  Name of Who is Calling:      What is the request in detail: Patient would like a call back regarding a sooner appointment first available.....patient called to verify time for 08/12/22   did not know she was scheduled for 08/10/222  patient is out of pressure medication  Please contact to further discuss and advise       Can the clinic reply by [facility]:       What Number to Call Back if not in [facility]:   [phone]

## 2022-08-11 NOTE — TELEPHONE ENCOUNTER
Care Due:                  Date            Visit Type   Department     Provider  --------------------------------------------------------------------------------                                EP -                              PRIMARY      [facility] INTERNAL  Last Visit: 11-      CARE ([facility])   MEDICINE       Shravan Diane                               -                              PRIMARY      [facility] INTERNAL  Next Visit: 09-      CARE ([facility])   MEDICINE       Shravan Diane                                                            Last  Test          Frequency    Reason                     Performed    Due Date  --------------------------------------------------------------------------------    CMP.........  12 months..  atorvastatin,              03- 03-                             hydroCHLOROthiazide,                             lisinopriL, potassium....    Lipid Panel.  12 months..  atorvastatin.............  03- 03-    [facility] Embedded Care Gaps. Reference number: [id number]. 8/11/2022   4:30:11 PM CDT

## 2022-08-12 RX ORDER — LISINOPRIL 40 MG/1
40 TABLET ORAL DAILY
Qty: 90 TABLET | Refills: 3 | Status: SHIPPED | OUTPATIENT
Start: 2022-08-12 | End: 2023-10-11

## 2022-08-12 RX ORDER — AMLODIPINE BESYLATE 5 MG/1
5 TABLET ORAL DAILY
Qty: 90 TABLET | Refills: 3 | Status: SHIPPED | OUTPATIENT
Start: 2022-08-12 | End: 2023-10-16

## 2022-08-31 DIAGNOSIS — E11.69 HYPERLIPIDEMIA ASSOCIATED WITH TYPE 2 DIABETES MELLITUS: ICD-10-CM

## 2022-08-31 DIAGNOSIS — E78.5 HYPERLIPIDEMIA ASSOCIATED WITH TYPE 2 DIABETES MELLITUS: ICD-10-CM

## 2022-09-14 ENCOUNTER — LAB VISIT (OUTPATIENT)
Dept: LAB | Facility: HOSPITAL | Age: 67
End: 2022-09-14
Attending: FAMILY MEDICINE
Payer: MEDICARE

## 2022-09-14 ENCOUNTER — OFFICE VISIT (OUTPATIENT)
Dept: INTERNAL MEDICINE | Facility: CLINIC | Age: 67
End: 2022-09-14
Payer: MEDICARE

## 2022-09-14 VITALS
HEIGHT: 67 IN | DIASTOLIC BLOOD PRESSURE: 74 MMHG | OXYGEN SATURATION: 95 % | WEIGHT: 179.69 LBS | SYSTOLIC BLOOD PRESSURE: 120 MMHG | HEART RATE: 57 BPM | BODY MASS INDEX: 28.2 KG/M2

## 2022-09-14 DIAGNOSIS — I10 ESSENTIAL HYPERTENSION: ICD-10-CM

## 2022-09-14 DIAGNOSIS — Z00.00 ANNUAL PHYSICAL EXAM: ICD-10-CM

## 2022-09-14 DIAGNOSIS — E78.5 HYPERLIPIDEMIA ASSOCIATED WITH TYPE 2 DIABETES MELLITUS: ICD-10-CM

## 2022-09-14 DIAGNOSIS — E11.9 TYPE 2 DIABETES MELLITUS WITHOUT COMPLICATION, WITHOUT LONG-TERM CURRENT USE OF INSULIN: ICD-10-CM

## 2022-09-14 DIAGNOSIS — E11.69 HYPERLIPIDEMIA ASSOCIATED WITH TYPE 2 DIABETES MELLITUS: ICD-10-CM

## 2022-09-14 DIAGNOSIS — N95.9 MENOPAUSAL AND POSTMENOPAUSAL DISORDER: ICD-10-CM

## 2022-09-14 DIAGNOSIS — Z00.00 ANNUAL PHYSICAL EXAM: Primary | ICD-10-CM

## 2022-09-14 DIAGNOSIS — Z79.899 ENCOUNTER FOR LONG-TERM (CURRENT) USE OF OTHER MEDICATIONS: ICD-10-CM

## 2022-09-14 DIAGNOSIS — Z13.5 ENCOUNTER FOR SCREENING FOR DIABETIC RETINOPATHY: ICD-10-CM

## 2022-09-14 DIAGNOSIS — E66.3 OVERWEIGHT (BMI 25.0-29.9): ICD-10-CM

## 2022-09-14 PROBLEM — T30.0 BURN INJURY: Status: RESOLVED | Noted: 2021-11-02 | Resolved: 2022-09-14

## 2022-09-14 PROBLEM — E11.59 HYPERTENSION ASSOCIATED WITH DIABETES: Status: RESOLVED | Noted: 2021-11-02 | Resolved: 2022-09-14

## 2022-09-14 PROBLEM — I15.2 HYPERTENSION ASSOCIATED WITH DIABETES: Status: RESOLVED | Noted: 2021-11-02 | Resolved: 2022-09-14

## 2022-09-14 PROBLEM — Z86.19 HISTORY OF HERPES ZOSTER: Status: ACTIVE | Noted: 2021-03-24

## 2022-09-14 PROCEDURE — 1101F PT FALLS ASSESS-DOCD LE1/YR: CPT | Mod: CPTII,S$GLB,, | Performed by: FAMILY MEDICINE

## 2022-09-14 PROCEDURE — 99499 RISK ADDL DX/OHS AUDIT: ICD-10-PCS | Mod: S$GLB,,, | Performed by: FAMILY MEDICINE

## 2022-09-14 PROCEDURE — 4010F ACE/ARB THERAPY RXD/TAKEN: CPT | Mod: CPTII,S$GLB,, | Performed by: FAMILY MEDICINE

## 2022-09-14 PROCEDURE — 3008F PR BODY MASS INDEX (BMI) DOCUMENTED: ICD-10-PCS | Mod: CPTII,S$GLB,, | Performed by: FAMILY MEDICINE

## 2022-09-14 PROCEDURE — 1159F MED LIST DOCD IN RCRD: CPT | Mod: CPTII,S$GLB,, | Performed by: FAMILY MEDICINE

## 2022-09-14 PROCEDURE — 4010F PR ACE/ARB THEARPY RXD/TAKEN: ICD-10-PCS | Mod: CPTII,S$GLB,, | Performed by: FAMILY MEDICINE

## 2022-09-14 PROCEDURE — 99999 PR PBB SHADOW E&M-EST. PATIENT-LVL IV: ICD-10-PCS | Mod: PBBFAC,,, | Performed by: FAMILY MEDICINE

## 2022-09-14 PROCEDURE — 99999 PR PBB SHADOW E&M-EST. PATIENT-LVL IV: CPT | Mod: PBBFAC,,, | Performed by: FAMILY MEDICINE

## 2022-09-14 PROCEDURE — 3288F FALL RISK ASSESSMENT DOCD: CPT | Mod: CPTII,S$GLB,, | Performed by: FAMILY MEDICINE

## 2022-09-14 PROCEDURE — 3078F PR MOST RECENT DIASTOLIC BLOOD PRESSURE < 80 MM HG: ICD-10-PCS | Mod: CPTII,S$GLB,, | Performed by: FAMILY MEDICINE

## 2022-09-14 PROCEDURE — 3074F SYST BP LT 130 MM HG: CPT | Mod: CPTII,S$GLB,, | Performed by: FAMILY MEDICINE

## 2022-09-14 PROCEDURE — 99397 PER PM REEVAL EST PAT 65+ YR: CPT | Mod: S$GLB,,, | Performed by: FAMILY MEDICINE

## 2022-09-14 PROCEDURE — 1101F PR PT FALLS ASSESS DOC 0-1 FALLS W/OUT INJ PAST YR: ICD-10-PCS | Mod: CPTII,S$GLB,, | Performed by: FAMILY MEDICINE

## 2022-09-14 PROCEDURE — 3074F PR MOST RECENT SYSTOLIC BLOOD PRESSURE < 130 MM HG: ICD-10-PCS | Mod: CPTII,S$GLB,, | Performed by: FAMILY MEDICINE

## 2022-09-14 PROCEDURE — 1159F PR MEDICATION LIST DOCUMENTED IN MEDICAL RECORD: ICD-10-PCS | Mod: CPTII,S$GLB,, | Performed by: FAMILY MEDICINE

## 2022-09-14 PROCEDURE — 99397 PR PREVENTIVE VISIT,EST,65 & OVER: ICD-10-PCS | Mod: S$GLB,,, | Performed by: FAMILY MEDICINE

## 2022-09-14 PROCEDURE — 99499 UNLISTED E&M SERVICE: CPT | Mod: S$GLB,,, | Performed by: FAMILY MEDICINE

## 2022-09-14 PROCEDURE — 3008F BODY MASS INDEX DOCD: CPT | Mod: CPTII,S$GLB,, | Performed by: FAMILY MEDICINE

## 2022-09-14 PROCEDURE — 3078F DIAST BP <80 MM HG: CPT | Mod: CPTII,S$GLB,, | Performed by: FAMILY MEDICINE

## 2022-09-14 PROCEDURE — 3288F PR FALLS RISK ASSESSMENT DOCUMENTED: ICD-10-PCS | Mod: CPTII,S$GLB,, | Performed by: FAMILY MEDICINE

## 2022-09-14 RX ORDER — VALACYCLOVIR HYDROCHLORIDE 500 MG/1
TABLET, FILM COATED ORAL
Qty: 30 TABLET | Refills: 5 | Status: SHIPPED | OUTPATIENT
Start: 2022-09-14 | End: 2023-09-08 | Stop reason: SDUPTHER
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
## 2022-09-15 DIAGNOSIS — E11.9 TYPE 2 DIABETES MELLITUS WITHOUT COMPLICATION: ICD-10-CM

## 2022-09-21 ENCOUNTER — IMMUNIZATION (OUTPATIENT)
Dept: INTERNAL MEDICINE | Facility: CLINIC | Age: 67
End: 2022-09-21
Payer: MEDICARE

## 2022-09-21 ENCOUNTER — CLINICAL SUPPORT (OUTPATIENT)
Dept: OPTOMETRY | Facility: CLINIC | Age: 67
End: 2022-09-21
Attending: FAMILY MEDICINE
Payer: MEDICARE

## 2022-09-21 ENCOUNTER — OFFICE VISIT (OUTPATIENT)
Dept: INTERNAL MEDICINE | Facility: CLINIC | Age: 67
End: 2022-09-21
Payer: MEDICARE

## 2022-09-21 ENCOUNTER — LAB VISIT (OUTPATIENT)
Dept: LAB | Facility: HOSPITAL | Age: 67
End: 2022-09-21
Attending: FAMILY MEDICINE
Payer: MEDICARE

## 2022-09-21 VITALS
HEART RATE: 51 BPM | WEIGHT: 177.25 LBS | HEIGHT: 67 IN | BODY MASS INDEX: 27.82 KG/M2 | DIASTOLIC BLOOD PRESSURE: 74 MMHG | SYSTOLIC BLOOD PRESSURE: 120 MMHG | OXYGEN SATURATION: 98 %

## 2022-09-21 DIAGNOSIS — E66.3 OVERWEIGHT (BMI 25.0-29.9): ICD-10-CM

## 2022-09-21 DIAGNOSIS — Z79.899 ENCOUNTER FOR LONG-TERM (CURRENT) USE OF OTHER MEDICATIONS: ICD-10-CM

## 2022-09-21 DIAGNOSIS — Z00.00 ANNUAL PHYSICAL EXAM: ICD-10-CM

## 2022-09-21 DIAGNOSIS — E11.9 TYPE 2 DIABETES MELLITUS WITHOUT COMPLICATION: ICD-10-CM

## 2022-09-21 DIAGNOSIS — I10 ESSENTIAL HYPERTENSION: Primary | ICD-10-CM

## 2022-09-21 DIAGNOSIS — Z13.5 ENCOUNTER FOR SCREENING FOR DIABETIC RETINOPATHY: ICD-10-CM

## 2022-09-21 DIAGNOSIS — E11.69 HYPERLIPIDEMIA ASSOCIATED WITH TYPE 2 DIABETES MELLITUS: ICD-10-CM

## 2022-09-21 DIAGNOSIS — E11.9 TYPE 2 DIABETES MELLITUS WITHOUT COMPLICATION, WITHOUT LONG-TERM CURRENT USE OF INSULIN: ICD-10-CM

## 2022-09-21 DIAGNOSIS — E78.5 HYPERLIPIDEMIA ASSOCIATED WITH TYPE 2 DIABETES MELLITUS: ICD-10-CM

## 2022-09-21 LAB
ALBUMIN/CREAT UR: NORMAL UG/MG (ref 0–30)
CREAT UR-MCNC: 44 MG/DL (ref 15–325)
MICROALBUMIN UR DL<=1MG/L-MCNC: <5 UG/ML

## 2022-09-21 PROCEDURE — 92228 IMG RTA DETC/MNTR DS PHY/QHP: CPT | Mod: TC,S$GLB,, | Performed by: FAMILY MEDICINE

## 2022-09-21 PROCEDURE — 1159F MED LIST DOCD IN RCRD: CPT | Mod: CPTII,S$GLB,, | Performed by: FAMILY MEDICINE

## 2022-09-21 PROCEDURE — 90694 FLU VACCINE - QUADRIVALENT - ADJUVANTED: ICD-10-PCS | Mod: S$GLB,,, | Performed by: INTERNAL MEDICINE

## 2022-09-21 PROCEDURE — 1159F PR MEDICATION LIST DOCUMENTED IN MEDICAL RECORD: ICD-10-PCS | Mod: CPTII,S$GLB,, | Performed by: FAMILY MEDICINE

## 2022-09-21 PROCEDURE — 3288F FALL RISK ASSESSMENT DOCD: CPT | Mod: CPTII,S$GLB,, | Performed by: FAMILY MEDICINE

## 2022-09-21 PROCEDURE — 3078F DIAST BP <80 MM HG: CPT | Mod: CPTII,S$GLB,, | Performed by: FAMILY MEDICINE

## 2022-09-21 PROCEDURE — 90694 VACC AIIV4 NO PRSRV 0.5ML IM: CPT | Mod: S$GLB,,, | Performed by: INTERNAL MEDICINE

## 2022-09-21 PROCEDURE — 1160F PR REVIEW ALL MEDS BY PRESCRIBER/CLIN PHARMACIST DOCUMENTED: ICD-10-PCS | Mod: CPTII,S$GLB,, | Performed by: FAMILY MEDICINE

## 2022-09-21 PROCEDURE — 99214 PR OFFICE/OUTPT VISIT, EST, LEVL IV, 30-39 MIN: ICD-10-PCS | Mod: S$GLB,,, | Performed by: FAMILY MEDICINE

## 2022-09-21 PROCEDURE — G0008 FLU VACCINE - QUADRIVALENT - ADJUVANTED: ICD-10-PCS | Mod: S$GLB,,, | Performed by: INTERNAL MEDICINE

## 2022-09-21 PROCEDURE — 1101F PT FALLS ASSESS-DOCD LE1/YR: CPT | Mod: CPTII,S$GLB,, | Performed by: FAMILY MEDICINE

## 2022-09-21 PROCEDURE — 92228 DIABETIC EYE SCREENING PHOTO: ICD-10-PCS | Mod: 26,S$GLB,, | Performed by: OPHTHALMOLOGY

## 2022-09-21 PROCEDURE — 82570 ASSAY OF URINE CREATININE: CPT | Performed by: FAMILY MEDICINE

## 2022-09-21 PROCEDURE — 99999 PR PBB SHADOW E&M-EST. PATIENT-LVL IV: CPT | Mod: PBBFAC,,, | Performed by: FAMILY MEDICINE

## 2022-09-21 PROCEDURE — 3074F PR MOST RECENT SYSTOLIC BLOOD PRESSURE < 130 MM HG: ICD-10-PCS | Mod: CPTII,S$GLB,, | Performed by: FAMILY MEDICINE

## 2022-09-21 PROCEDURE — 3288F PR FALLS RISK ASSESSMENT DOCUMENTED: ICD-10-PCS | Mod: CPTII,S$GLB,, | Performed by: FAMILY MEDICINE

## 2022-09-21 PROCEDURE — 92228 IMG RTA DETC/MNTR DS PHY/QHP: CPT | Mod: 26,S$GLB,, | Performed by: OPHTHALMOLOGY

## 2022-09-21 PROCEDURE — 3044F PR MOST RECENT HEMOGLOBIN A1C LEVEL <7.0%: ICD-10-PCS | Mod: CPTII,S$GLB,, | Performed by: FAMILY MEDICINE

## 2022-09-21 PROCEDURE — 3008F PR BODY MASS INDEX (BMI) DOCUMENTED: ICD-10-PCS | Mod: CPTII,S$GLB,, | Performed by: FAMILY MEDICINE

## 2022-09-21 PROCEDURE — 1101F PR PT FALLS ASSESS DOC 0-1 FALLS W/OUT INJ PAST YR: ICD-10-PCS | Mod: CPTII,S$GLB,, | Performed by: FAMILY MEDICINE

## 2022-09-21 PROCEDURE — 3008F BODY MASS INDEX DOCD: CPT | Mod: CPTII,S$GLB,, | Performed by: FAMILY MEDICINE

## 2022-09-21 PROCEDURE — 3044F HG A1C LEVEL LT 7.0%: CPT | Mod: CPTII,S$GLB,, | Performed by: FAMILY MEDICINE

## 2022-09-21 PROCEDURE — 3074F SYST BP LT 130 MM HG: CPT | Mod: CPTII,S$GLB,, | Performed by: FAMILY MEDICINE

## 2022-09-21 PROCEDURE — 4010F PR ACE/ARB THEARPY RXD/TAKEN: ICD-10-PCS | Mod: CPTII,S$GLB,, | Performed by: FAMILY MEDICINE

## 2022-09-21 PROCEDURE — 92228 DIABETIC EYE SCREENING PHOTO: ICD-10-PCS | Mod: TC,S$GLB,, | Performed by: FAMILY MEDICINE

## 2022-09-21 PROCEDURE — 99999 PR PBB SHADOW E&M-EST. PATIENT-LVL IV: ICD-10-PCS | Mod: PBBFAC,,, | Performed by: FAMILY MEDICINE

## 2022-09-21 PROCEDURE — 1160F RVW MEDS BY RX/DR IN RCRD: CPT | Mod: CPTII,S$GLB,, | Performed by: FAMILY MEDICINE

## 2022-09-21 PROCEDURE — 99214 OFFICE O/P EST MOD 30 MIN: CPT | Mod: S$GLB,,, | Performed by: FAMILY MEDICINE

## 2022-09-21 PROCEDURE — 3078F PR MOST RECENT DIASTOLIC BLOOD PRESSURE < 80 MM HG: ICD-10-PCS | Mod: CPTII,S$GLB,, | Performed by: FAMILY MEDICINE

## 2022-09-21 PROCEDURE — 4010F ACE/ARB THERAPY RXD/TAKEN: CPT | Mod: CPTII,S$GLB,, | Performed by: FAMILY MEDICINE

## 2022-09-21 PROCEDURE — G0008 ADMIN INFLUENZA VIRUS VAC: HCPCS | Mod: S$GLB,,, | Performed by: INTERNAL MEDICINE
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
## 2022-09-21 NOTE — PROGRESS NOTES
Autumn Smart is a 67 y.o. female here for a diabetic eye screening with non-dilated fundus photos per .    Patient cooperative?: Yes  Small pupils?: Yes  Last eye exam: 09/15/2020      Patients pupils small, several attempts made.     For exam results, see Encounter Report.

## 2023-04-05 DIAGNOSIS — E11.9 TYPE 2 DIABETES MELLITUS WITHOUT COMPLICATION: ICD-10-CM

## 2023-04-11 ENCOUNTER — PATIENT OUTREACH (OUTPATIENT)
Dept: ADMINISTRATIVE | Facility: HOSPITAL | Age: 68
End: 2023-04-11
Payer: MEDICARE

## 2023-05-22 DIAGNOSIS — E78.2 MIXED HYPERLIPIDEMIA: ICD-10-CM

## 2023-05-22 RX ORDER — ATORVASTATIN CALCIUM 20 MG/1
TABLET, FILM COATED ORAL
Qty: 90 TABLET | Refills: 1 | Status: SHIPPED | OUTPATIENT
Start: 2023-05-22 | End: 2023-06-01 | Stop reason: SDUPTHER

## 2023-05-22 NOTE — TELEPHONE ENCOUNTER
Refill Decision Note   Autumn Smart  is requesting a refill authorization.  Brief Assessment and Rationale for Refill:  Approve     Medication Therapy Plan:       Medication Reconciliation Completed: No   Comments:     No Care Gaps recommended.     Note composed:3:38 PM 05/22/2023

## 2023-05-22 NOTE — TELEPHONE ENCOUNTER
No care due was identified.  [facility] Embedded Care Due Messages. Reference number: [id number].   5/22/2023 1:02:01 PM CDT

## 2023-06-01 ENCOUNTER — HOSPITAL ENCOUNTER (OUTPATIENT)
Dept: RADIOLOGY | Facility: HOSPITAL | Age: 68
Discharge: HOME | End: 2023-06-01
Attending: OBSTETRICS & GYNECOLOGY
Payer: MEDICARE

## 2023-06-01 ENCOUNTER — OFFICE VISIT (OUTPATIENT)
Dept: INTERNAL MEDICINE | Facility: CLINIC | Age: 68
End: 2023-06-01
Payer: MEDICARE

## 2023-06-01 ENCOUNTER — TELEPHONE (OUTPATIENT)
Dept: INTERNAL MEDICINE | Facility: CLINIC | Age: 68
End: 2023-06-01
Payer: MEDICARE

## 2023-06-01 VITALS
WEIGHT: 170.63 LBS | DIASTOLIC BLOOD PRESSURE: 80 MMHG | HEIGHT: 67 IN | HEART RATE: 52 BPM | OXYGEN SATURATION: 98 % | BODY MASS INDEX: 26.78 KG/M2 | SYSTOLIC BLOOD PRESSURE: 130 MMHG

## 2023-06-01 DIAGNOSIS — Z12.31 SCREENING MAMMOGRAM, ENCOUNTER FOR: ICD-10-CM

## 2023-06-01 DIAGNOSIS — E11.9 TYPE 2 DIABETES MELLITUS WITHOUT COMPLICATION, WITHOUT LONG-TERM CURRENT USE OF INSULIN: ICD-10-CM

## 2023-06-01 DIAGNOSIS — I10 ESSENTIAL HYPERTENSION: ICD-10-CM

## 2023-06-01 DIAGNOSIS — E66.3 OVERWEIGHT (BMI 25.0-29.9): ICD-10-CM

## 2023-06-01 DIAGNOSIS — Z20.2 ENCOUNTER FOR ASSESSMENT OF STD EXPOSURE: ICD-10-CM

## 2023-06-01 DIAGNOSIS — E78.5 HYPERLIPIDEMIA ASSOCIATED WITH TYPE 2 DIABETES MELLITUS: ICD-10-CM

## 2023-06-01 DIAGNOSIS — E78.2 MIXED HYPERLIPIDEMIA: ICD-10-CM

## 2023-06-01 DIAGNOSIS — I10 HTN (HYPERTENSION), MALIGNANT: ICD-10-CM

## 2023-06-01 DIAGNOSIS — Z79.899 ENCOUNTER FOR LONG-TERM (CURRENT) USE OF OTHER MEDICATIONS: ICD-10-CM

## 2023-06-01 DIAGNOSIS — Z00.00 ANNUAL PHYSICAL EXAM: Primary | ICD-10-CM

## 2023-06-01 DIAGNOSIS — Z91.89 FRAMINGHAM CARDIAC RISK >20% IN NEXT 10 YEARS: ICD-10-CM

## 2023-06-01 DIAGNOSIS — E11.69 HYPERLIPIDEMIA ASSOCIATED WITH TYPE 2 DIABETES MELLITUS: ICD-10-CM

## 2023-06-01 PROCEDURE — 99999 PR PBB SHADOW E&M-EST. PATIENT-LVL IV: ICD-10-PCS | Mod: PBBFAC,,, | Performed by: FAMILY MEDICINE

## 2023-06-01 PROCEDURE — 3008F PR BODY MASS INDEX (BMI) DOCUMENTED: ICD-10-PCS | Mod: CPTII,S$GLB,, | Performed by: FAMILY MEDICINE

## 2023-06-01 PROCEDURE — 3288F FALL RISK ASSESSMENT DOCD: CPT | Mod: CPTII,S$GLB,, | Performed by: FAMILY MEDICINE

## 2023-06-01 PROCEDURE — 77067 SCR MAMMO BI INCL CAD: CPT | Mod: 26,,, | Performed by: RADIOLOGY

## 2023-06-01 PROCEDURE — 3079F DIAST BP 80-89 MM HG: CPT | Mod: CPTII,S$GLB,, | Performed by: FAMILY MEDICINE

## 2023-06-01 PROCEDURE — 77063 MAMMO DIGITAL SCREENING BILAT WITH TOMO: ICD-10-PCS | Mod: 26,,, | Performed by: RADIOLOGY

## 2023-06-01 PROCEDURE — 3288F PR FALLS RISK ASSESSMENT DOCUMENTED: ICD-10-PCS | Mod: CPTII,S$GLB,, | Performed by: FAMILY MEDICINE

## 2023-06-01 PROCEDURE — 99999 PR PBB SHADOW E&M-EST. PATIENT-LVL IV: CPT | Mod: PBBFAC,,, | Performed by: FAMILY MEDICINE

## 2023-06-01 PROCEDURE — 1101F PT FALLS ASSESS-DOCD LE1/YR: CPT | Mod: CPTII,S$GLB,, | Performed by: FAMILY MEDICINE

## 2023-06-01 PROCEDURE — 77067 MAMMO DIGITAL SCREENING BILAT WITH TOMO: ICD-10-PCS | Mod: 26,,, | Performed by: RADIOLOGY

## 2023-06-01 PROCEDURE — 3079F PR MOST RECENT DIASTOLIC BLOOD PRESSURE 80-89 MM HG: ICD-10-PCS | Mod: CPTII,S$GLB,, | Performed by: FAMILY MEDICINE

## 2023-06-01 PROCEDURE — 1159F PR MEDICATION LIST DOCUMENTED IN MEDICAL RECORD: ICD-10-PCS | Mod: CPTII,S$GLB,, | Performed by: FAMILY MEDICINE

## 2023-06-01 PROCEDURE — 3075F SYST BP GE 130 - 139MM HG: CPT | Mod: CPTII,S$GLB,, | Performed by: FAMILY MEDICINE

## 2023-06-01 PROCEDURE — 99397 PR PREVENTIVE VISIT,EST,65 & OVER: ICD-10-PCS | Mod: S$GLB,,, | Performed by: FAMILY MEDICINE

## 2023-06-01 PROCEDURE — 1101F PR PT FALLS ASSESS DOC 0-1 FALLS W/OUT INJ PAST YR: ICD-10-PCS | Mod: CPTII,S$GLB,, | Performed by: FAMILY MEDICINE

## 2023-06-01 PROCEDURE — 4010F PR ACE/ARB THEARPY RXD/TAKEN: ICD-10-PCS | Mod: CPTII,S$GLB,, | Performed by: FAMILY MEDICINE

## 2023-06-01 PROCEDURE — 1159F MED LIST DOCD IN RCRD: CPT | Mod: CPTII,S$GLB,, | Performed by: FAMILY MEDICINE

## 2023-06-01 PROCEDURE — 77067 SCR MAMMO BI INCL CAD: CPT | Mod: TC

## 2023-06-01 PROCEDURE — 77063 BREAST TOMOSYNTHESIS BI: CPT | Mod: 26,,, | Performed by: RADIOLOGY

## 2023-06-01 PROCEDURE — 1126F PR PAIN SEVERITY QUANTIFIED, NO PAIN PRESENT: ICD-10-PCS | Mod: CPTII,S$GLB,, | Performed by: FAMILY MEDICINE

## 2023-06-01 PROCEDURE — 3075F PR MOST RECENT SYSTOLIC BLOOD PRESS GE 130-139MM HG: ICD-10-PCS | Mod: CPTII,S$GLB,, | Performed by: FAMILY MEDICINE

## 2023-06-01 PROCEDURE — 3008F BODY MASS INDEX DOCD: CPT | Mod: CPTII,S$GLB,, | Performed by: FAMILY MEDICINE

## 2023-06-01 PROCEDURE — 99397 PER PM REEVAL EST PAT 65+ YR: CPT | Mod: S$GLB,,, | Performed by: FAMILY MEDICINE

## 2023-06-01 PROCEDURE — 4010F ACE/ARB THERAPY RXD/TAKEN: CPT | Mod: CPTII,S$GLB,, | Performed by: FAMILY MEDICINE

## 2023-06-01 PROCEDURE — 1126F AMNT PAIN NOTED NONE PRSNT: CPT | Mod: CPTII,S$GLB,, | Performed by: FAMILY MEDICINE

## 2023-06-01 RX ORDER — HYDROCHLOROTHIAZIDE 25 MG/1
25 TABLET ORAL DAILY
Qty: 90 TABLET | Refills: 3 | Status: SHIPPED | OUTPATIENT
Start: 2023-06-01

## 2023-06-01 RX ORDER — ATORVASTATIN CALCIUM 20 MG/1
20 TABLET, FILM COATED ORAL DAILY
Qty: 90 TABLET | Refills: 3 | Status: SHIPPED | OUTPATIENT
Start: 2023-06-01
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
## 2023-06-02 ENCOUNTER — HOSPITAL ENCOUNTER (EMERGENCY)
Facility: HOSPITAL | Age: 68
Discharge: HOME | End: 2023-06-02
Attending: STUDENT IN AN ORGANIZED HEALTH CARE EDUCATION/TRAINING PROGRAM
Payer: MEDICARE

## 2023-06-02 ENCOUNTER — TELEPHONE (OUTPATIENT)
Dept: INTERNAL MEDICINE | Facility: CLINIC | Age: 68
End: 2023-06-02
Payer: MEDICARE

## 2023-06-02 VITALS
TEMPERATURE: 98 F | SYSTOLIC BLOOD PRESSURE: 180 MMHG | WEIGHT: 170 LBS | RESPIRATION RATE: 18 BRPM | BODY MASS INDEX: 26.68 KG/M2 | HEIGHT: 67 IN | DIASTOLIC BLOOD PRESSURE: 84 MMHG | HEART RATE: 47 BPM | OXYGEN SATURATION: 100 %

## 2023-06-02 DIAGNOSIS — E87.6 HYPOKALEMIA: Primary | ICD-10-CM

## 2023-06-02 LAB
ANION GAP SERPL CALC-SCNC: 12 MMOL/L (ref 8–16)
BUN SERPL-MCNC: 11 MG/DL (ref 6–30)
BUN SERPL-MCNC: 13 MG/DL (ref 8–23)
CALCIUM SERPL-MCNC: 9.8 MG/DL (ref 8.7–10.5)
CHLORIDE SERPL-SCNC: 101 MMOL/L (ref 95–110)
CHLORIDE SERPL-SCNC: 99 MMOL/L (ref 95–110)
CO2 SERPL-SCNC: 30 MMOL/L (ref 23–29)
CREAT SERPL-MCNC: 0.7 MG/DL (ref 0.5–1.4)
CREAT SERPL-MCNC: 0.9 MG/DL (ref 0.5–1.4)
EST. GFR  (NO RACE VARIABLE): >60 ML/MIN/1.73 M^2
GLUCOSE SERPL-MCNC: 111 MG/DL (ref 70–110)
GLUCOSE SERPL-MCNC: 147 MG/DL (ref 70–110)
HCT VFR BLD CALC: 31 %PCV (ref 36–54)
HCV AB SERPL QL IA: NORMAL
MAGNESIUM SERPL-MCNC: 2 MG/DL (ref 1.6–2.6)
POC IONIZED CALCIUM: 1.08 MMOL/L (ref 1.06–1.42)
POC TCO2 (MEASURED): 29 MMOL/L (ref 23–29)
POTASSIUM BLD-SCNC: 3.3 MMOL/L (ref 3.5–5.1)
POTASSIUM SERPL-SCNC: 3.1 MMOL/L (ref 3.5–5.1)
SAMPLE: ABNORMAL
SODIUM BLD-SCNC: 141 MMOL/L (ref 136–145)
SODIUM SERPL-SCNC: 141 MMOL/L (ref 136–145)

## 2023-06-02 PROCEDURE — 96368 THER/DIAG CONCURRENT INF: CPT

## 2023-06-02 PROCEDURE — 99285 EMERGENCY DEPT VISIT HI MDM: CPT | Mod: ,,, | Performed by: STUDENT IN AN ORGANIZED HEALTH CARE EDUCATION/TRAINING PROGRAM

## 2023-06-02 PROCEDURE — 99285 PR EMERGENCY DEPT VISIT,LEVEL V: ICD-10-PCS | Mod: ,,, | Performed by: STUDENT IN AN ORGANIZED HEALTH CARE EDUCATION/TRAINING PROGRAM

## 2023-06-02 PROCEDURE — 80047 BASIC METABLC PNL IONIZED CA: CPT

## 2023-06-02 PROCEDURE — 63600175 PHARM REV CODE 636 W HCPCS: Performed by: PHYSICIAN ASSISTANT

## 2023-06-02 PROCEDURE — 96366 THER/PROPH/DIAG IV INF ADDON: CPT

## 2023-06-02 PROCEDURE — 80048 BASIC METABOLIC PNL TOTAL CA: CPT | Performed by: PHYSICIAN ASSISTANT

## 2023-06-02 PROCEDURE — 25000003 PHARM REV CODE 250: Performed by: PHYSICIAN ASSISTANT

## 2023-06-02 PROCEDURE — 93010 EKG 12-LEAD: ICD-10-PCS | Mod: ,,, | Performed by: INTERNAL MEDICINE

## 2023-06-02 PROCEDURE — 93010 ELECTROCARDIOGRAM REPORT: CPT | Mod: ,,, | Performed by: INTERNAL MEDICINE

## 2023-06-02 PROCEDURE — 63600175 PHARM REV CODE 636 W HCPCS: Performed by: STUDENT IN AN ORGANIZED HEALTH CARE EDUCATION/TRAINING PROGRAM

## 2023-06-02 PROCEDURE — 99284 EMERGENCY DEPT VISIT MOD MDM: CPT | Mod: 25

## 2023-06-02 PROCEDURE — 86803 HEPATITIS C AB TEST: CPT | Performed by: PHYSICIAN ASSISTANT

## 2023-06-02 PROCEDURE — 83735 ASSAY OF MAGNESIUM: CPT | Performed by: PHYSICIAN ASSISTANT

## 2023-06-02 PROCEDURE — 93005 ELECTROCARDIOGRAM TRACING: CPT

## 2023-06-02 PROCEDURE — 96365 THER/PROPH/DIAG IV INF INIT: CPT

## 2023-06-02 RX ORDER — POTASSIUM CHLORIDE 1500 MG/1
20 TABLET, EXTENDED RELEASE ORAL DAILY
Qty: 90 TABLET | Refills: 3 | Status: SHIPPED | OUTPATIENT
Start: 2023-06-02

## 2023-06-02 RX ORDER — MAGNESIUM SULFATE HEPTAHYDRATE 40 MG/ML
2 INJECTION, SOLUTION INTRAVENOUS
Status: DISCONTINUED | OUTPATIENT
Start: 2023-06-02 | End: 2023-06-02

## 2023-06-02 RX ORDER — MAGNESIUM SULFATE HEPTAHYDRATE 40 MG/ML
2 INJECTION, SOLUTION INTRAVENOUS
Status: COMPLETED | OUTPATIENT
Start: 2023-06-02 | End: 2023-06-02

## 2023-06-02 RX ORDER — POTASSIUM CHLORIDE 7.45 MG/ML
10 INJECTION INTRAVENOUS
Status: COMPLETED | OUTPATIENT
Start: 2023-06-02 | End: 2023-06-02

## 2023-06-02 RX ADMIN — POTASSIUM CHLORIDE 10 MEQ: 7.46 INJECTION, SOLUTION INTRAVENOUS at 01:06

## 2023-06-02 RX ADMIN — SODIUM CHLORIDE 500 ML: 9 INJECTION, SOLUTION INTRAVENOUS at 01:06

## 2023-06-02 RX ADMIN — MAGNESIUM SULFATE 2 G: 2 INJECTION INTRAVENOUS at 01:06

## 2023-06-02 RX ADMIN — POTASSIUM BICARBONATE 40 MEQ: 391 TABLET, EFFERVESCENT ORAL at 01:06
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
## 2023-06-02 NOTE — TELEPHONE ENCOUNTER
Received a call from lab with critical potassium of 2.7.  Attempted to contact patient, received voicemail, which is full.  Called her son, Jacob Benjamin who is her emergency contact.  I informed him that his mother needs to go to the emergency department due to abnormal lab.  He states he is having difficulty reaching her as well, but will continue to try to reach her and inform her that she needs to go to the hospital.

## 2023-06-02 NOTE — ED NOTES
I-STAT Chem-8+ Results:   Value Reference Range   Sodium 141 136-145 mmol/L   Potassium  3.3 3.5-5.1 mmol/L   Chloride 101  mmol/L   Ionized Calcium 1.08 1.06-1.42 mmol/L   CO2 (measured) 29 23-29 mmol/L   Glucose 111  mg/dL   BUN 11 6-30 mg/dL   Creatinine 0.7 0.5-1.4 mg/dL   Hematocrit 31 36-54%

## 2023-06-02 NOTE — TELEPHONE ENCOUNTER
----- Message from Shravan Diane MD sent at 6/2/2023  9:54 AM CDT -----  Check on patient and relay results. On-call team tried to reach her last night but unable to leave message. Potassium very low. Needs to get in to ED for urgent management and replacement of potassium
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
## 2023-06-02 NOTE — DISCHARGE INSTRUCTIONS
Diagnosis: Low potassium    Your potassium was low which I think was due to losing potassium from vomiting and diarrhea.  I am prescribing a potassium supplement to help increased your potassium.    Please schedule an appointment with your primary care doctor for follow-up. If you start to have any new or worsening symptoms, please come back to the emergency department.

## 2023-06-05 ENCOUNTER — TELEPHONE (OUTPATIENT)
Dept: INTERNAL MEDICINE | Facility: CLINIC | Age: 68
End: 2023-06-05
Payer: MEDICARE

## 2023-06-05 NOTE — TELEPHONE ENCOUNTER
----- Message from Nick Kee MA sent at 6/5/2023  3:42 PM CDT -----  Contact: [phone]    ----- Message -----  From: Court Del Real  Sent: 6/5/2023   3:33 PM CDT  To: [facility]ney Staff    Patient is returning a phone call.  Who left a message for the patient: Mallory Teran LPN   Does patient know what this is regarding:    Would you like a call back, or a response through your [facility] portal?:   CALL   Comments:

## 2023-06-07 ENCOUNTER — TELEPHONE (OUTPATIENT)
Dept: INTERNAL MEDICINE | Facility: CLINIC | Age: 68
End: 2023-06-07
Payer: MEDICARE

## 2023-06-07 NOTE — TELEPHONE ENCOUNTER
Called pt back. She said she is not in pain, but since she has had the mammogram done on last Thursday, she has been having bruising all on her right side and would like for someone to take a look at it.     Set her up with Dr. Velasquez on tomorrow.

## 2023-06-07 NOTE — TELEPHONE ENCOUNTER
----- Message from Sharyn Mccall sent at 6/7/2023  8:44 AM CDT -----  Contact: [phone]  Pt said she needs a call back about right side bruising from mammo and needs to come in.

## 2023-06-08 ENCOUNTER — HOSPITAL ENCOUNTER (OUTPATIENT)
Dept: RADIOLOGY | Facility: HOSPITAL | Age: 68
Discharge: HOME | End: 2023-06-08
Attending: INTERNAL MEDICINE
Payer: MEDICARE

## 2023-06-08 ENCOUNTER — TELEPHONE (OUTPATIENT)
Dept: INTERNAL MEDICINE | Facility: CLINIC | Age: 68
End: 2023-06-08
Payer: MEDICARE

## 2023-06-08 ENCOUNTER — OFFICE VISIT (OUTPATIENT)
Dept: INTERNAL MEDICINE | Facility: CLINIC | Age: 68
End: 2023-06-08
Payer: MEDICARE

## 2023-06-08 VITALS
BODY MASS INDEX: 27.57 KG/M2 | OXYGEN SATURATION: 98 % | WEIGHT: 175.69 LBS | HEIGHT: 67 IN | SYSTOLIC BLOOD PRESSURE: 138 MMHG | HEART RATE: 104 BPM | DIASTOLIC BLOOD PRESSURE: 85 MMHG

## 2023-06-08 DIAGNOSIS — M79.671 RIGHT FOOT PAIN: Primary | ICD-10-CM

## 2023-06-08 DIAGNOSIS — M79.671 RIGHT FOOT PAIN: ICD-10-CM

## 2023-06-08 DIAGNOSIS — S51.019A SKIN TEAR OF ELBOW WITHOUT COMPLICATION, UNSPECIFIED LATERALITY, INITIAL ENCOUNTER: ICD-10-CM

## 2023-06-08 PROCEDURE — 99999 PR PBB SHADOW E&M-EST. PATIENT-LVL III: CPT | Mod: PBBFAC,,, | Performed by: INTERNAL MEDICINE

## 2023-06-08 PROCEDURE — 3008F PR BODY MASS INDEX (BMI) DOCUMENTED: ICD-10-PCS | Mod: CPTII,S$GLB,, | Performed by: INTERNAL MEDICINE

## 2023-06-08 PROCEDURE — 73630 X-RAY EXAM OF FOOT: CPT | Mod: 26,RT,, | Performed by: RADIOLOGY

## 2023-06-08 PROCEDURE — 3008F BODY MASS INDEX DOCD: CPT | Mod: CPTII,S$GLB,, | Performed by: INTERNAL MEDICINE

## 2023-06-08 PROCEDURE — 3075F PR MOST RECENT SYSTOLIC BLOOD PRESS GE 130-139MM HG: ICD-10-PCS | Mod: CPTII,S$GLB,, | Performed by: INTERNAL MEDICINE

## 2023-06-08 PROCEDURE — 1101F PT FALLS ASSESS-DOCD LE1/YR: CPT | Mod: CPTII,S$GLB,, | Performed by: INTERNAL MEDICINE

## 2023-06-08 PROCEDURE — 73630 X-RAY EXAM OF FOOT: CPT | Mod: TC,RT

## 2023-06-08 PROCEDURE — 3079F PR MOST RECENT DIASTOLIC BLOOD PRESSURE 80-89 MM HG: ICD-10-PCS | Mod: CPTII,S$GLB,, | Performed by: INTERNAL MEDICINE

## 2023-06-08 PROCEDURE — 3075F SYST BP GE 130 - 139MM HG: CPT | Mod: CPTII,S$GLB,, | Performed by: INTERNAL MEDICINE

## 2023-06-08 PROCEDURE — 3288F FALL RISK ASSESSMENT DOCD: CPT | Mod: CPTII,S$GLB,, | Performed by: INTERNAL MEDICINE

## 2023-06-08 PROCEDURE — 1126F AMNT PAIN NOTED NONE PRSNT: CPT | Mod: CPTII,S$GLB,, | Performed by: INTERNAL MEDICINE

## 2023-06-08 PROCEDURE — 99214 OFFICE O/P EST MOD 30 MIN: CPT | Mod: S$GLB,,, | Performed by: INTERNAL MEDICINE

## 2023-06-08 PROCEDURE — 3079F DIAST BP 80-89 MM HG: CPT | Mod: CPTII,S$GLB,, | Performed by: INTERNAL MEDICINE

## 2023-06-08 PROCEDURE — 73630 XR FOOT COMPLETE 3 VIEW RIGHT: ICD-10-PCS | Mod: 26,RT,, | Performed by: RADIOLOGY

## 2023-06-08 PROCEDURE — 99214 PR OFFICE/OUTPT VISIT, EST, LEVL IV, 30-39 MIN: ICD-10-PCS | Mod: S$GLB,,, | Performed by: INTERNAL MEDICINE

## 2023-06-08 PROCEDURE — 99999 PR PBB SHADOW E&M-EST. PATIENT-LVL III: ICD-10-PCS | Mod: PBBFAC,,, | Performed by: INTERNAL MEDICINE

## 2023-06-08 PROCEDURE — 3044F HG A1C LEVEL LT 7.0%: CPT | Mod: CPTII,S$GLB,, | Performed by: INTERNAL MEDICINE

## 2023-06-08 PROCEDURE — 3288F PR FALLS RISK ASSESSMENT DOCUMENTED: ICD-10-PCS | Mod: CPTII,S$GLB,, | Performed by: INTERNAL MEDICINE

## 2023-06-08 PROCEDURE — 1101F PR PT FALLS ASSESS DOC 0-1 FALLS W/OUT INJ PAST YR: ICD-10-PCS | Mod: CPTII,S$GLB,, | Performed by: INTERNAL MEDICINE

## 2023-06-08 PROCEDURE — 1126F PR PAIN SEVERITY QUANTIFIED, NO PAIN PRESENT: ICD-10-PCS | Mod: CPTII,S$GLB,, | Performed by: INTERNAL MEDICINE

## 2023-06-08 PROCEDURE — 4010F PR ACE/ARB THEARPY RXD/TAKEN: ICD-10-PCS | Mod: CPTII,S$GLB,, | Performed by: INTERNAL MEDICINE

## 2023-06-08 PROCEDURE — 4010F ACE/ARB THERAPY RXD/TAKEN: CPT | Mod: CPTII,S$GLB,, | Performed by: INTERNAL MEDICINE

## 2023-06-08 PROCEDURE — 3044F PR MOST RECENT HEMOGLOBIN A1C LEVEL <7.0%: ICD-10-PCS | Mod: CPTII,S$GLB,, | Performed by: INTERNAL MEDICINE

## 2023-06-08 RX ORDER — DICLOFENAC SODIUM 10 MG/G
2 GEL TOPICAL DAILY
Qty: 20 G | Refills: 3 | Status: SHIPPED | OUTPATIENT
Start: 2023-06-08

## 2023-06-08 NOTE — TELEPHONE ENCOUNTER
----- Message from Tahira Velasquez MD sent at 6/8/2023 10:36 AM CDT -----  I placed a referral to podiatry. Can we make sure it gets scheduled. Thanks!   ----- Message -----  From: Interface, Rad Results In  Sent: 6/8/2023  10:16 AM CDT  To: Tahira Velasquez MD
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
## 2023-06-12 ENCOUNTER — TELEPHONE (OUTPATIENT)
Dept: ADMINISTRATIVE | Facility: HOSPITAL | Age: 68
End: 2023-06-12
Payer: MEDICARE

## 2023-07-10 ENCOUNTER — OFFICE VISIT (OUTPATIENT)
Dept: PODIATRY | Facility: CLINIC | Age: 68
End: 2023-07-10
Payer: MEDICARE

## 2023-07-10 VITALS
DIASTOLIC BLOOD PRESSURE: 84 MMHG | SYSTOLIC BLOOD PRESSURE: 160 MMHG | WEIGHT: 172.38 LBS | HEART RATE: 64 BPM | BODY MASS INDEX: 27.06 KG/M2 | HEIGHT: 67 IN

## 2023-07-10 DIAGNOSIS — M79.671 RIGHT FOOT PAIN: ICD-10-CM

## 2023-07-10 DIAGNOSIS — M20.41 HAMMER TOES OF BOTH FEET: ICD-10-CM

## 2023-07-10 DIAGNOSIS — M20.5X1 HALLUX LIMITUS, ACQUIRED, RIGHT: Primary | ICD-10-CM

## 2023-07-10 DIAGNOSIS — M20.11 HALLUX ABDUCTO VALGUS, RIGHT: ICD-10-CM

## 2023-07-10 DIAGNOSIS — E11.9 COMPREHENSIVE DIABETIC FOOT EXAMINATION, TYPE 2 DM, ENCOUNTER FOR: ICD-10-CM

## 2023-07-10 DIAGNOSIS — M20.42 HAMMER TOES OF BOTH FEET: ICD-10-CM

## 2023-07-10 PROCEDURE — 4010F PR ACE/ARB THEARPY RXD/TAKEN: ICD-10-PCS | Mod: HCNC,CPTII,S$GLB, | Performed by: PODIATRIST

## 2023-07-10 PROCEDURE — 3079F DIAST BP 80-89 MM HG: CPT | Mod: HCNC,CPTII,S$GLB, | Performed by: PODIATRIST

## 2023-07-10 PROCEDURE — 3079F PR MOST RECENT DIASTOLIC BLOOD PRESSURE 80-89 MM HG: ICD-10-PCS | Mod: HCNC,CPTII,S$GLB, | Performed by: PODIATRIST

## 2023-07-10 PROCEDURE — 1125F AMNT PAIN NOTED PAIN PRSNT: CPT | Mod: HCNC,CPTII,S$GLB, | Performed by: PODIATRIST

## 2023-07-10 PROCEDURE — 99999 PR PBB SHADOW E&M-EST. PATIENT-LVL IV: CPT | Mod: PBBFAC,HCNC,, | Performed by: PODIATRIST

## 2023-07-10 PROCEDURE — 1159F MED LIST DOCD IN RCRD: CPT | Mod: HCNC,CPTII,S$GLB, | Performed by: PODIATRIST

## 2023-07-10 PROCEDURE — 3077F SYST BP >= 140 MM HG: CPT | Mod: HCNC,CPTII,S$GLB, | Performed by: PODIATRIST

## 2023-07-10 PROCEDURE — 1159F PR MEDICATION LIST DOCUMENTED IN MEDICAL RECORD: ICD-10-PCS | Mod: HCNC,CPTII,S$GLB, | Performed by: PODIATRIST

## 2023-07-10 PROCEDURE — 1160F RVW MEDS BY RX/DR IN RCRD: CPT | Mod: HCNC,CPTII,S$GLB, | Performed by: PODIATRIST

## 2023-07-10 PROCEDURE — 3077F PR MOST RECENT SYSTOLIC BLOOD PRESSURE >= 140 MM HG: ICD-10-PCS | Mod: HCNC,CPTII,S$GLB, | Performed by: PODIATRIST

## 2023-07-10 PROCEDURE — 3008F BODY MASS INDEX DOCD: CPT | Mod: HCNC,CPTII,S$GLB, | Performed by: PODIATRIST

## 2023-07-10 PROCEDURE — 4010F ACE/ARB THERAPY RXD/TAKEN: CPT | Mod: HCNC,CPTII,S$GLB, | Performed by: PODIATRIST

## 2023-07-10 PROCEDURE — 3044F PR MOST RECENT HEMOGLOBIN A1C LEVEL <7.0%: ICD-10-PCS | Mod: HCNC,CPTII,S$GLB, | Performed by: PODIATRIST

## 2023-07-10 PROCEDURE — 99999 PR PBB SHADOW E&M-EST. PATIENT-LVL IV: ICD-10-PCS | Mod: PBBFAC,HCNC,, | Performed by: PODIATRIST

## 2023-07-10 PROCEDURE — 3008F PR BODY MASS INDEX (BMI) DOCUMENTED: ICD-10-PCS | Mod: HCNC,CPTII,S$GLB, | Performed by: PODIATRIST

## 2023-07-10 PROCEDURE — 1160F PR REVIEW ALL MEDS BY PRESCRIBER/CLIN PHARMACIST DOCUMENTED: ICD-10-PCS | Mod: HCNC,CPTII,S$GLB, | Performed by: PODIATRIST

## 2023-07-10 PROCEDURE — 99203 PR OFFICE/OUTPT VISIT, NEW, LEVL III, 30-44 MIN: ICD-10-PCS | Mod: HCNC,S$GLB,, | Performed by: PODIATRIST

## 2023-07-10 PROCEDURE — 1125F PR PAIN SEVERITY QUANTIFIED, PAIN PRESENT: ICD-10-PCS | Mod: HCNC,CPTII,S$GLB, | Performed by: PODIATRIST

## 2023-07-10 PROCEDURE — 3044F HG A1C LEVEL LT 7.0%: CPT | Mod: HCNC,CPTII,S$GLB, | Performed by: PODIATRIST

## 2023-07-10 PROCEDURE — 99203 OFFICE O/P NEW LOW 30 MIN: CPT | Mod: HCNC,S$GLB,, | Performed by: PODIATRIST

## 2023-07-10 RX ORDER — METHYLPREDNISOLONE 4 MG/1
TABLET ORAL
Qty: 21 TABLET | Refills: 0 | Status: SHIPPED | OUTPATIENT
Start: 2023-07-10 | End: 2023-07-24

## 2023-07-10 NOTE — PATIENT INSTRUCTIONS
Over the counter pain creams: Voltaren Gel, Biofreeze, Bengay, tiger balm, two old goat, lidocaine gel,  Absorbine Veterinary Liniment Gel Topical Analgesic Sore Muscle and Joint Pain Relief  Recommend lotions: eucerin, eucerin for diabetics, aquaphor, A&D ointment, gold bond for diabetics, sween, Doran's Bees all purpose baby ointment,  urea 40 with aloe or SkinIntegra rapid crack repair (found on amazon.com)  Shoe recommendations: (try 6pm.com, zappos.com , nordstromrack.Meetapp, or shoes.com for discounted prices) you can visit varsity shoes in [place], DSW shoes in [place]  or porfirio rack in the [facility] (there are also several shoe brand outlets in the [facility])  ONLY purchase stability style tennis shoes NO flex, foam, free, yoga mat style shoes  Asics (GT 4000 or gel foundations), new balance stability type shoes (such as the 940 series), saucony (stabil c3),  Cortez (GTS or Beast or transcend), propet, HokaOne (tennis shoe) Marco A (tennis shoes and boots)  Sofft Brand (women) Tony&Andrei (men), clarks, crocs, aerosoles, naturalizers, SAS, ecco, born, marylin mcbride, rockports (dress shoes)  Vionic, burkenstocks, fitflops, propet, taos, baretraps (sandals)  HokaOne sandals, crocs, propet (house shoes)    Nail Home remedy:  Vicks Vapor rub or Emuaid to nails for easier manageability    What Is Arthritis in the Foot?  Degenerative arthritis is a condition that slowly wears away joints, the area where bones meet and move. In the beginning, you may notice that the affected joint seems stiff. It may even ache. As the joint lining (cartilage) breaks down, the bones rub against each other, causing pain and swelling. Over time, small pieces of rough or splintered bone (bone spurs) develop, and the joints range of motion becomes limited. But movement doesnt have to cause pain. The effects of arthritis can be reduced.    The big-toe joint  When arthritis affects your big toe, your foot hurts when it pushes off  the ground. Arthritis often appears in the big-toe joint along with a bunion (a bony bump at the side of the joint) or a bone spur on top of the joint.    Other joints  When arthritis affects the rear or midfoot joints, you feel pain when you put weight on your foot. Arthritis may affect the joint where the ankle and foot meet. It may also affect other joints nearby.  Date Last Reviewed: 7/1/2016 © 2000-2016 [employer]. [address] [place] [id number]. All rights reserved. This information is not intended as a substitute for professional medical care. Always follow your healthcare professional's instructions.        Treating Arthritis in the Foot  If your symptoms are mild, medications may be enough to reduce pain and swelling. For more severe arthritis, surgery may be needed to improve the condition of the joint.    Medicine  Your doctor may prescribe medicine--pills or injections--to limit pain and swelling. Ice, aspirin, acetaminophen, or ibuprofen may help relieve mild symptoms that occur after activity.  Surgery and bone trimming  To ease movement and reduce pain, your doctor may trim damaged bone. If arthritis is severe, the joint may be fused or removed. If the bone is not damaged too badly, your doctor may simply shave away bone spurs. Any excess bone growth related to a bunion may also be trimmed.  Fusing joints  If damage is more severe, your doctor may fuse the joint to prevent the bones from rubbing. Afterward, staples, plates, or screws may hold the bones in place so they heal properly. In some cases, the joint may be removed and replaced with an implant.  After surgery  During the early stages of recovery, your foot is likely to be bandaged and immobilized for a while. For best results, follow up with your doctor as scheduled. These visits help ensure that your foot heals properly.  As you heal  After surgery, youll be told how to care for your incision and how soon to begin  walking on the foot. Until the foot can bear weight, you may need to walk with crutches or a cane.  For surgery on the big toe, your foot may be splinted to limit movement for several weeks. Despite this, you should be able to walk soon after surgery.  For surgery on rear or midfoot joints, you may need to wear a cast or surgical shoe. These joints are fairly large, so full recovery may take a few months. Once the bone has healed, any staples, plates, or screws may be removed.  Date Last Reviewed: 7/1/2016 © 2000-2016 [employer]. [address] [place] [address]. All rights reserved. This information is not intended as a substitute for professional medical care. Always follow your healthcare professional's instructions.        Foot Surgery: Degenerative Joint Disease    Degenerative joint disease (arthritis) often happens in the joint of a big toe. This bone growth may cause pain and stiffness in the joint. Left untreated, arthritis can break down the cartilage and destroy the joint. Your treatment choices depend on how damaged your joint is. There are many nonsurgical treatments, but if these are not helpful, surgery may be considered.    Cheilectomy  This is done when the arthritic joint and cartilage can be saved. A bone spur caused by arthritis may be symptomatic on the top of the big toe joint. The procedure involves removing this bone spur, usually with a small part of the top of the joint itself.  You will need to wear a surgical shoe for several weeks. Once the foot heals, joint movement is restored.    Fusion  In fusion, the cartilage and some bone on both sides of the joint are removed. Then, the big toe and metatarsal bones are held together with staples, screws, or a plate and screws. Your foot may be placed in a cast. While you heal, you will be asked not to bear weight on this foot. You may also need crutches for several weeks. Because the joint has been removed, your toe will  be less flexible.    Arthroplasty  During surgery, bone growth caused by the arthritis is trimmed, and part of the joint is removed. A pin can be used to align the bones and to keep them from touching. The pin is removed after several weeks. In some cases, the entire joint may be replaced with an implant. You may have to wear a splint or a surgical shoe for several weeks. When healed, the bones become connected with scar tissue.  Date Last Reviewed: 10/15/2015  © 3588-0798 [employer]. [address] [place] [address]. All rights reserved. This information is not intended as a substitute for professional medical care. Always follow your healthcare professional's instructions.            Wearing Proper Shoes                    You walk on your feet every day, forcing them to support the weight of your body. Repeated stress on your feet can cause damage over time. The right shoes can help protect your feet. The wrong shoes can cause more foot problems. Read the information below to help you find a shoe that fits your foot needs.     A good shoe fit will cover your foot outline.       A shoe that doesnt cover the outline is a bad fit.      Whats Your Foot Shape?  To get a good fit, you need to know the shape of your foot. Do this simple test: While standing, place your foot on a piece of paper and trace around it. Is your foot straight or curved? Do you have a foot problem, such as a bunion, that causes your foot outline to show a bulge on the side of your big toe?  Finding Your Fit  Bring your foot outline to the [employer] store to help you find the right shoe. Place a shoe you like on top of the outline to see if it matches the shape. The shoe should cover the outline. (If you have a bunion, the shoe may not cover the bulge on the outline. Look for soft leather shoes to stretch over the bunion.) Once youve found a pair of proper shoes, put them on. Walk around. Be sure the shoes dont rub or  pinch. If the shoes feel good, youve found your fit!  The Right Shoe for You  A good shoe has features that provide comfort and support. It must also be the right size and shape for your feet. Look for a shoe made of breathable fabric and lining, such as leather or canvas. Be sure that shoes have enough tread to prevent slipping. Go to a good shoe store for help finding the right shoe.  Good Shoe Features  An ideal shoe has the following:  Laces for support. If tying laces is a problem for you, try shoes with Velcro fasteners or sylvia.  A front of the shoe (toe box) with ½ inch space in front of your longest toes.  An arch shape that supports your foot.  No more than 1½ inches of heel.  A stiff, snug back of the shoe to keep your foot from sliding around.  A smooth lining with no rough seams.  Shoe Shopping Tips  Below are some dos and donts for when you go to the shoe store.  Do:  Select the shoes that feel right. Wear them around the house. Then bring them to your foot doctor to check for fit. If they dont fit well, return them.  Shop late in the day, when your feet will be slightly bigger.  Each time you buy shoes, have both your feet measured while you are standing. Foot size changes with time.  Pick shoes to suit their purpose. High heels are okay for an occasional night on the town. But for everyday wear, choose a more sensible shoe.  Try on shoes while wearing any inserts specially made for your feet (orthoses).  Try on both the right and left shoes. If your feet are different sizes, pick a pair that fits the larger foot.  Dont:  Dont buy shoes based on shoe size alone. Always try on shoes, as sizes differ from brand to brand and within brands.  Dont expect shoes to break in. If they dont fit at the store, dont buy them.  Dont buy a shoe that doesnt match your foot shape.  What About Socks?  Always wear socks with shoes. Socks help absorb sweat and reduce friction and blistering. When shopping  for shoes, choose soft, padded socks with seams that dont irritate your feet.  If You Have Foot Problems  Some foot problems cause deformities. This can make it hard to find a good fit. Look for shoes made of soft leather to stretch over the deformity. If you have bunions, buy shoes with a wider toe box. To fit hammertoes, look for shoes with a tall toe box. If you have arch problems, you may need inserts. In some cases, youll need to have custom footwear or orthoses made for your feet.  Suggested Footwear  Ask your healthcare provider what kind of footwear you need. He or she may recommend a certain brand or shoe store.  © 0192-6517 [employer]. [address] [place] [address]. All rights reserved. This information is not intended as a substitute for professional medical care. Always follow your healthcare professional's instructions.        Toe Extension (Flexibility)    These instructions are for your right foot. Switch sides for your left foot.  Sit in a chair. Rest your right ankle on your left knee.  Hold your toes with your right hand. Gently bend the toes backward. Feel a stretch in the undersides of the toes and ball of the foot. Hold for 30 to 60 seconds.  Then gently bend the toes in the other direction. Gently press on them until your foot is pointed. Hold for 30 to 60 seconds.  Repeat 5 times, or as instructed.  © 2000-2016 [employer]. [address] [place] [address]. All rights reserved. This information is not intended as a substitute for professional medical care. Always follow your healthcare professional's instructions.      Ankle Alphabet (Flexibility)    These instructions are for your right foot. Switch sides for your left foot.  Sit on the floor with your legs straight in front of you.  Rest your right calf on a rolled-up towel. Use your foot to write the letters of the alphabet in mid-air.  Repeat this exercise 3 times a day, or as  instructed.  Date Last Reviewed: 3/10/2016  © 9643-6502 [employer]. [address] [place] [address]. All rights reserved. This information is not intended as a substitute for professional medical care. Always follow your healthcare professional's instructions.        Calf Raise (Strength)    Stand up straight with both feet flat on the floor, slightly apart. Hold onto a sturdy chair, railing, counter, or table.  Raise both heels so youre standing on the balls of your feet. Dont lock your knees or arch your back. Hold for 5 seconds. Then slowly lower your heels back down to the floor.  Repeat 10 times, or as instructed.  Do this exercise 3 times a day, or as instructed.     Challenge yourself  As you become stronger, do this exercise on one foot at a time.   Date Last Reviewed: 3/10/2016  © 0242-4935 [employer]. [address] [place] [address]. All rights reserved. This information is not intended as a substitute for professional medical care. Always follow your healthcare professional's instructions.        Bent-Knee Calf Stretch  This exercise is designed to stretch and strengthen your feet and ankles. Before beginning the exercise, read through all the instructions. While exercising, breathe normally and dont bounce. If you feel any pain, stop the exercise. If pain persists, inform your healthcare provider:    Stand an arms length away from a wall. Place the palms of your hands on the wall. Step forward about 12 inches with your ______ foot.  Keeping toes pointed forward and both heels on the floor, bend both knees and lean forward. Hold for ______ seconds. Relax.  Repeat ______ times. Do ______ sets a day.  Date Last Reviewed: 8/16/2015  © 9364-6152 [employer]. [address] [place] [address]. All rights reserved. This information is not intended as a substitute for professional medical care. Always follow your healthcare  professional's instructions.        Arch retraining    These exercises are for your right foot. Switch sides for your left foot.  Sit in a chair or stand with both feet flat on the floor. Press down with the ball of your right foot, but only on the left side of the foot, just under the big toe.  Then pull the bottom of your big toe back toward your heel. This should pull up the arch of your foot. Dont flex your toes while doing this. It is a subtle movement of the arch.  Hold for 5 seconds. Relax.  Date Last Reviewed: 3/10/2016  © 0621-8866 [employer]. [address] [place] [address]. All rights reserved. This information is not intended as a substitute for professional medical care. Always follow your healthcare professional's instructions.        Ankle Dorsiflexion/Plantarflexion (Flexibility)    Sit on the floor or in bed with your legs straight in front of you.  Point both feet. Then flex both feet.  Do this 10 to 30 times in a row.  Repeat this exercise 2 times a day, or as instructed.  Date Last Reviewed: 5/1/2016 © 2000-2016 [employer]. [address] [place] [address]. All rights reserved. This information is not intended as a substitute for professional medical care. Always follow your healthcare professional's instructions.
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
## 2023-07-12 ENCOUNTER — TELEPHONE (OUTPATIENT)
Dept: INTERNAL MEDICINE | Facility: CLINIC | Age: 68
End: 2023-07-12
Payer: MEDICARE

## 2023-07-12 NOTE — TELEPHONE ENCOUNTER
----- Message from Nanda Salazar sent at 7/12/2023  9:19 AM CDT -----  Contact: [phone] @ patient  Good morning patient would like a call back from the doc to see if it is okay for her to take a medication that the foot doc wants her to take  methylPREDNISolone (MEDROL DOSEPACK) 4 mg tablet is the medication. Please give patient a call back [phone]

## 2023-07-24 ENCOUNTER — OFFICE VISIT (OUTPATIENT)
Dept: OBSTETRICS AND GYNECOLOGY | Facility: CLINIC | Age: 68
End: 2023-07-24
Payer: MEDICARE

## 2023-07-24 VITALS
WEIGHT: 160.5 LBS | HEIGHT: 67 IN | SYSTOLIC BLOOD PRESSURE: 86 MMHG | BODY MASS INDEX: 25.19 KG/M2 | DIASTOLIC BLOOD PRESSURE: 56 MMHG

## 2023-07-24 DIAGNOSIS — Z00.00 ANNUAL PHYSICAL EXAM: ICD-10-CM

## 2023-07-24 DIAGNOSIS — Z01.419 ENCOUNTER FOR GYNECOLOGICAL EXAMINATION WITHOUT ABNORMAL FINDING: Primary | ICD-10-CM

## 2023-07-24 DIAGNOSIS — Z11.3 VENEREAL DISEASE SCREENING: ICD-10-CM

## 2023-07-24 DIAGNOSIS — R68.82 DECREASED LIBIDO: ICD-10-CM

## 2023-07-24 PROCEDURE — 3044F HG A1C LEVEL LT 7.0%: CPT | Mod: HCNC,CPTII,S$GLB, | Performed by: OBSTETRICS & GYNECOLOGY

## 2023-07-24 PROCEDURE — 3008F BODY MASS INDEX DOCD: CPT | Mod: HCNC,CPTII,S$GLB, | Performed by: OBSTETRICS & GYNECOLOGY

## 2023-07-24 PROCEDURE — 4010F PR ACE/ARB THEARPY RXD/TAKEN: ICD-10-PCS | Mod: HCNC,CPTII,S$GLB, | Performed by: OBSTETRICS & GYNECOLOGY

## 2023-07-24 PROCEDURE — 81514 NFCT DS BV&VAGINITIS DNA ALG: CPT | Mod: HCNC | Performed by: OBSTETRICS & GYNECOLOGY

## 2023-07-24 PROCEDURE — 99999 PR PBB SHADOW E&M-EST. PATIENT-LVL III: ICD-10-PCS | Mod: PBBFAC,HCNC,, | Performed by: OBSTETRICS & GYNECOLOGY

## 2023-07-24 PROCEDURE — 87591 N.GONORRHOEAE DNA AMP PROB: CPT | Mod: HCNC | Performed by: OBSTETRICS & GYNECOLOGY

## 2023-07-24 PROCEDURE — 1159F PR MEDICATION LIST DOCUMENTED IN MEDICAL RECORD: ICD-10-PCS | Mod: HCNC,CPTII,S$GLB, | Performed by: OBSTETRICS & GYNECOLOGY

## 2023-07-24 PROCEDURE — 1159F MED LIST DOCD IN RCRD: CPT | Mod: HCNC,CPTII,S$GLB, | Performed by: OBSTETRICS & GYNECOLOGY

## 2023-07-24 PROCEDURE — 3288F PR FALLS RISK ASSESSMENT DOCUMENTED: ICD-10-PCS | Mod: HCNC,CPTII,S$GLB, | Performed by: OBSTETRICS & GYNECOLOGY

## 2023-07-24 PROCEDURE — 3288F FALL RISK ASSESSMENT DOCD: CPT | Mod: HCNC,CPTII,S$GLB, | Performed by: OBSTETRICS & GYNECOLOGY

## 2023-07-24 PROCEDURE — 3008F PR BODY MASS INDEX (BMI) DOCUMENTED: ICD-10-PCS | Mod: HCNC,CPTII,S$GLB, | Performed by: OBSTETRICS & GYNECOLOGY

## 2023-07-24 PROCEDURE — 4010F ACE/ARB THERAPY RXD/TAKEN: CPT | Mod: HCNC,CPTII,S$GLB, | Performed by: OBSTETRICS & GYNECOLOGY

## 2023-07-24 PROCEDURE — 1101F PR PT FALLS ASSESS DOC 0-1 FALLS W/OUT INJ PAST YR: ICD-10-PCS | Mod: HCNC,CPTII,S$GLB, | Performed by: OBSTETRICS & GYNECOLOGY

## 2023-07-24 PROCEDURE — 99999 PR PBB SHADOW E&M-EST. PATIENT-LVL III: CPT | Mod: PBBFAC,HCNC,, | Performed by: OBSTETRICS & GYNECOLOGY

## 2023-07-24 PROCEDURE — 1101F PT FALLS ASSESS-DOCD LE1/YR: CPT | Mod: HCNC,CPTII,S$GLB, | Performed by: OBSTETRICS & GYNECOLOGY

## 2023-07-24 PROCEDURE — G0101 PR CA SCREEN;PELVIC/BREAST EXAM: ICD-10-PCS | Mod: HCNC,GZ,S$GLB, | Performed by: OBSTETRICS & GYNECOLOGY

## 2023-07-24 PROCEDURE — 3078F PR MOST RECENT DIASTOLIC BLOOD PRESSURE < 80 MM HG: ICD-10-PCS | Mod: HCNC,CPTII,S$GLB, | Performed by: OBSTETRICS & GYNECOLOGY

## 2023-07-24 PROCEDURE — 3078F DIAST BP <80 MM HG: CPT | Mod: HCNC,CPTII,S$GLB, | Performed by: OBSTETRICS & GYNECOLOGY

## 2023-07-24 PROCEDURE — 3044F PR MOST RECENT HEMOGLOBIN A1C LEVEL <7.0%: ICD-10-PCS | Mod: HCNC,CPTII,S$GLB, | Performed by: OBSTETRICS & GYNECOLOGY

## 2023-07-24 PROCEDURE — 3074F PR MOST RECENT SYSTOLIC BLOOD PRESSURE < 130 MM HG: ICD-10-PCS | Mod: HCNC,CPTII,S$GLB, | Performed by: OBSTETRICS & GYNECOLOGY

## 2023-07-24 PROCEDURE — G0101 CA SCREEN;PELVIC/BREAST EXAM: HCPCS | Mod: HCNC,GZ,S$GLB, | Performed by: OBSTETRICS & GYNECOLOGY

## 2023-07-24 PROCEDURE — 3074F SYST BP LT 130 MM HG: CPT | Mod: HCNC,CPTII,S$GLB, | Performed by: OBSTETRICS & GYNECOLOGY

## 2023-07-24 RX ORDER — METFORMIN HYDROCHLORIDE 1000 MG/1
TABLET ORAL
COMMUNITY

## 2023-07-24 RX ORDER — DIPHENHYDRAMINE HCL 25 MG
CAPSULE ORAL
COMMUNITY
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
## 2023-07-25 LAB
C TRACH DNA SPEC QL NAA+PROBE: NOT DETECTED
N GONORRHOEA DNA SPEC QL NAA+PROBE: NOT DETECTED

## 2023-07-26 DIAGNOSIS — B37.9 YEAST INFECTION: Primary | ICD-10-CM

## 2023-07-26 LAB
BACTERIAL VAGINOSIS DNA: NEGATIVE
CANDIDA GLABRATA DNA: NEGATIVE
CANDIDA KRUSEI DNA: POSITIVE
CANDIDA RRNA VAG QL PROBE: NEGATIVE
T VAGINALIS RRNA GENITAL QL PROBE: NEGATIVE

## 2023-07-26 RX ORDER — FLUCONAZOLE 150 MG/1
150 TABLET ORAL DAILY
Qty: 7 TABLET | Refills: 0 | Status: SHIPPED | OUTPATIENT
Start: 2023-07-26

## 2023-09-07 ENCOUNTER — TELEPHONE (OUTPATIENT)
Dept: OBSTETRICS AND GYNECOLOGY | Facility: CLINIC | Age: 68
End: 2023-09-07
Payer: MEDICARE

## 2023-09-07 NOTE — TELEPHONE ENCOUNTER
----- Message from Karine Quiñonez sent at 9/7/2023  1:58 PM CDT -----  Pt called to get test results from July. Pls call and advise.

## 2023-09-08 ENCOUNTER — OFFICE VISIT (OUTPATIENT)
Dept: INTERNAL MEDICINE | Facility: CLINIC | Age: 68
End: 2023-09-08
Payer: MEDICARE

## 2023-09-08 VITALS
DIASTOLIC BLOOD PRESSURE: 82 MMHG | HEART RATE: 62 BPM | OXYGEN SATURATION: 99 % | HEIGHT: 67 IN | SYSTOLIC BLOOD PRESSURE: 144 MMHG | WEIGHT: 173.06 LBS | BODY MASS INDEX: 27.16 KG/M2

## 2023-09-08 DIAGNOSIS — E78.5 HYPERLIPIDEMIA ASSOCIATED WITH TYPE 2 DIABETES MELLITUS: ICD-10-CM

## 2023-09-08 DIAGNOSIS — I10 ESSENTIAL HYPERTENSION: Primary | ICD-10-CM

## 2023-09-08 DIAGNOSIS — Z79.899 ENCOUNTER FOR LONG-TERM (CURRENT) USE OF OTHER MEDICATIONS: ICD-10-CM

## 2023-09-08 DIAGNOSIS — E11.69 HYPERLIPIDEMIA ASSOCIATED WITH TYPE 2 DIABETES MELLITUS: ICD-10-CM

## 2023-09-08 DIAGNOSIS — E11.9 TYPE 2 DIABETES MELLITUS WITHOUT COMPLICATION, WITHOUT LONG-TERM CURRENT USE OF INSULIN: ICD-10-CM

## 2023-09-08 PROCEDURE — 3079F DIAST BP 80-89 MM HG: CPT | Mod: HCNC,CPTII,S$GLB, | Performed by: FAMILY MEDICINE

## 2023-09-08 PROCEDURE — 1159F MED LIST DOCD IN RCRD: CPT | Mod: HCNC,CPTII,S$GLB, | Performed by: FAMILY MEDICINE

## 2023-09-08 PROCEDURE — 99214 OFFICE O/P EST MOD 30 MIN: CPT | Mod: HCNC,S$GLB,, | Performed by: FAMILY MEDICINE

## 2023-09-08 PROCEDURE — 3077F PR MOST RECENT SYSTOLIC BLOOD PRESSURE >= 140 MM HG: ICD-10-PCS | Mod: HCNC,CPTII,S$GLB, | Performed by: FAMILY MEDICINE

## 2023-09-08 PROCEDURE — 1101F PR PT FALLS ASSESS DOC 0-1 FALLS W/OUT INJ PAST YR: ICD-10-PCS | Mod: HCNC,CPTII,S$GLB, | Performed by: FAMILY MEDICINE

## 2023-09-08 PROCEDURE — 3288F PR FALLS RISK ASSESSMENT DOCUMENTED: ICD-10-PCS | Mod: HCNC,CPTII,S$GLB, | Performed by: FAMILY MEDICINE

## 2023-09-08 PROCEDURE — 3077F SYST BP >= 140 MM HG: CPT | Mod: HCNC,CPTII,S$GLB, | Performed by: FAMILY MEDICINE

## 2023-09-08 PROCEDURE — 3008F PR BODY MASS INDEX (BMI) DOCUMENTED: ICD-10-PCS | Mod: HCNC,CPTII,S$GLB, | Performed by: FAMILY MEDICINE

## 2023-09-08 PROCEDURE — 3044F PR MOST RECENT HEMOGLOBIN A1C LEVEL <7.0%: ICD-10-PCS | Mod: HCNC,CPTII,S$GLB, | Performed by: FAMILY MEDICINE

## 2023-09-08 PROCEDURE — 1126F AMNT PAIN NOTED NONE PRSNT: CPT | Mod: HCNC,CPTII,S$GLB, | Performed by: FAMILY MEDICINE

## 2023-09-08 PROCEDURE — 4010F PR ACE/ARB THEARPY RXD/TAKEN: ICD-10-PCS | Mod: HCNC,CPTII,S$GLB, | Performed by: FAMILY MEDICINE

## 2023-09-08 PROCEDURE — 3044F HG A1C LEVEL LT 7.0%: CPT | Mod: HCNC,CPTII,S$GLB, | Performed by: FAMILY MEDICINE

## 2023-09-08 PROCEDURE — 99999 PR PBB SHADOW E&M-EST. PATIENT-LVL IV: CPT | Mod: PBBFAC,HCNC,, | Performed by: FAMILY MEDICINE

## 2023-09-08 PROCEDURE — 1160F PR REVIEW ALL MEDS BY PRESCRIBER/CLIN PHARMACIST DOCUMENTED: ICD-10-PCS | Mod: HCNC,CPTII,S$GLB, | Performed by: FAMILY MEDICINE

## 2023-09-08 PROCEDURE — 1101F PT FALLS ASSESS-DOCD LE1/YR: CPT | Mod: HCNC,CPTII,S$GLB, | Performed by: FAMILY MEDICINE

## 2023-09-08 PROCEDURE — 1160F RVW MEDS BY RX/DR IN RCRD: CPT | Mod: HCNC,CPTII,S$GLB, | Performed by: FAMILY MEDICINE

## 2023-09-08 PROCEDURE — 3008F BODY MASS INDEX DOCD: CPT | Mod: HCNC,CPTII,S$GLB, | Performed by: FAMILY MEDICINE

## 2023-09-08 PROCEDURE — 99999 PR PBB SHADOW E&M-EST. PATIENT-LVL IV: ICD-10-PCS | Mod: PBBFAC,HCNC,, | Performed by: FAMILY MEDICINE

## 2023-09-08 PROCEDURE — 4010F ACE/ARB THERAPY RXD/TAKEN: CPT | Mod: HCNC,CPTII,S$GLB, | Performed by: FAMILY MEDICINE

## 2023-09-08 PROCEDURE — 3079F PR MOST RECENT DIASTOLIC BLOOD PRESSURE 80-89 MM HG: ICD-10-PCS | Mod: HCNC,CPTII,S$GLB, | Performed by: FAMILY MEDICINE

## 2023-09-08 PROCEDURE — 1159F PR MEDICATION LIST DOCUMENTED IN MEDICAL RECORD: ICD-10-PCS | Mod: HCNC,CPTII,S$GLB, | Performed by: FAMILY MEDICINE

## 2023-09-08 PROCEDURE — 99214 PR OFFICE/OUTPT VISIT, EST, LEVL IV, 30-39 MIN: ICD-10-PCS | Mod: HCNC,S$GLB,, | Performed by: FAMILY MEDICINE

## 2023-09-08 PROCEDURE — 3288F FALL RISK ASSESSMENT DOCD: CPT | Mod: HCNC,CPTII,S$GLB, | Performed by: FAMILY MEDICINE

## 2023-09-08 PROCEDURE — 1126F PR PAIN SEVERITY QUANTIFIED, NO PAIN PRESENT: ICD-10-PCS | Mod: HCNC,CPTII,S$GLB, | Performed by: FAMILY MEDICINE

## 2023-09-08 RX ORDER — MELOXICAM 7.5 MG/1
7.5 TABLET ORAL DAILY PRN
COMMUNITY
Start: 2023-08-19

## 2023-09-08 RX ORDER — VALACYCLOVIR HYDROCHLORIDE 500 MG/1
TABLET, FILM COATED ORAL
Qty: 30 TABLET | Refills: 5 | Status: SHIPPED | OUTPATIENT
Start: 2023-09-08

## 2023-09-08 RX ORDER — TIZANIDINE 4 MG/1
8 TABLET ORAL NIGHTLY PRN
COMMUNITY
Start: 2023-07-28
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
## 2023-09-14 NOTE — PROGRESS NOTES
"INTERNAL MEDICINE CLINIC - SAME DAY APPOINTMENT  Progress Note    PRESENTING HISTORY     PCP: Sebastian Harris MD  Chief Complaint/Reason for Visit:   No chief complaint on file.      History of Present Illness & ROS : Ms. Autumn Smart is a 64 y.o. female.    Here for UC visit. Reports that has been having chronic issues with Potassium and wants refill on potassium.   Former patient of Dr Harris who is no longer with the practice (REsident).  Reports that the pain to left and right leg are not "new" and being followed by [facility] (outside of [facility]) for this with next scheduled appt on 11/2/2019.   Noted the xrays from 8/2019, + Varus Deformity and DJD. Will follow up with outside team and reports "waiting for brace".     On chronic Pecocets per the outside team.     Review of Systems:  Eyes: denies visual changes at this time denies floaters   ENT: no nasal congestion or sore throat  Respiratory: no cough or shorness of breath  Cardiovascular: no chest pain or palpitations  Gastrointestinal: no nausea or vomiting, no abdominal pain or change in bowel habits  Genitourinary: no hematuria or dysuria; denies frequency  Hematologic/Lymphatic: no easy bruising or lymphadenopathy  Musculoskeletal: no arthralgias or myalgias  Neurological: no seizures or tremors  Endocrine: no heat or cold intolerance      PAST HISTORY:     Past Medical History:   Diagnosis Date    Back pain     CVA (cerebral vascular accident)     Diabetes mellitus     Diabetes mellitus, type 2     Embolic stroke involving left middle cerebral artery 11/23/2017    Hyperlipidemia     Hypertension     Insomnia        Past Surgical History:   Procedure Laterality Date    BACK SURGERY      FRACTURE SURGERY      right leg surgery Right     dionna in right leg       Family History   Problem Relation Age of Onset    Diabetes Mother     Hypertension Mother     Diabetes Father     Hypertension Father     Breast cancer Neg Hx     " Colon cancer Neg Hx     Ovarian cancer Neg Hx        Social History     Socioeconomic History    Marital status:      Spouse name: Not on file    Number of children: Not on file    Years of education: Not on file    Highest education level: Not on file   Occupational History    Not on file   Social Needs    Financial resource strain: Not on file    Food insecurity:     Worry: Not on file     Inability: Not on file    Transportation needs:     Medical: Not on file     Non-medical: Not on file   Tobacco Use    Smoking status: Never Smoker    Smokeless tobacco: Never Used   Substance and Sexual Activity    Alcohol use: No     Alcohol/week: 1.0 standard drinks     Types: 1 Cans of beer per week    Drug use: Yes     Types: Marijuana     Comment: denies    Sexual activity: Yes     Partners: Male     Comment: relationship of 2 years broke up APril due to infidelity   Lifestyle    Physical activity:     Days per week: Not on file     Minutes per session: Not on file    Stress: Not on file   Relationships    Social connections:     Talks on phone: Not on file     Gets together: Not on file     Attends Hindu service: Not on file     Active member of club or organization: Not on file     Attends meetings of clubs or organizations: Not on file     Relationship status: Not on file   Other Topics Concern    Not on file   Social History Narrative    Not on file       MEDICATIONS & ALLERGIES:     Current Outpatient Medications on File Prior to Visit   Medication Sig Dispense Refill    amLODIPine (NORVASC) 5 MG tablet TAKE 1 TABLET BY MOUTH EVERY DAY 30 tablet 11    atorvastatin (LIPITOR) 40 MG tablet Take 1 tablet (40 mg total) by mouth once daily. 90 tablet 0    azithromycin (ZITHROMAX Z-DUSTIN) 250 MG tablet Take 2 tablets (500 mg) on  Day 1,  followed by 1 tablet (250 mg) once daily on Days 2 through 5. 6 tablet 0    cloNIDine (CATAPRES) 0.3 MG tablet TAKE 1 TABLET BY MOUTH DAILY AS NEEDED ONLY IF  BLOOD PRESSURE IS GREATER THAN OR EQUAL /100 30 tablet 0    clopidogrel (PLAVIX) 75 mg tablet Take 1 tablet (75 mg total) by mouth once daily. 30 tablet 1    hydroCHLOROthiazide (HYDRODIURIL) 25 MG tablet TAKE 1 TABLET BY MOUTH EVERY DAY 30 tablet 11    ibuprofen (ADVIL,MOTRIN) 200 MG tablet   0    ipratropium (ATROVENT) 0.03 % nasal spray 2 sprays by Nasal route 2 (two) times daily. 30 mL 0    lisinopril (PRINIVIL,ZESTRIL) 40 MG tablet TAKE 1 TABLET BY MOUTH EVERY DAY 30 tablet 11    metformin (GLUCOPHAGE) 1000 MG tablet Take 1 tablet (1,000 mg total) by mouth 2 (two) times daily with meals. 60 tablet 3    potassium chloride (K-TAB) 20 mEq TAKE 1 TABLET (20 MEQ TOTAL) BY MOUTH ONCE DAILY. 30 tablet 2    QUEtiapine (SEROQUEL XR) 400 MG Tb24 Take by mouth every evening.       No current facility-administered medications on file prior to visit.         Review of patient's allergies indicates:  No Known Allergies    Medications Reconciliation:   I have reconciled the patient's home medications with the patient/family. I have updated all changes.  Refer to After-Visit Medication List.    OBJECTIVE:     Vital Signs:  There were no vitals filed for this visit.  Wt Readings from Last 1 Encounters:   06/14/19 1700 79.4 kg (175 lb)     There is no height or weight on file to calculate BMI.     Wt Readings from Last 3 Encounters:   09/24/19 78.8 kg (173 lb 11.6 oz)   06/14/19 79.4 kg (175 lb)   03/02/19 79.4 kg (175 lb)     Temp Readings from Last 3 Encounters:   06/14/19 98 °F (36.7 °C) (Oral)   03/02/19 (!) 100.9 °F (38.3 °C)   06/06/18 98.7 °F (37.1 °C) (Oral)     BP Readings from Last 3 Encounters:   09/24/19 120/80   06/14/19 (!) 186/83   03/02/19 (!) 161/104     Pulse Readings from Last 3 Encounters:   09/24/19 78   06/14/19 (!) 56   03/02/19 108           Physical Exam:  General: Well developed, well nourished. No distress.  HEENT: Head is normocephalic, atraumatic; ears are normal.   Eyes: Clear  "conjunctiva.  Neck: Supple, symmetrical neck; trachea midline.  Lungs: Clear to auscultation bilaterally and normal respiratory effort.  Cardiovascular: Heart with regular rate and rhythm. No murmurs, gallops or rubs  Extremities: No LE edema. Pulses 2+ and symmetric.   Abdomen: Abdomen is soft, non-tender non-distended with normal bowel sounds.  Skin: Skin color, texture, turgor normal. No rashes.  Lymph Nodes: No cervical or supraclavicular adenopathy.  Neurologic: Normal strength and tone. No focal numbness or weakness.   Psychiatric: Not depressed.        Laboratory  Lab Results   Component Value Date    WBC 4.69 06/06/2018    HGB 11.3 (L) 06/06/2018    HCT 36.4 (L) 06/06/2018     06/06/2018    CHOL 132 11/23/2017    TRIG 114 11/23/2017    HDL 44 11/23/2017    ALT 17 06/06/2018    AST 19 06/06/2018     09/05/2018    K 3.4 (L) 09/05/2018     09/05/2018    CREATININE 0.8 09/05/2018    BUN 9 09/05/2018    CO2 32 (H) 09/05/2018    TSH 0.333 (L) 11/23/2017    INR 1.0 11/24/2017    HGBA1C 7.8 (H) 11/24/2017         ASSESSMENT & PLAN:     Here for UC visit.     Encounter with request for medication refills.   Chronically Hypokalemic   (On HCTZ therapy)  (Last level was in 6/2018: 3.6)  Has been informed that not comfortable refilling the "Potassium" pills until labs are obtained today. Voiced understanding.   Reports that the last K pill was 2 days ago.       *She understands that she must schedule an appt for a PE (last was in 6/2018) and a new Provider to be fully est'd in medical care.  Overdue on annual.     Future Appointments   Date Time Provider Department Center   9/27/2019 11:00 AM HOME MONITOR DEVICE CHECK, [facility] [place]        Medication List           Accurate as of September 24, 2019 11:10 AM. If you have any questions, ask your nurse or doctor.               CONTINUE taking these medications    * amLODIPine 10 MG tablet  Commonly known as:  NORVASC  Take 1 tablet (10 mg " total) by mouth once daily.     * amLODIPine 5 MG tablet  Commonly known as:  NORVASC  TAKE 1 TABLET BY MOUTH EVERY DAY     atorvastatin 40 MG tablet  Commonly known as:  LIPITOR  Take 1 tablet (40 mg total) by mouth once daily.     azithromycin 250 MG tablet  Commonly known as:  ZITHROMAX Z-DUSTIN  Take 2 tablets (500 mg) on  Day 1,  followed by 1 tablet (250 mg) once daily on Days 2 through 5.     cloNIDine 0.3 MG tablet  Commonly known as:  CATAPRES  TAKE 1 TABLET BY MOUTH DAILY AS NEEDED ONLY IF BLOOD PRESSURE IS GREATER THAN OR EQUAL /100     clopidogrel 75 mg tablet  Commonly known as:  PLAVIX  Take 1 tablet (75 mg total) by mouth once daily.     hydroCHLOROthiazide 25 MG tablet  Commonly known as:  HYDRODIURIL  TAKE 1 TABLET BY MOUTH EVERY DAY     ibuprofen 200 MG tablet  Commonly known as:  ADVIL,MOTRIN     ipratropium 0.03 % nasal spray  Commonly known as:  ATROVENT  2 sprays by Nasal route 2 (two) times daily.     lisinopril 40 MG tablet  Commonly known as:  PRINIVIL,ZESTRIL  TAKE 1 TABLET BY MOUTH EVERY DAY     metFORMIN 1000 MG tablet  Commonly known as:  GLUCOPHAGE  Take 1 tablet (1,000 mg total) by mouth 2 (two) times daily with meals.     potassium chloride 20 mEq  Commonly known as:  K-TAB  TAKE 1 TABLET (20 MEQ TOTAL) BY MOUTH ONCE DAILY.     QUEtiapine 400 MG Tb24  Commonly known as:  SEROQUEL XR         * This list has 2 medication(s) that are the same as other medications prescribed for you. Read the directions carefully, and ask your doctor or other care provider to review them with you.                Signing Physician:  MIAH Villalobos   GRAZYNA worley RN

## 2023-10-11 DIAGNOSIS — I10 ESSENTIAL HYPERTENSION: Primary | ICD-10-CM

## 2023-10-11 DIAGNOSIS — I10 HTN (HYPERTENSION), MALIGNANT: ICD-10-CM

## 2023-10-11 RX ORDER — LISINOPRIL 40 MG/1
40 TABLET ORAL
Qty: 90 TABLET | Refills: 3 | Status: SHIPPED | OUTPATIENT
Start: 2023-10-11

## 2023-10-11 NOTE — TELEPHONE ENCOUNTER
No care due was identified.  [facility] Embedded Care Due Messages. Reference number: [id number].   10/11/2023 12:12:47 PM CDT

## 2023-10-11 NOTE — TELEPHONE ENCOUNTER
Refill Routing Note   Medication(s) are not appropriate for processing by [facility] for the following reason(s):      Required vitals abnormal    [facility] action(s):  Defer Care Due:  None identified            Appointments  past 12m or future 3m with PCP    Date Provider   Last Visit   9/8/2023 Shravan Diane MD   Next Visit   12/1/2023 Shravan Diane MD   ED visits in past 90 days: 0        Note composed:1:50 PM 10/11/2023

## 2023-10-14 DIAGNOSIS — I10 ESSENTIAL HYPERTENSION: ICD-10-CM

## 2023-10-14 NOTE — TELEPHONE ENCOUNTER
No care due was identified.  [facility] Embedded Care Due Messages. Reference number: [id number].   10/14/2023 12:49:44 PM CDT

## 2023-10-15 NOTE — TELEPHONE ENCOUNTER
Refill Routing Note   Medication(s) are not appropriate for processing by [facility] for the following reason(s):      Required vitals abnormal    [facility] action(s):  Defer Care Due:  None identified            Appointments  past 12m or future 3m with PCP    Date Provider   Last Visit   9/8/2023 Shravan Diane MD   Next Visit   12/1/2023 Shravan Diane MD   ED visits in past 90 days: 0        Note composed:9:39 AM 10/15/2023

## 2023-10-16 RX ORDER — AMLODIPINE BESYLATE 5 MG/1
5 TABLET ORAL
Qty: 90 TABLET | Refills: 3 | Status: SHIPPED | OUTPATIENT
Start: 2023-10-16

## 2023-11-08 DIAGNOSIS — E11.9 TYPE 2 DIABETES MELLITUS WITHOUT COMPLICATION, UNSPECIFIED WHETHER LONG TERM INSULIN USE: ICD-10-CM

## 2023-11-14 ENCOUNTER — PATIENT OUTREACH (OUTPATIENT)
Dept: ADMINISTRATIVE | Facility: HOSPITAL | Age: 68
End: 2023-11-14
Payer: MEDICARE

## 2023-12-22 ENCOUNTER — NURSE TRIAGE (OUTPATIENT)
Dept: ADMINISTRATIVE | Facility: CLINIC | Age: 68
End: 2023-12-22
Payer: MEDICARE

## 2023-12-22 ENCOUNTER — HOSPITAL ENCOUNTER (EMERGENCY)
Facility: HOSPITAL | Age: 68
Discharge: HOME | End: 2023-12-22
Attending: EMERGENCY MEDICINE
Payer: MEDICARE

## 2023-12-22 VITALS
DIASTOLIC BLOOD PRESSURE: 87 MMHG | WEIGHT: 173 LBS | BODY MASS INDEX: 27.15 KG/M2 | HEART RATE: 81 BPM | HEIGHT: 67 IN | RESPIRATION RATE: 16 BRPM | TEMPERATURE: 98 F | OXYGEN SATURATION: 99 % | SYSTOLIC BLOOD PRESSURE: 169 MMHG

## 2023-12-22 DIAGNOSIS — R55 SYNCOPE: ICD-10-CM

## 2023-12-22 DIAGNOSIS — J10.1 INFLUENZA A: Primary | ICD-10-CM
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
## 2023-12-22 DIAGNOSIS — M25.462 EFFUSION OF LEFT KNEE: ICD-10-CM

## 2023-12-22 LAB
ALBUMIN SERPL BCP-MCNC: 4 G/DL (ref 3.5–5.2)
ALP SERPL-CCNC: 40 U/L (ref 55–135)
ALT SERPL W/O P-5'-P-CCNC: 23 U/L (ref 10–44)
ANION GAP SERPL CALC-SCNC: 12 MMOL/L (ref 8–16)
AST SERPL-CCNC: 41 U/L (ref 10–40)
BASOPHILS # BLD AUTO: 0.01 K/UL (ref 0–0.2)
BASOPHILS NFR BLD: 0.3 % (ref 0–1.9)
BILIRUB SERPL-MCNC: 0.4 MG/DL (ref 0.1–1)
BUN SERPL-MCNC: 14 MG/DL (ref 8–23)
CALCIUM SERPL-MCNC: 9.8 MG/DL (ref 8.7–10.5)
CHLORIDE SERPL-SCNC: 100 MMOL/L (ref 95–110)
CO2 SERPL-SCNC: 28 MMOL/L (ref 23–29)
CREAT SERPL-MCNC: 0.9 MG/DL (ref 0.5–1.4)
DIFFERENTIAL METHOD: ABNORMAL
EOSINOPHIL # BLD AUTO: 0 K/UL (ref 0–0.5)
EOSINOPHIL NFR BLD: 0 % (ref 0–8)
ERYTHROCYTE [DISTWIDTH] IN BLOOD BY AUTOMATED COUNT: 13.8 % (ref 11.5–14.5)
EST. GFR  (NO RACE VARIABLE): >60 ML/MIN/1.73 M^2
GLUCOSE SERPL-MCNC: 139 MG/DL (ref 70–110)
HCT VFR BLD AUTO: 36.9 % (ref 37–48.5)
HGB BLD-MCNC: 12 G/DL (ref 12–16)
IMM GRANULOCYTES # BLD AUTO: 0 K/UL (ref 0–0.04)
IMM GRANULOCYTES NFR BLD AUTO: 0 % (ref 0–0.5)
INFLUENZA A, MOLECULAR: POSITIVE
INFLUENZA B, MOLECULAR: NEGATIVE
LYMPHOCYTES # BLD AUTO: 1.3 K/UL (ref 1–4.8)
LYMPHOCYTES NFR BLD: 34.9 % (ref 18–48)
MCH RBC QN AUTO: 26.6 PG (ref 27–31)
MCHC RBC AUTO-ENTMCNC: 32.5 G/DL (ref 32–36)
MCV RBC AUTO: 82 FL (ref 82–98)
MONOCYTES # BLD AUTO: 0.3 K/UL (ref 0.3–1)
MONOCYTES NFR BLD: 8.6 % (ref 4–15)
NEUTROPHILS # BLD AUTO: 2.1 K/UL (ref 1.8–7.7)
NEUTROPHILS NFR BLD: 56.2 % (ref 38–73)
NRBC BLD-RTO: 0 /100 WBC
PLATELET # BLD AUTO: 226 K/UL (ref 150–450)
PMV BLD AUTO: 9.9 FL (ref 9.2–12.9)
POTASSIUM SERPL-SCNC: 3.7 MMOL/L (ref 3.5–5.1)
PROT SERPL-MCNC: 7.6 G/DL (ref 6–8.4)
RBC # BLD AUTO: 4.51 M/UL (ref 4–5.4)
SARS-COV-2 RDRP RESP QL NAA+PROBE: NEGATIVE
SODIUM SERPL-SCNC: 140 MMOL/L (ref 136–145)
SPECIMEN SOURCE: ABNORMAL
WBC # BLD AUTO: 3.7 K/UL (ref 3.9–12.7)

## 2023-12-22 PROCEDURE — 87502 INFLUENZA DNA AMP PROBE: CPT | Mod: HCNC | Performed by: EMERGENCY MEDICINE

## 2023-12-22 PROCEDURE — U0002 COVID-19 LAB TEST NON-CDC: HCPCS | Mod: HCNC | Performed by: EMERGENCY MEDICINE

## 2023-12-22 PROCEDURE — 80053 COMPREHEN METABOLIC PANEL: CPT | Mod: HCNC | Performed by: EMERGENCY MEDICINE

## 2023-12-22 PROCEDURE — 63600175 PHARM REV CODE 636 W HCPCS: Mod: HCNC | Performed by: EMERGENCY MEDICINE

## 2023-12-22 PROCEDURE — 85025 COMPLETE CBC W/AUTO DIFF WBC: CPT | Mod: HCNC | Performed by: EMERGENCY MEDICINE

## 2023-12-22 PROCEDURE — 93005 ELECTROCARDIOGRAM TRACING: CPT | Mod: HCNC

## 2023-12-22 PROCEDURE — 25000003 PHARM REV CODE 250: Mod: HCNC | Performed by: EMERGENCY MEDICINE

## 2023-12-22 PROCEDURE — 93010 EKG 12-LEAD: ICD-10-PCS | Mod: HCNC,,, | Performed by: INTERNAL MEDICINE

## 2023-12-22 PROCEDURE — 93010 ELECTROCARDIOGRAM REPORT: CPT | Mod: HCNC,,, | Performed by: INTERNAL MEDICINE

## 2023-12-22 PROCEDURE — 96374 THER/PROPH/DIAG INJ IV PUSH: CPT | Mod: HCNC

## 2023-12-22 PROCEDURE — 99285 EMERGENCY DEPT VISIT HI MDM: CPT | Mod: 25,HCNC

## 2023-12-22 RX ORDER — ONDANSETRON 2 MG/ML
4 INJECTION INTRAMUSCULAR; INTRAVENOUS
Status: COMPLETED | OUTPATIENT
Start: 2023-12-22 | End: 2023-12-22

## 2023-12-22 RX ORDER — OSELTAMIVIR PHOSPHATE 75 MG/1
75 CAPSULE ORAL 2 TIMES DAILY
Qty: 10 CAPSULE | Refills: 0 | Status: SHIPPED | OUTPATIENT
Start: 2023-12-22 | End: 2023-12-27

## 2023-12-22 RX ORDER — ACETAMINOPHEN 500 MG
1000 TABLET ORAL
Status: COMPLETED | OUTPATIENT
Start: 2023-12-22 | End: 2023-12-22

## 2023-12-22 RX ADMIN — ONDANSETRON 4 MG: 2 INJECTION INTRAMUSCULAR; INTRAVENOUS at 11:12

## 2023-12-22 RX ADMIN — ACETAMINOPHEN 1000 MG: 500 TABLET ORAL at 11:12

## 2023-12-22 NOTE — TELEPHONE ENCOUNTER
Pt calling with c/o weakness and thinks had the flu and she said that she fell off the toilet yesterday and fell in the shower. Pt said that she hit her head and is having a pain by her left temple and dragging her left leg, Pt triaged and care advice to go to the ED. Pt said that she is concerned about the leg the most. Pt said that she is having to hop around. Pt to go to the ED and I will message provider              Reason for Disposition   Unable to walk    Additional Information   Negative: Looks like a broken bone or dislocated joint (e.g., crooked or deformed)   Negative: Sounds like a life-threatening emergency to the triager   Negative: Chest pain   Negative: Difficulty breathing   Negative: Entire foot is cool or blue in comparison to other side    Protocols used: Leg Pain-A-AH
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
## 2024-01-11 ENCOUNTER — TELEPHONE (OUTPATIENT)
Dept: INTERNAL MEDICINE | Facility: CLINIC | Age: 69
End: 2024-01-11
Payer: MEDICARE

## 2024-01-11 NOTE — TELEPHONE ENCOUNTER
Patient came in because she is continuing to have cough after finishing Tamiflu. Patient states she is having problems not being able to keep down blood pressure medication because of nausea. Patient is asking for cough medication and something for nausea. Scheduled hospital follow up with available provider for next Tuesday. Asking for meds to be sent to [facility] pharmacy on [address]

## 2024-01-12 ENCOUNTER — OFFICE VISIT (OUTPATIENT)
Dept: INTERNAL MEDICINE | Facility: CLINIC | Age: 69
End: 2024-01-12
Payer: MEDICARE

## 2024-01-12 ENCOUNTER — TELEPHONE (OUTPATIENT)
Dept: INTERNAL MEDICINE | Facility: CLINIC | Age: 69
End: 2024-01-12
Payer: MEDICARE

## 2024-01-12 ENCOUNTER — TELEPHONE (OUTPATIENT)
Dept: LAB | Facility: HOSPITAL | Age: 69
End: 2024-01-12
Payer: MEDICARE

## 2024-01-12 VITALS
HEART RATE: 63 BPM | SYSTOLIC BLOOD PRESSURE: 138 MMHG | HEIGHT: 67 IN | WEIGHT: 167.56 LBS | DIASTOLIC BLOOD PRESSURE: 88 MMHG | OXYGEN SATURATION: 99 % | BODY MASS INDEX: 26.3 KG/M2

## 2024-01-12 DIAGNOSIS — B96.89 ACUTE BACTERIAL SINUSITIS: Primary | ICD-10-CM

## 2024-01-12 DIAGNOSIS — J01.90 ACUTE BACTERIAL SINUSITIS: Primary | ICD-10-CM

## 2024-01-12 PROCEDURE — 3008F BODY MASS INDEX DOCD: CPT | Mod: HCNC,CPTII,S$GLB, | Performed by: PHYSICIAN ASSISTANT

## 2024-01-12 PROCEDURE — 3075F SYST BP GE 130 - 139MM HG: CPT | Mod: HCNC,CPTII,S$GLB, | Performed by: PHYSICIAN ASSISTANT

## 2024-01-12 PROCEDURE — 3288F FALL RISK ASSESSMENT DOCD: CPT | Mod: HCNC,CPTII,S$GLB, | Performed by: PHYSICIAN ASSISTANT

## 2024-01-12 PROCEDURE — 3079F DIAST BP 80-89 MM HG: CPT | Mod: HCNC,CPTII,S$GLB, | Performed by: PHYSICIAN ASSISTANT

## 2024-01-12 PROCEDURE — 99214 OFFICE O/P EST MOD 30 MIN: CPT | Mod: HCNC,S$GLB,, | Performed by: PHYSICIAN ASSISTANT

## 2024-01-12 PROCEDURE — 1159F MED LIST DOCD IN RCRD: CPT | Mod: HCNC,CPTII,S$GLB, | Performed by: PHYSICIAN ASSISTANT

## 2024-01-12 PROCEDURE — 1101F PT FALLS ASSESS-DOCD LE1/YR: CPT | Mod: HCNC,CPTII,S$GLB, | Performed by: PHYSICIAN ASSISTANT

## 2024-01-12 PROCEDURE — 99999 PR PBB SHADOW E&M-EST. PATIENT-LVL III: CPT | Mod: PBBFAC,HCNC,, | Performed by: PHYSICIAN ASSISTANT

## 2024-01-12 RX ORDER — ONDANSETRON 4 MG/1
4 TABLET, ORALLY DISINTEGRATING ORAL ONCE
Qty: 1 TABLET | Refills: 0 | Status: SHIPPED | OUTPATIENT
Start: 2024-01-12 | End: 2024-01-12

## 2024-01-12 RX ORDER — DOXYCYCLINE 100 MG/1
100 CAPSULE ORAL 2 TIMES DAILY
Qty: 14 CAPSULE | Refills: 0 | Status: SHIPPED | OUTPATIENT
Start: 2024-01-12 | End: 2024-01-19

## 2024-01-12 RX ORDER — BENZONATATE 100 MG/1
100 CAPSULE ORAL 3 TIMES DAILY PRN
Qty: 30 CAPSULE | Refills: 0 | Status: SHIPPED | OUTPATIENT
Start: 2024-01-12 | End: 2024-01-22

## 2024-01-12 NOTE — TELEPHONE ENCOUNTER
----- Message from Mariusz Allen sent at 1/12/2024  8:12 AM CST -----  Contact: Pt [phone]  [email or web address]     Patient is calling for Medical Advice regarding: Cough and congestion running nose     How long has patient had these symptoms:    Pharmacy name and phone#:[id number] - [place]  [address] [place] [address]   Phone: [phone]  Fax: [phone]      Would like response via [place] ([facility]): call    Comments: Pt states she did not want to speak with an on call nurse she said she was tired of telling what happened over and over again pt states she has a bad headache

## 2024-01-12 NOTE — TELEPHONE ENCOUNTER
Called and spoke to patient about continuing headaches cough congestion since being diagnoses with the flu 2 weeks ago. Scheduled patient for same day appt.
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
## 2024-03-18 ENCOUNTER — OFFICE VISIT (OUTPATIENT)
Dept: INTERNAL MEDICINE | Facility: CLINIC | Age: 69
End: 2024-03-18
Payer: MEDICARE

## 2024-03-18 ENCOUNTER — LAB VISIT (OUTPATIENT)
Dept: LAB | Facility: HOSPITAL | Age: 69
End: 2024-03-18
Attending: FAMILY MEDICINE
Payer: MEDICARE

## 2024-03-18 VITALS
OXYGEN SATURATION: 99 % | SYSTOLIC BLOOD PRESSURE: 132 MMHG | WEIGHT: 171.94 LBS | HEART RATE: 64 BPM | BODY MASS INDEX: 26.99 KG/M2 | HEIGHT: 67 IN | DIASTOLIC BLOOD PRESSURE: 84 MMHG

## 2024-03-18 DIAGNOSIS — E11.69 HYPERLIPIDEMIA ASSOCIATED WITH TYPE 2 DIABETES MELLITUS: ICD-10-CM

## 2024-03-18 DIAGNOSIS — E78.5 HYPERLIPIDEMIA ASSOCIATED WITH TYPE 2 DIABETES MELLITUS: ICD-10-CM

## 2024-03-18 DIAGNOSIS — Z00.00 ANNUAL PHYSICAL EXAM: Primary | ICD-10-CM

## 2024-03-18 DIAGNOSIS — Z20.2 ENCOUNTER FOR ASSESSMENT OF STD EXPOSURE: ICD-10-CM

## 2024-03-18 DIAGNOSIS — Z13.5 ENCOUNTER FOR SCREENING FOR DIABETIC RETINOPATHY: ICD-10-CM

## 2024-03-18 DIAGNOSIS — Z79.899 ENCOUNTER FOR LONG-TERM (CURRENT) USE OF OTHER MEDICATIONS: ICD-10-CM

## 2024-03-18 DIAGNOSIS — Z00.00 ANNUAL PHYSICAL EXAM: ICD-10-CM

## 2024-03-18 DIAGNOSIS — I10 ESSENTIAL HYPERTENSION: ICD-10-CM

## 2024-03-18 LAB
ALBUMIN SERPL BCP-MCNC: 4.2 G/DL (ref 3.5–5.2)
ALP SERPL-CCNC: 58 U/L (ref 55–135)
ALT SERPL W/O P-5'-P-CCNC: 14 U/L (ref 10–44)
ANION GAP SERPL CALC-SCNC: 11 MMOL/L (ref 8–16)
AST SERPL-CCNC: 18 U/L (ref 10–40)
BILIRUB SERPL-MCNC: 0.7 MG/DL (ref 0.1–1)
BUN SERPL-MCNC: 13 MG/DL (ref 8–23)
CALCIUM SERPL-MCNC: 9.9 MG/DL (ref 8.7–10.5)
CHLORIDE SERPL-SCNC: 102 MMOL/L (ref 95–110)
CHOLEST SERPL-MCNC: 169 MG/DL (ref 120–199)
CHOLEST/HDLC SERPL: 2.3 {RATIO} (ref 2–5)
CO2 SERPL-SCNC: 27 MMOL/L (ref 23–29)
CREAT SERPL-MCNC: 0.7 MG/DL (ref 0.5–1.4)
ERYTHROCYTE [DISTWIDTH] IN BLOOD BY AUTOMATED COUNT: 14.1 % (ref 11.5–14.5)
EST. GFR  (NO RACE VARIABLE): >60 ML/MIN/1.73 M^2
ESTIMATED AVG GLUCOSE: 123 MG/DL (ref 68–131)
GLUCOSE SERPL-MCNC: 99 MG/DL (ref 70–110)
HBA1C MFR BLD: 5.9 % (ref 4–5.6)
HCT VFR BLD AUTO: 38 % (ref 37–48.5)
HDLC SERPL-MCNC: 75 MG/DL (ref 40–75)
HDLC SERPL: 44.4 % (ref 20–50)
HGB BLD-MCNC: 11.7 G/DL (ref 12–16)
HIV 1+2 AB+HIV1 P24 AG SERPL QL IA: NORMAL
LDLC SERPL CALC-MCNC: 79.8 MG/DL (ref 63–159)
MCH RBC QN AUTO: 26.8 PG (ref 27–31)
MCHC RBC AUTO-ENTMCNC: 30.8 G/DL (ref 32–36)
MCV RBC AUTO: 87 FL (ref 82–98)
NONHDLC SERPL-MCNC: 94 MG/DL
PLATELET # BLD AUTO: 282 K/UL (ref 150–450)
PMV BLD AUTO: 10.9 FL (ref 9.2–12.9)
POTASSIUM SERPL-SCNC: 4.5 MMOL/L (ref 3.5–5.1)
PROT SERPL-MCNC: 7.2 G/DL (ref 6–8.4)
RBC # BLD AUTO: 4.36 M/UL (ref 4–5.4)
SODIUM SERPL-SCNC: 140 MMOL/L (ref 136–145)
TRIGL SERPL-MCNC: 71 MG/DL (ref 30–150)
TSH SERPL DL<=0.005 MIU/L-ACNC: 1.02 UIU/ML (ref 0.4–4)
WBC # BLD AUTO: 5.17 K/UL (ref 3.9–12.7)

## 2024-03-18 PROCEDURE — 3288F FALL RISK ASSESSMENT DOCD: CPT | Mod: HCNC,CPTII,S$GLB, | Performed by: FAMILY MEDICINE

## 2024-03-18 PROCEDURE — 36415 COLL VENOUS BLD VENIPUNCTURE: CPT | Mod: HCNC | Performed by: FAMILY MEDICINE

## 2024-03-18 PROCEDURE — 3008F BODY MASS INDEX DOCD: CPT | Mod: HCNC,CPTII,S$GLB, | Performed by: FAMILY MEDICINE

## 2024-03-18 PROCEDURE — 84443 ASSAY THYROID STIM HORMONE: CPT | Mod: HCNC | Performed by: FAMILY MEDICINE

## 2024-03-18 PROCEDURE — 99999 PR PBB SHADOW E&M-EST. PATIENT-LVL IV: CPT | Mod: PBBFAC,HCNC,, | Performed by: FAMILY MEDICINE

## 2024-03-18 PROCEDURE — 86592 SYPHILIS TEST NON-TREP QUAL: CPT | Mod: HCNC | Performed by: FAMILY MEDICINE

## 2024-03-18 PROCEDURE — 80053 COMPREHEN METABOLIC PANEL: CPT | Mod: HCNC | Performed by: FAMILY MEDICINE

## 2024-03-18 PROCEDURE — 1101F PT FALLS ASSESS-DOCD LE1/YR: CPT | Mod: HCNC,CPTII,S$GLB, | Performed by: FAMILY MEDICINE

## 2024-03-18 PROCEDURE — 4010F ACE/ARB THERAPY RXD/TAKEN: CPT | Mod: HCNC,CPTII,S$GLB, | Performed by: FAMILY MEDICINE

## 2024-03-18 PROCEDURE — 83036 HEMOGLOBIN GLYCOSYLATED A1C: CPT | Mod: HCNC | Performed by: FAMILY MEDICINE

## 2024-03-18 PROCEDURE — G2211 COMPLEX E/M VISIT ADD ON: HCPCS | Mod: HCNC,S$GLB,, | Performed by: FAMILY MEDICINE

## 2024-03-18 PROCEDURE — 1126F AMNT PAIN NOTED NONE PRSNT: CPT | Mod: HCNC,CPTII,S$GLB, | Performed by: FAMILY MEDICINE

## 2024-03-18 PROCEDURE — 85027 COMPLETE CBC AUTOMATED: CPT | Mod: HCNC | Performed by: FAMILY MEDICINE

## 2024-03-18 PROCEDURE — 80061 LIPID PANEL: CPT | Mod: HCNC | Performed by: FAMILY MEDICINE

## 2024-03-18 PROCEDURE — 3075F SYST BP GE 130 - 139MM HG: CPT | Mod: HCNC,CPTII,S$GLB, | Performed by: FAMILY MEDICINE

## 2024-03-18 PROCEDURE — 1159F MED LIST DOCD IN RCRD: CPT | Mod: HCNC,CPTII,S$GLB, | Performed by: FAMILY MEDICINE

## 2024-03-18 PROCEDURE — 99214 OFFICE O/P EST MOD 30 MIN: CPT | Mod: HCNC,S$GLB,, | Performed by: FAMILY MEDICINE

## 2024-03-18 PROCEDURE — 3079F DIAST BP 80-89 MM HG: CPT | Mod: HCNC,CPTII,S$GLB, | Performed by: FAMILY MEDICINE

## 2024-03-18 PROCEDURE — 87389 HIV-1 AG W/HIV-1&-2 AB AG IA: CPT | Mod: HCNC | Performed by: FAMILY MEDICINE
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
## 2024-03-19 LAB — RPR SER QL: NORMAL

## 2024-03-21 ENCOUNTER — TELEPHONE (OUTPATIENT)
Dept: INTERNAL MEDICINE | Facility: CLINIC | Age: 69
End: 2024-03-21
Payer: MEDICARE

## 2024-03-27 ENCOUNTER — TELEPHONE (OUTPATIENT)
Dept: INTERNAL MEDICINE | Facility: CLINIC | Age: 69
End: 2024-03-27
Payer: MEDICARE

## 2024-03-28 ENCOUNTER — TELEPHONE (OUTPATIENT)
Dept: INTERNAL MEDICINE | Facility: CLINIC | Age: 69
End: 2024-03-28
Payer: MEDICARE

## 2024-03-28 NOTE — TELEPHONE ENCOUNTER
----- Message from Echo Benjamin sent at 3/27/2024  4:54 PM CDT -----  Contact: Self [phone]  Patient is returning a phone call.    Who left a message for the patient: nurse    Does patient know what this is regarding:  unknown    Would you like a call back, or a response through your [facility] portal?:   call back      Comments:

## 2024-05-23 NOTE — SUBJECTIVE & OBJECTIVE
Brain Imaging   CT Head. Date: 11/23/12  No evidence of acute major vascular distribution infarct or intracranial hemorrhage.     Stable, remote infarcts of the left shantanu and left occipital lobe.        MRI. Date: 11/23/17  Acute infarct in the left posterior MCA and superior PCA territory. No evidence of hemorrhagic conversion.    Remote infarcts in the left shantanu and left occipital lobe.     Vessel Imaging   CTA Multiphase. Date: 11/23/17  Suspect new occlusion or high grade stenosis involving the A2 segment of the right anterior cerebral artery.    Focal narrowing of the M2 segment of the left MCA, more conspicuous but grossly unchanged when compared with the prior CTA in 2016.    Stable occlusion or high-grade stenosis involving the P2 segment of the left posterior cerebral artery.    No evidence of aneurysm or vascular malformation.     Cardiac Imaging   NSR at 72 BPM     Echo with bubble. Date: 11/24/17    1 - Normal left ventricular systolic function (EF 60-65%).     2 - No wall motion abnormalities.     3 - Mild left atrial enlargement.     4 - Impaired LV relaxation, elevated LAP (grade 2 diastolic dysfunction).     5 - Normal right ventricular systolic function .     6 - The estimated PA systolic pressure is 36 mmHg.     7 - Mild tricuspid regurgitation.            She has a history of left main disease which was not severe enough to require revascularization, and he has no angina with normal activities.  We will continue medical management with risk factor modification.

## 2024-07-02 ENCOUNTER — TELEPHONE (OUTPATIENT)
Dept: INTERNAL MEDICINE | Facility: CLINIC | Age: 69
End: 2024-07-02
Payer: MEDICARE

## 2024-07-02 NOTE — TELEPHONE ENCOUNTER
----- Message from Shravan Diane MD sent at 7/2/2024 10:44 AM CDT -----  Contact: Pt  [phone]  Ask her what she needs this for. Ok to print out recent office visit note which will have her medical history on it  ----- Message -----  From: Priscilla Gonzalez MA  Sent: 6/28/2024   2:29 PM CDT  To: Shravan Diane MD      ----- Message -----  From: Mariusz Allen  Sent: 6/28/2024  12:07 PM CDT  To: [facility] Staff    Pt called in regards to getting a letter stating what her medical conditions is she would like to  in clinic if possible please call and advise

## 2024-07-03 ENCOUNTER — TELEPHONE (OUTPATIENT)
Dept: INTERNAL MEDICINE | Facility: CLINIC | Age: 69
End: 2024-07-03
Payer: MEDICARE

## 2024-07-03 NOTE — TELEPHONE ENCOUNTER
----- Message from Glenroy Mirza sent at 7/2/2024  3:00 PM CDT -----  Contact: Pt [phone]  Patient is returning a phone call.    Who left a message for the patient: Priscilla Gonzalez,    Does patient know what this is regarding:  See chart notes

## 2024-07-12 DIAGNOSIS — E78.5 HYPERLIPIDEMIA ASSOCIATED WITH TYPE 2 DIABETES MELLITUS: ICD-10-CM

## 2024-07-12 DIAGNOSIS — E11.69 HYPERLIPIDEMIA ASSOCIATED WITH TYPE 2 DIABETES MELLITUS: ICD-10-CM

## 2024-07-12 DIAGNOSIS — I10 HTN (HYPERTENSION), MALIGNANT: ICD-10-CM

## 2024-07-12 DIAGNOSIS — E78.2 MIXED HYPERLIPIDEMIA: ICD-10-CM

## 2024-07-12 NOTE — TELEPHONE ENCOUNTER
----- Message from Mehnaz Manley sent at 7/12/2024  3:38 PM CDT -----  Contact: Mobile  [phone]  Requesting an RX refill or new RX.    Is this a refill or new RX: Refill    RX name and strength potassium chloride (K-TAB) 20 mE    Is this a 30 day or 90 day RX: 90    Pharmacy name and phone #   [id number] - [place] - [address]  [address] [place] [address]  Phone: [phone] Fax: [phone]      The doctors have asked that we provide their patients with the following 2 reminders -- prescription refills can take up to 72 hours, and a friendly reminder that in the future you can use your [facility] account to request refills:   Comment: Please give the patient a call because she would like to get another one of her medications filled but she does not know the name of the medication.     Requesting an RX refill or new RX.    Is this a refill or new RX: Refill    RX name and strength atorvastatin (LIPITOR) 20 MG tablet    Is this a 30 day or 90 day RX: 90    Pharmacy name and phone # [id number] - [place] - [address]  [address] [place] [address]  Phone: [phone] Fax: [phone]    The doctors have asked that we provide their patients with the following 2 reminders -- prescription refills can take up to 72 hours, and a friendly reminder that in the future you can use your [facility] account to request refills:

## 2024-07-12 NOTE — TELEPHONE ENCOUNTER
No care due was identified.  [facility] Embedded Care Due Messages. Reference number: [id number].   7/12/2024 4:21:20 PM CDT

## 2024-07-12 NOTE — TELEPHONE ENCOUNTER
MARGARETTE Pena NP, requested that IV ATB be given prior to discharge to [facility]. This nurse administered IV ATB. Will continue to monitor and reassess.  Electronically signed by Kassy Lopez RN on 6/11/2020 No care due was identified.  [facility] Embedded Care Due Messages. Reference number: [id number].   7/12/2024 3:35:20 PM CDT

## 2024-07-13 RX ORDER — ATORVASTATIN CALCIUM 20 MG/1
20 TABLET, FILM COATED ORAL
Qty: 90 TABLET | Refills: 3 | OUTPATIENT
Start: 2024-07-13

## 2024-07-13 RX ORDER — HYDROCHLOROTHIAZIDE 25 MG/1
25 TABLET ORAL
Qty: 90 TABLET | Refills: 2 | Status: SHIPPED | OUTPATIENT
Start: 2024-07-13

## 2024-07-15 RX ORDER — ATORVASTATIN CALCIUM 20 MG/1
20 TABLET, FILM COATED ORAL DAILY
Qty: 90 TABLET | Refills: 3 | Status: SHIPPED | OUTPATIENT
Start: 2024-07-15

## 2024-07-15 RX ORDER — POTASSIUM CHLORIDE 1500 MG/1
20 TABLET, EXTENDED RELEASE ORAL DAILY
Qty: 90 TABLET | Refills: 3 | Status: SHIPPED | OUTPATIENT
Start: 2024-07-15

## 2024-08-29 ENCOUNTER — TELEPHONE (OUTPATIENT)
Dept: OBSTETRICS AND GYNECOLOGY | Facility: CLINIC | Age: 69
End: 2024-08-29
Payer: MEDICARE

## 2024-08-29 NOTE — TELEPHONE ENCOUNTER
----- Message from Ginharvey Mccall sent at 8/29/2024  3:42 PM CDT -----  Contact: [phone]  [email or web address]     Patient is calling for Medical Advice regarding: Pt said she does want the earlier appt and to call her back she missed a call    How long has patient had these symptoms:    Pharmacy name and phone#:    Patient wants a call back or thru [facility]: call back     Comments:    Please advise patient replies from provider may take up to 48 hours.

## 2024-08-29 NOTE — TELEPHONE ENCOUNTER
Called patient no answer to inform that Dr Patterson doesn't have any sooner appointments at this time.

## 2024-08-31 NOTE — TELEPHONE ENCOUNTER
No care due was identified.  [facility] Embedded Care Due Messages. Reference number: [id number].   8/31/2024 12:32:44 PM CDT

## 2024-09-01 RX ORDER — POTASSIUM CHLORIDE 1500 MG/1
20 TABLET, EXTENDED RELEASE ORAL
Qty: 90 TABLET | Refills: 4 | OUTPATIENT
Start: 2024-09-01

## 2024-09-01 NOTE — TELEPHONE ENCOUNTER
Refill Decision Note   Autumn Smart  is requesting a refill authorization.  Brief Assessment and Rationale for Refill:  Quick Discontinue     Medication Therapy Plan:  Receipt confirmed by pharmacy (7/15/2024  8:07 AM CDT) 90d +3      Comments:     Note composed:10:02 AM 09/01/2024

## 2024-09-05 DIAGNOSIS — I10 ESSENTIAL HYPERTENSION: ICD-10-CM

## 2024-09-05 RX ORDER — AMLODIPINE BESYLATE 5 MG/1
5 TABLET ORAL
Qty: 90 TABLET | Refills: 1 | Status: SHIPPED | OUTPATIENT
Start: 2024-09-05

## 2024-09-05 RX ORDER — LISINOPRIL 40 MG/1
40 TABLET ORAL
Qty: 90 TABLET | Refills: 1 | Status: SHIPPED | OUTPATIENT
Start: 2024-09-05

## 2024-09-06 NOTE — TELEPHONE ENCOUNTER
No care due was identified.  [facility] Embedded Care Due Messages. Reference number: [id number].   9/05/2024 8:15:49 PM CDT

## 2024-09-06 NOTE — TELEPHONE ENCOUNTER
Refill Decision Note   Autumn Smrat  is requesting a refill authorization.  Brief Assessment and Rationale for Refill:  Approve     Medication Therapy Plan:         Comments:     Note composed:11:33 PM 09/05/2024

## 2024-09-23 ENCOUNTER — OFFICE VISIT (OUTPATIENT)
Dept: OBSTETRICS AND GYNECOLOGY | Facility: CLINIC | Age: 69
End: 2024-09-23
Payer: MEDICARE

## 2024-09-23 VITALS
BODY MASS INDEX: 26.89 KG/M2 | DIASTOLIC BLOOD PRESSURE: 60 MMHG | SYSTOLIC BLOOD PRESSURE: 128 MMHG | HEIGHT: 67 IN | WEIGHT: 171.31 LBS

## 2024-09-23 DIAGNOSIS — Z01.419 ENCOUNTER FOR GYNECOLOGICAL EXAMINATION WITHOUT ABNORMAL FINDING: Primary | ICD-10-CM

## 2024-09-23 DIAGNOSIS — R68.82 DECREASED LIBIDO: ICD-10-CM

## 2024-09-23 DIAGNOSIS — Z12.31 ENCOUNTER FOR SCREENING MAMMOGRAM FOR MALIGNANT NEOPLASM OF BREAST: ICD-10-CM

## 2024-09-23 PROCEDURE — 99999 PR PBB SHADOW E&M-EST. PATIENT-LVL III: CPT | Mod: PBBFAC,HCNC,, | Performed by: OBSTETRICS & GYNECOLOGY
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
## 2024-09-25 ENCOUNTER — LAB VISIT (OUTPATIENT)
Dept: LAB | Facility: HOSPITAL | Age: 69
End: 2024-09-25
Attending: FAMILY MEDICINE
Payer: MEDICARE

## 2024-09-25 ENCOUNTER — TELEPHONE (OUTPATIENT)
Dept: INTERNAL MEDICINE | Facility: CLINIC | Age: 69
End: 2024-09-25

## 2024-09-25 ENCOUNTER — OFFICE VISIT (OUTPATIENT)
Dept: INTERNAL MEDICINE | Facility: CLINIC | Age: 69
End: 2024-09-25
Payer: MEDICARE

## 2024-09-25 ENCOUNTER — TELEPHONE (OUTPATIENT)
Dept: INTERNAL MEDICINE | Facility: CLINIC | Age: 69
End: 2024-09-25
Payer: MEDICARE

## 2024-09-25 VITALS
HEIGHT: 67 IN | WEIGHT: 173.94 LBS | OXYGEN SATURATION: 98 % | SYSTOLIC BLOOD PRESSURE: 132 MMHG | DIASTOLIC BLOOD PRESSURE: 78 MMHG | HEART RATE: 53 BPM | BODY MASS INDEX: 27.3 KG/M2

## 2024-09-25 DIAGNOSIS — Z79.899 ENCOUNTER FOR LONG-TERM (CURRENT) USE OF OTHER MEDICATIONS: ICD-10-CM

## 2024-09-25 DIAGNOSIS — Z20.2 ENCOUNTER FOR ASSESSMENT OF STD EXPOSURE: ICD-10-CM

## 2024-09-25 DIAGNOSIS — I10 ESSENTIAL HYPERTENSION: Primary | ICD-10-CM

## 2024-09-25 DIAGNOSIS — E11.9 TYPE 2 DIABETES MELLITUS WITHOUT COMPLICATION, WITHOUT LONG-TERM CURRENT USE OF INSULIN: ICD-10-CM

## 2024-09-25 DIAGNOSIS — Z13.5 ENCOUNTER FOR SCREENING FOR DIABETIC RETINOPATHY: ICD-10-CM

## 2024-09-25 DIAGNOSIS — E78.5 HYPERLIPIDEMIA ASSOCIATED WITH TYPE 2 DIABETES MELLITUS: ICD-10-CM

## 2024-09-25 DIAGNOSIS — E11.69 HYPERLIPIDEMIA ASSOCIATED WITH TYPE 2 DIABETES MELLITUS: ICD-10-CM

## 2024-09-25 DIAGNOSIS — E66.3 OVERWEIGHT (BMI 25.0-29.9): ICD-10-CM

## 2024-09-25 DIAGNOSIS — Z13.5 ENCOUNTER FOR SCREENING FOR DIABETIC RETINOPATHY: Primary | ICD-10-CM

## 2024-09-25 LAB
ESTIMATED AVG GLUCOSE: 117 MG/DL (ref 68–131)
HBA1C MFR BLD: 5.7 % (ref 4–5.6)

## 2024-09-25 PROCEDURE — 4010F ACE/ARB THERAPY RXD/TAKEN: CPT | Mod: HCNC,CPTII,S$GLB, | Performed by: FAMILY MEDICINE

## 2024-09-25 PROCEDURE — 3044F HG A1C LEVEL LT 7.0%: CPT | Mod: HCNC,CPTII,S$GLB, | Performed by: FAMILY MEDICINE

## 2024-09-25 PROCEDURE — 99999 PR PBB SHADOW E&M-EST. PATIENT-LVL IV: CPT | Mod: PBBFAC,HCNC,, | Performed by: FAMILY MEDICINE

## 2024-09-25 PROCEDURE — 3288F FALL RISK ASSESSMENT DOCD: CPT | Mod: HCNC,CPTII,S$GLB, | Performed by: FAMILY MEDICINE

## 2024-09-25 PROCEDURE — 3008F BODY MASS INDEX DOCD: CPT | Mod: HCNC,CPTII,S$GLB, | Performed by: FAMILY MEDICINE

## 2024-09-25 PROCEDURE — 87389 HIV-1 AG W/HIV-1&-2 AB AG IA: CPT | Mod: HCNC | Performed by: FAMILY MEDICINE

## 2024-09-25 PROCEDURE — 1101F PT FALLS ASSESS-DOCD LE1/YR: CPT | Mod: HCNC,CPTII,S$GLB, | Performed by: FAMILY MEDICINE

## 2024-09-25 PROCEDURE — 36415 COLL VENOUS BLD VENIPUNCTURE: CPT | Mod: HCNC | Performed by: FAMILY MEDICINE

## 2024-09-25 PROCEDURE — 1159F MED LIST DOCD IN RCRD: CPT | Mod: HCNC,CPTII,S$GLB, | Performed by: FAMILY MEDICINE

## 2024-09-25 PROCEDURE — 1126F AMNT PAIN NOTED NONE PRSNT: CPT | Mod: HCNC,CPTII,S$GLB, | Performed by: FAMILY MEDICINE

## 2024-09-25 PROCEDURE — 86593 SYPHILIS TEST NON-TREP QUANT: CPT | Mod: HCNC | Performed by: FAMILY MEDICINE

## 2024-09-25 PROCEDURE — G2211 COMPLEX E/M VISIT ADD ON: HCPCS | Mod: HCNC,S$GLB,, | Performed by: FAMILY MEDICINE

## 2024-09-25 PROCEDURE — 3078F DIAST BP <80 MM HG: CPT | Mod: HCNC,CPTII,S$GLB, | Performed by: FAMILY MEDICINE

## 2024-09-25 PROCEDURE — 99214 OFFICE O/P EST MOD 30 MIN: CPT | Mod: HCNC,S$GLB,, | Performed by: FAMILY MEDICINE

## 2024-09-25 PROCEDURE — 3075F SYST BP GE 130 - 139MM HG: CPT | Mod: HCNC,CPTII,S$GLB, | Performed by: FAMILY MEDICINE

## 2024-09-25 PROCEDURE — 83036 HEMOGLOBIN GLYCOSYLATED A1C: CPT | Mod: HCNC | Performed by: FAMILY MEDICINE

## 2024-09-25 RX ORDER — POTASSIUM CHLORIDE 1500 MG/1
20 TABLET, EXTENDED RELEASE ORAL DAILY
Qty: 90 TABLET | Refills: 3 | Status: SHIPPED | OUTPATIENT
Start: 2024-09-25

## 2024-09-25 RX ORDER — VALACYCLOVIR HYDROCHLORIDE 500 MG/1
TABLET, FILM COATED ORAL
Qty: 30 TABLET | Refills: 5 | Status: SHIPPED | OUTPATIENT
Start: 2024-09-25

## 2024-09-25 NOTE — TELEPHONE ENCOUNTER
Tried to call pt to see if she was still coming in for her appt or if she was needing to r/s. Pt did not answer. Marina

## 2024-09-25 NOTE — TELEPHONE ENCOUNTER
----- Message from Keisha Curry LPN sent at 9/25/2024  3:14 PM CDT -----  Patient needs referral for optometrist.

## 2024-09-25 NOTE — PROGRESS NOTES
Patient agreed to annual eye examination thru use of the eye cam for a diabetic evaluation of eyes. Patient verified with name and . Patient able to keep eye open for the right but not for the left. Patient is requesting an referral to see an optometrist . Will route message to PCP.
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
## 2024-09-26 ENCOUNTER — TELEPHONE (OUTPATIENT)
Dept: INTERNAL MEDICINE | Facility: CLINIC | Age: 69
End: 2024-09-26
Payer: MEDICARE

## 2024-09-26 LAB
HIV 1+2 AB+HIV1 P24 AG SERPL QL IA: NORMAL
TREPONEMA PALLIDUM IGG+IGM AB [PRESENCE] IN SERUM OR PLASMA BY IMMUNOASSAY: NONREACTIVE

## 2024-09-26 NOTE — TELEPHONE ENCOUNTER
----- Message from Patricia Adams sent at 9/26/2024  9:06 AM CDT -----  Contact: [phone] Patient  Patient would like to get medical advice.  Symptoms (please be specific):   Pt states she needs to speak to Dr Diane's nurse about the eye doctor appt she scheduled on 11/20/2024.   How long have you had these symptoms: N/A  Would you like a call back, or a response through your [facility] portal?:   call back   Pharmacy name and phone # (copy from chart):   N/A  Comments:

## 2024-09-27 ENCOUNTER — TELEPHONE (OUTPATIENT)
Dept: INTERNAL MEDICINE | Facility: CLINIC | Age: 69
End: 2024-09-27
Payer: MEDICARE

## 2024-10-09 ENCOUNTER — HOSPITAL ENCOUNTER (OUTPATIENT)
Dept: RADIOLOGY | Facility: HOSPITAL | Age: 69
Discharge: HOME | End: 2024-10-09
Attending: OBSTETRICS & GYNECOLOGY
Payer: MEDICARE

## 2024-10-09 DIAGNOSIS — Z12.31 ENCOUNTER FOR SCREENING MAMMOGRAM FOR MALIGNANT NEOPLASM OF BREAST: ICD-10-CM

## 2024-10-09 PROCEDURE — 77063 BREAST TOMOSYNTHESIS BI: CPT | Mod: TC,HCNC

## 2024-10-09 PROCEDURE — 77063 BREAST TOMOSYNTHESIS BI: CPT | Mod: 26,HCNC,, | Performed by: RADIOLOGY

## 2024-10-09 PROCEDURE — 77067 SCR MAMMO BI INCL CAD: CPT | Mod: 26,HCNC,, | Performed by: RADIOLOGY

## 2024-10-09 PROCEDURE — 77067 SCR MAMMO BI INCL CAD: CPT | Mod: TC,HCNC

## 2024-11-20 ENCOUNTER — OFFICE VISIT (OUTPATIENT)
Dept: OPTOMETRY | Facility: CLINIC | Age: 69
End: 2024-11-20
Payer: MEDICARE

## 2024-11-20 DIAGNOSIS — Z86.73 HISTORY OF STROKE: ICD-10-CM

## 2024-11-20 DIAGNOSIS — H25.13 SENILE NUCLEAR CATARACT, BILATERAL: ICD-10-CM

## 2024-11-20 DIAGNOSIS — I10 ESSENTIAL HYPERTENSION: ICD-10-CM

## 2024-11-20 DIAGNOSIS — E11.69 HYPERLIPIDEMIA ASSOCIATED WITH TYPE 2 DIABETES MELLITUS: Primary | ICD-10-CM

## 2024-11-20 DIAGNOSIS — H52.7 REFRACTIVE ERROR: ICD-10-CM

## 2024-11-20 DIAGNOSIS — I67.2 INTRACRANIAL ATHEROSCLEROSIS: ICD-10-CM

## 2024-11-20 DIAGNOSIS — Z13.5 ENCOUNTER FOR SCREENING FOR DIABETIC RETINOPATHY: ICD-10-CM

## 2024-11-20 DIAGNOSIS — E78.5 HYPERLIPIDEMIA ASSOCIATED WITH TYPE 2 DIABETES MELLITUS: Primary | ICD-10-CM

## 2024-11-20 DIAGNOSIS — E11.9 TYPE 2 DIABETES MELLITUS WITHOUT RETINOPATHY: ICD-10-CM

## 2024-11-20 PROCEDURE — 99999 PR PBB SHADOW E&M-EST. PATIENT-LVL III: CPT | Mod: PBBFAC,HCNC,, | Performed by: OPTOMETRIST

## 2024-11-20 PROCEDURE — 3061F NEG MICROALBUMINURIA REV: CPT | Mod: HCNC,CPTII,S$GLB, | Performed by: OPTOMETRIST

## 2024-11-20 PROCEDURE — 1126F AMNT PAIN NOTED NONE PRSNT: CPT | Mod: HCNC,CPTII,S$GLB, | Performed by: OPTOMETRIST

## 2024-11-20 PROCEDURE — 4010F ACE/ARB THERAPY RXD/TAKEN: CPT | Mod: HCNC,CPTII,S$GLB, | Performed by: OPTOMETRIST

## 2024-11-20 PROCEDURE — 3044F HG A1C LEVEL LT 7.0%: CPT | Mod: HCNC,CPTII,S$GLB, | Performed by: OPTOMETRIST

## 2024-11-20 PROCEDURE — 99204 OFFICE O/P NEW MOD 45 MIN: CPT | Mod: HCNC,S$GLB,, | Performed by: OPTOMETRIST

## 2024-11-20 PROCEDURE — 3288F FALL RISK ASSESSMENT DOCD: CPT | Mod: HCNC,CPTII,S$GLB, | Performed by: OPTOMETRIST

## 2024-11-20 PROCEDURE — 2023F DILAT RTA XM W/O RTNOPTHY: CPT | Mod: HCNC,CPTII,S$GLB, | Performed by: OPTOMETRIST

## 2024-11-20 PROCEDURE — 1101F PT FALLS ASSESS-DOCD LE1/YR: CPT | Mod: HCNC,CPTII,S$GLB, | Performed by: OPTOMETRIST

## 2024-11-20 PROCEDURE — 3066F NEPHROPATHY DOC TX: CPT | Mod: HCNC,CPTII,S$GLB, | Performed by: OPTOMETRIST

## 2024-11-20 PROCEDURE — 1159F MED LIST DOCD IN RCRD: CPT | Mod: HCNC,CPTII,S$GLB, | Performed by: OPTOMETRIST

## 2024-11-20 NOTE — PROGRESS NOTES
HPI    CC:69 yr old in today for Diabetic Retinopathy   JAVIER:09/15/2020 Dr. Ovalle     (+) Changes in vision   (-) Pain  (-) Irritation   (-) Itching   (-) Flashes  (-) Floaters  (+) Glasses wearer, Reading +2.50  (-) CL wearer  (+) Uses eye gtts OTC drops     Does patient want a refraction today?     (-) Eye injury  (-) Eye surgery   (+)POHx  Type 2 diabetes mellitus without retinopathy    Hypertensive retinopathy of both eyes    MGD (meibomian gland dysfunction)    Dry eye syndrome of both eyes    Nuclear sclerosis of both eyes    Presbyopia    (-)FOHx    (+)DM  Hemoglobin A1C       Date                     Value               Ref Range             Status                09/25/2024               5.7 (H)             4.0 - 5.6 %           Final              Comment:    ADA Screening Guidelines:  5.7-6.4%  Consistent with   prediabetes  >or=6.5%  Consistent with diabetes    High levels of fetal   hemoglobin interfere with the HbA1C  assay. Heterozygous hemoglobin   variants (HbS, HgC, etc)do  not significantly interfere with this assay.     However, presence of multiple variants may affect accuracy.         03/18/2024               5.9 (H)             4.0 - 5.6 %           Final              Comment:    ADA Screening Guidelines:  5.7-6.4%  Consistent with   prediabetes  >or=6.5%  Consistent with diabetes    High levels of fetal   hemoglobin interfere with the HbA1C  assay. Heterozygous hemoglobin   variants (HbS, HgC, etc)do  not significantly interfere with this assay.     However, presence of multiple variants may affect accuracy.         06/01/2023               5.7 (H)             4.0 - 5.6 %           Final              Comment:    ADA Screening Guidelines:  5.7-6.4%  Consistent with   prediabetes  >or=6.5%  Consistent with diabetes    High levels of fetal   hemoglobin interfere with the HbA1C  assay. Heterozygous hemoglobin   variants (HbS, HgC, etc)do  not significantly interfere with this assay.     However,  presence of multiple variants may affect accuracy.    ----------         Last edited by Chapis Childers on 11/20/2024  2:46 PM.            Assessment /Plan     For exam results, see Encounter Report.    Hyperlipidemia associated with type 2 diabetes mellitus    Encounter for screening for diabetic retinopathy  -     Ambulatory referral/consult to Optometry    Type 2 diabetes mellitus without retinopathy    Senile nuclear cataract, bilateral    History of stroke    Essential hypertension    Intracranial atherosclerosis    Refractive error      MONITOR. ED PT ON ALL EXAM FINDINGS  RX FINAL SPECS   OCULAR HEALTH STABLE OD, OS   TYPE 2 DM W/O RETINOPATHY OU; CONTINUE WITH PCP FOR BP AND GLYCEMIC CONTROL; MONITOR  H/O STROKE; ASYMPTOMATIC AT VISIT; REVIEWED FAST   RTC 1 YR//PRN FOR REE/DFE

## 2025-02-18 ENCOUNTER — TELEPHONE (OUTPATIENT)
Dept: INTERNAL MEDICINE | Facility: CLINIC | Age: 70
End: 2025-02-18
Payer: MEDICARE

## 2025-02-18 ENCOUNTER — NURSE TRIAGE (OUTPATIENT)
Dept: ADMINISTRATIVE | Facility: CLINIC | Age: 70
End: 2025-02-18
Payer: MEDICARE

## 2025-02-18 NOTE — TELEPHONE ENCOUNTER
----- Message from Marilynn sent at 2/18/2025  2:33 PM CST -----  Contact: [phone]  Returning a phone call.Who left a message for the patient:  nurse they know what this is regarding:  yesWould they like a phone call back or a response via [facility]:  call back
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
## 2025-02-19 ENCOUNTER — TELEPHONE (OUTPATIENT)
Dept: INTERNAL MEDICINE | Facility: CLINIC | Age: 70
End: 2025-02-19
Payer: MEDICARE
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
## 2025-02-20 ENCOUNTER — TELEPHONE (OUTPATIENT)
Dept: INTERNAL MEDICINE | Facility: CLINIC | Age: 70
End: 2025-02-20
Payer: MEDICARE

## 2025-02-20 NOTE — TELEPHONE ENCOUNTER
----- Message from Olman sent at 2/19/2025  4:38 PM CST -----  Regarding: Virtual Now?  Contact: Pt [phone]  [email or web address] Patient is calling for Medical Advice regarding: Patient waiting for virtual appointment to begin. She is stressed out because she has not gotten a call yet. Please call patient to discuss. How long has patient had these symptoms:Pharmacy name and phone#:Patient wants a call back or thru [facility]: CallComments:Please advise patient replies from provider may take up to 48 hours.

## 2025-02-21 ENCOUNTER — OFFICE VISIT (OUTPATIENT)
Dept: INTERNAL MEDICINE | Facility: CLINIC | Age: 70
End: 2025-02-21
Payer: MEDICARE

## 2025-02-21 ENCOUNTER — HOSPITAL ENCOUNTER (OUTPATIENT)
Dept: RADIOLOGY | Facility: HOSPITAL | Age: 70
Discharge: HOME | End: 2025-02-21
Attending: FAMILY MEDICINE
Payer: MEDICARE

## 2025-02-21 VITALS
BODY MASS INDEX: 26.64 KG/M2 | HEIGHT: 67 IN | SYSTOLIC BLOOD PRESSURE: 165 MMHG | WEIGHT: 169.75 LBS | DIASTOLIC BLOOD PRESSURE: 78 MMHG

## 2025-02-21 DIAGNOSIS — Z79.899 DRUG THERAPY: ICD-10-CM

## 2025-02-21 DIAGNOSIS — E78.5 HYPERLIPIDEMIA ASSOCIATED WITH TYPE 2 DIABETES MELLITUS: ICD-10-CM

## 2025-02-21 DIAGNOSIS — M25.512 ACUTE PAIN OF LEFT SHOULDER: Primary | ICD-10-CM
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
## 2025-02-21 DIAGNOSIS — E11.69 HYPERLIPIDEMIA ASSOCIATED WITH TYPE 2 DIABETES MELLITUS: ICD-10-CM

## 2025-02-21 PROCEDURE — 73030 X-RAY EXAM OF SHOULDER: CPT | Mod: TC,50,HCNC

## 2025-02-21 PROCEDURE — 99999 PR PBB SHADOW E&M-EST. PATIENT-LVL V: CPT | Mod: PBBFAC,HCNC,, | Performed by: FAMILY MEDICINE

## 2025-02-21 PROCEDURE — 73030 X-RAY EXAM OF SHOULDER: CPT | Mod: 26,50,HCNC, | Performed by: INTERNAL MEDICINE

## 2025-02-21 PROCEDURE — 72040 X-RAY EXAM NECK SPINE 2-3 VW: CPT | Mod: 26,HCNC,, | Performed by: INTERNAL MEDICINE

## 2025-02-21 PROCEDURE — 72040 X-RAY EXAM NECK SPINE 2-3 VW: CPT | Mod: TC,HCNC
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
## 2025-02-22 DIAGNOSIS — Z00.00 ENCOUNTER FOR MEDICARE ANNUAL WELLNESS EXAM: ICD-10-CM

## 2025-02-24 ENCOUNTER — RESULTS FOLLOW-UP (OUTPATIENT)
Dept: INTERNAL MEDICINE | Facility: CLINIC | Age: 70
End: 2025-02-24

## 2025-02-24 PROBLEM — M47.812 SPONDYLOSIS OF CERVICAL REGION WITHOUT MYELOPATHY OR RADICULOPATHY: Status: ACTIVE | Noted: 2025-02-24
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
## 2025-02-25 ENCOUNTER — HOSPITAL ENCOUNTER (EMERGENCY)
Facility: HOSPITAL | Age: 70
Discharge: HOME | End: 2025-02-25
Attending: EMERGENCY MEDICINE
Payer: MEDICARE

## 2025-02-25 ENCOUNTER — TELEPHONE (OUTPATIENT)
Dept: INTERNAL MEDICINE | Facility: CLINIC | Age: 70
End: 2025-02-25
Payer: MEDICARE

## 2025-02-25 ENCOUNTER — OFFICE VISIT (OUTPATIENT)
Dept: INTERNAL MEDICINE | Facility: CLINIC | Age: 70
End: 2025-02-25
Payer: MEDICARE

## 2025-02-25 VITALS
TEMPERATURE: 99 F | OXYGEN SATURATION: 100 % | DIASTOLIC BLOOD PRESSURE: 95 MMHG | HEIGHT: 67 IN | BODY MASS INDEX: 27.47 KG/M2 | RESPIRATION RATE: 17 BRPM | HEART RATE: 68 BPM | WEIGHT: 175 LBS | SYSTOLIC BLOOD PRESSURE: 192 MMHG

## 2025-02-25 VITALS
WEIGHT: 174.81 LBS | BODY MASS INDEX: 27.44 KG/M2 | DIASTOLIC BLOOD PRESSURE: 80 MMHG | HEART RATE: 62 BPM | SYSTOLIC BLOOD PRESSURE: 162 MMHG | HEIGHT: 67 IN | OXYGEN SATURATION: 100 %

## 2025-02-25 DIAGNOSIS — Z87.81 H/O CLAVICLE FRACTURE: Primary | ICD-10-CM

## 2025-02-25 DIAGNOSIS — S42.002A CLOSED NONDISPLACED FRACTURE OF LEFT CLAVICLE, UNSPECIFIED PART OF CLAVICLE, INITIAL ENCOUNTER: Primary | ICD-10-CM

## 2025-02-25 PROCEDURE — 99999 PR PBB SHADOW E&M-EST. PATIENT-LVL IV: CPT | Mod: PBBFAC,HCNC,, | Performed by: FAMILY MEDICINE

## 2025-02-25 PROCEDURE — 99282 EMERGENCY DEPT VISIT SF MDM: CPT

## 2025-02-25 NOTE — TELEPHONE ENCOUNTER
Called pt, and her son answered the phone. Dr. Diane wanted me to let them know that he is talking back and fourth with ER doctor. There isn't much they can do for her. Dr. Diane would still like for her to make it to her ortho appt this evening if possible.     Pts son said that he will be sure to bring her.
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
## 2025-02-28 ENCOUNTER — OFFICE VISIT (OUTPATIENT)
Dept: ORTHOPEDICS | Facility: CLINIC | Age: 70
End: 2025-02-28
Payer: MEDICARE

## 2025-02-28 ENCOUNTER — TELEPHONE (OUTPATIENT)
Dept: INTERNAL MEDICINE | Facility: CLINIC | Age: 70
End: 2025-02-28
Payer: MEDICARE

## 2025-02-28 VITALS — BODY MASS INDEX: 27.48 KG/M2 | WEIGHT: 175.06 LBS | HEIGHT: 67 IN

## 2025-02-28 DIAGNOSIS — S42.032A DISPLACED FRACTURE OF LATERAL END OF LEFT CLAVICLE, INITIAL ENCOUNTER FOR CLOSED FRACTURE: Primary | ICD-10-CM

## 2025-02-28 DIAGNOSIS — M25.512 ACUTE PAIN OF LEFT SHOULDER: ICD-10-CM

## 2025-02-28 PROCEDURE — 99999 PR PBB SHADOW E&M-EST. PATIENT-LVL IV: CPT | Mod: PBBFAC,HCNC,, | Performed by: ORTHOPAEDIC SURGERY

## 2025-02-28 NOTE — TELEPHONE ENCOUNTER
----- Message from Bessy sent at 2/28/2025 10:17 AM CST -----  Contact: [phone] .1MEDICALADVICE Patient is calling for Medical Advice regarding:pt is calling she was seen today by Chris Sherman MD the pt states that the dr told her He is the wrong doctor. She needs to see Medicine Doctor asap and Dr. Diane needs to get her in to see one.  Please call pt back and advise.How long has patient had these symptoms:Pharmacy name and phone#:Patient wants a call back or [facility]:callbackComments:Please advise patient replies from provider may take up to 48 hours.

## 2025-02-28 NOTE — TELEPHONE ENCOUNTER
Called both numbers on pts profile. First did not work, second mailbox was full could not leave message
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
## 2025-03-01 DIAGNOSIS — I10 HTN (HYPERTENSION), MALIGNANT: ICD-10-CM

## 2025-03-01 NOTE — TELEPHONE ENCOUNTER
Care Due:                  Date            Visit Type   Department     Provider  --------------------------------------------------------------------------------                                EP -                              PRIMARY      [facility] INTERNAL  Last Visit: 02-      CARE ([facility])   MEDICINE       Shravan Diane                              [facility]                              PRIMARY      [facility] INTERNAL  Next Visit: 03-      CARE ([facility])   MEDICINE       Shravan Diane                                                            Last  Test          Frequency    Reason                     Performed    Due Date  --------------------------------------------------------------------------------    CBC.........  12 months..  diclofenac, valACYclovir.  03- 03-    CMP.........  12 months..  atorvastatin, diclofenac,   03- 03-                             hydroCHLOROthiazide,                             lisinopriL, potassium,                             valACYclovir.............    Lipid Panel.  12 months..  atorvastatin.............  03- 03-    [facility] Embedded Care Due Messages. Reference number: [id number].   3/01/2025 11:35:22 AM CST

## 2025-03-02 ENCOUNTER — HOSPITAL ENCOUNTER (EMERGENCY)
Facility: HOSPITAL | Age: 70
Discharge: HOME | End: 2025-03-02
Attending: EMERGENCY MEDICINE
Payer: MEDICARE

## 2025-03-02 VITALS
TEMPERATURE: 98 F | DIASTOLIC BLOOD PRESSURE: 84 MMHG | HEART RATE: 54 BPM | SYSTOLIC BLOOD PRESSURE: 182 MMHG | OXYGEN SATURATION: 99 % | RESPIRATION RATE: 18 BRPM

## 2025-03-02 DIAGNOSIS — R20.2 NUMBNESS AND TINGLING: ICD-10-CM

## 2025-03-02 DIAGNOSIS — R20.0 NUMBNESS AND TINGLING: ICD-10-CM

## 2025-03-02 DIAGNOSIS — R20.0 RIGHT SIDED NUMBNESS: Primary | ICD-10-CM

## 2025-03-02 DIAGNOSIS — R29.818 ACUTE FOCAL NEUROLOGICAL DEFICIT: ICD-10-CM

## 2025-03-02 LAB
ALBUMIN SERPL BCP-MCNC: 4.3 G/DL (ref 3.5–5.2)
ALP SERPL-CCNC: 68 U/L (ref 40–150)
ALT SERPL W/O P-5'-P-CCNC: 15 U/L (ref 10–44)
ANION GAP SERPL CALC-SCNC: 10 MMOL/L (ref 8–16)
AST SERPL-CCNC: 34 U/L (ref 10–40)
BASOPHILS # BLD AUTO: 0.03 K/UL (ref 0–0.2)
BASOPHILS NFR BLD: 0.9 % (ref 0–1.9)
BILIRUB SERPL-MCNC: 0.4 MG/DL (ref 0.1–1)
BUN SERPL-MCNC: 11 MG/DL (ref 8–23)
CALCIUM SERPL-MCNC: 9.5 MG/DL (ref 8.7–10.5)
CHLORIDE SERPL-SCNC: 107 MMOL/L (ref 95–110)
CHOLEST SERPL-MCNC: 149 MG/DL (ref 120–199)
CHOLEST/HDLC SERPL: 2.7 {RATIO} (ref 2–5)
CO2 SERPL-SCNC: 26 MMOL/L (ref 23–29)
CREAT SERPL-MCNC: 0.7 MG/DL (ref 0.5–1.4)
DIFFERENTIAL METHOD BLD: ABNORMAL
EOSINOPHIL # BLD AUTO: 0.1 K/UL (ref 0–0.5)
EOSINOPHIL NFR BLD: 2.4 % (ref 0–8)
ERYTHROCYTE [DISTWIDTH] IN BLOOD BY AUTOMATED COUNT: 13.9 % (ref 11.5–14.5)
EST. GFR  (NO RACE VARIABLE): >60 ML/MIN/1.73 M^2
GLUCOSE SERPL-MCNC: 111 MG/DL (ref 70–110)
HCT VFR BLD AUTO: 35.8 % (ref 37–48.5)
HDLC SERPL-MCNC: 56 MG/DL (ref 40–75)
HDLC SERPL: 37.6 % (ref 20–50)
HGB BLD-MCNC: 11.3 G/DL (ref 12–16)
IMM GRANULOCYTES # BLD AUTO: 0.01 K/UL (ref 0–0.04)
IMM GRANULOCYTES NFR BLD AUTO: 0.3 % (ref 0–0.5)
LDLC SERPL CALC-MCNC: 77.4 MG/DL (ref 63–159)
LYMPHOCYTES # BLD AUTO: 1.6 K/UL (ref 1–4.8)
LYMPHOCYTES NFR BLD: 49.5 % (ref 18–48)
MCH RBC QN AUTO: 27.4 PG (ref 27–31)
MCHC RBC AUTO-ENTMCNC: 31.6 G/DL (ref 32–36)
MCV RBC AUTO: 87 FL (ref 82–98)
MONOCYTES # BLD AUTO: 0.3 K/UL (ref 0.3–1)
MONOCYTES NFR BLD: 7.9 % (ref 4–15)
NEUTROPHILS # BLD AUTO: 1.3 K/UL (ref 1.8–7.7)
NEUTROPHILS NFR BLD: 39 % (ref 38–73)
NONHDLC SERPL-MCNC: 93 MG/DL
NRBC BLD-RTO: 0 /100 WBC
PLATELET # BLD AUTO: 265 K/UL (ref 150–450)
PMV BLD AUTO: 10.4 FL (ref 9.2–12.9)
POTASSIUM SERPL-SCNC: 3.6 MMOL/L (ref 3.5–5.1)
PROT SERPL-MCNC: 7.7 G/DL (ref 6–8.4)
RBC # BLD AUTO: 4.13 M/UL (ref 4–5.4)
SODIUM SERPL-SCNC: 143 MMOL/L (ref 136–145)
TRIGL SERPL-MCNC: 78 MG/DL (ref 30–150)
TSH SERPL DL<=0.005 MIU/L-ACNC: 0.52 UIU/ML (ref 0.4–4)
WBC # BLD AUTO: 3.29 K/UL (ref 3.9–12.7)

## 2025-03-02 PROCEDURE — 84443 ASSAY THYROID STIM HORMONE: CPT | Performed by: PHYSICIAN ASSISTANT

## 2025-03-02 PROCEDURE — 93010 ELECTROCARDIOGRAM REPORT: CPT | Mod: HCNC,,, | Performed by: INTERNAL MEDICINE

## 2025-03-02 PROCEDURE — 80053 COMPREHEN METABOLIC PANEL: CPT | Performed by: PHYSICIAN ASSISTANT

## 2025-03-02 PROCEDURE — 85025 COMPLETE CBC W/AUTO DIFF WBC: CPT | Performed by: PHYSICIAN ASSISTANT

## 2025-03-02 PROCEDURE — 84425 ASSAY OF VITAMIN B-1: CPT | Mod: HCNC | Performed by: PHYSICIAN ASSISTANT

## 2025-03-02 PROCEDURE — 99285 EMERGENCY DEPT VISIT HI MDM: CPT | Mod: 25

## 2025-03-02 PROCEDURE — 93005 ELECTROCARDIOGRAM TRACING: CPT | Mod: HCNC

## 2025-03-02 PROCEDURE — 80061 LIPID PANEL: CPT | Performed by: PHYSICIAN ASSISTANT
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
## 2025-03-03 LAB
OHS QRS DURATION: 90 MS
OHS QTC CALCULATION: 443 MS

## 2025-03-03 RX ORDER — HYDROCHLOROTHIAZIDE 25 MG/1
25 TABLET ORAL
Qty: 90 TABLET | Refills: 3 | Status: SHIPPED | OUTPATIENT
Start: 2025-03-03

## 2025-03-03 NOTE — TELEPHONE ENCOUNTER
Refill Routing Note   Medication(s) are not appropriate for processing by [facility] for the following reason(s):        Required vitals abnormal  ED/Hospital Visit since last OV with provider    [facility] action(s):  Defer     Requires labs : Yes             Appointments  past 12m or future 3m with PCP    Date Provider   Last Visit   2/25/2025 Shravan Diane MD   Next Visit   3/14/2025 Shravan Diane MD   ED visits in past 90 days: 2        Note composed:11:10 AM 03/03/2025

## 2025-03-03 NOTE — DISCHARGE INSTRUCTIONS
Diagnosis: Right-sided numbness    I am not sure what is causing your numbness.  Your MRI did not show any evidence of stroke.  Your lab tests did not show any concerning findings.    I sent a referral to our neurology doctors for follow-up.  Please call to schedule a follow-up appointment.  Please also follow up with your primary care doctor.  If you start to have any worsening symptoms, please return to the emergency department immediately.

## 2025-03-05 ENCOUNTER — TELEPHONE (OUTPATIENT)
Dept: INTERNAL MEDICINE | Facility: CLINIC | Age: 70
End: 2025-03-05
Payer: MEDICARE

## 2025-03-05 NOTE — TELEPHONE ENCOUNTER
----- Message from Ivtet sent at 3/5/2025 12:36 PM CST -----  Contact: Self/[phone]  .[email or web address] Patient is calling for Medical Advice regarding:status check on message Patient wants a call back or thru [facility]: Call back Comments: pt said that she is calling in regards to she left a message on 2/28 and she did not get a return call informed pt that the staff tried to call her at both numbers on file but got no answer, pt stated that she would like to let the doctor know that she was in the [facility] at [facility] on 3/2 , had an MRI and labs  done and was that she needs to be seen by a Neurologist, pt stated that she still feels the pins and needles on the right side . Please advise Please advise patient replies from provider may take up to 48 hours.Pt has a referral that was put in by [facility] provider , a message was sent to the Neurology Department to be seen

## 2025-03-07 LAB — VIT B1 BLD-MCNC: 54 UG/L (ref 38–122)

## 2025-03-14 ENCOUNTER — OFFICE VISIT (OUTPATIENT)
Dept: INTERNAL MEDICINE | Facility: CLINIC | Age: 70
End: 2025-03-14
Payer: MEDICARE

## 2025-03-14 VITALS
DIASTOLIC BLOOD PRESSURE: 86 MMHG | HEIGHT: 67 IN | OXYGEN SATURATION: 98 % | HEART RATE: 62 BPM | WEIGHT: 177.25 LBS | SYSTOLIC BLOOD PRESSURE: 144 MMHG | BODY MASS INDEX: 27.82 KG/M2

## 2025-03-14 DIAGNOSIS — R20.0 NUMBNESS AND TINGLING IN RIGHT HAND: ICD-10-CM

## 2025-03-14 DIAGNOSIS — Z87.81 H/O CLAVICLE FRACTURE: Primary | ICD-10-CM

## 2025-03-14 DIAGNOSIS — I10 ESSENTIAL HYPERTENSION: ICD-10-CM

## 2025-03-14 DIAGNOSIS — R20.2 NUMBNESS AND TINGLING IN RIGHT HAND: ICD-10-CM

## 2025-03-14 PROCEDURE — 99999 PR PBB SHADOW E&M-EST. PATIENT-LVL IV: CPT | Mod: PBBFAC,HCNC,, | Performed by: FAMILY MEDICINE

## 2025-03-14 RX ORDER — GABAPENTIN 300 MG/1
300 CAPSULE ORAL 3 TIMES DAILY
Qty: 90 CAPSULE | Refills: 2 | Status: SHIPPED | OUTPATIENT
Start: 2025-03-14 | End: 2026-03-14

## 2025-03-14 RX ORDER — HYDROCORTISONE 25 MG/G
CREAM TOPICAL 2 TIMES DAILY
Qty: 20 G | Refills: 0 | Status: SHIPPED | OUTPATIENT
Start: 2025-03-14
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
## 2025-03-14 NOTE — TELEPHONE ENCOUNTER
----- Message from Tiffany Billings sent at 1/10/2022  4:13 PM CST -----  Pt states that she need an appt for hormone . Patient can be reached at [phone]    
Called pt and scheduled for hormone consult on 02/14/22 at 9:45 am. Pt said thanks.  
Ambulatory

## 2025-03-19 ENCOUNTER — TELEPHONE (OUTPATIENT)
Dept: INTERNAL MEDICINE | Facility: CLINIC | Age: 70
End: 2025-03-19
Payer: MEDICARE

## 2025-03-19 ENCOUNTER — PATIENT OUTREACH (OUTPATIENT)
Dept: ADMINISTRATIVE | Facility: HOSPITAL | Age: 70
End: 2025-03-19
Payer: MEDICARE
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
## 2025-03-19 NOTE — TELEPHONE ENCOUNTER
Tried calling pt back to see if she needed anything from us or to see if she was just relaying the information to us so we are aware of her stopping the medication, and it causing her to fall.    No answer, sending to you as ELVA

## 2025-03-19 NOTE — TELEPHONE ENCOUNTER
Copied from CRM #[id number]. Topic: General Inquiry - Patient Advice  >> Mar 19, 2025  1:26 PM Patricia wrote:  Patient would like to get medical advice.  Symptoms (please be specific):   Pt states the gabapentin (NEURONTIN) 300 MG capsule medication prescribed made her fall down again. Pt states she stopped talking the medication for her numbness on her right side. Pt states she is going to see a neurology provider on Friday for the numbness.   How long have you had these symptoms: N/A  Would you like a call back, or a response through your [facility] portal?: call back to pt   [phone]  Pharmacy name and phone # (copy from chart):   N/A  Comments:

## 2025-03-19 NOTE — PROGRESS NOTES
Chart reviewed and updated. Reconciled immunizations, health maintenance and care everywhere.  Scanned external ER Note 02.10.2025.      Sarahy Diez [facility] [address]  Panel Care Coordinator  [facility]  [phone]  For Shravan Diane MD

## 2025-03-20 ENCOUNTER — TELEPHONE (OUTPATIENT)
Dept: NEUROLOGY | Facility: CLINIC | Age: 70
End: 2025-03-20

## 2025-03-20 ENCOUNTER — OFFICE VISIT (OUTPATIENT)
Dept: NEUROLOGY | Facility: CLINIC | Age: 70
End: 2025-03-20
Payer: MEDICARE

## 2025-03-20 ENCOUNTER — LAB VISIT (OUTPATIENT)
Dept: LAB | Facility: HOSPITAL | Age: 70
End: 2025-03-20
Payer: MEDICARE

## 2025-03-20 VITALS — SYSTOLIC BLOOD PRESSURE: 178 MMHG | HEART RATE: 60 BPM | DIASTOLIC BLOOD PRESSURE: 88 MMHG

## 2025-03-20 DIAGNOSIS — E13.8 OTHER SPECIFIED DIABETES MELLITUS WITH UNSPECIFIED COMPLICATIONS: ICD-10-CM

## 2025-03-20 DIAGNOSIS — G62.9 NEUROPATHY: Primary | ICD-10-CM

## 2025-03-20 DIAGNOSIS — G62.9 NEUROPATHY: ICD-10-CM

## 2025-03-20 DIAGNOSIS — G60.9 HEREDITARY AND IDIOPATHIC NEUROPATHY, UNSPECIFIED: ICD-10-CM

## 2025-03-20 DIAGNOSIS — R20.2 NUMBNESS AND TINGLING OF RIGHT ARM AND LEG: ICD-10-CM

## 2025-03-20 DIAGNOSIS — R20.0 NUMBNESS AND TINGLING OF RIGHT ARM AND LEG: ICD-10-CM

## 2025-03-20 LAB
CERULOPLASMIN SERPL-MCNC: 26 MG/DL (ref 15–45)
CRP SERPL-MCNC: 2.2 MG/L (ref 0–8.2)
ERYTHROCYTE [SEDIMENTATION RATE] IN BLOOD BY PHOTOMETRIC METHOD: 29 MM/HR (ref 0–36)
ESTIMATED AVG GLUCOSE: 126 MG/DL (ref 68–131)
HBA1C MFR BLD: 6 % (ref 4–5.6)
HCV AB SERPL QL IA: NORMAL
HIV 1+2 AB+HIV1 P24 AG SERPL QL IA: NORMAL
RHEUMATOID FACT SERPL-ACNC: <13 IU/ML (ref 0–15)
T4 FREE SERPL-MCNC: 0.77 NG/DL (ref 0.71–1.51)
TSH SERPL DL<=0.005 MIU/L-ACNC: 1.5 UIU/ML (ref 0.4–4)

## 2025-03-20 PROCEDURE — 86036 ANCA SCREEN EACH ANTIBODY: CPT | Mod: HCNC

## 2025-03-20 PROCEDURE — 82607 VITAMIN B-12: CPT | Mod: HCNC

## 2025-03-20 PROCEDURE — 87389 HIV-1 AG W/HIV-1&-2 AB AG IA: CPT | Mod: HCNC

## 2025-03-20 PROCEDURE — 82595 ASSAY OF CRYOGLOBULIN: CPT | Mod: HCNC

## 2025-03-20 PROCEDURE — 84446 ASSAY OF VITAMIN E: CPT | Mod: HCNC

## 2025-03-20 PROCEDURE — 82300 ASSAY OF CADMIUM: CPT | Mod: HCNC

## 2025-03-20 PROCEDURE — 86803 HEPATITIS C AB TEST: CPT | Mod: HCNC

## 2025-03-20 PROCEDURE — 86334 IMMUNOFIX E-PHORESIS SERUM: CPT | Mod: HCNC

## 2025-03-20 PROCEDURE — 86431 RHEUMATOID FACTOR QUANT: CPT | Mod: HCNC

## 2025-03-20 PROCEDURE — 86334 IMMUNOFIX E-PHORESIS SERUM: CPT | Mod: 26,HCNC,, | Performed by: PATHOLOGY

## 2025-03-20 PROCEDURE — 99999 PR PBB SHADOW E&M-EST. PATIENT-LVL III: CPT | Mod: PBBFAC,HCNC,GC,

## 2025-03-20 PROCEDURE — 85652 RBC SED RATE AUTOMATED: CPT | Mod: HCNC

## 2025-03-20 PROCEDURE — 84311 SPECTROPHOTOMETRY: CPT | Mod: HCNC

## 2025-03-20 PROCEDURE — 86235 NUCLEAR ANTIGEN ANTIBODY: CPT | Mod: 59,HCNC

## 2025-03-20 PROCEDURE — 82390 ASSAY OF CERULOPLASMIN: CPT | Mod: HCNC

## 2025-03-20 PROCEDURE — 84165 PROTEIN E-PHORESIS SERUM: CPT | Mod: HCNC

## 2025-03-20 PROCEDURE — 86038 ANTINUCLEAR ANTIBODIES: CPT | Mod: HCNC

## 2025-03-20 PROCEDURE — 82525 ASSAY OF COPPER: CPT | Mod: HCNC

## 2025-03-20 PROCEDURE — 86235 NUCLEAR ANTIGEN ANTIBODY: CPT | Mod: HCNC

## 2025-03-20 PROCEDURE — 84443 ASSAY THYROID STIM HORMONE: CPT | Mod: HCNC

## 2025-03-20 PROCEDURE — 86618 LYME DISEASE ANTIBODY: CPT | Mod: HCNC

## 2025-03-20 PROCEDURE — 84165 PROTEIN E-PHORESIS SERUM: CPT | Mod: 26,HCNC,, | Performed by: PATHOLOGY

## 2025-03-20 PROCEDURE — 83036 HEMOGLOBIN GLYCOSYLATED A1C: CPT | Mod: HCNC

## 2025-03-20 PROCEDURE — 84207 ASSAY OF VITAMIN B-6: CPT | Mod: HCNC

## 2025-03-20 PROCEDURE — 86140 C-REACTIVE PROTEIN: CPT | Mod: HCNC

## 2025-03-20 PROCEDURE — 84439 ASSAY OF FREE THYROXINE: CPT | Mod: HCNC

## 2025-03-20 RX ORDER — GABAPENTIN 300 MG/1
600 CAPSULE ORAL 3 TIMES DAILY
COMMUNITY

## 2025-03-21 LAB
ALBUMIN SERPL ELPH-MCNC: 4.67 G/DL (ref 3.35–5.55)
ALPHA1 GLOB SERPL ELPH-MCNC: 0.3 G/DL (ref 0.17–0.41)
ALPHA2 GLOB SERPL ELPH-MCNC: 0.62 G/DL (ref 0.43–0.99)
ANA SER QL IF: NORMAL
ARSENIC BLD-MCNC: <1 NG/ML
B-GLOBULIN SERPL ELPH-MCNC: 0.88 G/DL (ref 0.5–1.1)
CADMIUM BLD-MCNC: <0.2 NG/ML
CITY: NORMAL
COUNTY: NORMAL
GAMMA GLOB SERPL ELPH-MCNC: 1.04 G/DL (ref 0.67–1.58)
GUARDIAN FIRST NAME: NORMAL
GUARDIAN LAST NAME: NORMAL
HOME PHONE: NORMAL
INTERPRETATION SERPL IFE-IMP: NORMAL
LEAD BLD-MCNC: 1.9 MCG/DL
MERCURY BLD-MCNC: <1 NG/ML
PATHOLOGIST INTERPRETATION IFE: NORMAL
PATHOLOGIST INTERPRETATION SPE: NORMAL
PROT SERPL-MCNC: 7.5 G/DL (ref 6–8.4)
RACE: NORMAL
STATE: NORMAL
STREET ADDRESS: NORMAL
VENOUS/CAPILLARY: NORMAL
ZIP: NORMAL

## 2025-03-22 LAB
ANTI-SSA ANTIBODY: 0.07 RATIO (ref 0–0.99)
ANTI-SSA INTERPRETATION: NEGATIVE
ANTI-SSB ANTIBODY: 0.06 RATIO (ref 0–0.99)
ANTI-SSB INTERPRETATION: NEGATIVE
VIT B12 SERPL-MCNC: 561 NG/L (ref 180–914)

## 2025-03-24 LAB
ANCA AB TITR SER IF: NORMAL TITER
B BURGDOR AB SER IA-ACNC: 0.06 INDEX VALUE
CLINICAL BIOCHEMIST REVIEW: NORMAL
P-ANCA TITR SER IF: NORMAL TITER
PORPHYRINS REVIEWED BY: NORMAL
PORPHYRINS SERPL-MCNC: <1 MCG/DL

## 2025-03-25 ENCOUNTER — TELEPHONE (OUTPATIENT)
Dept: NEUROLOGY | Facility: CLINIC | Age: 70
End: 2025-03-25
Payer: MEDICARE

## 2025-03-25 LAB — COPPER SERPL-MCNC: 954 UG/L (ref 810–1990)

## 2025-03-25 NOTE — TELEPHONE ENCOUNTER
----- Message from Luz Marina sent at 3/25/2025  1:31 PM CDT -----  Contact: [phone] Patient  2TESTRESULTSType: Test ResultsWhat test was performed? LabsWho ordered the test? Dr Olivarez and where were the test performed? 03/20/2025 [facility] LabWould you like a call back and or thru [facility]: Pt stated she has been calling with no response. Pt is asking if she can please get a call back about her results. Thank you. Comments:

## 2025-03-25 NOTE — TELEPHONE ENCOUNTER
----- Message from Luz Marina sent at 3/25/2025  1:31 PM CDT -----  Contact: [phone] Patient  2TESTRESULTSType: Test ResultsWhat test was performed? LabsWho ordered the test? Dr Olivarez and where were the test performed? 03/20/2025 [facility] LabWould you like a call back and or thru [facility]: Pt stated she has been calling with no response. Pt is asking if she can please get a call back about her results. Thank you. Comments:

## 2025-03-26 ENCOUNTER — TELEPHONE (OUTPATIENT)
Dept: NEUROLOGY | Facility: CLINIC | Age: 70
End: 2025-03-26
Payer: MEDICARE

## 2025-03-26 LAB — A-TOCOPHEROL VIT E SERPL-MCNC: 1129 UG/DL (ref 500–1800)

## 2025-03-28 ENCOUNTER — TELEPHONE (OUTPATIENT)
Dept: INTERNAL MEDICINE | Facility: CLINIC | Age: 70
End: 2025-03-28
Payer: MEDICARE

## 2025-03-28 LAB
4PYRIDOXATE SERPL-MCNC: 16 MCG/L (ref 3–30)
PYRIDOXAL PHOS SERPL-MCNC: 36 MCG/L (ref 5–50)

## 2025-03-28 NOTE — TELEPHONE ENCOUNTER
She is wanting you to resend in some hormone cream that she was once taking before. Would like it sent to [facility] on [address]

## 2025-03-28 NOTE — TELEPHONE ENCOUNTER
Pt is upset that she got her blood work done on 03/20 and nobody has reached out to her yet, says that nobody has reached out to her yet about her results.     I do see where she was spoken to about contacting her when all the labs come back. I see only one still in process, could you all contact pt please.

## 2025-03-28 NOTE — TELEPHONE ENCOUNTER
He is overdue to be seen, none of these medicines should be refilled at this time until an appointment has been made    Routing Comment    Per Dr Cat.     Tried calling pt back to see if the cream she is wanting is something that her gyno had prescribed her a while back. If so Dr. Diane recommends her reaching back out to the gyno to discuss or get the cream.     No answer unable to lvm

## 2025-03-28 NOTE — TELEPHONE ENCOUNTER
"Tried calling pt back to let her know that neurology said     "The labs we have back all look fine. We are still waiting on one result- for cryoglobulin. "     No answer unable to lvm   "

## 2025-03-28 NOTE — TELEPHONE ENCOUNTER
----- Message from Camryn sent at 3/28/2025  9:46 AM CDT -----  Contact: [phone]  .1MEDICALADVICE Patient is calling for Medical Advice regarding: Patient is requesting a call from the staff about her medical file. States it is important. Patient wants a call back or thru [facility]: call back Comments: Patient states she is calling since last Wednesday.Thank you  Please advise patient replies from provider may take up to 48 hours.

## 2025-03-31 ENCOUNTER — DOCUMENTATION ONLY (OUTPATIENT)
Dept: NEUROLOGY | Facility: CLINIC | Age: 70
End: 2025-03-31
Payer: MEDICARE

## 2025-03-31 ENCOUNTER — TELEPHONE (OUTPATIENT)
Dept: NEUROLOGY | Facility: CLINIC | Age: 70
End: 2025-03-31
Payer: MEDICARE

## 2025-03-31 DIAGNOSIS — R20.0 NUMBNESS AND TINGLING OF RIGHT ARM AND LEG: Primary | ICD-10-CM

## 2025-03-31 DIAGNOSIS — R20.2 NUMBNESS AND TINGLING OF RIGHT ARM AND LEG: Primary | ICD-10-CM

## 2025-03-31 DIAGNOSIS — I10 ESSENTIAL HYPERTENSION: ICD-10-CM

## 2025-03-31 RX ORDER — DULOXETIN HYDROCHLORIDE 60 MG/1
60 CAPSULE, DELAYED RELEASE ORAL DAILY
Qty: 30 CAPSULE | Refills: 11 | Status: SHIPPED | OUTPATIENT
Start: 2025-03-31 | End: 2026-03-31

## 2025-03-31 NOTE — TELEPHONE ENCOUNTER
----- Message from Phillip sent at 3/31/2025 10:14 AM CDT -----  Regarding: Urgent Callback  Contact: [phone]  Patient calling requesting a callback from nurse or provider in regards to questions about her health. She said she need to find out about her life before she go paralyze. She said she don't understand why she have to wait a whole month to be seen.  Please call back as soon as possible.

## 2025-03-31 NOTE — TELEPHONE ENCOUNTER
----- Message from Marilynn sent at 3/31/2025 10:39 AM CDT -----  Contact: Pt  [phone]  Would like to receive medical advice.Would they like a call back or a response via [facility]:  call back Additional information:  Pt is calling to speak to nurse about her Neurologist.  She said she is trying to find a different doctor outside of [facility].  She said the neurologist here doesn't care about her life because they can't see her until the end of the month.  She is very upset.

## 2025-03-31 NOTE — PROGRESS NOTES
Called and spoke to patient this morning regarding neuropathy. Reassured her that she will not become paralyzed from neuropathic pain. Reviewed lab results - all within normal limits (cryoglobulin still pending.)    Increased dose of gabapentin has been ineffective. Will start trial of Cymbalta 60 mg po daily. In addition, will obtain C-spine imaging and ordered compounded cream for neuropathy.     Of note, when asked about current medications patient stated that she breaks up her Seroquel 400 mg qhs into little pieces and goes through one pill every one to two weeks. PCP messaged regarding this.     Kimmy Culver MD  Neurology PGY-2  [facility]

## 2025-03-31 NOTE — TELEPHONE ENCOUNTER
Pt is not pleased with having to wait until the end of April to be seen again by neurology. She is still having numbness in the right side of her body.     I explained to pt that if she wants to be seen elsewhere outside of [facility], then she will have to find where she wants to go and let us know. Pt v/u and will let us know where she chooses to go

## 2025-03-31 NOTE — TELEPHONE ENCOUNTER
I tried to contact the patient to let her know the providers next available is in a month because they are in clinic once a month. I was unable to leave a voice message due to the patients mailbox being full. The last time the patient was in [facility] was March 2.

## 2025-04-01 ENCOUNTER — CLINICAL SUPPORT (OUTPATIENT)
Dept: REHABILITATION | Facility: HOSPITAL | Age: 70
End: 2025-04-01
Attending: PSYCHIATRY & NEUROLOGY
Payer: MEDICARE

## 2025-04-01 DIAGNOSIS — R20.2 NUMBNESS AND TINGLING OF RIGHT ARM AND LEG: Primary | ICD-10-CM

## 2025-04-01 DIAGNOSIS — R20.0 NUMBNESS AND TINGLING OF RIGHT ARM AND LEG: Primary | ICD-10-CM

## 2025-04-01 DIAGNOSIS — R29.898 DECREASED RANGE OF MOTION OF NECK: Primary | ICD-10-CM

## 2025-04-01 DIAGNOSIS — G62.9 NEUROPATHY: ICD-10-CM

## 2025-04-01 DIAGNOSIS — M53.86 DECREASED RANGE OF MOTION OF LUMBAR SPINE: ICD-10-CM

## 2025-04-01 DIAGNOSIS — R53.1 GENERALIZED WEAKNESS: ICD-10-CM

## 2025-04-01 PROCEDURE — 97162 PT EVAL MOD COMPLEX 30 MIN: CPT | Mod: PO

## 2025-04-01 RX ORDER — CAPSAICIN 0.03 G/100G
CREAM TOPICAL 2 TIMES DAILY
Qty: 50 G | Refills: 2 | Status: SHIPPED | OUTPATIENT
Start: 2025-04-01

## 2025-04-01 NOTE — TELEPHONE ENCOUNTER
Refill Routing Note   Medication(s) are not appropriate for processing by [facility] for the following reason(s):        Required vitals abnormal    [facility] action(s):  Defer             Appointments  past 12m or future 3m with PCP    Date Provider   Last Visit   3/14/2025 Shravan Diane MD   Next Visit   6/20/2025 Shravan Diane MD   ED visits in past 90 days: 2        Note composed:11:59 AM 04/01/2025

## 2025-04-01 NOTE — TELEPHONE ENCOUNTER
No care due was identified.  [facility] Embedded Care Due Messages. Reference number: [id number].   4/01/2025 11:55:27 AM CDT

## 2025-04-01 NOTE — TELEPHONE ENCOUNTER
----- Message from Aga sent at 4/1/2025 11:46 AM CDT -----  Contact: Autumn [phone]  Pt needs a refill on High B/P pill called into [facility]/pharmacy #[id number] - [place] [address] [facility] [address]: [phone] Fax: [phone] mom/dad/guardian can be reached at [phone] is calling to request if the provider can please send over the orders for her High B/P pills. Pt states she is at [facility] and the pharmacy is stating to her that Dr. Diane never put in orders and she can not get anything refilled until the provider sends the orders. Please call Autumn back for advice

## 2025-04-02 DIAGNOSIS — I10 ESSENTIAL HYPERTENSION: ICD-10-CM

## 2025-04-02 RX ORDER — AMLODIPINE BESYLATE 5 MG/1
5 TABLET ORAL DAILY
Qty: 90 TABLET | Refills: 3 | Status: SHIPPED | OUTPATIENT
Start: 2025-04-02

## 2025-04-02 RX ORDER — POTASSIUM CHLORIDE 1500 MG/1
20 TABLET, EXTENDED RELEASE ORAL DAILY
Qty: 90 TABLET | Refills: 3 | Status: SHIPPED | OUTPATIENT
Start: 2025-04-02

## 2025-04-02 RX ORDER — LISINOPRIL 40 MG/1
40 TABLET ORAL DAILY
Qty: 90 TABLET | Refills: 1 | Status: SHIPPED | OUTPATIENT
Start: 2025-04-02

## 2025-04-02 NOTE — TELEPHONE ENCOUNTER
Unable to retrieve patient chart and identify care due.  [facility] Embedded Care Due Messages. Reference number: [id number].   4/02/2025 1:19:10 PM CDT

## 2025-04-02 NOTE — TELEPHONE ENCOUNTER
----- Message from Luz Marina sent at 4/2/2025 12:21 PM CDT -----  Contact: [phone] Patient  Requesting an RX refill or new RX.Is this a refill or new RX: newRX name and strength (copy/paste from chart):  lisinopril (PRINIVIL,ZESTRIL) 40 MG tabletIs this a 30 day or 90 day RX: Pharmacy name and phone # (copy/paste from chart): [facility]/pharmacy #[id number]  [place] [address]Phone: [phone] Fax: [phone] an RX refill or new RX.Is this a refill or new RX: newRX name and strength (copy/paste from chart):  potassium chloride (K-TAB) 20 mEqIs this a 30 day or 90 day RX: Pharmacy name and phone # (copy/paste from chart):  [facility]/pharmacy #[id number]  [place] [address] [address]V[place]: [phone] Fax: [phone]

## 2025-04-03 ENCOUNTER — TELEPHONE (OUTPATIENT)
Dept: INTERNAL MEDICINE | Facility: CLINIC | Age: 70
End: 2025-04-03
Payer: MEDICARE

## 2025-04-03 NOTE — TELEPHONE ENCOUNTER
----- Message from Amie sent at 4/3/2025 12:32 PM CDT -----  Contact: Mee with Thu [phone]  .1MEDICALADVICE Patient is calling for Medical Advice regarding: Thu Caban rep calling to verify receipt of fax (67pgs) sent to [phone] on 03/25/2025How long has patient had these symptoms:Pharmacy name and phone#:Patient wants a call back or thru [facility]: call back to Mrs. Caban.Comments:Please advise patient replies from provider may take up to 48 hours.

## 2025-04-04 ENCOUNTER — TELEPHONE (OUTPATIENT)
Dept: INTERNAL MEDICINE | Facility: CLINIC | Age: 70
End: 2025-04-04
Payer: MEDICARE

## 2025-04-04 NOTE — TELEPHONE ENCOUNTER
----- Message from Shravan Diane MD sent at 4/4/2025  9:55 AM CDT -----  Contact: Vonda with Thu @ [phone]  No fax  ----- Message -----  From: Nick Kee MA  Sent: 4/4/2025   9:37 AM CDT  To: Shravan Diane MD    Did you receive a fax for her by any chance?  ----- Message -----  From: Sera Benjamin  Sent: 4/4/2025   8:39 AM CDT  To: Benedicto Vásquez    .[email or web address] Patient is calling for Medical Advice regarding: Vonda with Juan [facility] @ [phone]   is returning Ubaldo Allen MA call on patient, please call.
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
## 2025-04-08 ENCOUNTER — TELEPHONE (OUTPATIENT)
Dept: OBSTETRICS AND GYNECOLOGY | Facility: CLINIC | Age: 70
End: 2025-04-08
Payer: MEDICARE

## 2025-04-08 DIAGNOSIS — I10 ESSENTIAL HYPERTENSION: ICD-10-CM

## 2025-04-08 RX ORDER — AMLODIPINE BESYLATE 5 MG/1
5 TABLET ORAL DAILY
Qty: 90 TABLET | Refills: 3 | OUTPATIENT
Start: 2025-04-08

## 2025-04-08 NOTE — TELEPHONE ENCOUNTER
No care due was identified.  [facility] Embedded Care Due Messages. Reference number: [id number].   4/08/2025 10:41:05 AM CDT

## 2025-04-08 NOTE — TELEPHONE ENCOUNTER
Refill Decision Note   Autumn Smart  is requesting a refill authorization.  Brief Assessment and Rationale for Refill:  Quick Discontinue     Medication Therapy Plan:  Status: Sent to pharmacy (4/2/2025  8:05 AM CDT)      Comments:     Note composed:1:14 PM 04/08/2025

## 2025-04-08 NOTE — TELEPHONE ENCOUNTER
----- Message from Mellisa sent at 4/8/2025 10:32 AM CDT -----  Contact: patient @ [phone]  Requesting an RX refill or new RX.Is this a refill or new RX: Refill RX name and strength (copy/paste from chart):  amLODIPine (NORVASC) 5 MG tabletIs this a 30 day or 90 day RX: Pharmacy name and phone # (copy/paste from chart):  [facility]/pharmacy #[id number] - [place] - [address] [address]Ldfac: [phone] Fax: [phone] doctors have asked that we provide their patients with the following 2 reminders -- prescription refills can take up to 72 hours, and a friendly reminder that in the future you can use your [facility] account to request refills:

## 2025-04-10 ENCOUNTER — TELEPHONE (OUTPATIENT)
Dept: OBSTETRICS AND GYNECOLOGY | Facility: CLINIC | Age: 70
End: 2025-04-10
Payer: MEDICARE

## 2025-04-10 ENCOUNTER — HOSPITAL ENCOUNTER (OUTPATIENT)
Dept: RADIOLOGY | Facility: HOSPITAL | Age: 70
Discharge: HOME | End: 2025-04-10
Payer: MEDICARE

## 2025-04-10 DIAGNOSIS — R20.0 NUMBNESS AND TINGLING OF RIGHT ARM AND LEG: ICD-10-CM

## 2025-04-10 DIAGNOSIS — R20.2 NUMBNESS AND TINGLING OF RIGHT ARM AND LEG: ICD-10-CM

## 2025-04-10 PROCEDURE — 72156 MRI NECK SPINE W/O & W/DYE: CPT | Mod: TC

## 2025-04-10 PROCEDURE — A9585 GADOBUTROL INJECTION: HCPCS

## 2025-04-10 PROCEDURE — 25500020 PHARM REV CODE 255

## 2025-04-10 PROCEDURE — 72156 MRI NECK SPINE W/O & W/DYE: CPT | Mod: 26,,, | Performed by: INTERNAL MEDICINE

## 2025-04-10 RX ORDER — GADOBUTROL 604.72 MG/ML
9 INJECTION INTRAVENOUS
Status: COMPLETED | OUTPATIENT
Start: 2025-04-10 | End: 2025-04-10

## 2025-04-10 RX ADMIN — GADOBUTROL 9 ML: 604.72 INJECTION INTRAVENOUS at 07:04

## 2025-04-10 NOTE — TELEPHONE ENCOUNTER
----- Message from Tressa sent at 4/10/2025  1:26 PM CDT -----  [phone]  Type: Patient Call BackWho called: self What is the request in detail: pt stated she received a call yesterday and was told her cream would be sent to the pharmacy and wanted to know if it was sent yet. [facility] - [place]  [address] [place] [address].[address] [place] [address].Bianka BORDEN [address]Peavs: [phone] Fax: [phone] Can the clinic reply by [facility]? noWould the patient rather a call back or a response via [facility]? Call back Best call back number: [phone] Information:Pt is requesting an appt at [facility]

## 2025-04-10 NOTE — TELEPHONE ENCOUNTER
Called Tari Roberts in reference to patients message. Spoke with Sanjana and was told that the script was phoned in on yesterday and patient will be called once her script is ready for .

## 2025-04-11 DIAGNOSIS — M50.30 DDD (DEGENERATIVE DISC DISEASE), CERVICAL: ICD-10-CM

## 2025-04-11 DIAGNOSIS — R20.2 NUMBNESS AND TINGLING OF RIGHT ARM AND LEG: Primary | ICD-10-CM

## 2025-04-11 DIAGNOSIS — R20.0 NUMBNESS AND TINGLING OF RIGHT ARM AND LEG: Primary | ICD-10-CM

## 2025-04-15 ENCOUNTER — TELEPHONE (OUTPATIENT)
Dept: NEUROLOGY | Facility: HOSPITAL | Age: 70
End: 2025-04-15
Payer: MEDICARE

## 2025-04-16 ENCOUNTER — TELEPHONE (OUTPATIENT)
Dept: NEUROLOGY | Facility: HOSPITAL | Age: 70
End: 2025-04-16
Payer: MEDICARE
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
## 2025-04-23 ENCOUNTER — TELEPHONE (OUTPATIENT)
Dept: NEUROLOGY | Facility: CLINIC | Age: 70
End: 2025-04-23
Payer: MEDICARE

## 2025-04-23 NOTE — TELEPHONE ENCOUNTER
Called Pt to pass on a message from the provider per providers orders about her results. Pt did not  left a message.

## 2025-04-23 NOTE — TELEPHONE ENCOUNTER
----- Message from Kimmy Culver sent at 4/16/2025  9:56 AM CDT -----  Contact: [phone] Patient  I've called her twice yesterday and twice today and she doesn't answer. If you get a hold of her let her know that her MRI doesn't show anything emergent but I referred her to Pain Management because she has some chronic changes on the right that could be causing her symptoms.  ----- Message -----  From: Hasmukh Cruz Jr., MA  Sent: 4/15/2025  10:24 AM CDT  To: Kimmy Culver MD      ----- Message -----  From: Amie Rodriguez  Sent: 4/2/2025  12:50 PM CDT  To: Abiola Oneal Staff    .1MEDICALADVICE Patient is calling for Medical Advice regarding: Pt states she refuses to take the following RxDULoxetine (CYMBALTA) 60 MG capsule 30 capsule 11 3/31/2025 3/31/2026 --Due to side effects of liver and kidney issues and suicidal tendencies - Pt will wait until test results come back to discuss other medication.How long has patient had these symptoms:Pharmacy name and phone#:[facility] call back [facility]: call backComments:Please advise patient replies from provider may take up to 48 hours.

## 2025-04-25 ENCOUNTER — TELEPHONE (OUTPATIENT)
Dept: NEUROLOGY | Facility: CLINIC | Age: 70
End: 2025-04-25
Payer: MEDICARE

## 2025-04-25 NOTE — TELEPHONE ENCOUNTER
Called Pt to inform them that they have two appts on the same week and due to company policy they can only have one. Pt did not  left a message.

## 2025-04-28 ENCOUNTER — OFFICE VISIT (OUTPATIENT)
Dept: NEUROLOGY | Facility: CLINIC | Age: 70
End: 2025-04-28
Payer: MEDICARE

## 2025-04-28 VITALS
DIASTOLIC BLOOD PRESSURE: 84 MMHG | WEIGHT: 183 LBS | HEIGHT: 67 IN | SYSTOLIC BLOOD PRESSURE: 155 MMHG | BODY MASS INDEX: 28.72 KG/M2 | HEART RATE: 54 BPM

## 2025-04-28 DIAGNOSIS — R20.2 NUMBNESS AND TINGLING OF RIGHT ARM AND LEG: ICD-10-CM

## 2025-04-28 DIAGNOSIS — R25.2 MUSCLE CRAMP: Primary | ICD-10-CM

## 2025-04-28 DIAGNOSIS — R20.0 NUMBNESS AND TINGLING OF RIGHT ARM AND LEG: ICD-10-CM

## 2025-04-28 PROCEDURE — 99999 PR PBB SHADOW E&M-EST. PATIENT-LVL III: CPT | Mod: PBBFAC,HCNC,GC,

## 2025-04-28 RX ORDER — BACLOFEN 5 MG/1
5 TABLET ORAL 3 TIMES DAILY PRN
Qty: 90 TABLET | Refills: 2 | Status: SHIPPED | OUTPATIENT
Start: 2025-04-28

## 2025-04-28 NOTE — PROGRESS NOTES
I have reviewed the notes, assessments, and/or procedures performed by Dr. Culver, I concur with her/his documentation of Autumn Smart.  Date of Service: 4/28/2025

## 2025-04-30 ENCOUNTER — TELEPHONE (OUTPATIENT)
Dept: INTERNAL MEDICINE | Facility: CLINIC | Age: 70
End: 2025-04-30
Payer: MEDICARE

## 2025-04-30 NOTE — TELEPHONE ENCOUNTER
----- Message from Tech Fatimah sent at 4/30/2025 12:46 PM CDT -----  Contact: [phone]  .1MEDICALADVICE Patient is calling for Medical Advice regarding: pt was given a new medication, and wants to make sure she won't die since she is a heart patient, pt needs advice on what to doPatient wants a call back or [facility]:  callComments:Please advise patient replies from provider may take up to 48 hours.

## 2025-05-13 RX ORDER — QUETIAPINE 400 MG/1
400 TABLET, FILM COATED, EXTENDED RELEASE ORAL NIGHTLY
Qty: 90 TABLET | Refills: 3 | Status: SHIPPED | OUTPATIENT
Start: 2025-05-13

## 2025-05-13 NOTE — TELEPHONE ENCOUNTER
----- Message from Stu sent at 5/13/2025  1:42 PM CDT -----  Contact: [phone]  Requesting an RX refill or new RX.Is this a refill or new RX: refillRX name and strength (copy/paste from chart):  QUEtiapine (SEROQUEL XR) 400 MG Tb24 Is this a 30 day or 90 day RX: 90Pharmacy name and phone # (copy/paste from chart):  [facility]/pharmacy #[id number] - [place]  [address] [place] [address]: [phone] Fax: [phone]

## 2025-05-13 NOTE — TELEPHONE ENCOUNTER
No care due was identified.  [facility] Embedded Care Due Messages. Reference number: [id number].   5/13/2025 2:03:57 PM CDT

## 2025-05-15 ENCOUNTER — TELEPHONE (OUTPATIENT)
Dept: INTERNAL MEDICINE | Facility: CLINIC | Age: 70
End: 2025-05-15
Payer: MEDICARE
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
## 2025-05-15 NOTE — TELEPHONE ENCOUNTER
----- Message from Stu sent at 5/15/2025 11:20 AM CDT -----  Contact: [phone]  Requesting an RX refill or new RX.Is this a refill or new RX: newRX name and strength (copy/paste from chart):  Tricyclic Is this a 30 day or 90 day RX: 30Pharmacy name and phone # (copy/paste from chart):  [facility]/pharmacy #[id number] - [place] - [address] [address]: [phone] Fax: [phone] is trying to get medication because she has neuropathy and is trying to get that medication if possible

## 2025-05-20 ENCOUNTER — TELEPHONE (OUTPATIENT)
Dept: INTERNAL MEDICINE | Facility: CLINIC | Age: 70
End: 2025-05-20
Payer: MEDICARE

## 2025-05-20 NOTE — TELEPHONE ENCOUNTER
----- Message from Mariusz sent at 5/20/2025  1:37 PM CDT -----  Contact: Pt [phone]  .1MEDICALADVICE Patient is calling for Medical Advice regarding:Pt called in regards to checking status on previous message about medication she has not heard back please adviseHow long has patient had these symptoms:N/APharmacy name and phone#:[facility]/pharmacy #[id number] - [place], [place] - [address] Phone: [phone]: [phone] wants a call back or [facility]:Callback Comments:Please advise patient replies from provider may take up to 48 hours.

## 2025-05-24 NOTE — TELEPHONE ENCOUNTER
No care due was identified.  [facility] Embedded Care Due Messages. Reference number: [id number].   5/24/2025 7:02:19 AM CDT

## 2025-05-25 RX ORDER — VALACYCLOVIR HYDROCHLORIDE 500 MG/1
TABLET, FILM COATED ORAL
Qty: 60 TABLET | Refills: 2 | OUTPATIENT
Start: 2025-05-25

## 2025-05-25 NOTE — TELEPHONE ENCOUNTER
Refill Decision Note   Autumn Smart  is requesting a refill authorization.  Brief Assessment and Rationale for Refill:  Quick Discontinue     Medication Therapy Plan:  discontinued 3/14/25 by Shravan Diane MD; Pt reported not taking      Comments:     Note composed:6:20 AM 05/25/2025

## 2025-05-29 ENCOUNTER — PATIENT OUTREACH (OUTPATIENT)
Dept: ADMINISTRATIVE | Facility: HOSPITAL | Age: 70
End: 2025-05-29
Payer: MEDICARE

## 2025-05-29 DIAGNOSIS — Z78.0 MENOPAUSE: Primary | ICD-10-CM

## 2025-05-29 NOTE — PROGRESS NOTES
Order placed for bone density scan. Attempted to contact Pt to schedule dexa on same day after PCP appt 6/5/25.  No answer, left VM.  Chart reviewed, immunizations reconciled, Care Everywhere updated.  John García LPN  Care Coordinator  [facility]

## 2025-06-20 DIAGNOSIS — I10 ESSENTIAL HYPERTENSION: ICD-10-CM

## 2025-06-20 DIAGNOSIS — I10 HTN (HYPERTENSION), MALIGNANT: ICD-10-CM

## 2025-06-20 RX ORDER — AMLODIPINE BESYLATE 5 MG/1
5 TABLET ORAL DAILY
Qty: 90 TABLET | Refills: 3 | Status: SHIPPED | OUTPATIENT
Start: 2025-06-20

## 2025-06-20 RX ORDER — LISINOPRIL 40 MG/1
40 TABLET ORAL DAILY
Qty: 90 TABLET | Refills: 1 | Status: SHIPPED | OUTPATIENT
Start: 2025-06-20

## 2025-06-20 RX ORDER — HYDROCHLOROTHIAZIDE 25 MG/1
25 TABLET ORAL DAILY
Qty: 90 TABLET | Refills: 3 | Status: SHIPPED | OUTPATIENT
Start: 2025-06-20

## 2025-06-20 NOTE — TELEPHONE ENCOUNTER
She had to reschedule, she had to go to [place] to  her vehicle.     Wants to know if you can fill her  BP medication.

## 2025-06-20 NOTE — TELEPHONE ENCOUNTER
Copied from CRM #[id number]. Topic: Medications - Medication Refill  >> Jun 20, 2025 12:55 PM Nanda wrote:  Type: Returning a call    Who left a message?Pt     When did the practice call?    Does patient know what this is regarding:    Who called and best call back number:    Would the patient rather a call back or a response via [facility]? Call back     Comments:Pt would like a  call back to discuss med refill.Pt says she doesn't know what med she needs refill on. Please call pt to advise [phone]

## 2025-06-20 NOTE — TELEPHONE ENCOUNTER
No care due was identified.  [facility] Embedded Care Due Messages. Reference number: [id number].   6/20/2025 1:04:52 PM CDT

## 2025-07-02 DIAGNOSIS — I10 ESSENTIAL HYPERTENSION: ICD-10-CM

## 2025-07-02 RX ORDER — POTASSIUM CHLORIDE 1500 MG/1
20 TABLET, EXTENDED RELEASE ORAL DAILY
Qty: 90 TABLET | Refills: 3 | Status: SHIPPED | OUTPATIENT
Start: 2025-07-02

## 2025-07-02 RX ORDER — AMLODIPINE BESYLATE 5 MG/1
5 TABLET ORAL DAILY
Qty: 90 TABLET | Refills: 3 | OUTPATIENT
Start: 2025-07-02

## 2025-07-02 NOTE — TELEPHONE ENCOUNTER
Refill Decision Note   Autumn Smart  is requesting a refill authorization.  Brief Assessment and Rationale for Refill:  Approve     Medication Therapy Plan:        Comments:     Note composed:2:36 PM 07/02/2025

## 2025-07-02 NOTE — TELEPHONE ENCOUNTER
No care due was identified.  [facility] Embedded Care Due Messages. Reference number: [id number].   7/02/2025 1:28:39 PM CDT

## 2025-07-02 NOTE — TELEPHONE ENCOUNTER
Copied from CRM #[id number]. Topic: Medications - Medication Refill  >> Jul 2, 2025  1:15 PM Mehnaz wrote:  Requesting an RX refill or new RX.    Is this a refill or new RX: Refill    RX name and strength (copy/paste from chart):  amLODIPine (NORVASC) 5 MG tablet    Is this a 30 day or 90 day RX: 90    Pharmacy name and phone # (copy/paste from chart):    [facility]/pharmacy #[id number] - [place] - [address] [facility]  [address] [place] [address]  [place] [address]  Phone: [phone] Fax: [phone]    The doctors have asked that we provide their patients with the following 2 reminders -- prescription refills can take up to 72 hours, and a friendly reminder that in the future you can use your [facility] account to request refills: Call

## 2025-07-02 NOTE — TELEPHONE ENCOUNTER
Copied from CRM #[id number]. Topic: Medications - Medication Refill  >> Jul 2, 2025  1:19 PM Bessy wrote:  Requesting an RX refill or new RX.    Is this a refill or new RX:     RX name and strength (copy/paste from chart):  potassium chloride (K-TAB) 20 mEq 90 tablet    Is this a 30 day or 90 day RX:     Pharmacy name and phone # (copy/paste from chart):    [facility]/pharmacy #[id number] - [place] - [address]  [place] [address]  Phone: [phone] Fax: [phone]    Who called and call back number:[phone]  The doctors have asked that we provide their patients with the following 2 reminders -- prescription refills can take up to 72 hours, and a friendly reminder that in the future you can use your [facility] account to request refills:

## 2025-07-09 NOTE — PROGRESS NOTES
"Subjective:       Patient ID: Autumn Smart is a 63 y.o. female.    Vitals:  height is 5' 7" (1.702 m) and weight is 79.4 kg (175 lb). Her oral temperature is 98 °F (36.7 °C). Her blood pressure is 186/83 (abnormal) and her pulse is 56 (abnormal). Her oxygen saturation is 100%.     Chief Complaint: Female  Problem    Female  Problem   The patient's primary symptoms include genital itching. The patient's pertinent negatives include no genital lesions, genital odor, missed menses, pelvic pain, vaginal bleeding or vaginal discharge. Primary symptoms comment: vaginal irritation & discomfort. This is a new problem. The current episode started in the past 7 days. The problem occurs constantly. The problem has been gradually worsening. The pain is mild. The problem affects both sides. She is not pregnant. Pertinent negatives include no abdominal pain, back pain, chills, discolored urine, dysuria, fever, frequency, hematuria, nausea, rash, urgency or vomiting. She has tried douching and antifungals (Clontrimazole & Betamethasone Dipropinate cream 1% 0.05%) for the symptoms. The treatment provided no relief. She is sexually active. It is unknown whether or not her partner has an STD. She uses nothing for contraception. She is postmenopausal. There is no history of ovarian cysts.       Constitution: Negative for chills and fever.   Neck: Negative for painful lymph nodes.   Gastrointestinal: Negative for abdominal pain, nausea and vomiting.   Genitourinary: Negative for dysuria, frequency, urgency, urine decreased, hematuria, history of kidney stones, painful menstruation, irregular menstruation, missed menses, heavy menstrual bleeding, ovarian cysts, genital trauma, vaginal pain, vaginal discharge, vaginal bleeding, vaginal odor, painful intercourse, genital sore, painful ejaculation and pelvic pain.   Musculoskeletal: Negative for back pain.   Skin: Negative for rash and lesion.   Hematologic/Lymphatic: Negative " Patient IV removed. Catheter tip intact. Discharged instruction explained. Patient/family verbalized understanding. Patient medication picked up from pharmacy. Patient ambulated for discharge with all belongings.      for swollen lymph nodes.       Objective:      Physical Exam   Constitutional: She is oriented to person, place, and time. She appears well-developed and well-nourished.   HENT:   Head: Normocephalic and atraumatic.   Right Ear: External ear normal.   Left Ear: External ear normal.   Nose: Nose normal. No nasal deformity. No epistaxis.   Mouth/Throat: Oropharynx is clear and moist and mucous membranes are normal.   Eyes: Conjunctivae and lids are normal.   Neck: Trachea normal, normal range of motion and phonation normal. Neck supple.   Cardiovascular: Normal rate, regular rhythm, normal heart sounds and normal pulses.   Pulmonary/Chest: Effort normal and breath sounds normal.   Abdominal: Soft. Normal appearance and bowel sounds are normal. She exhibits no distension and no mass. There is no tenderness. There is no CVA tenderness. Hernia confirmed negative in the right inguinal area.   Genitourinary: Rectum normal and vagina normal.       Pelvic exam was performed with patient supine. No labial fusion. There is rash on the right labia. There is no tenderness, lesion or injury on the right labia. There is rash on the left labia. There is no tenderness, lesion or injury on the left labia. No erythema, tenderness or bleeding in the vagina. No foreign body in the vagina. No signs of injury around the vagina.   Lymphadenopathy: No inguinal adenopathy noted on the right or left side.   Neurological: She is alert and oriented to person, place, and time.   Skin: Skin is warm, dry and intact.   Psychiatric: She has a normal mood and affect. Her speech is normal and behavior is normal. Cognition and memory are normal.   Nursing note and vitals reviewed.      Assessment:       1. Vaginal discomfort    2. Yeast vaginitis        Plan:         Vaginal discomfort  -     POCT Urinalysis, Dipstick, Automated, W/O Scope  -     Ambulatory referral to Gynecology    Yeast vaginitis  -     fluconazole (DIFLUCAN) 150 MG Tab; Take 1  tablet (150 mg total) by mouth once daily. for 1 day  Dispense: 1 tablet; Refill: 0  -     clotrimazole (LOTRIMIN) 1 % Crea; Place 1 suppository (1 applicator total) vaginally every evening. for 7 days  Dispense: 45 g; Refill: 0          Vaginal Infection: Yeast (Candidiasis)  Yeast infection occurs when yeast in the vagina increase and attacks the vaginal tissues. Yeast is a type of fungus. These infections are often caused by a type of yeast called Candida albicans. Other species of yeast can also cause infections. Factors that may make infection more likely include recent antibiotic use, douching, or increased sex. Yeast infections are more common in women who have diabetes, or are obese or pregnant, or have a weak immune system.  Symptoms of yeast infection  · Clumpy or thin, white discharge, which may look like cottage cheese  · No odor or minimal odor  · Severe vaginal itching or burning  · Burning with urination  · Swelling, redness of vulva  · Pain during sex  Treating yeast infection  Yeast infection is treated with a vaginal antifungal cream. In some cases, antifungal pills are prescribed instead. During treatment:  · Finish all of your medicine, even if your symptoms go away.  · Apply the cream before going to bed. Lie flat after applying so that it doesn't drip out.  · Do not douche or use tampons.  · Don't rely on a diaphragm or condoms, since the cream may weaken them.  · Avoid intercourse if advised by your healthcare provider.     Should I treat a yeast infection myself?  Discuss with your healthcare provider whether you should use over-the-counter medicines to treat a yeast infection. Self-treatment may depend on whether:  · You've had a yeast infection in the past.  · You're at risk for STDs.  Call your healthcare provider if symptoms do not go away or come back after treatment.   Date Last Reviewed: 3/1/2017  © 1643-9644 [employer]. [address] [place] [address], [place] [address]. All  rights reserved. This information is not intended as a substitute for professional medical care. Always follow your healthcare professional's instructions.        Preventing Vaginitis     Use mild, unscented soap when you bathe or shower to avoid irritating your vagina.    Vaginitis is irritation or infection of the vagina or vulva (the outside opening of the vagina). Vaginitis can be caused by bacteria, viruses, parasites, or yeast. Chemicals (such as in perfumes or soaps or in spermicides) can sometimes be a cause. Vaginitis can be caused by hormone changes in pregnancy or with menopause. You can help prevent vaginitis. Follow the tips below. And see your healthcare provider if you have any symptoms.  Hygiene  · Avoid chemicals. Do not use vaginal sprays. Do not use scented toilet paper or tampons that are scented. Sprays and scents have chemicals that can irritate your vagina.  · Do not douche unless you are told to by your healthcare provider. Douching is rarely needed. And it upsets the normal balance in the vagina.  · Wash yourself well. Wash the outer vaginal area (vulva) every day with mild, unscented soap. Keep it as dry as possible.  · Wipe correctly. Make sure to wipe from front to back after a bowel movement. This helps keep from spreading bacteria from your anus to your vagina.  · Change your tampon often. During your period, make sure to change your tampon as often as directed on the package. This allows the normal flow of vaginal discharge and blood.  Lifestyle  · Limit your number of sexual partners. The more partners you have, the greater your risk of infection. Using condoms helps reduce your risk.  · Get enough sleep. Sleep helps keep your bodys immune system healthy. This helps you fight infection.  · Lose weight, if needed. Excess weight can reduce air circulation around your vagina. This can increase your risk of infection.  · Exercise regularly. Regular activity helps keep your body  healthy.  · Take antibiotics only as directed. Antibiotics can change the normal chemical balance in the vagina.    Clothing  · Dont sit in wet clothes. Yeast thrives when its warm and damp.  · Dont wear tight pants. And dont wear tights, leggings, or hose without a cotton crotch. These types of clothing trap warmth and moisture.  · Wear cotton underwear. Cotton lets air circulate around the vagina.  Symptoms of vaginitis  · Irritation, swelling, or itching of the genital area  · Vaginal discharge  · Bad vaginal odor  · Pain or burning during urination   Date Last Reviewed: 12/1/2016  © 3487-6233 [employer]. [address] [place] [address], [place] [address]. All rights reserved. This information is not intended as a substitute for professional medical care. Always follow your healthcare professional's instructions.      Discussed discontinuation of douching.  Discussed pH healthy balance soap.  Discussed Kegel exercises for discomfort.  Discussed possible initiation of a topical estrogen gel which she can discuss at her gynecology appointment for next week.    Please follow up with your Primary care provider within 2-5 days if your signs and symptoms have not resolved or worsen.     If your condition worsens or fails to improve we recommend that you receive another evaluation at the emergency room immediately or contact your primary medical clinic to discuss your concerns.   You must understand that you have received an Urgent Care treatment only and that you may be released before all of your medical problems are known or treated. You, the patient, will arrange for follow up care as instructed.     RED FLAGS/WARNING SYMPTOMS DISCUSSED WITH PATIENT THAT WOULD WARRANT EMERGENT MEDICAL ATTENTION. PATIENT VERBALIZED UNDERSTANDING.     Elevated Blood Pressure  Your blood pressure was elevated during your visit to the urgent care.  It was not so high that immediate care was needed but it is recommended that  you monitor your blood pressure over the next week or two to make sure that it is not staying elevated.  Please have your blood pressure taken 2-3 times daily at different times of the day.  Write all of those blood pressures down and record the time that they were taken.  Keep all that information and take it with you to see your Primary Care Physician.  If your blood pressure is consistently above 140/90 you will need to follow up with your PCP more quickly

## 2025-07-15 ENCOUNTER — TELEPHONE (OUTPATIENT)
Dept: INTERNAL MEDICINE | Facility: CLINIC | Age: 70
End: 2025-07-15
Payer: MEDICARE

## 2025-07-16 ENCOUNTER — TELEPHONE (OUTPATIENT)
Dept: INTERNAL MEDICINE | Facility: CLINIC | Age: 70
End: 2025-07-16
Payer: MEDICARE

## 2025-07-16 ENCOUNTER — OFFICE VISIT (OUTPATIENT)
Dept: INTERNAL MEDICINE | Facility: CLINIC | Age: 70
End: 2025-07-16
Payer: MEDICARE

## 2025-07-16 VITALS
BODY MASS INDEX: 28.13 KG/M2 | DIASTOLIC BLOOD PRESSURE: 90 MMHG | OXYGEN SATURATION: 100 % | SYSTOLIC BLOOD PRESSURE: 158 MMHG | HEART RATE: 62 BPM | HEIGHT: 67 IN | WEIGHT: 179.25 LBS

## 2025-07-16 DIAGNOSIS — Z79.899 DRUG THERAPY: ICD-10-CM

## 2025-07-16 DIAGNOSIS — R20.0 NUMBNESS AND TINGLING OF RIGHT ARM AND LEG: Primary | ICD-10-CM

## 2025-07-16 DIAGNOSIS — N95.9 MENOPAUSAL AND POSTMENOPAUSAL DISORDER: ICD-10-CM

## 2025-07-16 DIAGNOSIS — R20.2 NUMBNESS AND TINGLING OF RIGHT ARM AND LEG: Primary | ICD-10-CM

## 2025-07-16 PROCEDURE — 99214 OFFICE O/P EST MOD 30 MIN: CPT | Mod: HCNC,S$GLB,, | Performed by: FAMILY MEDICINE

## 2025-07-16 PROCEDURE — 1159F MED LIST DOCD IN RCRD: CPT | Mod: CPTII,HCNC,S$GLB, | Performed by: FAMILY MEDICINE

## 2025-07-16 PROCEDURE — 99999 PR PBB SHADOW E&M-EST. PATIENT-LVL IV: CPT | Mod: PBBFAC,HCNC,, | Performed by: FAMILY MEDICINE

## 2025-07-16 PROCEDURE — 3044F HG A1C LEVEL LT 7.0%: CPT | Mod: CPTII,HCNC,S$GLB, | Performed by: FAMILY MEDICINE

## 2025-07-16 PROCEDURE — 3288F FALL RISK ASSESSMENT DOCD: CPT | Mod: CPTII,HCNC,S$GLB, | Performed by: FAMILY MEDICINE

## 2025-07-16 PROCEDURE — G2211 COMPLEX E/M VISIT ADD ON: HCPCS | Mod: HCNC,S$GLB,, | Performed by: FAMILY MEDICINE

## 2025-07-16 PROCEDURE — 3080F DIAST BP >= 90 MM HG: CPT | Mod: CPTII,HCNC,S$GLB, | Performed by: FAMILY MEDICINE

## 2025-07-16 PROCEDURE — 3008F BODY MASS INDEX DOCD: CPT | Mod: CPTII,HCNC,S$GLB, | Performed by: FAMILY MEDICINE

## 2025-07-16 PROCEDURE — 3072F LOW RISK FOR RETINOPATHY: CPT | Mod: CPTII,HCNC,S$GLB, | Performed by: FAMILY MEDICINE

## 2025-07-16 PROCEDURE — 1125F AMNT PAIN NOTED PAIN PRSNT: CPT | Mod: CPTII,HCNC,S$GLB, | Performed by: FAMILY MEDICINE

## 2025-07-16 PROCEDURE — 1101F PT FALLS ASSESS-DOCD LE1/YR: CPT | Mod: CPTII,HCNC,S$GLB, | Performed by: FAMILY MEDICINE

## 2025-07-16 PROCEDURE — 3077F SYST BP >= 140 MM HG: CPT | Mod: CPTII,HCNC,S$GLB, | Performed by: FAMILY MEDICINE

## 2025-07-16 PROCEDURE — 4010F ACE/ARB THERAPY RXD/TAKEN: CPT | Mod: CPTII,HCNC,S$GLB, | Performed by: FAMILY MEDICINE

## 2025-07-16 RX ORDER — MELOXICAM 7.5 MG/1
7.5 TABLET ORAL
COMMUNITY
Start: 2025-05-20 | End: 2025-07-16

## 2025-07-16 RX ORDER — PREGABALIN 25 MG/1
CAPSULE ORAL
COMMUNITY
Start: 2025-06-20

## 2025-07-16 RX ORDER — DULOXETIN HYDROCHLORIDE 60 MG/1
60 CAPSULE, DELAYED RELEASE ORAL
COMMUNITY
Start: 2025-06-17 | End: 2025-07-16

## 2025-07-16 RX ORDER — OXYCODONE AND ACETAMINOPHEN 10; 325 MG/1; MG/1
TABLET ORAL
COMMUNITY
Start: 2025-06-20
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
## 2025-07-17 ENCOUNTER — TELEPHONE (OUTPATIENT)
Dept: INTERNAL MEDICINE | Facility: CLINIC | Age: 70
End: 2025-07-17
Payer: MEDICARE

## 2025-07-17 NOTE — TELEPHONE ENCOUNTER
Pt called [facility] about the 2 medications Oxycodone and Pregabalin, that we told her yesterday was sent to the pharmacy by them . [facility] said that they are unsure what she is talking about.     PT says that she already gets her oxycodone from [facility] but they are unaware of the pregabalin. She will call them back again to be sure she is telling them the right name.

## 2025-07-17 NOTE — TELEPHONE ENCOUNTER
Copied from CRM #[id number]. Topic: General Inquiry - Patient Advice  >> Jul 17, 2025  8:42 AM Melissa wrote:  .1MEDICALADVICE     Patient is calling for Medical Advice regarding: pt would like a call  back in regards to the medications that were called in for her yesterday. Please call and advise.     How long has patient had these symptoms:    Pharmacy name and phone#:    Patient wants a call back or thru [facility], provide patient's call back phone number:    Comments:    Please advise patient replies from provider may take up to 48 hours.

## 2025-07-18 ENCOUNTER — TELEPHONE (OUTPATIENT)
Dept: PODIATRY | Facility: CLINIC | Age: 70
End: 2025-07-18
Payer: MEDICARE

## 2025-07-18 ENCOUNTER — TELEPHONE (OUTPATIENT)
Dept: INTERNAL MEDICINE | Facility: CLINIC | Age: 70
End: 2025-07-18
Payer: MEDICARE

## 2025-07-18 NOTE — TELEPHONE ENCOUNTER
Attempted to contact patient to offer an earlier appt. No answer and unable to leave a voicemail. TOM Cooney 07/18/2025

## 2025-07-18 NOTE — TELEPHONE ENCOUNTER
Last saw [facility] neuro center Dr. Terry Kaur 6/19/25. Refill of chronic pain med, percocet, for pt. And started Pregabalin 25 bid  Scheduled for emg/ncs for RUE and ANGEL LUIS at that visit on 6/19.   During her visit with me in 7/2025, pt was unaware of pregabalin but I can see from  that she filled prescription which makes this situation tricky. I would want her to try this med as well.       Scan records to chart including notes and NAGEL LUIS documentation today

## 2025-07-18 NOTE — TELEPHONE ENCOUNTER
Copied from CRM #[id number]. Topic: General Inquiry - Patient Advice  >> Jul 18, 2025  1:36 PM Sheri wrote:  Type: Needs Medical Advice  Who Called:  Pt     Best Call Back Number: [phone]    Additional Information: Pt would like to know why her appt is so far out in Oct. Pt stated appt was made by nurse in office and was wondering if their was a reason she needs to wait so long. Pls call back and advise

## 2025-07-22 ENCOUNTER — PATIENT OUTREACH (OUTPATIENT)
Dept: ADMINISTRATIVE | Facility: HOSPITAL | Age: 70
End: 2025-07-22
Payer: MEDICARE

## 2025-07-22 ENCOUNTER — TELEPHONE (OUTPATIENT)
Dept: INTERNAL MEDICINE | Facility: CLINIC | Age: 70
End: 2025-07-22
Payer: MEDICARE

## 2025-07-22 NOTE — TELEPHONE ENCOUNTER
Spoke with pt, she stated that Thu coming out to her home on Thursday July 24, 2025 at 09:30 am to do the Bone Density test.

## 2025-07-22 NOTE — TELEPHONE ENCOUNTER
Copied from CRM #[id number]. Topic: General Inquiry - Return Call  >> Jul 22, 2025 12:52 PM Court wrote:  Type:  Patient Returning Call    Who Called:Patient  Who Left Message for Patient:Sarahy  Does the patient know what this is regarding?:no  Would the patient rather a call back or a response via [facility]? Call   Best Call Back Number:   Additional Information: portal as well

## 2025-07-22 NOTE — PROGRESS NOTES
Chart reviewed and updated. Reconciled immunizations, health maintenance and care everywhere.  Attempted to contact pt phone no vm mail space available, other number for son asked to have pt call office. Also sent portal msg.      Sarahy [facility]  Panel Care Coordinator  [facility]  [phone]  For Shravan Diane MD

## 2025-07-29 LAB — HEMOCCULT STL QL IA: NEGATIVE

## 2025-08-11 ENCOUNTER — OFFICE VISIT (OUTPATIENT)
Dept: PODIATRY | Facility: CLINIC | Age: 70
End: 2025-08-11
Payer: MEDICARE

## 2025-08-11 VITALS — BODY MASS INDEX: 27.65 KG/M2 | HEIGHT: 67 IN | WEIGHT: 176.19 LBS

## 2025-08-11 DIAGNOSIS — M20.11 VALGUS DEFORMITY OF BOTH GREAT TOES: Primary | ICD-10-CM

## 2025-08-11 DIAGNOSIS — M20.12 VALGUS DEFORMITY OF BOTH GREAT TOES: Primary | ICD-10-CM

## 2025-08-11 DIAGNOSIS — R26.2 DIFFICULTY WALKING: ICD-10-CM

## 2025-08-11 DIAGNOSIS — M79.604 PAIN OF RIGHT LOWER EXTREMITY: ICD-10-CM

## 2025-08-11 PROCEDURE — 1126F AMNT PAIN NOTED NONE PRSNT: CPT | Mod: CPTII,S$GLB,, | Performed by: PODIATRIST

## 2025-08-11 PROCEDURE — 4010F ACE/ARB THERAPY RXD/TAKEN: CPT | Mod: CPTII,S$GLB,, | Performed by: PODIATRIST

## 2025-08-11 PROCEDURE — 3072F LOW RISK FOR RETINOPATHY: CPT | Mod: CPTII,S$GLB,, | Performed by: PODIATRIST

## 2025-08-11 PROCEDURE — 1160F RVW MEDS BY RX/DR IN RCRD: CPT | Mod: CPTII,S$GLB,, | Performed by: PODIATRIST

## 2025-08-11 PROCEDURE — 99213 OFFICE O/P EST LOW 20 MIN: CPT | Mod: S$GLB,,, | Performed by: PODIATRIST

## 2025-08-11 PROCEDURE — 3008F BODY MASS INDEX DOCD: CPT | Mod: CPTII,S$GLB,, | Performed by: PODIATRIST

## 2025-08-11 PROCEDURE — 1159F MED LIST DOCD IN RCRD: CPT | Mod: CPTII,S$GLB,, | Performed by: PODIATRIST

## 2025-08-11 PROCEDURE — 3044F HG A1C LEVEL LT 7.0%: CPT | Mod: CPTII,S$GLB,, | Performed by: PODIATRIST

## 2025-08-11 RX ORDER — TIZANIDINE 4 MG/1
4 TABLET ORAL 2 TIMES DAILY
COMMUNITY
Start: 2025-05-19

## 2025-08-11 RX ORDER — VALACYCLOVIR HYDROCHLORIDE 500 MG/1
500 TABLET, FILM COATED ORAL 2 TIMES DAILY
COMMUNITY
Start: 2025-04-26

## 2025-08-11 RX ORDER — BACLOFEN 5 MG/1
5 TABLET ORAL 3 TIMES DAILY
COMMUNITY
Start: 2025-08-07

## 2025-08-12 ENCOUNTER — TELEPHONE (OUTPATIENT)
Dept: INTERNAL MEDICINE | Facility: CLINIC | Age: 70
End: 2025-08-12
Payer: MEDICARE

## 2025-08-14 DIAGNOSIS — E78.2 MIXED HYPERLIPIDEMIA: ICD-10-CM

## 2025-08-14 DIAGNOSIS — E78.5 HYPERLIPIDEMIA ASSOCIATED WITH TYPE 2 DIABETES MELLITUS: ICD-10-CM

## 2025-08-14 DIAGNOSIS — E11.69 HYPERLIPIDEMIA ASSOCIATED WITH TYPE 2 DIABETES MELLITUS: ICD-10-CM

## 2025-08-14 RX ORDER — ATORVASTATIN CALCIUM 20 MG/1
20 TABLET, FILM COATED ORAL
Qty: 90 TABLET | Refills: 1 | Status: SHIPPED | OUTPATIENT
Start: 2025-08-14

## 2025-08-21 ENCOUNTER — OFFICE VISIT (OUTPATIENT)
Dept: URGENT CARE | Facility: CLINIC | Age: 70
End: 2025-08-21
Payer: MEDICARE

## 2025-08-21 VITALS
OXYGEN SATURATION: 98 % | RESPIRATION RATE: 20 BRPM | BODY MASS INDEX: 27.62 KG/M2 | HEIGHT: 67 IN | WEIGHT: 176 LBS | SYSTOLIC BLOOD PRESSURE: 130 MMHG | HEART RATE: 88 BPM | DIASTOLIC BLOOD PRESSURE: 74 MMHG | TEMPERATURE: 98 F

## 2025-08-21 DIAGNOSIS — N89.8 VAGINAL DISCHARGE: ICD-10-CM

## 2025-08-21 DIAGNOSIS — R30.0 DYSURIA: ICD-10-CM

## 2025-08-21 DIAGNOSIS — J06.9 UPPER RESPIRATORY TRACT INFECTION, UNSPECIFIED TYPE: ICD-10-CM

## 2025-08-21 DIAGNOSIS — N30.00 ACUTE CYSTITIS WITHOUT HEMATURIA: Primary | ICD-10-CM

## 2025-08-21 DIAGNOSIS — Z11.3 ENCOUNTER FOR SCREENING FOR INFECTIONS WITH A PREDOMINANTLY SEXUAL MODE OF TRANSMISSION: ICD-10-CM

## 2025-08-21 DIAGNOSIS — Z20.2 EXPOSURE TO STD: ICD-10-CM

## 2025-08-21 LAB
BILIRUBIN, UA POC OHS: NEGATIVE
BLOOD, UA POC OHS: NEGATIVE
CLARITY, UA POC OHS: CLEAR
COLOR, UA POC OHS: YELLOW
GLUCOSE, UA POC OHS: NEGATIVE
HIV 1+2 AB+HIV1 P24 AG SERPL QL IA: NORMAL
KETONES, UA POC OHS: NEGATIVE
LEUKOCYTES, UA POC OHS: NEGATIVE
NITRITE, UA POC OHS: NEGATIVE
PH, UA POC OHS: 6
PROTEIN, UA POC OHS: NEGATIVE
SPECIFIC GRAVITY, UA POC OHS: 1
T PALLIDUM IGG+IGM SER QL: NORMAL
UROBILINOGEN, UA POC OHS: 0.2

## 2025-08-21 PROCEDURE — 87389 HIV-1 AG W/HIV-1&-2 AB AG IA: CPT | Mod: HCNC

## 2025-08-21 PROCEDURE — 87591 N.GONORRHOEAE DNA AMP PROB: CPT | Mod: HCNC

## 2025-08-21 PROCEDURE — 81003 URINALYSIS AUTO W/O SCOPE: CPT | Mod: QW,S$GLB,,

## 2025-08-21 PROCEDURE — 87086 URINE CULTURE/COLONY COUNT: CPT | Mod: HCNC

## 2025-08-21 PROCEDURE — 86593 SYPHILIS TEST NON-TREP QUANT: CPT | Mod: HCNC

## 2025-08-21 PROCEDURE — 99204 OFFICE O/P NEW MOD 45 MIN: CPT | Mod: S$GLB,,,

## 2025-08-21 RX ORDER — FLUTICASONE PROPIONATE 50 MCG
1 SPRAY, SUSPENSION (ML) NASAL DAILY
Qty: 18.2 ML | Refills: 0 | Status: SHIPPED | OUTPATIENT
Start: 2025-08-21 | End: 2025-09-20

## 2025-08-21 RX ORDER — CETIRIZINE HYDROCHLORIDE 10 MG/1
10 TABLET ORAL DAILY
Qty: 30 TABLET | Refills: 0 | Status: SHIPPED | OUTPATIENT
Start: 2025-08-21 | End: 2025-09-20

## 2025-08-21 RX ORDER — PROMETHAZINE HYDROCHLORIDE AND DEXTROMETHORPHAN HYDROBROMIDE 6.25; 15 MG/5ML; MG/5ML
5 SYRUP ORAL EVERY 6 HOURS PRN
Qty: 118 ML | Refills: 0 | Status: SHIPPED | OUTPATIENT
Start: 2025-08-21 | End: 2025-08-31

## 2025-08-21 RX ORDER — NITROFURANTOIN 25; 75 MG/1; MG/1
100 CAPSULE ORAL 2 TIMES DAILY
Qty: 10 CAPSULE | Refills: 0 | Status: SHIPPED | OUTPATIENT
Start: 2025-08-21 | End: 2025-08-26

## 2025-08-23 LAB — BACTERIA UR CULT: NORMAL

## 2025-08-25 ENCOUNTER — PATIENT OUTREACH (OUTPATIENT)
Dept: ADMINISTRATIVE | Facility: HOSPITAL | Age: 70
End: 2025-08-25
Payer: MEDICARE

## 2025-08-27 ENCOUNTER — TELEPHONE (OUTPATIENT)
Dept: INTERNAL MEDICINE | Facility: CLINIC | Age: 70
End: 2025-08-27
Payer: MEDICARE

## (undated) DEVICE — DRESSING LEUKOPLAST FLEX 1X3IN